# Patient Record
Sex: FEMALE | Race: WHITE | NOT HISPANIC OR LATINO | Employment: FULL TIME | URBAN - METROPOLITAN AREA
[De-identification: names, ages, dates, MRNs, and addresses within clinical notes are randomized per-mention and may not be internally consistent; named-entity substitution may affect disease eponyms.]

---

## 2017-01-05 ENCOUNTER — OFFICE VISIT (OUTPATIENT)
Dept: OPHTHALMOLOGY | Facility: CLINIC | Age: 27
End: 2017-01-05
Payer: COMMERCIAL

## 2017-01-05 DIAGNOSIS — H52.202 ASTIGMATISM, LEFT: ICD-10-CM

## 2017-01-05 DIAGNOSIS — H52.13 MYOPIA, BILATERAL: Primary | ICD-10-CM

## 2017-01-05 DIAGNOSIS — H04.123 DRY EYES, BILATERAL: ICD-10-CM

## 2017-01-05 PROCEDURE — 92004 COMPRE OPH EXAM NEW PT 1/>: CPT | Mod: S$GLB,,, | Performed by: OPTOMETRIST

## 2017-01-05 PROCEDURE — 92015 DETERMINE REFRACTIVE STATE: CPT | Mod: S$GLB,,, | Performed by: OPTOMETRIST

## 2017-01-05 PROCEDURE — 99999 PR PBB SHADOW E&M-EST. PATIENT-LVL II: CPT | Mod: PBBFAC,,, | Performed by: OPTOMETRIST

## 2017-01-05 PROCEDURE — 92310 CONTACT LENS FITTING OU: CPT | Mod: ,,, | Performed by: OPTOMETRIST

## 2017-01-05 NOTE — PROGRESS NOTES
HPI     PTs last exam was 1 year ago. PT c/o blurred distance va and wears cls   full time.   HPI    Any vision changes since last exam: slight decrease in distance va  Eye pain: no  Other ocular symptoms: itching, dryness OU. Works on computer all day    Do you wear currently wear glasses or contacts? both    Interested in contacts today? yes    Do you plan on getting new glasses today? If needed         Last edited by Gwen Fernandez MA on 1/5/2017  1:43 PM.         Assessment /Plan     For exam results, see Encounter Report.    Myopia, bilateral  Astigmatism, left  Eyeglass Final Rx     Eyeglass Final Rx      Sphere Cylinder Axis   Right -1.50     Left -2.00 +0.50 100       Expiration Date:  1/6/2018              Dry eyes, bilateral  D/c clear eyes  Start preservative free AT bid-PRN  Dispensed trial contact lenses today. RTC 1 week for contact lens check.  Dispensed trials of Dailies Total 1 and Air Optix

## 2017-01-18 ENCOUNTER — OFFICE VISIT (OUTPATIENT)
Dept: OPHTHALMOLOGY | Facility: CLINIC | Age: 27
End: 2017-01-18
Payer: COMMERCIAL

## 2017-01-18 DIAGNOSIS — H52.202 ASTIGMATISM, LEFT: ICD-10-CM

## 2017-01-18 DIAGNOSIS — H52.13 MYOPIA, BILATERAL: Primary | ICD-10-CM

## 2017-01-18 PROCEDURE — 99499 UNLISTED E&M SERVICE: CPT | Mod: S$GLB,,, | Performed by: OPTOMETRIST

## 2017-01-18 NOTE — PROGRESS NOTES
HPI     Pt states that the contact lenses are great, feel great and she can see   very well with them, but she is concerned with the cost of the daily's.        Last edited by Tom Zhao, Patient Care Assistant on 1/18/2017  8:24   AM.         Assessment /Plan     For exam results, see Encounter Report.    Myopia, bilateral    Astigmatism, left      Contact Lens Prescription (1/18/2017)       Brand Base Curve Diameter Sphere   Right Enrique Dailies Total 1 8.50 14.1 -1.50   Left Enrique Dailies Total 1 8.50 14.1 -1.50       Expiration Date:  1/19/2018    Replacement:  Daily    Wearing Schedule:  Daily wear      Contact Lens Prescription (1/18/2017)  #2      Brand Base Curve Diameter Sphere   Right Air Optix Aqua 8.60 14.2 -1.50   Left Air Optix Aqua 8.60 14.2 -1.50       Expiration Date:  1/19/2018    Replacement:  Monthly    Solutions:  OptiFree PureMoist    Wearing Schedule:  Daily wear      RTC 1 yr for undilated eye exam or PRN if any problems.   Discussed above and answered questions.

## 2017-02-01 ENCOUNTER — OFFICE VISIT (OUTPATIENT)
Dept: INTERNAL MEDICINE | Facility: CLINIC | Age: 27
End: 2017-02-01
Payer: COMMERCIAL

## 2017-02-01 ENCOUNTER — LAB VISIT (OUTPATIENT)
Dept: LAB | Facility: HOSPITAL | Age: 27
End: 2017-02-01
Attending: FAMILY MEDICINE
Payer: COMMERCIAL

## 2017-02-01 ENCOUNTER — PATIENT MESSAGE (OUTPATIENT)
Dept: INTERNAL MEDICINE | Facility: CLINIC | Age: 27
End: 2017-02-01

## 2017-02-01 VITALS
SYSTOLIC BLOOD PRESSURE: 108 MMHG | WEIGHT: 176.38 LBS | HEART RATE: 92 BPM | TEMPERATURE: 99 F | HEIGHT: 67 IN | OXYGEN SATURATION: 97 % | BODY MASS INDEX: 27.68 KG/M2 | DIASTOLIC BLOOD PRESSURE: 72 MMHG

## 2017-02-01 DIAGNOSIS — N94.3 PMS (PREMENSTRUAL SYNDROME): ICD-10-CM

## 2017-02-01 DIAGNOSIS — Z01.411 ENCOUNTER FOR GYNECOLOGICAL EXAMINATION WITH ABNORMAL FINDING: Primary | ICD-10-CM

## 2017-02-01 DIAGNOSIS — Z13.9 SCREENING: ICD-10-CM

## 2017-02-01 DIAGNOSIS — J45.909 ASTHMATIC BRONCHITIS, UNSPECIFIED ASTHMA SEVERITY, UNCOMPLICATED: ICD-10-CM

## 2017-02-01 DIAGNOSIS — N94.3 PMS (PREMENSTRUAL SYNDROME): Primary | ICD-10-CM

## 2017-02-01 DIAGNOSIS — F32.A ANXIETY AND DEPRESSION: ICD-10-CM

## 2017-02-01 DIAGNOSIS — F41.9 ANXIETY AND DEPRESSION: ICD-10-CM

## 2017-02-01 LAB
CHOLEST/HDLC SERPL: 2.3 {RATIO}
HDL/CHOLESTEROL RATIO: 43.1 %
HDLC SERPL-MCNC: 160 MG/DL
HDLC SERPL-MCNC: 69 MG/DL
LDLC SERPL CALC-MCNC: 83.4 MG/DL
NONHDLC SERPL-MCNC: 91 MG/DL
TRIGL SERPL-MCNC: 38 MG/DL

## 2017-02-01 PROCEDURE — 99999 PR PBB SHADOW E&M-EST. PATIENT-LVL III: CPT | Mod: PBBFAC,,, | Performed by: FAMILY MEDICINE

## 2017-02-01 PROCEDURE — 88175 CYTOPATH C/V AUTO FLUID REDO: CPT | Performed by: PATHOLOGY

## 2017-02-01 PROCEDURE — 36415 COLL VENOUS BLD VENIPUNCTURE: CPT

## 2017-02-01 PROCEDURE — 80061 LIPID PANEL: CPT

## 2017-02-01 PROCEDURE — 88141 CYTOPATH C/V INTERPRET: CPT | Mod: ,,, | Performed by: PATHOLOGY

## 2017-02-01 PROCEDURE — 99213 OFFICE O/P EST LOW 20 MIN: CPT | Mod: 25,S$GLB,, | Performed by: FAMILY MEDICINE

## 2017-02-01 RX ORDER — ALBUTEROL SULFATE 90 UG/1
2 AEROSOL, METERED RESPIRATORY (INHALATION)
Qty: 18 G | Refills: 3 | Status: SHIPPED | OUTPATIENT
Start: 2017-02-01 | End: 2018-12-05

## 2017-02-01 NOTE — MR AVS SNAPSHOT
[facility] - Internal Medicine  [address] [address]  Phone: [phone]  Fax: [phone]                  Billie Nicolas   2017 8:00 AM   Office Visit    Description:  Female : 1990   Provider:  Yanni Ramírez MD   Department:  [facility] - Internal Medicine           Reason for Visit     Gynecologic Exam           Diagnoses this Visit        Comments    Encounter for gynecological examination with abnormal finding    -  Primary     Asthmatic bronchitis, unspecified asthma severity, uncomplicated                To Do List           Future Appointments        Provider Department Dept Phone    2017 3:00 PM Yanni Ramírez MD [facility] Internal Medicine [phone]      Goals (5 Years of Data)     None      Follow-Up and Disposition     Return if symptoms worsen or fail to improve.       These Medications        Disp Refills Start End    albuterol 90 mcg/actuation inhaler 18 g 3 2017    Inhale 2 puffs into the lungs every 4 to 6 hours as needed for Wheezing. - Inhalation    Pharmacy: [facility] [id number] [place] [facility] [place] [place] -  [facility] #: [phone]         [facility] On Call     [facility] On Call Nurse Care Line -  Assistance  Registered nurses in the [facility] provide clinical advisement, health education, appointment booking, and other advisory services.  Call for this free service at [phone].             Medications           Message regarding Medications     Verify the changes and/or additions to your medication regime listed below are the same as discussed with your clinician today.  If any of these changes or additions are incorrect, please notify your healthcare provider.        STOP taking these medications     fluticasone (FLONASE) 50 mcg/actuation nasal spray 2 sprays by Each Nare route once daily.    valacyclovir (VALTREX) 500 MG tablet Take 500 mg by mouth once daily.           Verify that the  "below list of medications is an accurate representation of the medications you are currently taking.  If none reported, the list may be blank. If incorrect, please contact your healthcare provider. Carry this list with you in case of emergency.           Current Medications     albuterol 90 mcg/actuation inhaler Inhale 2 puffs into the lungs every 4 to 6 hours as needed for Wheezing.    CALC/D3/MAG/ZN/LENCHO/LEESA/BORON (CA-D3-MAG-ZINC--LEESA-BOR) 609-493-96-7.5 mg-unit-mg-mg Tab Take 3 tablets by mouth once daily.    copper (PARAGARD T 380A) 380 square mm IUD by Intrauterine route.    dextroamphetamine-amphetamine (ADDERALL XR) 20 MG 24 hr capsule Take 40 mg by mouth every morning.    VIIBRYD 40 mg Tab tablet Take 40 mg by mouth once daily.    zolpidem (AMBIEN) 10 mg Tab Take 10 mg by mouth nightly.           Clinical Reference Information           Vital Signs - Last Recorded  Most recent update: 2/1/2017  8:15 AM by Daily Arenas    BP Pulse Temp Ht    108/72 (BP Location: Left arm, Patient Position: Sitting, BP Method: Manual) 92 99.1 °F (37.3 °C) (Tympanic) 5' 7" (1.702 m)    Wt LMP SpO2 BMI    80 kg (176 lb 5.9 oz) 01/19/2017 (Exact Date) 97% 27.62 kg/m2      Blood Pressure          Most Recent Value    BP  108/72      Allergies as of 2/1/2017     Codeine    Iodine Containing Multivitamin      Immunizations Administered on Date of Encounter - 2/1/2017     None      "
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
## 2017-02-07 RX ORDER — SERTRALINE HYDROCHLORIDE 50 MG/1
50 TABLET, FILM COATED ORAL DAILY
Qty: 30 TABLET | Refills: 2 | Status: SHIPPED | OUTPATIENT
Start: 2017-02-07 | End: 2017-03-19

## 2017-02-08 ENCOUNTER — PATIENT MESSAGE (OUTPATIENT)
Dept: INTERNAL MEDICINE | Facility: CLINIC | Age: 27
End: 2017-02-08

## 2017-02-09 ENCOUNTER — TELEPHONE (OUTPATIENT)
Dept: INTERNAL MEDICINE | Facility: CLINIC | Age: 27
End: 2017-02-09

## 2017-02-09 DIAGNOSIS — R87.611 PAP SMEAR OF CERVIX WITH ASCUS, CANNOT EXCLUDE HGSIL: Primary | ICD-10-CM

## 2017-02-09 NOTE — TELEPHONE ENCOUNTER
Patient needs to be scheduled for colposcopy. Sent referral to gyn and sent results via [facility] to patient.

## 2017-02-13 ENCOUNTER — PATIENT MESSAGE (OUTPATIENT)
Dept: INTERNAL MEDICINE | Facility: CLINIC | Age: 27
End: 2017-02-13

## 2017-02-13 ENCOUNTER — INITIAL CONSULT (OUTPATIENT)
Dept: PSYCHIATRY | Facility: CLINIC | Age: 27
End: 2017-02-13
Payer: COMMERCIAL

## 2017-02-13 DIAGNOSIS — F33.1 MAJOR DEPRESSIVE DISORDER, RECURRENT EPISODE, MODERATE: Primary | ICD-10-CM

## 2017-02-13 PROCEDURE — 90791 PSYCH DIAGNOSTIC EVALUATION: CPT | Mod: S$GLB,,, | Performed by: SOCIAL WORKER
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
## 2017-02-23 ENCOUNTER — TELEPHONE (OUTPATIENT)
Dept: OBSTETRICS AND GYNECOLOGY | Facility: CLINIC | Age: 27
End: 2017-02-23

## 2017-02-23 NOTE — TELEPHONE ENCOUNTER
----- Message from Peg Darby sent at 2/23/2017  2:32 PM CST -----  Contact: pt  Pt want call back concerning 2/28 appt, pt can be reached at [phone]///thxMW

## 2017-03-07 ENCOUNTER — OFFICE VISIT (OUTPATIENT)
Dept: PSYCHIATRY | Facility: CLINIC | Age: 27
End: 2017-03-07
Payer: COMMERCIAL

## 2017-03-07 DIAGNOSIS — F48.9 DEFERRED DIAGNOSIS ON AXIS I: Primary | ICD-10-CM

## 2017-03-07 PROCEDURE — 90834 PSYTX W PT 45 MINUTES: CPT | Mod: S$GLB,,, | Performed by: SOCIAL WORKER

## 2017-03-17 ENCOUNTER — PATIENT MESSAGE (OUTPATIENT)
Dept: INTERNAL MEDICINE | Facility: CLINIC | Age: 27
End: 2017-03-17

## 2017-03-17 DIAGNOSIS — N94.3 PMS (PREMENSTRUAL SYNDROME): Primary | ICD-10-CM

## 2017-03-17 DIAGNOSIS — F41.8 DEPRESSION WITH ANXIETY: ICD-10-CM

## 2017-03-19 RX ORDER — SERTRALINE HYDROCHLORIDE 100 MG/1
100 TABLET, FILM COATED ORAL DAILY
Qty: 30 TABLET | Refills: 11 | Status: SHIPPED | OUTPATIENT
Start: 2017-03-19 | End: 2017-04-25 | Stop reason: SDUPTHER

## 2017-03-20 ENCOUNTER — PATIENT MESSAGE (OUTPATIENT)
Dept: INTERNAL MEDICINE | Facility: CLINIC | Age: 27
End: 2017-03-20

## 2017-03-20 ENCOUNTER — TELEPHONE (OUTPATIENT)
Dept: OBSTETRICS AND GYNECOLOGY | Facility: CLINIC | Age: 27
End: 2017-03-20

## 2017-03-20 NOTE — TELEPHONE ENCOUNTER
----- Message from Sabi Singh sent at 3/20/2017  3:14 PM CDT -----  Call pt at [phone]//calling to reschedule her procedure from the march 24//thks ht

## 2017-03-21 ENCOUNTER — OFFICE VISIT (OUTPATIENT)
Dept: PSYCHIATRY | Facility: CLINIC | Age: 27
End: 2017-03-21
Payer: COMMERCIAL

## 2017-03-21 DIAGNOSIS — F48.9 DEFERRED DIAGNOSIS ON AXIS I: Primary | ICD-10-CM

## 2017-03-21 PROCEDURE — 90834 PSYTX W PT 45 MINUTES: CPT | Mod: S$GLB,,, | Performed by: SOCIAL WORKER

## 2017-03-28 ENCOUNTER — TELEPHONE (OUTPATIENT)
Dept: OBSTETRICS AND GYNECOLOGY | Facility: CLINIC | Age: 27
End: 2017-03-28

## 2017-03-29 ENCOUNTER — TELEPHONE (OUTPATIENT)
Dept: OBSTETRICS AND GYNECOLOGY | Facility: CLINIC | Age: 27
End: 2017-03-29

## 2017-03-29 NOTE — TELEPHONE ENCOUNTER
----- Message from Mayelin Cotter sent at 3/29/2017  3:40 PM CDT -----  Contact: Pt  Pt is returning the nurse call. Pls call pt back at [phone].

## 2017-03-29 NOTE — TELEPHONE ENCOUNTER
----- Message from Teresa Hernandez sent at 3/29/2017 12:21 PM CDT -----  Contact: pt   States she is rtn nurses call and can be reached at [phone]//thanks/dbw

## 2017-04-19 ENCOUNTER — OFFICE VISIT (OUTPATIENT)
Dept: PSYCHIATRY | Facility: CLINIC | Age: 27
End: 2017-04-19
Payer: COMMERCIAL

## 2017-04-19 DIAGNOSIS — F48.9 DEFERRED DIAGNOSIS ON AXIS I: Primary | ICD-10-CM

## 2017-04-19 PROCEDURE — 90834 PSYTX W PT 45 MINUTES: CPT | Mod: S$GLB,,, | Performed by: SOCIAL WORKER

## 2017-04-20 ENCOUNTER — TELEPHONE (OUTPATIENT)
Dept: OBSTETRICS AND GYNECOLOGY | Facility: CLINIC | Age: 27
End: 2017-04-20

## 2017-04-20 NOTE — TELEPHONE ENCOUNTER
----- Message from Wilton Rodriguez sent at 4/20/2017 11:47 AM CDT -----  Pt is requesting a call from nurse in regards to scheduling a  colposcopy  Procedure.            Please call pt back at [phone]

## 2017-04-20 NOTE — TELEPHONE ENCOUNTER
Message left for patient to call the office back to reschedule her colposcopy with one of our MD's.

## 2017-04-21 ENCOUNTER — TELEPHONE (OUTPATIENT)
Dept: OBSTETRICS AND GYNECOLOGY | Facility: CLINIC | Age: 27
End: 2017-04-21

## 2017-04-21 NOTE — TELEPHONE ENCOUNTER
----- Message from Denise Prajapati sent at 4/21/2017  1:01 PM CDT -----  Contact: patient  Returning your call. Please call patient @ ASAP today [phone]. Thanks, archie

## 2017-04-21 NOTE — TELEPHONE ENCOUNTER
----- Message from Carly Barbosa sent at 4/21/2017 11:44 AM CDT -----  Contact: [phone]  Pt would like to schedule procedure appt. Please call back @ [phone]. Thanks- [facility].

## 2017-04-21 NOTE — TELEPHONE ENCOUNTER
----- Message from Teresa Hernandez sent at 4/20/2017 12:14 PM CDT -----  Contact: pt   States she is rtn nurses call and can be reached at [phone]// rg scheduling her procedure//thanks/cullen

## 2017-04-25 DIAGNOSIS — N94.3 PMS (PREMENSTRUAL SYNDROME): ICD-10-CM

## 2017-04-25 DIAGNOSIS — F41.8 DEPRESSION WITH ANXIETY: ICD-10-CM

## 2017-04-25 RX ORDER — SERTRALINE HYDROCHLORIDE 100 MG/1
100 TABLET, FILM COATED ORAL DAILY
Qty: 90 TABLET | Refills: 3 | Status: SHIPPED | OUTPATIENT
Start: 2017-04-25 | End: 2018-05-08 | Stop reason: SDUPTHER

## 2017-05-02 ENCOUNTER — PROCEDURE VISIT (OUTPATIENT)
Dept: OBSTETRICS AND GYNECOLOGY | Facility: CLINIC | Age: 27
End: 2017-05-02
Payer: COMMERCIAL

## 2017-05-02 ENCOUNTER — TELEPHONE (OUTPATIENT)
Dept: OBSTETRICS AND GYNECOLOGY | Facility: CLINIC | Age: 27
End: 2017-05-02

## 2017-05-02 VITALS
DIASTOLIC BLOOD PRESSURE: 82 MMHG | SYSTOLIC BLOOD PRESSURE: 130 MMHG | BODY MASS INDEX: 27.52 KG/M2 | WEIGHT: 175.69 LBS

## 2017-05-02 DIAGNOSIS — R87.611 PAP SMEAR OF CERVIX WITH ASCUS, CANNOT EXCLUDE HGSIL: Primary | ICD-10-CM

## 2017-05-02 PROCEDURE — 81025 URINE PREGNANCY TEST: CPT | Mod: QW,S$GLB,, | Performed by: OBSTETRICS & GYNECOLOGY

## 2017-05-02 PROCEDURE — 57454 BX/CURETT OF CERVIX W/SCOPE: CPT | Mod: S$GLB,,, | Performed by: OBSTETRICS & GYNECOLOGY

## 2017-05-02 PROCEDURE — 88305 TISSUE EXAM BY PATHOLOGIST: CPT | Mod: 26,,, | Performed by: PATHOLOGY

## 2017-05-02 PROCEDURE — 88305 TISSUE EXAM BY PATHOLOGIST: CPT | Performed by: PATHOLOGY

## 2017-05-02 NOTE — PROCEDURES
Colposcopy-Today  Date/Time: 5/2/2017 3:29 PM  Performed by: MUNIRA SANDOVAL  Authorized by: MUNIRA SANDOVAL     Consent Done?:  Yes (Written)  Assistants?: No      Colposcopy Site:  Cervix  Position:  Supine  Acrowhite Lesion? Yes    Atypical Vessels: No    Transformation Zone Adequate?: Yes    Biopsy?: Yes         Location:  Cervix ((1 00))  ECC Performed?: Yes    LEEP Performed?: No    Estimated blood loss (cc):  1   Patient tolerated the procedure well with no immediate complications.   Post-operative instructions were provided for the patient.   Patient was discharged and will follow up if any complications occur     COLPOSCOPY:    Billie Nicolas is a 26 y.o. female No obstetric history on file.  presents for colposcopy.  Patient's last menstrual period was 04/16/2017..  Her most recent pap smear shows ASC cannot exclude high grade lesion (ASCH).    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    The abnormal test findings were discussed, as well as HPV infection, need for colposcopy and possible biopsies to determine the plan of care, treatments available, the minimal risk of bleeding and infection with colposcopy, and alternatives to colposcopy.  Pt voiced understanding and desires to proceed.      UPT is negative    COLPOSCOPY EXAM:   TIME OUT PERFORMED.     No gross vulvovaginal or cervix lesions noted.  On colpo: no mosaicism, no punctation, no abnormal vasculature and acetowhite lesion(s) noted at 1 o'clock    Biopsy was taken at 1 o'clock.  ECC was performed.   SCJ was entirely visualized.    IUD strings were visualized.  Hemostasis was adequate with application of Monsel's solution.  The speculum was removed.  The patient did tolerate the procedure well but experienced a mild vagal reaction (no loss of consciousness) after completion of procedure.  This resolved promptly within minutes of laying supine with elevated legs.  Pt was able to stand and  ambulate without difficulty.    All collected specimens sent to pathology for histologic analysis.    Post-colposcopy counseling:  The patient was instructed to manage post-colposcopy cramping with NSAIDs or Tylenol, or with a prescription per the medication card.  Avoid intercourse, douching, or tampons in the vagina for at least 2-3 days.  Expect a clumpy blackish discharge due to Monsel's solution application for several days.  Report heavy bleeding, worsening pain or pain that does not respond to above medications, or foul-smelling vaginal discharge. HPV vaccine recommended according to FDA age guidelines.  Importance of follow-up stressed.      Follow up based on colposcopy results.  Impression:  GARCÍA I.  If confirmed by pathology results, recommend follow-up pap in 6 months.  Pt was counseled on possibility for need of excisional procedure (LEEP vs CKC).  Given pt tendency towards vagal response with procedures, pt may benefit from OR based procedure.

## 2017-05-10 ENCOUNTER — PATIENT MESSAGE (OUTPATIENT)
Dept: INTERNAL MEDICINE | Facility: CLINIC | Age: 27
End: 2017-05-10

## 2017-05-10 DIAGNOSIS — F32.A DEPRESSION, UNSPECIFIED DEPRESSION TYPE: Primary | ICD-10-CM

## 2017-05-11 RX ORDER — VILAZODONE HYDROCHLORIDE 20 MG/1
1 TABLET ORAL DAILY
Qty: 90 TABLET | Refills: 3 | Status: SHIPPED | OUTPATIENT
Start: 2017-05-11 | End: 2018-02-02 | Stop reason: SDUPTHER

## 2017-05-12 ENCOUNTER — TELEPHONE (OUTPATIENT)
Dept: OBSTETRICS AND GYNECOLOGY | Facility: CLINIC | Age: 27
End: 2017-05-12

## 2017-05-12 NOTE — TELEPHONE ENCOUNTER
Called and spoke with pt.  Reviewed pathology results (no dysplasia and negative ECC).  Findings are not consistent with pap result but are consistent with colpo findings.  As per recommendations at last visit, pt may proceed with expectant management with paps every 6 months or proceed with excisional procedure LEEP if she desires.  Pt voiced understanding and desires to proceed with repeat pap in 4-6 months..

## 2017-05-16 ENCOUNTER — OFFICE VISIT (OUTPATIENT)
Dept: PSYCHIATRY | Facility: CLINIC | Age: 27
End: 2017-05-16
Payer: COMMERCIAL

## 2017-05-16 DIAGNOSIS — F33.1 MAJOR DEPRESSIVE DISORDER, RECURRENT EPISODE, MODERATE: Primary | ICD-10-CM

## 2017-05-16 PROCEDURE — 90832 PSYTX W PT 30 MINUTES: CPT | Mod: S$GLB,,, | Performed by: SOCIAL WORKER
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
## 2017-07-03 ENCOUNTER — PATIENT MESSAGE (OUTPATIENT)
Dept: INTERNAL MEDICINE | Facility: CLINIC | Age: 27
End: 2017-07-03

## 2017-07-03 RX ORDER — ZOLPIDEM TARTRATE 5 MG/1
5 TABLET ORAL NIGHTLY PRN
Qty: 30 TABLET | Refills: 0 | Status: SHIPPED | OUTPATIENT
Start: 2017-07-03 | End: 2017-12-14

## 2017-07-05 ENCOUNTER — PATIENT MESSAGE (OUTPATIENT)
Dept: INTERNAL MEDICINE | Facility: CLINIC | Age: 27
End: 2017-07-05

## 2017-07-05 DIAGNOSIS — F98.8 ADD (ATTENTION DEFICIT DISORDER): Primary | ICD-10-CM

## 2017-07-05 RX ORDER — DEXTROAMPHETAMINE SACCHARATE, AMPHETAMINE ASPARTATE MONOHYDRATE, DEXTROAMPHETAMINE SULFATE AND AMPHETAMINE SULFATE 5; 5; 5; 5 MG/1; MG/1; MG/1; MG/1
40 CAPSULE, EXTENDED RELEASE ORAL EVERY MORNING
Qty: 60 CAPSULE | Refills: 0 | Status: SHIPPED | OUTPATIENT
Start: 2017-07-05 | End: 2017-07-06

## 2017-07-06 ENCOUNTER — PATIENT MESSAGE (OUTPATIENT)
Dept: INTERNAL MEDICINE | Facility: CLINIC | Age: 27
End: 2017-07-06

## 2017-07-06 RX ORDER — DEXTROAMPHETAMINE SACCHARATE, AMPHETAMINE ASPARTATE, DEXTROAMPHETAMINE SULFATE AND AMPHETAMINE SULFATE 5; 5; 5; 5 MG/1; MG/1; MG/1; MG/1
1 TABLET ORAL DAILY
Qty: 30 TABLET | Refills: 0 | Status: SHIPPED | OUTPATIENT
Start: 2017-07-06 | End: 2017-08-25 | Stop reason: SDUPTHER

## 2017-07-19 ENCOUNTER — OFFICE VISIT (OUTPATIENT)
Dept: PSYCHIATRY | Facility: CLINIC | Age: 27
End: 2017-07-19
Payer: COMMERCIAL

## 2017-07-19 DIAGNOSIS — F33.1 MAJOR DEPRESSIVE DISORDER, RECURRENT EPISODE, MODERATE: Primary | ICD-10-CM

## 2017-07-19 PROCEDURE — 90834 PSYTX W PT 45 MINUTES: CPT | Mod: S$GLB,,, | Performed by: SOCIAL WORKER

## 2017-07-19 NOTE — PROGRESS NOTES
Individual Psychotherapy (PhD/LCSW)    7/19/2017    Site:  [facility]         Therapeutic Intervention: Met with patient.  Outpatient - Insight oriented psychotherapy 45 min - CPT code 50028    Chief complaint/reason for encounter: depression and anxiety     Interval history and content of current session:  Patient presents to ongoing individual therapy due to depression and anxiety.  She was last in session on 5/16/17.  Since that time, she has decided to break off her relationship of two years.  She was dating a ash who was in his residency in [place], [place].  She felt there was too much effort being used to plan time to get together to then work on his house.  She was tired of arguing with him.  Since that time, she has been spending time with friends.  She has decided to that she does not want a serious monogamous relationship.  She has communicated her wishes to people she has been dating.  She has been spending time with a ash named, Miko.  He is an occupational therapist.  She became anxious on Sunday because they were spending too much time together.  She admits she basically threw him out of the apartment.  She has focused more time on her job.  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  She has realized that she is triggered by men who use a certain tone with her.  She continues to set boundaries with her mother who is trying to get the patient involved with her sister's life.  Her mother will back down when the patient sets boundaries.    Treatment plan:  ? Target symptoms: depression, anxiety   ? Why chosen therapy is appropriate versus another modality: relevant to diagnosis  ? Outcome monitoring methods: self-report, observation  ? Therapeutic intervention type: insight oriented psychotherapy, supportive psychotherapy, interactive psychotherapy     Risk parameters:  Patient reports no suicidal ideation  Patient reports no homicidal ideation  Patient reports no  self-injurious behavior  Patient reports no violent behavior     Verbal deficits: None     Patient's response to intervention:  The patient's response to intervention is accepting, motivated.     Progress toward goals and other mental status changes:  The patient's progress toward goals is fair .     Diagnosis:   Major depression recurrent moderate     Plan:  individual psychotherapy and medication management by physician     Return to clinic: as scheduled     Length of Service (minutes): 45

## 2017-07-28 ENCOUNTER — PATIENT MESSAGE (OUTPATIENT)
Dept: INTERNAL MEDICINE | Facility: CLINIC | Age: 27
End: 2017-07-28

## 2017-07-28 RX ORDER — VALACYCLOVIR HYDROCHLORIDE 500 MG/1
500 TABLET, FILM COATED ORAL DAILY
Qty: 90 TABLET | Refills: 0 | Status: SHIPPED | OUTPATIENT
Start: 2017-07-28 | End: 2017-10-24 | Stop reason: SDUPTHER

## 2017-08-01 ENCOUNTER — TELEPHONE (OUTPATIENT)
Dept: DERMATOLOGY | Facility: CLINIC | Age: 27
End: 2017-08-01

## 2017-08-01 ENCOUNTER — LAB VISIT (OUTPATIENT)
Dept: LAB | Facility: HOSPITAL | Age: 27
End: 2017-08-01
Payer: COMMERCIAL

## 2017-08-01 ENCOUNTER — OFFICE VISIT (OUTPATIENT)
Dept: DERMATOLOGY | Facility: CLINIC | Age: 27
End: 2017-08-01
Payer: COMMERCIAL

## 2017-08-01 DIAGNOSIS — Z79.899 ENCOUNTER FOR LONG-TERM (CURRENT) USE OF OTHER MEDICATIONS: ICD-10-CM

## 2017-08-01 DIAGNOSIS — L70.0 ACNE VULGARIS: Primary | ICD-10-CM

## 2017-08-01 LAB
ANION GAP SERPL CALC-SCNC: 8 MMOL/L
BUN SERPL-MCNC: 13 MG/DL
CALCIUM SERPL-MCNC: 9 MG/DL
CHLORIDE SERPL-SCNC: 105 MMOL/L
CO2 SERPL-SCNC: 26 MMOL/L
CREAT SERPL-MCNC: 0.9 MG/DL
EST. GFR  (AFRICAN AMERICAN): >60 ML/MIN/1.73 M^2
EST. GFR  (NON AFRICAN AMERICAN): >60 ML/MIN/1.73 M^2
GLUCOSE SERPL-MCNC: 79 MG/DL
POTASSIUM SERPL-SCNC: 4 MMOL/L
SODIUM SERPL-SCNC: 139 MMOL/L

## 2017-08-01 PROCEDURE — 99201 PR OFFICE/OUTPT VISIT,NEW,LEVL I: CPT | Mod: S$GLB,,,

## 2017-08-01 PROCEDURE — 80048 BASIC METABOLIC PNL TOTAL CA: CPT

## 2017-08-01 PROCEDURE — 99999 PR PBB SHADOW E&M-EST. PATIENT-LVL II: CPT | Mod: PBBFAC,,,

## 2017-08-01 PROCEDURE — 36415 COLL VENOUS BLD VENIPUNCTURE: CPT | Mod: PO

## 2017-08-01 RX ORDER — ADAPALENE AND BENZOYL PEROXIDE 3; 25 MG/G; MG/G
GEL TOPICAL
Qty: 45 G | Refills: 3 | Status: SHIPPED | OUTPATIENT
Start: 2017-08-01 | End: 2018-07-09 | Stop reason: SDUPTHER

## 2017-08-01 NOTE — PROGRESS NOTES
Subjective:       Patient ID:  Billie Nicolas is a 26 y.o. female who presents for   Chief Complaint   Patient presents with    Acne     c/o acne on bilateral cheeks and chin x 2 years w/ scarring     27 yo with complaint of acne x 2 years that began after she discontinued OCPs. Symptoms are worse with menses. She has tried ziana gel with mild improvement. She has also tried minocycline in past but discontinued 2/2 diarrhea/heartburn.             Review of Systems   HENT: Negative for nosebleeds and headaches.    Gastrointestinal: Positive for Sensitivity to oral antibiotics (Reports GI side effects with tetracyclines). Negative for nausea, vomiting, diarrhea and indigestion.   Genitourinary: Negative for irregular periods.        Has IUD   Musculoskeletal: Negative for arthralgias.   Skin: Positive for daily sunscreen use and activity-related sunscreen use.   Neurological: Negative for headaches.        Objective:    Physical Exam   Constitutional: She appears well-developed and well-nourished. No distress.   Neurological: She is alert and oriented to person, place, and time. She is not disoriented.   Psychiatric: She has a normal mood and affect.   Skin:   Areas Examined (abnormalities noted in diagram):   Head / Face Inspection Performed  Neck Inspection Performed  Chest / Axilla Inspection Performed  Back Inspection Performed  RUE Inspected  LUE Inspection Performed                   Diagram Legend     Erythematous scaling macule/papule c/w actinic keratosis       Vascular papule c/w angioma      Pigmented verrucoid papule/plaque c/w seborrheic keratosis      Yellow umbilicated papule c/w sebaceous hyperplasia      Irregularly shaped tan macule c/w lentigo     1-2 mm smooth white papules consistent with Milia      Movable subcutaneous cyst with punctum c/w epidermal inclusion cyst      Subcutaneous movable cyst c/w pilar cyst      Firm pink to brown papule c/w dermatofibroma      Pedunculated fleshy  papule(s) c/w skin tag(s)      Evenly pigmented macule c/w junctional nevus     Mildly variegated pigmented, slightly irregular-bordered macule c/w mildly atypical nevus      Flesh colored to evenly pigmented papule c/w intradermal nevus       Pink pearly papule/plaque c/w basal cell carcinoma      Erythematous hyperkeratotic cursted plaque c/w SCC      Surgical scar with no sign of skin cancer recurrence      Open and closed comedones      Inflammatory papules and pustules      Verrucoid papule consistent consistent with wart     Erythematous eczematous patches and plaques     Dystrophic onycholytic nail with subungual debris c/w onychomycosis     Umbilicated papule    Erythematous-base heme-crusted tan verrucoid plaque consistent with inflamed seborrheic keratosis     Erythematous Silvery Scaling Plaque c/w Psoriasis     See annotation      Assessment / Plan:        Acne vulgaris- likely hormonal   -     EPIDUO FORTE 0.3-2.5 % GlwP; AAA face qhs  Dispense: 45 g; Refill: 3  If labs wnl, will also begin spironolactone 50 mg daily  Will avoid tetracyclines 2/2 GI complaints in past.  Discussed using pea-sized drop to entire face qhs.  Start applying every 2-3 nights then gradually increase to nightly application as tolerated.  May notice mild redness and irritation  Continue sun protection with at least SPF 30 daily  Discussed benefits and risks of therapy including but not limited to breakthrough bleeding, breast tenderness, and elevated potassium levels which may give symptoms of fatigue, palpitations, and nausea. Patient should limit potassium intake - avoid potassium supplements or salt substitutes, limit bananas and citrus fruits. Pregnancy must be avoided while taking spironolactone. Pt verbalized understanding.     Encounter for long-term (current) use of other medications  -     Basic metabolic panel; Future- today  -     Basic metabolic panel; Future- 4 weeks           Return in about 6 weeks (around  9/12/2017).

## 2017-08-02 ENCOUNTER — PATIENT MESSAGE (OUTPATIENT)
Dept: DERMATOLOGY | Facility: CLINIC | Age: 27
End: 2017-08-02

## 2017-08-03 ENCOUNTER — TELEPHONE (OUTPATIENT)
Dept: DERMATOLOGY | Facility: CLINIC | Age: 27
End: 2017-08-03

## 2017-08-25 ENCOUNTER — OFFICE VISIT (OUTPATIENT)
Dept: INTERNAL MEDICINE | Facility: CLINIC | Age: 27
End: 2017-08-25
Payer: COMMERCIAL

## 2017-08-25 ENCOUNTER — OFFICE VISIT (OUTPATIENT)
Dept: PSYCHIATRY | Facility: CLINIC | Age: 27
End: 2017-08-25
Payer: COMMERCIAL

## 2017-08-25 VITALS
SYSTOLIC BLOOD PRESSURE: 124 MMHG | WEIGHT: 167.56 LBS | BODY MASS INDEX: 26.3 KG/M2 | TEMPERATURE: 98 F | OXYGEN SATURATION: 98 % | HEIGHT: 67 IN | HEART RATE: 94 BPM | DIASTOLIC BLOOD PRESSURE: 90 MMHG

## 2017-08-25 DIAGNOSIS — Z11.3 ROUTINE SCREENING FOR STI (SEXUALLY TRANSMITTED INFECTION): ICD-10-CM

## 2017-08-25 DIAGNOSIS — F33.1 MAJOR DEPRESSIVE DISORDER, RECURRENT EPISODE, MODERATE: Primary | ICD-10-CM

## 2017-08-25 DIAGNOSIS — F41.9 ANXIETY AND DEPRESSION: ICD-10-CM

## 2017-08-25 DIAGNOSIS — F32.A ANXIETY AND DEPRESSION: ICD-10-CM

## 2017-08-25 DIAGNOSIS — N94.3 PMS (PREMENSTRUAL SYNDROME): ICD-10-CM

## 2017-08-25 DIAGNOSIS — F98.8 ATTENTION DEFICIT DISORDER, UNSPECIFIED HYPERACTIVITY PRESENCE: Primary | ICD-10-CM

## 2017-08-25 PROCEDURE — 99214 OFFICE O/P EST MOD 30 MIN: CPT | Mod: S$GLB,,, | Performed by: FAMILY MEDICINE

## 2017-08-25 PROCEDURE — 3008F BODY MASS INDEX DOCD: CPT | Mod: S$GLB,,, | Performed by: FAMILY MEDICINE

## 2017-08-25 PROCEDURE — 90834 PSYTX W PT 45 MINUTES: CPT | Mod: S$GLB,,, | Performed by: SOCIAL WORKER

## 2017-08-25 PROCEDURE — 99999 PR PBB SHADOW E&M-EST. PATIENT-LVL III: CPT | Mod: PBBFAC,,, | Performed by: FAMILY MEDICINE

## 2017-08-25 RX ORDER — DEXTROAMPHETAMINE SACCHARATE, AMPHETAMINE ASPARTATE, DEXTROAMPHETAMINE SULFATE AND AMPHETAMINE SULFATE 5; 5; 5; 5 MG/1; MG/1; MG/1; MG/1
TABLET ORAL
Qty: 45 TABLET | Refills: 0 | Status: SHIPPED | OUTPATIENT
Start: 2017-08-25 | End: 2017-08-25 | Stop reason: SDUPTHER

## 2017-08-25 RX ORDER — DEXTROAMPHETAMINE SACCHARATE, AMPHETAMINE ASPARTATE, DEXTROAMPHETAMINE SULFATE AND AMPHETAMINE SULFATE 5; 5; 5; 5 MG/1; MG/1; MG/1; MG/1
TABLET ORAL
Qty: 45 TABLET | Refills: 0 | Status: SHIPPED | OUTPATIENT
Start: 2017-11-10 | End: 2017-12-14 | Stop reason: SDUPTHER

## 2017-08-25 RX ORDER — DEXTROAMPHETAMINE SACCHARATE, AMPHETAMINE ASPARTATE, DEXTROAMPHETAMINE SULFATE AND AMPHETAMINE SULFATE 5; 5; 5; 5 MG/1; MG/1; MG/1; MG/1
TABLET ORAL
Qty: 45 TABLET | Refills: 0 | Status: SHIPPED | OUTPATIENT
Start: 2017-09-15 | End: 2017-08-25 | Stop reason: SDUPTHER

## 2017-08-25 RX ORDER — DEXTROAMPHETAMINE SACCHARATE, AMPHETAMINE ASPARTATE, DEXTROAMPHETAMINE SULFATE AND AMPHETAMINE SULFATE 5; 5; 5; 5 MG/1; MG/1; MG/1; MG/1
TABLET ORAL
Qty: 45 TABLET | Refills: 0 | Status: SHIPPED | OUTPATIENT
Start: 2017-10-13 | End: 2017-08-25 | Stop reason: SDUPTHER
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
## 2017-08-25 NOTE — PROGRESS NOTES
Subjective:       Patient ID: Billie Nicolas is a 26 y.o. female.    Chief Complaint: Medication Refill and Follow-up    HPI Ms. Nicolas presents for medication refill and follow up. Zoloft has helped with PMS symptoms and she decreased her viibryd as much as she felt comfortable and feels she is stable.     Second dose of the adderall needed we have discussed this in the past.      Doing sleep hygiene which is helping to use ambien sparingly. She does not want a refill at this time.     She would like routine STI check as she is no longer in the relationship she was in previously. She does practice safe sex.     Review of Systems   Constitutional: Negative for activity change and unexpected weight change.   HENT: Negative for hearing loss, rhinorrhea and trouble swallowing.    Eyes: Negative for discharge and visual disturbance.   Respiratory: Negative for chest tightness and wheezing.    Cardiovascular: Negative for chest pain and palpitations.   Gastrointestinal: Negative for blood in stool, constipation, diarrhea and vomiting.   Endocrine: Negative for polydipsia and polyuria.   Genitourinary: Negative for difficulty urinating, dysuria, hematuria and menstrual problem.   Musculoskeletal: Negative for arthralgias, joint swelling and neck pain.   Neurological: Negative for weakness and headaches.   Psychiatric/Behavioral: Negative for confusion and dysphoric mood.           Past Medical History:   Diagnosis Date    Abnormal cervical Pap smear with positive HPV DNA test     ADHD, predominantly inattentive type     Allergy     Anxiety     Asthma     Bulimia     Major depression, recurrent     Migraine headache        Objective:        Physical Exam   Constitutional: She is oriented to person, place, and time. She appears well-developed and well-nourished.   HENT:   Head: Normocephalic and atraumatic.   Eyes: EOM are normal. Pupils are equal, round, and reactive to light.   Cardiovascular: Normal  heart sounds.    No murmur heard.  Pulmonary/Chest: Effort normal and breath sounds normal.   Abdominal: Soft. Bowel sounds are normal. She exhibits no distension. There is no tenderness.   Musculoskeletal: Normal range of motion.   Neurological: She is alert and oriented to person, place, and time.   Psychiatric: She has a normal mood and affect. Her behavior is normal.   Vitals reviewed.        Assessment/Plan:     Attention deficit disorder, unspecified hyperactivity presence  -     Discontinue: dextroamphetamine-amphetamine (ADDERALL) 20 mg tablet; Take 1 tablet in the a.m and 1/2 tablet in the evening  Dispense: 45 tablet; Refill: 0  -     Discontinue: dextroamphetamine-amphetamine (ADDERALL) 20 mg tablet; Take 1 tablet in the a.m and 1/2 tablet in the evening  Dispense: 45 tablet; Refill: 0  -     Discontinue: dextroamphetamine-amphetamine (ADDERALL) 20 mg tablet; Take 1 tablet in the a.m and 1/2 tablet in the evening  Dispense: 45 tablet; Refill: 0  -     dextroamphetamine-amphetamine (ADDERALL) 20 mg tablet; Take 1 tablet in the a.m and 1/2 tablet in the evening  Dispense: 45 tablet; Refill: 0  Refilled medications for the time I am out on maternity leave. Printed scripts today.     Anxiety and depression-Stable    PMS-Stable    Routine screening for STI (sexually transmitted infection)  -     HIV-1 and HIV-2 antibodies; Future; Expected date: 08/25/2017  -     RPR; Future; Expected date: 08/25/2017  -     Hepatitis B surface antigen; Future; Expected date: 08/25/2017  -     C. trachomatis/N. gonorrhoeae by AMP DNA Urine  -     Urinalysis Microscopic        Return in about 4 months (around 12/25/2017).    Yanni Ramírez MD  [facility]   Family Medicine

## 2017-09-07 ENCOUNTER — PATIENT MESSAGE (OUTPATIENT)
Dept: INTERNAL MEDICINE | Facility: CLINIC | Age: 27
End: 2017-09-07

## 2017-09-07 ENCOUNTER — LAB VISIT (OUTPATIENT)
Dept: LAB | Facility: HOSPITAL | Age: 27
End: 2017-09-07
Attending: FAMILY MEDICINE
Payer: COMMERCIAL

## 2017-09-07 ENCOUNTER — OFFICE VISIT (OUTPATIENT)
Dept: INTERNAL MEDICINE | Facility: CLINIC | Age: 27
End: 2017-09-07
Payer: COMMERCIAL

## 2017-09-07 VITALS
TEMPERATURE: 99 F | HEIGHT: 67 IN | HEART RATE: 79 BPM | WEIGHT: 168.63 LBS | BODY MASS INDEX: 26.47 KG/M2 | OXYGEN SATURATION: 98 % | SYSTOLIC BLOOD PRESSURE: 108 MMHG | DIASTOLIC BLOOD PRESSURE: 82 MMHG

## 2017-09-07 DIAGNOSIS — Z11.3 ROUTINE SCREENING FOR STI (SEXUALLY TRANSMITTED INFECTION): ICD-10-CM

## 2017-09-07 DIAGNOSIS — Z11.3 ROUTINE SCREENING FOR STI (SEXUALLY TRANSMITTED INFECTION): Primary | ICD-10-CM

## 2017-09-07 DIAGNOSIS — B37.9 YEAST INFECTION: ICD-10-CM

## 2017-09-07 DIAGNOSIS — B96.89 BV (BACTERIAL VAGINOSIS): ICD-10-CM

## 2017-09-07 DIAGNOSIS — N76.0 BV (BACTERIAL VAGINOSIS): ICD-10-CM

## 2017-09-07 DIAGNOSIS — N89.8 VAGINAL DISCHARGE: ICD-10-CM

## 2017-09-07 DIAGNOSIS — R30.0 DYSURIA: ICD-10-CM

## 2017-09-07 LAB
BACTERIA #/AREA URNS HPF: NORMAL /HPF
BACTERIA GENITAL QL WET PREP: ABNORMAL
BILIRUB UR QL STRIP: NEGATIVE
CLARITY UR: CLEAR
CLUE CELLS VAG QL WET PREP: ABNORMAL
COLOR UR: YELLOW
FILAMENT FUNGI VAG WET PREP-#/AREA: ABNORMAL
GLUCOSE UR QL STRIP: NEGATIVE
HGB UR QL STRIP: NEGATIVE
KETONES UR QL STRIP: NEGATIVE
LEUKOCYTE ESTERASE UR QL STRIP: NEGATIVE
MICROSCOPIC COMMENT: NORMAL
NITRITE UR QL STRIP: NEGATIVE
NON-SQ EPI CELLS #/AREA URNS HPF: <1 /HPF
PH UR STRIP: 6 [PH] (ref 5–8)
PROT UR QL STRIP: NEGATIVE
SP GR UR STRIP: 1.02 (ref 1–1.03)
SPECIMEN SOURCE: ABNORMAL
SQUAMOUS #/AREA URNS HPF: 3 /HPF
T VAGINALIS GENITAL QL WET PREP: ABNORMAL
URN SPEC COLLECT METH UR: NORMAL
WBC #/AREA URNS HPF: 1 /HPF (ref 0–5)
WBC #/AREA VAG WET PREP: ABNORMAL
YEAST GENITAL QL WET PREP: ABNORMAL

## 2017-09-07 PROCEDURE — 86703 HIV-1/HIV-2 1 RESULT ANTBDY: CPT

## 2017-09-07 PROCEDURE — 36415 COLL VENOUS BLD VENIPUNCTURE: CPT

## 2017-09-07 PROCEDURE — 87340 HEPATITIS B SURFACE AG IA: CPT

## 2017-09-07 PROCEDURE — 3008F BODY MASS INDEX DOCD: CPT | Mod: S$GLB,,, | Performed by: FAMILY MEDICINE

## 2017-09-07 PROCEDURE — 99999 PR PBB SHADOW E&M-EST. PATIENT-LVL III: CPT | Mod: PBBFAC,,, | Performed by: FAMILY MEDICINE

## 2017-09-07 PROCEDURE — 81000 URINALYSIS NONAUTO W/SCOPE: CPT

## 2017-09-07 PROCEDURE — 87210 SMEAR WET MOUNT SALINE/INK: CPT

## 2017-09-07 PROCEDURE — 99214 OFFICE O/P EST MOD 30 MIN: CPT | Mod: S$GLB,,, | Performed by: FAMILY MEDICINE

## 2017-09-07 PROCEDURE — 86592 SYPHILIS TEST NON-TREP QUAL: CPT

## 2017-09-07 PROCEDURE — 87591 N.GONORRHOEAE DNA AMP PROB: CPT

## 2017-09-07 RX ORDER — METRONIDAZOLE 500 MG/1
500 TABLET ORAL EVERY 12 HOURS
Qty: 14 TABLET | Refills: 0 | Status: SHIPPED | OUTPATIENT
Start: 2017-09-07 | End: 2018-01-08

## 2017-09-07 RX ORDER — FLUCONAZOLE 150 MG/1
150 TABLET ORAL DAILY
Qty: 1 TABLET | Refills: 0 | Status: SHIPPED | OUTPATIENT
Start: 2017-09-07 | End: 2017-09-08

## 2017-09-07 NOTE — PROGRESS NOTES
Subjective:       Patient ID: Billie Nicolas is a 26 y.o. female.    Chief Complaint: Vaginal Discharge (Creamy Yellowish,Odor, x 5-6 Days Ago, 1Treatment Of Teoconazole ); Vaginal Itching (Redness,Soreness); and Medication Refill (Ambien)    HPI Ms. Nicolas presents today with complaint of vaginal itching and discharge. She reports a new sex partner and not sure if she is having symptoms from a yeast infection or Bacterial vaginosis.   She has no other complaint today.     Review of Systems   Constitutional: Negative for chills.   Gastrointestinal: Negative for constipation, nausea and vomiting.   Genitourinary: Positive for dysuria, flank pain, frequency and urgency. Negative for hematuria.   Skin: Positive for rash.         Objective:        Physical Exam   Constitutional: She is oriented to person, place, and time. She appears well-developed and well-nourished.   Cardiovascular: Normal heart sounds.    Pulmonary/Chest: Breath sounds normal.   Genitourinary: Vagina normal. No vaginal discharge found.   Neurological: She is alert and oriented to person, place, and time.   Vitals reviewed.        Assessment/Plan:     Routine screening for STI (sexually transmitted infection)  -     C. trachomatis/N. gonorrhoeae by AMP DNA Urine  -     Urinalysis Microscopic    Vaginal discharge  -     Vaginal Screen Vagina; Future; Expected date: 09/07/2017    Dysuria  -     Urinalysis    Yeast infection  -     fluconazole (DIFLUCAN) 150 MG Tab; Take 1 tablet (150 mg total) by mouth once daily.  Dispense: 1 tablet; Refill: 0    BV (bacterial vaginosis)  -     metronidazole (FLAGYL) 500 MG tablet; Take 1 tablet (500 mg total) by mouth every 12 (twelve) hours.  Dispense: 14 tablet; Refill: 0    Vaginal screen showed clue cells.       Return if symptoms worsen or fail to improve.    Yanni Ramírez MD  [facility]   Family Medicine

## 2017-09-08 LAB
C TRACH DNA SPEC QL NAA+PROBE: NOT DETECTED
HBV SURFACE AG SERPL QL IA: NEGATIVE
HIV 1+2 AB+HIV1 P24 AG SERPL QL IA: NEGATIVE
N GONORRHOEA DNA SPEC QL NAA+PROBE: NOT DETECTED
RPR SER QL: NORMAL

## 2017-10-10 ENCOUNTER — OFFICE VISIT (OUTPATIENT)
Dept: DERMATOLOGY | Facility: CLINIC | Age: 27
End: 2017-10-10
Payer: COMMERCIAL

## 2017-10-10 DIAGNOSIS — L70.0 ACNE VULGARIS: Primary | ICD-10-CM

## 2017-10-10 DIAGNOSIS — L73.0 ACNE SCARRING: ICD-10-CM

## 2017-10-10 PROCEDURE — 99213 OFFICE O/P EST LOW 20 MIN: CPT | Mod: S$GLB,,, | Performed by: DERMATOLOGY

## 2017-10-10 PROCEDURE — 99999 PR PBB SHADOW E&M-EST. PATIENT-LVL II: CPT | Mod: PBBFAC,,, | Performed by: DERMATOLOGY

## 2017-10-10 RX ORDER — CLINDAMYCIN PHOSPHATE AND BENZOYL PEROXIDE 10; 37.5 MG/G; MG/G
GEL TOPICAL
Qty: 50 G | Refills: 3 | Status: SHIPPED | OUTPATIENT
Start: 2017-10-10 | End: 2019-06-25

## 2017-10-10 NOTE — PROGRESS NOTES
Subjective:       Patient ID:  Billie Nicolas is a 27 y.o. female who presents for   Chief Complaint   Patient presents with    Acne     f/u     Hx of acne x 2 years, last seen by NP 8/1/17.  Acne began after she discontinued OCPs. She currently has copper paraguard. Symptoms are worse with menses. She currently uses epiduo forte qOHS.  She did not start spironolactone due to concern with SAMI Felix about SIADH with zoloft. 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     Prior meds: ziana gel, minocycline (diarrhea), doxycycline (diarrhea)        Review of Systems   Constitutional: Negative for fever and chills.   Gastrointestinal: Negative for nausea and vomiting.   Skin: Negative for daily sunscreen use, activity-related sunscreen use and recent sunburn.   Hematologic/Lymphatic: Does not bruise/bleed easily.        Objective:    Physical Exam   Constitutional: She appears well-developed and well-nourished. No distress.   Neurological: She is alert and oriented to person, place, and time. She is not disoriented.   Psychiatric: She has a normal mood and affect.   Skin:   Areas Examined (abnormalities noted in diagram):   Head / Face Inspection Performed  Neck Inspection Performed  Chest / Axilla Inspection Performed  Abdomen Inspection Performed  Back Inspection Performed  RUE Inspected  LUE Inspection Performed  Nails and Digits Inspection Performed              Diagram Legend     Open and closed comedones      Inflammatory papules and pustules       Assessment / Plan:        Acne vulgaris  Acne scarring  -     ONEXTON 1.2 %(1 % base) -3.75 % Gel; AAA face each morning  Dispense: 50 g; Refill: 3  -      Discussed risk vs. Benefit with use of sprinolactone while on zoloft and risk of SIADH.  Will avoid for now, will start onexton qAM, continue epiduo forte qOHS. The patient acknowledged understanding.                Return in about 3 months (around 1/10/2018).

## 2017-10-19 ENCOUNTER — OFFICE VISIT (OUTPATIENT)
Dept: OBSTETRICS AND GYNECOLOGY | Facility: CLINIC | Age: 27
End: 2017-10-19
Payer: COMMERCIAL

## 2017-10-19 VITALS
BODY MASS INDEX: 27.35 KG/M2 | DIASTOLIC BLOOD PRESSURE: 78 MMHG | WEIGHT: 170.19 LBS | HEIGHT: 66 IN | SYSTOLIC BLOOD PRESSURE: 118 MMHG

## 2017-10-19 DIAGNOSIS — B37.31 YEAST VAGINITIS: Primary | ICD-10-CM

## 2017-10-19 PROCEDURE — 87210 SMEAR WET MOUNT SALINE/INK: CPT | Mod: QW,S$GLB,, | Performed by: OBSTETRICS & GYNECOLOGY

## 2017-10-19 PROCEDURE — 99999 PR PBB SHADOW E&M-EST. PATIENT-LVL III: CPT | Mod: PBBFAC,,, | Performed by: OBSTETRICS & GYNECOLOGY

## 2017-10-19 PROCEDURE — 99213 OFFICE O/P EST LOW 20 MIN: CPT | Mod: 25,S$GLB,, | Performed by: OBSTETRICS & GYNECOLOGY

## 2017-10-19 RX ORDER — FLUCONAZOLE 150 MG/1
150 TABLET ORAL
Qty: 3 TABLET | Refills: 0 | Status: SHIPPED | OUTPATIENT
Start: 2017-10-19 | End: 2017-10-26

## 2017-10-19 NOTE — PROGRESS NOTES
Subjective:       Patient ID: Billie Nicolas is a 27 y.o. female.    Chief Complaint:  Vaginal Discharge      History of Present Illness  HPI  Pt complains of vaginal discomfort and discharge for the past several weeks.  Pt was originally diagnosed with BV and completed course of flagyl last week (pt also treated for same 1 month ago).  Discharge this week has been white and clumpy, while prior infections have been thinner.  Pt is concerned as to why she is having recurrent infections.  Pt admits to bathing in tub 5 days weekly and has one new partner for past 3 months.  Has paragard in place.  Had STD screening recently which returned with negative results.    GYN & OB History  Patient's last menstrual period was 09/21/2017 (exact date).   Date of Last Pap: 2/9/2017    OB History   No data available       Review of Systems  Review of Systems   Constitutional: Negative for activity change, appetite change, fatigue, fever and unexpected weight change.   Respiratory: Negative for shortness of breath.    Cardiovascular: Negative for chest pain, palpitations and leg swelling.   Gastrointestinal: Negative for abdominal pain, constipation, diarrhea, nausea and vomiting.   Genitourinary: Positive for vaginal discharge, vaginal pain and vaginal odor. Negative for dyspareunia, dysuria, frequency, genital sores, hematuria, menstrual problem, pelvic pain, vaginal bleeding, dysmenorrhea, postcoital bleeding and postmenopausal bleeding.   Neurological: Negative for syncope and headaches.           Objective:    Physical Exam:   Constitutional: She is oriented to person, place, and time. She appears well-developed and well-nourished. No distress.       Cardiovascular: Normal rate and regular rhythm.     Pulmonary/Chest: Effort normal.        Abdominal: Soft. She exhibits no distension. There is no tenderness.     Genitourinary: Uterus normal. Pelvic exam was performed with patient supine. There is no rash, tenderness,  lesion or injury on the right labia. There is no rash, tenderness, lesion or injury on the left labia. Uterus is not deviated, not enlarged and not tender. Cervix is normal. Right adnexum displays no mass, no tenderness and no fullness. Left adnexum displays no mass, no tenderness and no fullness. There is erythema and tenderness in the vagina. No bleeding in the vagina. No foreign body in the vagina. No signs of injury around the vagina. Vaginal discharge found. Cervix exhibits no motion tenderness, no discharge and no friability. Additional cervical findings: IUD strings visualized  Genitourinary Comments: Wet prep: many yeast; negative for clue cells or trichomonas           Musculoskeletal: Normal range of motion and moves all extremeties. She exhibits no edema or tenderness.       Neurological: She is alert and oriented to person, place, and time.    Skin: Skin is warm and dry.    Psychiatric: She has a normal mood and affect. Her behavior is normal. Thought content normal.          Assessment:        1. Yeast vaginitis             Plan:      Yeast vaginitis  -     POCT Wet Prep  -     fluconazole (DIFLUCAN) 150 MG Tab; Take 1 tablet (150 mg total) by mouth every 72 hours.  Dispense: 3 tablet; Refill: 0  -     Pt counseled on recurrent vaginitis, including common risk factors.  Pt has IUD in place, has a recent new partner, and takes regular tub baths.  Pt encouraged to take showers instead of baths.      Return if symptoms worsen or fail to improve.

## 2017-10-24 RX ORDER — VALACYCLOVIR HYDROCHLORIDE 500 MG/1
TABLET, FILM COATED ORAL
Qty: 90 TABLET | Refills: 0 | Status: SHIPPED | OUTPATIENT
Start: 2017-10-24 | End: 2017-12-14 | Stop reason: SDUPTHER

## 2017-12-14 ENCOUNTER — OFFICE VISIT (OUTPATIENT)
Dept: INTERNAL MEDICINE | Facility: CLINIC | Age: 27
End: 2017-12-14
Payer: COMMERCIAL

## 2017-12-14 VITALS
WEIGHT: 167.31 LBS | HEART RATE: 102 BPM | OXYGEN SATURATION: 98 % | SYSTOLIC BLOOD PRESSURE: 116 MMHG | DIASTOLIC BLOOD PRESSURE: 76 MMHG | TEMPERATURE: 98 F | HEIGHT: 66 IN | BODY MASS INDEX: 26.89 KG/M2

## 2017-12-14 DIAGNOSIS — B00.1 COLD SORE: ICD-10-CM

## 2017-12-14 DIAGNOSIS — Z00.00 ROUTINE PHYSICAL EXAMINATION: Primary | ICD-10-CM

## 2017-12-14 DIAGNOSIS — F98.8 ATTENTION DEFICIT DISORDER, UNSPECIFIED HYPERACTIVITY PRESENCE: ICD-10-CM

## 2017-12-14 PROCEDURE — 99999 PR PBB SHADOW E&M-EST. PATIENT-LVL III: CPT | Mod: PBBFAC,,, | Performed by: FAMILY MEDICINE

## 2017-12-14 PROCEDURE — 99395 PREV VISIT EST AGE 18-39: CPT | Mod: S$GLB,,, | Performed by: FAMILY MEDICINE

## 2017-12-14 RX ORDER — VALACYCLOVIR HYDROCHLORIDE 500 MG/1
500 TABLET, FILM COATED ORAL DAILY
Qty: 90 TABLET | Refills: 3 | Status: SHIPPED | OUTPATIENT
Start: 2017-12-14 | End: 2018-01-24 | Stop reason: SDUPTHER

## 2017-12-14 RX ORDER — DEXTROAMPHETAMINE SACCHARATE, AMPHETAMINE ASPARTATE, DEXTROAMPHETAMINE SULFATE AND AMPHETAMINE SULFATE 5; 5; 5; 5 MG/1; MG/1; MG/1; MG/1
TABLET ORAL
Qty: 45 TABLET | Refills: 0 | Status: SHIPPED | OUTPATIENT
Start: 2017-12-14 | End: 2018-01-24 | Stop reason: SDUPTHER

## 2017-12-14 RX ORDER — NITROFURANTOIN 25; 75 MG/1; MG/1
100 CAPSULE ORAL 2 TIMES DAILY
Refills: 0 | COMMUNITY
Start: 2017-12-08 | End: 2018-01-08

## 2017-12-14 RX ORDER — FLUCONAZOLE 150 MG/1
TABLET ORAL
Refills: 0 | COMMUNITY
Start: 2017-12-08 | End: 2018-01-08

## 2017-12-14 NOTE — PROGRESS NOTES
Billie Nicolas  12/14/2017  [id number]    Yanni Ramírez MD  Patient Care Team:  Yanni Ramírez MD as PCP - General (Internal Medicine)    Has the patient seen any provider outside of the network since the last visit ? (no). If yes, HIPPA forms completed and records requested.      Visit Type:a scheduled routine follow-up visit    Chief Complaint:  Chief Complaint   Patient presents with    Annual Exam       History of Present Illness:  Ms. Nicolas presents today for her annual exam. She has no complaint today.     She has been getting similar vaginal infections monthly since Sept found to be yeast infections requiring diflucan. She has changed lifestyle habits for example bathing and not staying in sweaty clothes.   She no longer needs ambien for sleep she has worked on sleep hygiene, staying away from caffeine when she can and taking melatonin.     Screening Questionnaires:    In the last two weeks how often have you felt down, depressed, or hopeless ( no )    In the last two weeks how often have you had little interest or pleasure in doing  (no )    In the last two weeks how often have you been bothered by the following problems:  1. Feeling nervous, anxious, or on edge ( no )    2. Not being able to stop or control worrying ( no)    3. Worrying too much about different things ( no)    4. Trouble relaxing ( no )    5. Being so restless that it is hard to sit still  (no )    6. Becoming easily annoyed or irritable (no)    7. Feeling afraid as if something awful might happen (no )    How often do you have a drink containing Alcohol? minimal use     Do you exercise  (yes ) moderately active    Do you take a baby Aspirin daily ( no)    Do you have an advance directive ( no ) The patient was given information regarding Living Will/Durable Power-of- if requested.     The following were reviewed: Active problem list, medication list, allergies, family history, social history, and Health Maintenance.      History:  Past Medical History:   Diagnosis Date    Abnormal cervical Pap smear with positive HPV DNA test     ADHD, predominantly inattentive type     Allergy     Anxiety     Asthma     Bulimia     Major depression, recurrent     Migraine headache      Past Surgical History:   Procedure Laterality Date    TONSILLECTOMY      WISDOM TOOTH EXTRACTION       Family History   Problem Relation Age of Onset    Cancer Mother      cervical    Heart disease Mother     Mitral valve prolapse Mother     Hypertension Mother     Hypertension Maternal Grandmother     Heart disease Maternal Grandfather      Passed at 54 yoa    Hodgkin's lymphoma Maternal Grandfather     Cancer Maternal Grandfather     Melanoma Neg Hx      Social History     Social History    Marital status: Single     Spouse name: N/A    Number of children: 0    Years of education: N/A     Occupational History    Pharmacy Intern      Social History Main Topics    Smoking status: Never Smoker    Smokeless tobacco: Never Used    Alcohol use Yes      Comment: rarely     Drug use: No    Sexual activity: Yes     Partners: Male     Other Topics Concern    Are You Pregnant Or Think You May Be? No    Breast-Feeding No     Social History Narrative    No narrative on file     Patient Active Problem List   Diagnosis    Allergy    Anxiety    Asthma    Migraine headache     Review of patient's allergies indicates:   Allergen Reactions    Codeine Nausea Only    Iodine containing multivitamin Rash       Health Maintenance  Health Maintenance Topics with due status: Not Due       Topic Last Completion Date    TETANUS VACCINE 12/10/2015    Pap Smear 02/01/2017     There are no preventive care reminders to display for this patient.    Medications:  Current Outpatient Prescriptions on File Prior to Visit   Medication Sig Dispense Refill    albuterol 90 mcg/actuation inhaler Inhale 2 puffs into the lungs every 4 to 6 hours as needed for  Wheezing. 18 g 3    CALC/D3/MAG/ZN/LENCHO/LEESA/BORON (CA-D3-MAG-ZINC--LEESA-BOR) 325-490-70-7.5 mg-unit-mg-mg Tab Take 3 tablets by mouth once daily.      copper (PARAGARD T 380A) 380 square mm IUD by Intrauterine route.      EPIDUO FORTE 0.3-2.5 % GlwP AAA face qhs 45 g 3    ONEXTON 1.2 %(1 % base) -3.75 % Gel AAA face each morning 50 g 3    sertraline (ZOLOFT) 100 MG tablet Take 1 tablet (100 mg total) by mouth once daily. 90 tablet 3    vilazodone 20 mg Tab Take 1 tablet (20 mg total) by mouth once daily at 6am. 90 tablet 3    [DISCONTINUED] dextroamphetamine-amphetamine (ADDERALL) 20 mg tablet Take 1 tablet in the a.m and 1/2 tablet in the evening 45 tablet 0    [DISCONTINUED] valACYclovir (VALTREX) 500 MG tablet TAKE 1 TABLET BY MOUTH ONCE DAILY 90 tablet 0    metronidazole (FLAGYL) 500 MG tablet Take 1 tablet (500 mg total) by mouth every 12 (twelve) hours. 14 tablet 0    [DISCONTINUED] zolpidem (AMBIEN) 5 MG Tab Take 1 tablet (5 mg total) by mouth nightly as needed. 30 tablet 0     No current facility-administered medications on file prior to visit.        Medications have been reviewed and reconciled with patient at visit today.    Barriers to medications present (no )    Adverse reactions to current medications (no)    Over the counter medications reviewed (Yes) and if needed added to active Medication list.    Exam:  Vitals:    12/14/17 1510   BP: 116/76   Pulse: 102   Temp: 98.1 °F (36.7 °C)     Weight: 75.9 kg (167 lb 5.3 oz)   Body mass index is 27.01 kg/m².    Review of Systems   Constitutional: Negative for chills and fever.   HENT: Negative for congestion and tinnitus.    Eyes: Negative for blurred vision, pain and discharge.   Respiratory: Negative for cough and wheezing.    Cardiovascular: Negative for chest pain, palpitations, orthopnea and leg swelling.   Gastrointestinal: Negative for abdominal pain, blood in stool, constipation, diarrhea, heartburn, nausea and vomiting.    Genitourinary: Negative for dysuria, flank pain, frequency, hematuria and urgency.   Skin: Negative for itching and rash.   Neurological: Negative for dizziness, tingling and headaches.   Psychiatric/Behavioral: Negative for depression.       Physical Exam   Constitutional: She is oriented to person, place, and time. She appears well-nourished. No distress.   HENT:   Head: Normocephalic and atraumatic.   Right Ear: External ear normal.   Left Ear: External ear normal.   Nose: Nose normal.   Mouth/Throat: Oropharynx is clear and moist.   Eyes: EOM are normal. Pupils are equal, round, and reactive to light. Right eye exhibits no discharge. Left eye exhibits no discharge.   Neck: Normal range of motion. Neck supple. No thyromegaly present.   Cardiovascular: Normal rate, regular rhythm and normal heart sounds.    No murmur heard.  Pulmonary/Chest: Effort normal and breath sounds normal. No respiratory distress. She has no wheezes.   Abdominal: Soft. Bowel sounds are normal. She exhibits no distension. There is no tenderness.   Musculoskeletal: Normal range of motion. She exhibits no edema.   Neurological: She is alert and oriented to person, place, and time. Coordination normal.   Skin: Skin is warm and dry. No rash noted.   Psychiatric: She has a normal mood and affect. Her behavior is normal.   Nursing note and vitals reviewed.      Laboratory Reviewed: (Yes)  Lab Results   Component Value Date    WBC 7.05 12/13/2016    HGB 11.9 (L) 12/13/2016    HCT 37.7 12/13/2016     12/13/2016    CHOL 160 02/01/2017    TRIG 38 02/01/2017    HDL 69 02/01/2017     08/01/2017    K 4.0 08/01/2017     08/01/2017    CREATININE 0.9 08/01/2017    BUN 13 08/01/2017    CO2 26 08/01/2017    TSH 1.347 12/13/2016       Assessment:  The primary encounter diagnosis was Routine physical examination. Diagnoses of Attention deficit disorder, unspecified hyperactivity presence and Cold sore were also pertinent to this  visit.    Plan:  Routine physical examination  Patient doing well today. No complaint. Health maintenance up to date    Attention deficit disorder, unspecified hyperactivity presence  -     dextroamphetamine-amphetamine (ADDERALL) 20 mg tablet; Take 1 tablet in the a.m and 1/2 tablet in the evening  Dispense: 45 tablet; Refill: 0  Refilled today. She misplaced previous 2 printed prescriptions had some left from previous filled prescriptions because she doesn't take the 1/2 tablet every evening    Cold sore-prophylaxis  -     valACYclovir (VALTREX) 500 MG tablet; Take 1 tablet (500 mg total) by mouth once daily.  Dispense: 90 tablet; Refill: 3    Will refill other medications on request  She has enough zoloft and viibryd currently    Follow up: Return in about 1 year (around 12/14/2018).      Care Plan/Goals: Reviewed N/A  Goals     None              After visit summary printed and given to patient upon discharge.  Patient goals and care plan are included in After visit summary.

## 2017-12-26 ENCOUNTER — OFFICE VISIT (OUTPATIENT)
Dept: PSYCHIATRY | Facility: CLINIC | Age: 27
End: 2017-12-26
Payer: COMMERCIAL

## 2017-12-26 DIAGNOSIS — F48.9 DEFERRED DIAGNOSIS ON AXIS I: Primary | ICD-10-CM

## 2017-12-26 PROCEDURE — 90834 PSYTX W PT 45 MINUTES: CPT | Mod: S$GLB,,, | Performed by: SOCIAL WORKER

## 2017-12-26 PROCEDURE — 99499 UNLISTED E&M SERVICE: CPT | Mod: S$GLB,,, | Performed by: SOCIAL WORKER

## 2018-01-08 ENCOUNTER — OFFICE VISIT (OUTPATIENT)
Dept: INTERNAL MEDICINE | Facility: CLINIC | Age: 28
End: 2018-01-08
Payer: COMMERCIAL

## 2018-01-08 ENCOUNTER — LAB VISIT (OUTPATIENT)
Dept: LAB | Facility: HOSPITAL | Age: 28
End: 2018-01-08
Attending: FAMILY MEDICINE
Payer: COMMERCIAL

## 2018-01-08 VITALS
HEART RATE: 88 BPM | DIASTOLIC BLOOD PRESSURE: 78 MMHG | WEIGHT: 167.56 LBS | HEIGHT: 66 IN | RESPIRATION RATE: 18 BRPM | BODY MASS INDEX: 26.93 KG/M2 | SYSTOLIC BLOOD PRESSURE: 122 MMHG | TEMPERATURE: 98 F | OXYGEN SATURATION: 98 %

## 2018-01-08 DIAGNOSIS — T78.40XD ALLERGIC REACTION, SUBSEQUENT ENCOUNTER: ICD-10-CM

## 2018-01-08 DIAGNOSIS — T78.40XD ALLERGIC REACTION, SUBSEQUENT ENCOUNTER: Primary | ICD-10-CM

## 2018-01-08 LAB
ALBUMIN SERPL BCP-MCNC: 3.6 G/DL
ALP SERPL-CCNC: 86 U/L
ALT SERPL W/O P-5'-P-CCNC: 39 U/L
ANION GAP SERPL CALC-SCNC: 5 MMOL/L
AST SERPL-CCNC: 40 U/L
BASOPHILS # BLD AUTO: 0.02 K/UL
BASOPHILS NFR BLD: 0.7 %
BILIRUB SERPL-MCNC: 0.2 MG/DL
BUN SERPL-MCNC: 14 MG/DL
CALCIUM SERPL-MCNC: 8.4 MG/DL
CHLORIDE SERPL-SCNC: 106 MMOL/L
CO2 SERPL-SCNC: 26 MMOL/L
CREAT SERPL-MCNC: 0.7 MG/DL
DIFFERENTIAL METHOD: ABNORMAL
EOSINOPHIL # BLD AUTO: 0 K/UL
EOSINOPHIL NFR BLD: 0.3 %
ERYTHROCYTE [DISTWIDTH] IN BLOOD BY AUTOMATED COUNT: 15.2 %
EST. GFR  (AFRICAN AMERICAN): >60 ML/MIN/1.73 M^2
EST. GFR  (NON AFRICAN AMERICAN): >60 ML/MIN/1.73 M^2
GLUCOSE SERPL-MCNC: 99 MG/DL
HCT VFR BLD AUTO: 35.6 %
HGB BLD-MCNC: 11.3 G/DL
IMM GRANULOCYTES # BLD AUTO: 0.01 K/UL
IMM GRANULOCYTES NFR BLD AUTO: 0.3 %
LYMPHOCYTES # BLD AUTO: 0.8 K/UL
LYMPHOCYTES NFR BLD: 26.8 %
MCH RBC QN AUTO: 27.1 PG
MCHC RBC AUTO-ENTMCNC: 31.7 G/DL
MCV RBC AUTO: 85 FL
MONOCYTES # BLD AUTO: 0.1 K/UL
MONOCYTES NFR BLD: 4.3 %
NEUTROPHILS # BLD AUTO: 2 K/UL
NEUTROPHILS NFR BLD: 67.6 %
NRBC BLD-RTO: 0 /100 WBC
PLATELET # BLD AUTO: 237 K/UL
PMV BLD AUTO: 9.5 FL
POTASSIUM SERPL-SCNC: 4.6 MMOL/L
PROT SERPL-MCNC: 7.1 G/DL
RBC # BLD AUTO: 4.17 M/UL
SODIUM SERPL-SCNC: 137 MMOL/L
WBC # BLD AUTO: 2.99 K/UL

## 2018-01-08 PROCEDURE — 99213 OFFICE O/P EST LOW 20 MIN: CPT | Mod: S$GLB,,, | Performed by: FAMILY MEDICINE

## 2018-01-08 PROCEDURE — 85025 COMPLETE CBC W/AUTO DIFF WBC: CPT

## 2018-01-08 PROCEDURE — 99999 PR PBB SHADOW E&M-EST. PATIENT-LVL III: CPT | Mod: PBBFAC,,, | Performed by: FAMILY MEDICINE

## 2018-01-08 PROCEDURE — 80053 COMPREHEN METABOLIC PANEL: CPT

## 2018-01-08 PROCEDURE — 36415 COLL VENOUS BLD VENIPUNCTURE: CPT

## 2018-01-08 NOTE — LETTER
January 8, 2018      [facility]  [id number] [place] [id number]  Phone: [phone]  Fax: [phone]       Patient: Billie Nicolas   YOB: 1990  Date of Visit: 01/08/2018    To Whom It May Concern:    Beth Nicolas  was at [facility] on 01/08/2018. She may return to work on 01/09/2018 with no restrictions. If you have any questions or concerns, or if I can be of further assistance, please do not hesitate to contact me.          Sincerely,        Yanni Ramírez MD

## 2018-01-08 NOTE — PROGRESS NOTES
Subjective:       Patient ID: Billie Nicolas is a 27 y.o. female.    Chief Complaint: Follow-up (Had an allergic reaction to bactrim. Rx'd for abcess.)    HPI Ms. Nicolas presents today for follow up from  visit. She was prescribed Bactrim for an abcess and had a reaction to it.   Abscess drained and positive for MRSA.   Not sensitive to clindamycin but sensitive to Bactrim  8 days in to taking Bactrim noted petechia from trunk to extremities. This turned to blotchy   Day 10 fever, headache, stiffness of the neck, URI, sore throat and fatigue.     Day 2 of medrol dose pack. Feels better but not great.     Review of Systems   Constitutional: Positive for fatigue and fever.   HENT: Positive for congestion, rhinorrhea and sore throat.    Eyes: Positive for pain.   Respiratory: Positive for shortness of breath. Negative for cough.    Gastrointestinal: Negative for diarrhea and vomiting.   Skin: Positive for rash.         Objective:        Physical Exam   Constitutional: She appears well-developed and well-nourished.   HENT:   Head: Normocephalic and atraumatic.   Musculoskeletal: Normal range of motion.   Skin: Skin is warm. Rash (splotchy rash on extremities improved from prior (viewed pictures)) noted.   Vitals reviewed.        Assessment/Plan:     Allergic reaction, subsequent encounter  -     CBC auto differential; Future; Expected date: 01/08/2018  -     Comprehensive metabolic panel; Future; Expected date: 01/08/2018    Considered DRESS with history of medication and symptoms. Will obtain CBC and CMP did not get hepatitis panel will follow up if liver function is abnormal.       Return if symptoms worsen or fail to improve.    Yanni Ramírez MD  [facility]   Family Medicine

## 2018-01-15 ENCOUNTER — PATIENT MESSAGE (OUTPATIENT)
Dept: PSYCHIATRY | Facility: CLINIC | Age: 28
End: 2018-01-15

## 2018-01-17 ENCOUNTER — PATIENT MESSAGE (OUTPATIENT)
Dept: OPHTHALMOLOGY | Facility: CLINIC | Age: 28
End: 2018-01-17

## 2018-01-24 ENCOUNTER — PATIENT MESSAGE (OUTPATIENT)
Dept: INTERNAL MEDICINE | Facility: CLINIC | Age: 28
End: 2018-01-24

## 2018-01-24 DIAGNOSIS — B00.1 COLD SORE: ICD-10-CM

## 2018-01-24 DIAGNOSIS — F98.8 ATTENTION DEFICIT DISORDER, UNSPECIFIED HYPERACTIVITY PRESENCE: ICD-10-CM

## 2018-01-24 RX ORDER — VALACYCLOVIR HYDROCHLORIDE 500 MG/1
500 TABLET, FILM COATED ORAL DAILY
Qty: 90 TABLET | Refills: 3 | Status: SHIPPED | OUTPATIENT
Start: 2018-01-24 | End: 2018-07-06

## 2018-01-24 RX ORDER — DEXTROAMPHETAMINE SACCHARATE, AMPHETAMINE ASPARTATE, DEXTROAMPHETAMINE SULFATE AND AMPHETAMINE SULFATE 5; 5; 5; 5 MG/1; MG/1; MG/1; MG/1
TABLET ORAL
Qty: 45 TABLET | Refills: 0 | Status: SHIPPED | OUTPATIENT
Start: 2018-01-24 | End: 2018-02-28 | Stop reason: SDUPTHER

## 2018-01-24 RX ORDER — VALACYCLOVIR HYDROCHLORIDE 500 MG/1
TABLET, FILM COATED ORAL
Qty: 90 TABLET | Refills: 0 | Status: SHIPPED | OUTPATIENT
Start: 2018-01-24 | End: 2018-04-15 | Stop reason: SDUPTHER

## 2018-02-02 DIAGNOSIS — F32.A DEPRESSION, UNSPECIFIED DEPRESSION TYPE: ICD-10-CM

## 2018-02-02 RX ORDER — VILAZODONE HYDROCHLORIDE 20 MG/1
TABLET ORAL
Qty: 90 TABLET | Refills: 1 | Status: SHIPPED | OUTPATIENT
Start: 2018-02-02 | End: 2018-07-02 | Stop reason: SDUPTHER

## 2018-02-28 ENCOUNTER — PATIENT MESSAGE (OUTPATIENT)
Dept: INTERNAL MEDICINE | Facility: CLINIC | Age: 28
End: 2018-02-28

## 2018-02-28 DIAGNOSIS — F98.8 ATTENTION DEFICIT DISORDER, UNSPECIFIED HYPERACTIVITY PRESENCE: ICD-10-CM

## 2018-03-01 RX ORDER — DEXTROAMPHETAMINE SACCHARATE, AMPHETAMINE ASPARTATE, DEXTROAMPHETAMINE SULFATE AND AMPHETAMINE SULFATE 5; 5; 5; 5 MG/1; MG/1; MG/1; MG/1
TABLET ORAL
Qty: 45 TABLET | Refills: 0 | Status: SHIPPED | OUTPATIENT
Start: 2018-03-01 | End: 2018-04-16 | Stop reason: SDUPTHER

## 2018-03-21 ENCOUNTER — OFFICE VISIT (OUTPATIENT)
Dept: DERMATOLOGY | Facility: CLINIC | Age: 28
End: 2018-03-21
Payer: COMMERCIAL

## 2018-03-21 DIAGNOSIS — L30.0 NUMMULAR ECZEMA: Primary | ICD-10-CM

## 2018-03-21 PROCEDURE — 99213 OFFICE O/P EST LOW 20 MIN: CPT | Mod: S$GLB,,, | Performed by: DERMATOLOGY

## 2018-03-21 PROCEDURE — 99999 PR PBB SHADOW E&M-EST. PATIENT-LVL II: CPT | Mod: PBBFAC,,, | Performed by: DERMATOLOGY

## 2018-03-21 RX ORDER — TRIAMCINOLONE ACETONIDE 0.25 MG/G
CREAM TOPICAL
Qty: 80 G | Refills: 1 | Status: SHIPPED | OUTPATIENT
Start: 2018-03-21 | End: 2019-06-25

## 2018-03-21 NOTE — PROGRESS NOTES
Subjective:       Patient ID:  Billie Nicolas is a 27 y.o. female who presents for   Chief Complaint   Patient presents with    Follow-up    Eczema     eyelid and underarm     Hx of acne and recently dx sulfa allergy (hives), last seen on 10/10/17.  She has used terbinafine and clotrimazole topical x 2 weeks without improvement.  + improvement with medrol dosepak (for sulfa drug rash).     She states acne flares one week prior to menses. She has paraguard IUD. She is not compliant with epiduo.          Review of Systems   Constitutional: Negative for fever and chills.   Gastrointestinal: Negative for nausea and vomiting.   Skin: Negative for daily sunscreen use, activity-related sunscreen use and recent sunburn.   Hematologic/Lymphatic: Does not bruise/bleed easily.        Objective:    Physical Exam   Constitutional: She appears well-developed and well-nourished. No distress.   Neurological: She is alert and oriented to person, place, and time. She is not disoriented.   Psychiatric: She has a normal mood and affect.   Skin:   Areas Examined (abnormalities noted in diagram):   Head / Face Inspection Performed  Neck Inspection Performed  Chest / Axilla Inspection Performed  Back Inspection Performed  RUE Inspected  LUE Inspection Performed                 Assessment / Plan:        Nummular eczema        -     crisaborole (EUCRISA) 2 % Oint; AAA bid.  Dispense: 60 g; Refill: 3  -     triamcinolone acetonide 0.025% (KENALOG) 0.025 % cream; AAA bid as needed for flares.  Mild steroid.  Dispense: 80 g; Refill: 1  -     Discussed unlikely tinea given response to systemic steroids and lack of scaling.  Will start above med with eucrisa.                    Follow-up if symptoms worsen or fail to improve.

## 2018-03-29 ENCOUNTER — PATIENT MESSAGE (OUTPATIENT)
Dept: INTERNAL MEDICINE | Facility: CLINIC | Age: 28
End: 2018-03-29

## 2018-03-29 DIAGNOSIS — Z20.828 EXPOSURE TO HERPES: Primary | ICD-10-CM

## 2018-04-02 ENCOUNTER — LAB VISIT (OUTPATIENT)
Dept: LAB | Facility: HOSPITAL | Age: 28
End: 2018-04-02
Attending: FAMILY MEDICINE
Payer: COMMERCIAL

## 2018-04-02 DIAGNOSIS — Z20.828 EXPOSURE TO HERPES: ICD-10-CM

## 2018-04-02 PROCEDURE — 86696 HERPES SIMPLEX TYPE 2 TEST: CPT

## 2018-04-02 PROCEDURE — 36415 COLL VENOUS BLD VENIPUNCTURE: CPT

## 2018-04-02 PROCEDURE — 86694 HERPES SIMPLEX NES ANTBDY: CPT

## 2018-04-03 LAB
HSV1 IGG SERPL QL IA: POSITIVE
HSV2 IGG SERPL QL IA: NEGATIVE

## 2018-04-04 LAB — HSV AB, IGM BY EIA: NEGATIVE

## 2018-04-15 ENCOUNTER — HOSPITAL ENCOUNTER (EMERGENCY)
Facility: HOSPITAL | Age: 28
Discharge: HOME | End: 2018-04-15
Payer: COMMERCIAL

## 2018-04-15 VITALS
SYSTOLIC BLOOD PRESSURE: 129 MMHG | TEMPERATURE: 98 F | OXYGEN SATURATION: 100 % | RESPIRATION RATE: 20 BRPM | HEART RATE: 89 BPM | DIASTOLIC BLOOD PRESSURE: 82 MMHG | BODY MASS INDEX: 27.47 KG/M2 | WEIGHT: 175 LBS | HEIGHT: 67 IN

## 2018-04-15 DIAGNOSIS — S61.259A ANIMAL BITE OF FINGER, INITIAL ENCOUNTER: ICD-10-CM

## 2018-04-15 DIAGNOSIS — S51.852A: Primary | ICD-10-CM

## 2018-04-15 PROCEDURE — 63600175 PHARM REV CODE 636 W HCPCS: Performed by: PHYSICIAN ASSISTANT

## 2018-04-15 PROCEDURE — 25000003 PHARM REV CODE 250: Performed by: PHYSICIAN ASSISTANT

## 2018-04-15 PROCEDURE — 96372 THER/PROPH/DIAG INJ SC/IM: CPT

## 2018-04-15 PROCEDURE — 12031 INTMD RPR S/A/T/EXT 2.5 CM/<: CPT

## 2018-04-15 PROCEDURE — 99283 EMERGENCY DEPT VISIT LOW MDM: CPT | Mod: 25

## 2018-04-15 RX ORDER — AMOXICILLIN AND CLAVULANATE POTASSIUM 875; 125 MG/1; MG/1
1 TABLET, FILM COATED ORAL 2 TIMES DAILY
Qty: 14 TABLET | Refills: 0 | Status: SHIPPED | OUTPATIENT
Start: 2018-04-15 | End: 2018-07-06

## 2018-04-15 RX ORDER — LIDOCAINE HYDROCHLORIDE 10 MG/ML
10 INJECTION, SOLUTION EPIDURAL; INFILTRATION; INTRACAUDAL; PERINEURAL
Status: COMPLETED | OUTPATIENT
Start: 2018-04-15 | End: 2018-04-15

## 2018-04-15 RX ORDER — CEFAZOLIN SODIUM 1 G/3ML
1 INJECTION, POWDER, FOR SOLUTION INTRAMUSCULAR; INTRAVENOUS
Status: COMPLETED | OUTPATIENT
Start: 2018-04-15 | End: 2018-04-15

## 2018-04-15 RX ADMIN — CEFAZOLIN 1 G: 330 INJECTION, POWDER, FOR SOLUTION INTRAMUSCULAR; INTRAVENOUS at 12:04

## 2018-04-15 RX ADMIN — LIDOCAINE HYDROCHLORIDE 100 MG: 10 INJECTION, SOLUTION EPIDURAL; INFILTRATION; INTRACAUDAL at 12:04

## 2018-04-15 NOTE — ED PROVIDER NOTES
Encounter Date: 4/15/2018       History     Chief Complaint   Patient presents with    Animal Bite     patient states she was at the dog park when her dog and another got into a fight and she pulled them apart, c/o dog bite to left forearm and middle finger     Pt was attempting to break up a fight between her dog and another dog when she was bitten.  She had the shot records of the other animal and is assured the animal is UTD on all vaccines.      The history is provided by the patient.   Animal Bite    The incident occurred just prior to arrival. The incident occurred at a playground. She came to the ER via by private vehicle. There is an injury to the left forearm. There is an injury to the left long finger. The pain is at a severity of 0/10. Pertinent negatives include no chest pain, no numbness, no abdominal pain, no nausea, no vomiting, no headaches, no focal weakness, no decreased responsiveness, no light-headedness, no loss of consciousness, no seizures, no tingling, no weakness, no cough and no memory loss. There have been no prior injuries to these areas. Her tetanus status is UTD.     Review of patient's allergies indicates:   Allergen Reactions    Sulfa (sulfonamide antibiotics) Hives    Codeine Nausea Only    Iodine containing multivitamin Rash     Past Medical History:   Diagnosis Date    Abnormal cervical Pap smear with positive HPV DNA test     ADHD, predominantly inattentive type     Allergy     Anxiety     Asthma     Bulimia     Major depression, recurrent     Migraine headache      Past Surgical History:   Procedure Laterality Date    ABSCESS DRAINAGE  12/25/2017    abscess lanced     TONSILLECTOMY      WISDOM TOOTH EXTRACTION       Family History   Problem Relation Age of Onset    Cancer Mother      cervical    Heart disease Mother     Mitral valve prolapse Mother     Hypertension Mother     Hypertension Maternal Grandmother     Heart disease Maternal Grandfather      Passed  at 54 yoa    Hodgkin's lymphoma Maternal Grandfather     Cancer Maternal Grandfather     Melanoma Neg Hx      Social History   Substance Use Topics    Smoking status: Never Smoker    Smokeless tobacco: Never Used    Alcohol use Yes      Comment: rarely      Review of Systems   Constitutional: Negative for chills, decreased responsiveness and fever.   HENT: Negative for sore throat.    Eyes: Negative for photophobia and redness.   Respiratory: Negative for cough and shortness of breath.    Cardiovascular: Negative for chest pain.   Gastrointestinal: Negative for abdominal pain, diarrhea, nausea and vomiting.   Endocrine: Negative for polydipsia and polyphagia.   Genitourinary: Negative for dysuria.   Musculoskeletal: Negative for arthralgias, back pain and myalgias.   Skin: Positive for wound.   Neurological: Negative for tingling, focal weakness, seizures, loss of consciousness, weakness, light-headedness, numbness and headaches.   Hematological: Does not bruise/bleed easily.   Psychiatric/Behavioral: Negative for memory loss. The patient is not nervous/anxious.    All other systems reviewed and are negative.      Physical Exam     Initial Vitals [04/15/18 1147]   BP Pulse Resp Temp SpO2   (!) 146/86 102 18 97.9 °F (36.6 °C) 100 %      MAP       106         Physical Exam    Nursing note and vitals reviewed.  Constitutional: Vital signs are normal. She appears well-developed and well-nourished. No distress.   HENT:   Head: Normocephalic and atraumatic.   Right Ear: External ear normal.   Left Ear: External ear normal.   Nose: Nose normal.   Mouth/Throat: Oropharynx is clear and moist.   Eyes: Conjunctivae, EOM and lids are normal. Pupils are equal, round, and reactive to light.   Neck: Normal range of motion and full passive range of motion without pain. Neck supple.   Cardiovascular: Normal rate, regular rhythm, S1 normal, S2 normal, normal heart sounds, intact distal pulses and normal pulses.    Pulmonary/Chest: Breath sounds normal. No respiratory distress. She has no wheezes. She has no rales.   Abdominal: Soft. Normal appearance and bowel sounds are normal. She exhibits no distension. There is no tenderness.   Musculoskeletal: Normal range of motion.        Left forearm: She exhibits tenderness and laceration. She exhibits no bony tenderness, no swelling, no edema and no deformity.   Sub centimeter linear lac to volar aspect of left forearm;  No active bleeding noted;  No erythema, swelling or discharge noted.  Superficial abrasion to lateral aspect of left forearm measuring less than 1 cm in length  Superficial abrasion to dorsal aspect of left middle finger;   Lymphadenopathy:     She has no cervical adenopathy.   Neurological: She is alert and oriented to person, place, and time. She has normal strength. No cranial nerve deficit or sensory deficit. Coordination and gait normal.   Skin: Skin is warm, dry and intact.   Psychiatric: She has a normal mood and affect. Her speech is normal and behavior is normal. Judgment and thought content normal. Cognition and memory are normal.         ED Course   Lac Repair  Date/Time: 4/15/2018 12:06 PM  Performed by: OBED DEAN.  Authorized by: OBED DEAN.   Body area: upper extremity  Location details: left lower arm  Laceration length: 0.5 cm  Foreign bodies: no foreign bodies  Tendon involvement: none  Nerve involvement: none  Vascular damage: no  Irrigation solution: saline  Irrigation method: jet lavage (pulsavac)  Amount of cleaning: extensive  Debridement: none  Degree of undermining: none  Dressing: 4x4 sterile gauze  Patient tolerance: Patient tolerated the procedure well with no immediate complications        Labs Reviewed - No data to display          Medical Decision Making:   ED Management:  WOUND WAS NOT SUTURED TO MITIGATE THE DEVELOPMENT OF BACTERIAL INFECTION SECONDARY TO THE ANIMAL BITE.                      Clinical  Impression:   The primary encounter diagnosis was Animal bite of left forearm, initial encounter. A diagnosis of Animal bite of finger, initial encounter was also pertinent to this visit.    Disposition:   Disposition: Discharged  Condition: Stable                        JAYCE Schaefer  04/15/18 1216       JAYCE Schaefer  04/15/18 1216

## 2018-04-16 ENCOUNTER — OFFICE VISIT (OUTPATIENT)
Dept: INTERNAL MEDICINE | Facility: CLINIC | Age: 28
End: 2018-04-16
Payer: COMMERCIAL

## 2018-04-16 VITALS
HEART RATE: 82 BPM | BODY MASS INDEX: 27.37 KG/M2 | HEIGHT: 67 IN | OXYGEN SATURATION: 98 % | DIASTOLIC BLOOD PRESSURE: 78 MMHG | SYSTOLIC BLOOD PRESSURE: 114 MMHG | WEIGHT: 174.38 LBS | TEMPERATURE: 97 F

## 2018-04-16 DIAGNOSIS — Z09 HOSPITAL DISCHARGE FOLLOW-UP: Primary | ICD-10-CM

## 2018-04-16 DIAGNOSIS — F98.8 ATTENTION DEFICIT DISORDER, UNSPECIFIED HYPERACTIVITY PRESENCE: ICD-10-CM

## 2018-04-16 PROCEDURE — 99213 OFFICE O/P EST LOW 20 MIN: CPT | Mod: S$GLB,,, | Performed by: FAMILY MEDICINE

## 2018-04-16 PROCEDURE — 99999 PR PBB SHADOW E&M-EST. PATIENT-LVL III: CPT | Mod: PBBFAC,,, | Performed by: FAMILY MEDICINE

## 2018-04-16 RX ORDER — DEXTROAMPHETAMINE SACCHARATE, AMPHETAMINE ASPARTATE, DEXTROAMPHETAMINE SULFATE AND AMPHETAMINE SULFATE 5; 5; 5; 5 MG/1; MG/1; MG/1; MG/1
TABLET ORAL
Qty: 45 TABLET | Refills: 0 | Status: SHIPPED | OUTPATIENT
Start: 2018-04-16 | End: 2018-05-28 | Stop reason: SDUPTHER

## 2018-04-16 NOTE — PROGRESS NOTES
Subjective:       Patient ID: Billie Nicolas is a 27 y.o. female.    Chief Complaint: Hospital Follow Up (dog bite/currently on antibiotics)    HPI Ms. Nicolas presents today for hospital follow up. She was seen on 4/15/18 after a dog bite. She thinks her dog bit her in the midst of the commotion between her dog and another dog.   She is on antibiotics currently.   She reports both animals were UTD on vaccinations.     She would like a refill on her adderall. She doesn't take the second dose when se isn't working and therefore has been able to stretch the other prescription. She has no complaint in regard to the medication and is tolerating it. She denies weight loss and problems sleeping.      Review of Systems   Constitutional: Negative for activity change and unexpected weight change.   HENT: Negative for hearing loss, rhinorrhea and trouble swallowing.    Eyes: Negative for discharge and visual disturbance.   Respiratory: Negative for chest tightness and wheezing.    Cardiovascular: Negative for chest pain and palpitations.   Gastrointestinal: Negative for blood in stool, constipation, diarrhea and vomiting.   Endocrine: Negative for polydipsia and polyuria.   Genitourinary: Negative for difficulty urinating, dysuria, hematuria and menstrual problem.   Musculoskeletal: Negative for arthralgias, joint swelling and neck pain.   Neurological: Negative for weakness and headaches.   Psychiatric/Behavioral: Negative for confusion and dysphoric mood.           Past Medical History:   Diagnosis Date    Abnormal cervical Pap smear with positive HPV DNA test     ADHD, predominantly inattentive type     Allergy     Anxiety     Asthma     Bulimia     Major depression, recurrent     Migraine headache          Objective:        Physical Exam    Vitals reviewed.  Constitutional: Vital signs are normal. She appears well-developed and well-nourished. No distress.   Head: Normocephalic and atraumatic.    Eyes:  Conjunctivae, EOM and lids are normal. Pupils are equal, round, and reactive to light.   Cardiovascular: Normal rate, regular rhythm,normal heart sounds  Musculoskeletal: Normal range of motion.        Left forearm: She exhibits no bony tenderness, no swelling, no edema and no deformity.   Sub centimeter linear lac to volar aspect of left forearm;  No active bleeding noted;  No erythema, swelling or discharge noted.  Superficial abrasion to lateral aspect of left forearm measuring less than 1 cm in length  Neurological: She is alert and oriented to person, place, and time  Skin: Skin is warm, dry and intact.      Assessment/Plan:     Hospital discharge follow-up    Attention deficit disorder, unspecified hyperactivity presence  -     dextroamphetamine-amphetamine (ADDERALL) 20 mg tablet; Take 1 tablet in the a.m and 1/2 tablet in the evening  Dispense: 45 tablet; Refill: 0          Follow-up in about 3 months (around 7/16/2018).    Yanni Ramírez MD  [facility]   Family Medicine

## 2018-04-20 ENCOUNTER — PATIENT MESSAGE (OUTPATIENT)
Dept: INTERNAL MEDICINE | Facility: CLINIC | Age: 28
End: 2018-04-20

## 2018-04-20 DIAGNOSIS — T36.95XA ANTIBIOTIC-INDUCED YEAST INFECTION: Primary | ICD-10-CM

## 2018-04-20 DIAGNOSIS — B37.9 ANTIBIOTIC-INDUCED YEAST INFECTION: Primary | ICD-10-CM

## 2018-04-23 RX ORDER — FLUCONAZOLE 200 MG/1
200 TABLET ORAL DAILY
Qty: 1 TABLET | Refills: 1 | Status: SHIPPED | OUTPATIENT
Start: 2018-04-23 | End: 2018-05-23

## 2018-05-08 DIAGNOSIS — F41.8 DEPRESSION WITH ANXIETY: ICD-10-CM

## 2018-05-08 DIAGNOSIS — N94.3 PMS (PREMENSTRUAL SYNDROME): ICD-10-CM

## 2018-05-08 RX ORDER — SERTRALINE HYDROCHLORIDE 100 MG/1
100 TABLET, FILM COATED ORAL DAILY
Qty: 90 TABLET | Refills: 3 | Status: SHIPPED | OUTPATIENT
Start: 2018-05-08 | End: 2019-04-20 | Stop reason: SDUPTHER

## 2018-05-28 DIAGNOSIS — F98.8 ATTENTION DEFICIT DISORDER, UNSPECIFIED HYPERACTIVITY PRESENCE: ICD-10-CM

## 2018-05-28 RX ORDER — DEXTROAMPHETAMINE SACCHARATE, AMPHETAMINE ASPARTATE, DEXTROAMPHETAMINE SULFATE AND AMPHETAMINE SULFATE 5; 5; 5; 5 MG/1; MG/1; MG/1; MG/1
TABLET ORAL
Qty: 45 TABLET | Refills: 0 | Status: SHIPPED | OUTPATIENT
Start: 2018-05-28 | End: 2018-07-02 | Stop reason: SDUPTHER

## 2018-07-02 ENCOUNTER — PATIENT MESSAGE (OUTPATIENT)
Dept: DERMATOLOGY | Facility: CLINIC | Age: 28
End: 2018-07-02

## 2018-07-02 DIAGNOSIS — F98.8 ATTENTION DEFICIT DISORDER, UNSPECIFIED HYPERACTIVITY PRESENCE: ICD-10-CM

## 2018-07-02 DIAGNOSIS — F32.A DEPRESSION, UNSPECIFIED DEPRESSION TYPE: ICD-10-CM

## 2018-07-02 RX ORDER — VILAZODONE HYDROCHLORIDE 20 MG/1
TABLET ORAL
Qty: 90 TABLET | Refills: 1 | Status: SHIPPED | OUTPATIENT
Start: 2018-07-02 | End: 2018-12-28

## 2018-07-02 RX ORDER — DEXTROAMPHETAMINE SACCHARATE, AMPHETAMINE ASPARTATE, DEXTROAMPHETAMINE SULFATE AND AMPHETAMINE SULFATE 5; 5; 5; 5 MG/1; MG/1; MG/1; MG/1
TABLET ORAL
Qty: 45 TABLET | Refills: 0 | Status: SHIPPED | OUTPATIENT
Start: 2018-07-02 | End: 2018-08-08 | Stop reason: SDUPTHER

## 2018-07-06 ENCOUNTER — OFFICE VISIT (OUTPATIENT)
Dept: INTERNAL MEDICINE | Facility: CLINIC | Age: 28
End: 2018-07-06
Payer: COMMERCIAL

## 2018-07-06 VITALS
WEIGHT: 177.25 LBS | TEMPERATURE: 98 F | OXYGEN SATURATION: 100 % | HEART RATE: 101 BPM | HEIGHT: 67 IN | DIASTOLIC BLOOD PRESSURE: 92 MMHG | RESPIRATION RATE: 20 BRPM | SYSTOLIC BLOOD PRESSURE: 134 MMHG | BODY MASS INDEX: 27.82 KG/M2

## 2018-07-06 DIAGNOSIS — R39.198 SUBJECTIVE CHANGE IN URINATION: Primary | ICD-10-CM

## 2018-07-06 LAB
BACTERIA #/AREA URNS HPF: ABNORMAL /HPF
BILIRUB UR QL STRIP: NEGATIVE
CLARITY UR: ABNORMAL
COLOR UR: YELLOW
GLUCOSE UR QL STRIP: NEGATIVE
HGB UR QL STRIP: ABNORMAL
KETONES UR QL STRIP: NEGATIVE
LEUKOCYTE ESTERASE UR QL STRIP: ABNORMAL
MICROSCOPIC COMMENT: ABNORMAL
NITRITE UR QL STRIP: NEGATIVE
PH UR STRIP: 6 [PH] (ref 5–8)
PROT UR QL STRIP: ABNORMAL
RBC #/AREA URNS HPF: >100 /HPF (ref 0–4)
SP GR UR STRIP: 1.01 (ref 1–1.03)
URN SPEC COLLECT METH UR: ABNORMAL
WBC #/AREA URNS HPF: >100 /HPF (ref 0–5)

## 2018-07-06 PROCEDURE — 87086 URINE CULTURE/COLONY COUNT: CPT

## 2018-07-06 PROCEDURE — 87077 CULTURE AEROBIC IDENTIFY: CPT

## 2018-07-06 PROCEDURE — 99213 OFFICE O/P EST LOW 20 MIN: CPT | Mod: S$GLB,,, | Performed by: FAMILY MEDICINE

## 2018-07-06 PROCEDURE — 3008F BODY MASS INDEX DOCD: CPT | Mod: CPTII,S$GLB,, | Performed by: FAMILY MEDICINE

## 2018-07-06 PROCEDURE — 99999 PR PBB SHADOW E&M-EST. PATIENT-LVL III: CPT | Mod: PBBFAC,,, | Performed by: FAMILY MEDICINE

## 2018-07-06 PROCEDURE — 81000 URINALYSIS NONAUTO W/SCOPE: CPT

## 2018-07-06 PROCEDURE — 87186 SC STD MICRODIL/AGAR DIL: CPT

## 2018-07-06 PROCEDURE — 87088 URINE BACTERIA CULTURE: CPT

## 2018-07-06 RX ORDER — NITROFURANTOIN 25; 75 MG/1; MG/1
100 CAPSULE ORAL EVERY 12 HOURS
Qty: 10 CAPSULE | Refills: 0 | Status: SHIPPED | OUTPATIENT
Start: 2018-07-06 | End: 2018-07-10 | Stop reason: ALTCHOICE

## 2018-07-06 NOTE — PROGRESS NOTES
Subjective:       Patient ID: Billie Nicolas is a 27 y.o. female.    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  Feel subjectively warm - no fevers.    Has had dysuria.      Normal PO intake    No n/v      Review of Systems   Constitutional: Negative for chills and fever.   HENT: Negative for trouble swallowing.    Eyes: Negative for visual disturbance.   Respiratory: Negative for shortness of breath.    Cardiovascular: Negative for chest pain.   Gastrointestinal: Negative for nausea and vomiting.   Genitourinary: Positive for dysuria. Negative for difficulty urinating.   Neurological: Negative for dizziness.   Psychiatric/Behavioral: Negative for confusion and dysphoric mood.       Objective:       Vitals:    07/06/18 1616   BP: (!) 134/92   Pulse: 101   Resp: 20   Temp: 97.7 °F (36.5 °C)       Physical Exam   Constitutional: She is oriented to person, place, and time. She appears well-developed and well-nourished. She does not have a sickly appearance. No distress.   HENT:   Head: Normocephalic and atraumatic.   Right Ear: External ear normal.   Left Ear: External ear normal.   Eyes: Conjunctivae, EOM and lids are normal.   Neck: Trachea normal, normal range of motion and full passive range of motion without pain.   Cardiovascular: Normal rate, regular rhythm and normal heart sounds.    Pulmonary/Chest: Effort normal and breath sounds normal. No stridor. No respiratory distress.   Abdominal: Soft. Normal appearance and bowel sounds are normal. She exhibits no distension. There is tenderness. There is no guarding. No hernia.   Musculoskeletal: Normal range of motion.   Lymphadenopathy:     She has no cervical adenopathy.   Neurological: She is alert and oriented to person, place, and time. She is not disoriented.   Skin: Skin is warm, dry and intact. No rash noted. She is not diaphoretic.   Psychiatric: She has a normal mood  and affect. Her speech is normal and behavior is normal. Thought content normal.       Assessment:       1. Subjective change in urination        Plan:   Subjective change in urination  -     Urinalysis Microscopic  -     Urinalysis; Future; Expected date: 07/06/2018  -     Urine culture; Future; Expected date: 07/06/2018    Nitrofurantoin prescribed.  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    Noted elevated pressure. Reports recent adderall - not typically elevated. Encouraged to monitor.       No Follow-up on file.

## 2018-07-09 DIAGNOSIS — L70.0 ACNE VULGARIS: ICD-10-CM

## 2018-07-09 LAB — BACTERIA UR CULT: NORMAL

## 2018-07-09 RX ORDER — ADAPALENE AND BENZOYL PEROXIDE 3; 25 MG/G; MG/G
GEL TOPICAL
Qty: 45 G | Refills: 5 | Status: SHIPPED | OUTPATIENT
Start: 2018-07-09 | End: 2020-06-10

## 2018-07-10 RX ORDER — CIPROFLOXACIN 500 MG/1
500 TABLET ORAL EVERY 12 HOURS
Qty: 6 TABLET | Refills: 0 | Status: SHIPPED | OUTPATIENT
Start: 2018-07-10 | End: 2018-07-13

## 2018-07-17 ENCOUNTER — OFFICE VISIT (OUTPATIENT)
Dept: OPHTHALMOLOGY | Facility: CLINIC | Age: 28
End: 2018-07-17
Payer: COMMERCIAL

## 2018-07-17 DIAGNOSIS — H52.222 REGULAR ASTIGMATISM OF LEFT EYE: ICD-10-CM

## 2018-07-17 DIAGNOSIS — H52.13 MYOPIA, BILATERAL: Primary | ICD-10-CM

## 2018-07-17 PROCEDURE — 92014 COMPRE OPH EXAM EST PT 1/>: CPT | Mod: S$GLB,,, | Performed by: OPTOMETRIST

## 2018-07-17 PROCEDURE — 99999 PR PBB SHADOW E&M-EST. PATIENT-LVL I: CPT | Mod: PBBFAC,,, | Performed by: OPTOMETRIST

## 2018-07-17 PROCEDURE — 92015 DETERMINE REFRACTIVE STATE: CPT | Mod: S$GLB,,, | Performed by: OPTOMETRIST

## 2018-07-31 ENCOUNTER — PATIENT MESSAGE (OUTPATIENT)
Dept: INTERNAL MEDICINE | Facility: CLINIC | Age: 28
End: 2018-07-31

## 2018-07-31 DIAGNOSIS — N64.4 BREAST TENDERNESS IN FEMALE: Primary | ICD-10-CM

## 2018-07-31 DIAGNOSIS — N92.3 SPOTTING BETWEEN MENSES: ICD-10-CM

## 2018-07-31 DIAGNOSIS — Z32.02 URINE PREGNANCY TEST NEGATIVE: ICD-10-CM

## 2018-08-01 ENCOUNTER — LAB VISIT (OUTPATIENT)
Dept: LAB | Facility: HOSPITAL | Age: 28
End: 2018-08-01
Payer: COMMERCIAL

## 2018-08-01 DIAGNOSIS — Z32.02 URINE PREGNANCY TEST NEGATIVE: ICD-10-CM

## 2018-08-01 DIAGNOSIS — N64.4 BREAST TENDERNESS IN FEMALE: ICD-10-CM

## 2018-08-01 DIAGNOSIS — N92.3 SPOTTING BETWEEN MENSES: ICD-10-CM

## 2018-08-01 LAB — HCG INTACT+B SERPL-ACNC: <1.2 MIU/ML

## 2018-08-01 PROCEDURE — 36415 COLL VENOUS BLD VENIPUNCTURE: CPT

## 2018-08-01 PROCEDURE — 84702 CHORIONIC GONADOTROPIN TEST: CPT

## 2018-08-02 ENCOUNTER — OFFICE VISIT (OUTPATIENT)
Dept: INTERNAL MEDICINE | Facility: CLINIC | Age: 28
End: 2018-08-02
Payer: COMMERCIAL

## 2018-08-02 VITALS
WEIGHT: 178.38 LBS | TEMPERATURE: 97 F | BODY MASS INDEX: 28 KG/M2 | OXYGEN SATURATION: 98 % | SYSTOLIC BLOOD PRESSURE: 144 MMHG | DIASTOLIC BLOOD PRESSURE: 90 MMHG | HEIGHT: 67 IN | HEART RATE: 82 BPM

## 2018-08-02 DIAGNOSIS — N93.9 ABNORMAL UTERINE AND VAGINAL BLEEDING, UNSPECIFIED: Primary | ICD-10-CM

## 2018-08-02 PROCEDURE — 99999 PR PBB SHADOW E&M-EST. PATIENT-LVL III: CPT | Mod: PBBFAC,,, | Performed by: FAMILY MEDICINE

## 2018-08-02 PROCEDURE — 99213 OFFICE O/P EST LOW 20 MIN: CPT | Mod: S$GLB,,, | Performed by: FAMILY MEDICINE

## 2018-08-02 PROCEDURE — 3008F BODY MASS INDEX DOCD: CPT | Mod: CPTII,S$GLB,, | Performed by: FAMILY MEDICINE

## 2018-08-04 ENCOUNTER — PATIENT MESSAGE (OUTPATIENT)
Dept: INTERNAL MEDICINE | Facility: CLINIC | Age: 28
End: 2018-08-04

## 2018-08-04 NOTE — PROGRESS NOTES
Subjective:       Patient ID: Billie Nicolas is a 27 y.o. female.    Chief Complaint: Follow-up    HPI Elizabeth Nicolas presents today to have her IUD checked.   She reports Abnormal vaginal bleeding started Juy 31st. It wasn't a lot, like a light day of her normal cycle in volume. She had concerns because the first day of her period was July 16, and the timeline would place her in her fertile window sand in theory putting her ovulation date of 7/30 (day 14 of cycle). She has noticed some lower back pain (new) and breast swelling/tenderness that has sustained even though my period is over.   No abdominal pain or cramping nor tenderness.     UPT was negative, however, she is concerned the bleeding could be implantation bleeding.     Review of Systems   Constitutional: Positive for activity change. Negative for unexpected weight change.   HENT: Negative for hearing loss, rhinorrhea and trouble swallowing.    Eyes: Negative for discharge and visual disturbance.   Respiratory: Negative for chest tightness and wheezing.    Cardiovascular: Negative for chest pain and palpitations.   Gastrointestinal: Negative for blood in stool, constipation, diarrhea and vomiting.   Endocrine: Negative for polydipsia and polyuria.   Genitourinary: Positive for menstrual problem. Negative for difficulty urinating, dysuria and hematuria.   Musculoskeletal: Negative for arthralgias, joint swelling and neck pain.   Neurological: Negative for weakness and headaches.   Psychiatric/Behavioral: Positive for dysphoric mood. Negative for confusion.         Objective:        Physical Exam   Constitutional: She appears well-developed and well-nourished.   Genitourinary:   Genitourinary Comments: Blood noted on speculum exam not excessive  Strings of IUD identified.    Vitals reviewed.        Results for orders placed or performed in visit on 08/01/18   hCG, quantitative   Result Value Ref Range    hCG Quant <1.2 See Text mIU/mL        Assessment/Plan:     Abnormal uterine and vaginal bleeding, unspecified  -     hCG, quantitative; Future; Expected date: 08/02/2018    Discussed potential etiologies of her symptoms. Concerned for implantation bleeding. Keeping in mind that the copper IUD is to create an unfavorable environment for implantation and therefore this could be a result of the IUD doing what it was and is suppose to do. Rechecking hCG quantitative at this point UPT negative and blood test negative.   Will follow up after recheck on Monday.    This may or may not have been an early miscarriage    Follow-up if symptoms worsen or fail to improve.    Yanni Ramírez MD  [facility]   Family Medicine

## 2018-08-06 ENCOUNTER — LAB VISIT (OUTPATIENT)
Dept: LAB | Facility: HOSPITAL | Age: 28
End: 2018-08-06
Payer: COMMERCIAL

## 2018-08-06 DIAGNOSIS — N93.9 ABNORMAL UTERINE AND VAGINAL BLEEDING, UNSPECIFIED: ICD-10-CM

## 2018-08-06 LAB — HCG INTACT+B SERPL-ACNC: <1.2 MIU/ML

## 2018-08-06 PROCEDURE — 36415 COLL VENOUS BLD VENIPUNCTURE: CPT

## 2018-08-06 PROCEDURE — 84702 CHORIONIC GONADOTROPIN TEST: CPT

## 2018-08-07 ENCOUNTER — PATIENT MESSAGE (OUTPATIENT)
Dept: PSYCHIATRY | Facility: CLINIC | Age: 28
End: 2018-08-07

## 2018-08-07 ENCOUNTER — PATIENT MESSAGE (OUTPATIENT)
Dept: INTERNAL MEDICINE | Facility: CLINIC | Age: 28
End: 2018-08-07

## 2018-08-08 ENCOUNTER — OFFICE VISIT (OUTPATIENT)
Dept: PSYCHIATRY | Facility: CLINIC | Age: 28
End: 2018-08-08
Payer: COMMERCIAL

## 2018-08-08 DIAGNOSIS — F98.8 ATTENTION DEFICIT DISORDER, UNSPECIFIED HYPERACTIVITY PRESENCE: ICD-10-CM

## 2018-08-08 DIAGNOSIS — F48.9 DEFERRED DIAGNOSIS ON AXIS I: Primary | ICD-10-CM

## 2018-08-08 PROCEDURE — 90834 PSYTX W PT 45 MINUTES: CPT | Mod: S$GLB,,, | Performed by: SOCIAL WORKER

## 2018-08-09 RX ORDER — DEXTROAMPHETAMINE SACCHARATE, AMPHETAMINE ASPARTATE, DEXTROAMPHETAMINE SULFATE AND AMPHETAMINE SULFATE 5; 5; 5; 5 MG/1; MG/1; MG/1; MG/1
TABLET ORAL
Qty: 45 TABLET | Refills: 0 | Status: SHIPPED | OUTPATIENT
Start: 2018-08-09 | End: 2018-09-27 | Stop reason: SDUPTHER

## 2018-08-20 ENCOUNTER — TELEPHONE (OUTPATIENT)
Dept: INTERNAL MEDICINE | Facility: CLINIC | Age: 28
End: 2018-08-20

## 2018-08-20 DIAGNOSIS — N93.9 ABNORMAL UTERINE BLEEDING UNRELATED TO MENSTRUAL CYCLE: Primary | ICD-10-CM

## 2018-08-20 RX ORDER — MEDROXYPROGESTERONE ACETATE 10 MG/1
10 TABLET ORAL DAILY
Qty: 10 TABLET | Refills: 0 | Status: SHIPPED | OUTPATIENT
Start: 2018-08-20 | End: 2018-12-05

## 2018-08-20 NOTE — TELEPHONE ENCOUNTER
Spoke to pt and she reports she has stopped bleeding as of yesterday. She is ok right now. And she does not need the medication right now.   Pt will notify the office is something changes.   Dr Ramírez already sent RX to [facility] pharmacy.

## 2018-09-27 DIAGNOSIS — F98.8 ATTENTION DEFICIT DISORDER, UNSPECIFIED HYPERACTIVITY PRESENCE: ICD-10-CM

## 2018-09-27 RX ORDER — DEXTROAMPHETAMINE SACCHARATE, AMPHETAMINE ASPARTATE, DEXTROAMPHETAMINE SULFATE AND AMPHETAMINE SULFATE 5; 5; 5; 5 MG/1; MG/1; MG/1; MG/1
TABLET ORAL
Qty: 45 TABLET | Refills: 0 | Status: SHIPPED | OUTPATIENT
Start: 2018-09-27 | End: 2018-10-24 | Stop reason: SDUPTHER

## 2018-10-04 ENCOUNTER — OFFICE VISIT (OUTPATIENT)
Dept: PSYCHIATRY | Facility: CLINIC | Age: 28
End: 2018-10-04
Payer: COMMERCIAL

## 2018-10-04 DIAGNOSIS — F48.9 DEFERRED DIAGNOSIS ON AXIS I: Primary | ICD-10-CM

## 2018-10-04 PROCEDURE — 90834 PSYTX W PT 45 MINUTES: CPT | Mod: S$GLB,,, | Performed by: SOCIAL WORKER

## 2018-10-13 ENCOUNTER — PATIENT MESSAGE (OUTPATIENT)
Dept: INTERNAL MEDICINE | Facility: CLINIC | Age: 28
End: 2018-10-13

## 2018-10-24 DIAGNOSIS — F98.8 ATTENTION DEFICIT DISORDER, UNSPECIFIED HYPERACTIVITY PRESENCE: ICD-10-CM

## 2018-10-24 RX ORDER — DEXTROAMPHETAMINE SACCHARATE, AMPHETAMINE ASPARTATE, DEXTROAMPHETAMINE SULFATE AND AMPHETAMINE SULFATE 5; 5; 5; 5 MG/1; MG/1; MG/1; MG/1
TABLET ORAL
Qty: 45 TABLET | Refills: 0 | Status: SHIPPED | OUTPATIENT
Start: 2018-10-24 | End: 2018-12-21 | Stop reason: SDUPTHER

## 2018-11-11 ENCOUNTER — PATIENT MESSAGE (OUTPATIENT)
Dept: INTERNAL MEDICINE | Facility: CLINIC | Age: 28
End: 2018-11-11

## 2018-12-05 ENCOUNTER — OFFICE VISIT (OUTPATIENT)
Dept: INTERNAL MEDICINE | Facility: CLINIC | Age: 28
End: 2018-12-05
Payer: COMMERCIAL

## 2018-12-05 VITALS
HEIGHT: 67 IN | SYSTOLIC BLOOD PRESSURE: 136 MMHG | HEART RATE: 91 BPM | BODY MASS INDEX: 27.41 KG/M2 | DIASTOLIC BLOOD PRESSURE: 80 MMHG | WEIGHT: 174.63 LBS | OXYGEN SATURATION: 99 % | TEMPERATURE: 99 F

## 2018-12-05 DIAGNOSIS — L02.411 ABSCESS OF RIGHT AXILLA: Primary | ICD-10-CM

## 2018-12-05 PROCEDURE — 99999 PR PBB SHADOW E&M-EST. PATIENT-LVL IV: CPT | Mod: PBBFAC,,, | Performed by: NURSE PRACTITIONER

## 2018-12-05 PROCEDURE — 3008F BODY MASS INDEX DOCD: CPT | Mod: CPTII,S$GLB,, | Performed by: NURSE PRACTITIONER

## 2018-12-05 PROCEDURE — 99214 OFFICE O/P EST MOD 30 MIN: CPT | Mod: S$GLB,,, | Performed by: NURSE PRACTITIONER

## 2018-12-05 RX ORDER — CLINDAMYCIN HYDROCHLORIDE 300 MG/1
300 CAPSULE ORAL EVERY 6 HOURS
Qty: 28 CAPSULE | Refills: 0 | Status: SHIPPED | OUTPATIENT
Start: 2018-12-05 | End: 2018-12-11 | Stop reason: ALTCHOICE

## 2018-12-05 NOTE — PROGRESS NOTES
Subjective:       Patient ID: Billie Nicolas is a 28 y.o. female.    Chief Complaint: abscess to right armpit and Sinus Problem    Patient presents for right axilla abscess and sinus problem.      Abscess   Chronicity:  NewProgression Since Onset: worsening  Abscess location: right axilla.  Associated Symptoms: a fever (subjective), no chills, no sweats  Characteristics: draining (one has drained, other has not), painful, redness and swelling    Pain Scale:  3/10  Treatments Tried:  Warm compresses, draining/squeezing and warm water soaks (arnicare)  Worsened by:  Nothing  Sinus Problem   This is a new problem. The current episode started in the past 7 days (6 days). Maximum temperature: subjective. Associated symptoms include congestion, coughing, headaches, sinus pressure and a sore throat. Pertinent negatives include no chills. Treatments tried: xyzal, DM.     Review of Systems   Constitutional: Positive for fatigue and fever (subjective). Negative for chills.   HENT: Positive for congestion, sinus pressure and sore throat.    Respiratory: Positive for cough.    Neurological: Positive for headaches.       Objective:      Physical Exam   Constitutional: She appears well-developed and well-nourished. No distress.   HENT:   Head: Normocephalic and atraumatic.   Right Ear: Tympanic membrane and ear canal normal.   Left Ear: Tympanic membrane and ear canal normal.   Nose: Rhinorrhea present. No mucosal edema. Right sinus exhibits maxillary sinus tenderness. Right sinus exhibits no frontal sinus tenderness. Left sinus exhibits maxillary sinus tenderness. Left sinus exhibits no frontal sinus tenderness.   Eyes: Conjunctivae are normal. Pupils are equal, round, and reactive to light. Right eye exhibits no discharge. Left eye exhibits no discharge.   Cardiovascular: Normal rate and regular rhythm.   Pulmonary/Chest: Effort normal and breath sounds normal.   Skin: She is not diaphoretic.        Vitals reviewed.       Assessment:       1. Abscess of right axilla        Plan:   Abscess of right axilla  -     clindamycin (CLEOCIN) 300 MG capsule; Take 1 capsule (300 mg total) by mouth every 6 (six) hours. for 7 days  Dispense: 28 capsule; Refill: 0      Avoid shaving  Continue compresses  If no improvement in 24 hours, will refer for possible I&D  Follow-up in about 1 day (around 12/6/2018), or if symptoms worsen or fail to improve, for abscess follow up.

## 2018-12-06 ENCOUNTER — PATIENT MESSAGE (OUTPATIENT)
Dept: INTERNAL MEDICINE | Facility: CLINIC | Age: 28
End: 2018-12-06

## 2018-12-06 DIAGNOSIS — B37.9 ANTIBIOTIC-INDUCED YEAST INFECTION: Primary | ICD-10-CM

## 2018-12-06 DIAGNOSIS — T36.95XA ANTIBIOTIC-INDUCED YEAST INFECTION: Primary | ICD-10-CM

## 2018-12-09 ENCOUNTER — OFFICE VISIT (OUTPATIENT)
Dept: URGENT CARE | Facility: CLINIC | Age: 28
End: 2018-12-09
Payer: COMMERCIAL

## 2018-12-09 VITALS
TEMPERATURE: 99 F | OXYGEN SATURATION: 97 % | WEIGHT: 176.38 LBS | BODY MASS INDEX: 27.68 KG/M2 | HEART RATE: 94 BPM | HEIGHT: 67 IN | DIASTOLIC BLOOD PRESSURE: 90 MMHG | SYSTOLIC BLOOD PRESSURE: 130 MMHG

## 2018-12-09 DIAGNOSIS — L02.411 ABSCESS OF RIGHT AXILLA: Primary | ICD-10-CM

## 2018-12-09 PROCEDURE — 99213 OFFICE O/P EST LOW 20 MIN: CPT | Mod: S$GLB,,, | Performed by: FAMILY MEDICINE

## 2018-12-09 PROCEDURE — 87186 SC STD MICRODIL/AGAR DIL: CPT

## 2018-12-09 PROCEDURE — 3008F BODY MASS INDEX DOCD: CPT | Mod: CPTII,S$GLB,, | Performed by: FAMILY MEDICINE

## 2018-12-09 PROCEDURE — 87077 CULTURE AEROBIC IDENTIFY: CPT

## 2018-12-09 PROCEDURE — 87070 CULTURE OTHR SPECIMN AEROBIC: CPT

## 2018-12-09 PROCEDURE — 99999 PR PBB SHADOW E&M-EST. PATIENT-LVL IV: CPT | Mod: PBBFAC,,,

## 2018-12-09 RX ORDER — TRAMADOL HYDROCHLORIDE 50 MG/1
50 TABLET ORAL EVERY 8 HOURS PRN
Qty: 20 TABLET | Refills: 0 | Status: SHIPPED | OUTPATIENT
Start: 2018-12-09 | End: 2018-12-28

## 2018-12-09 RX ORDER — IBUPROFEN 800 MG/1
800 TABLET ORAL 3 TIMES DAILY
Qty: 30 TABLET | Refills: 0 | Status: SHIPPED | OUTPATIENT
Start: 2018-12-09 | End: 2019-06-25

## 2018-12-09 NOTE — PROGRESS NOTES
Subjective:       Patient ID: Billie Nicolas is a 28 y.o. female.    Chief Complaint: Abscess  Pt reports to clinic with chief complaint of worsening abscess to right axilla.  Was evaluated 4 days ago and placed on clindamycin.  Reports compliance with mediation.  Denies drainage.  Notes worsening edema and erythema extending to bicpe  HPI  Review of Systems    Objective:      Physical Exam   Constitutional: She is oriented to person, place, and time. She appears well-developed and well-nourished.   Cardiovascular: Normal rate and normal heart sounds.   Pulmonary/Chest: Effort normal and breath sounds normal.   Abdominal: Soft. Bowel sounds are normal.   Neurological: She is alert and oriented to person, place, and time.   Skin: There is erythema.        Erythema extending from axilla to bicep   Vitals reviewed.      Assessment:       1. Abscess of right axilla        Plan:   Abscess of right axilla  -     CULTURE, AEROBIC  (SPECIFY SOURCE)  -     traMADol (ULTRAM) 50 mg tablet; Take 1 tablet (50 mg total) by mouth every 8 (eight) hours as needed for Pain.  Dispense: 20 tablet; Refill: 0  -     ibuprofen (ADVIL,MOTRIN) 800 MG tablet; Take 1 tablet (800 mg total) by mouth 3 (three) times daily.  Dispense: 30 tablet; Refill: 0  Continue clindamycin pending culture reports.  Home instruction with packing removal given. Verbalized understanding    Procedure: Billie was seen in the clinic room and placed in the supine position on the treatment table. Attention was directed to the abscess which was located on the right axillar, where an wound measuring 3 cm is noted. The area was prepped with betadine. Approximately 1.5cc of 2% lidocaine without epi was injected into the audie-abscess area. Once the area was anesthetized allowing 2-3 minutes for the anesthetic to take effect, I utilized a #11 scalpel to cut through the skin into the abscess area. I allowed the pus to drain utilizing gauze to absorb drainage and  "blood. A culture swab was utilized to take a culture of the abscess contents swabbing inside the abscess cavity. The abscess cavity was explored breaking up any loculations within the abscess cavity. The wound was then packed with 1/2" plain nugauze, placing a gauze dressing over the wound and securing with paper tape. The patient tolerated the procedure well.    Follow up in 3 days    "

## 2018-12-09 NOTE — PATIENT INSTRUCTIONS
Abscess (Incision & Drainage)  An abscess is sometimes called a boil. It happens when bacteria get trapped under the skin and start to grow. Pus forms inside the abscess as the body responds to the bacteria. An abscess can happen with an insect bite, ingrown hair, blocked oil gland, pimple, cyst, or puncture wound.  Your healthcare provider has drained the pus from your abscess. If the abscess pocket was large, your healthcare provider may have put in gauze packing. Your provider will need to remove it on your next visit. He or she may also replace it at that time. You may not need antibiotics to treat a simple abscess, unless the infection is spreading into the skin around the wound (cellulitis).  The wound will take about 1 to 2 weeks to heal, depending on the size of the abscess. Healthy tissue will grow from the bottom and sides of the opening until it seals over.  Home care  These tips can help your wound heal:  · The wound may drain for the first 2 days. Cover the wound with a clean dry dressing. Change the dressing if it becomes soaked with blood or pus.  · If a gauze packing was placed inside the abscess pocket, you may be told to remove it yourself. You may do this in the shower. Once the packing is removed, you should wash the area in the shower, or clean the area as directed by your provider. Continue to do this until the skin opening has closed. Make sure you wash your hands after changing the packing or cleaning the wound.  · If you were prescribed antibiotics, take them as directed until they are all gone.  · You may use acetaminophen or ibuprofen to control pain, unless another pain medicine was prescribed. If you have liver disease or ever had a stomach ulcer, talk with your doctor before using these medicines.  Follow-up care  Follow up with your healthcare provider, or as advised. If a gauze packing was put in your wound, it should be removed in 1 to 2 days. Check your wound every day for any  signs that the infection is getting worse. The signs are listed below.  When to seek medical advice  Call your healthcare provider right away if any of these occur:  · Increasing redness or swelling  · Red streaks in the skin leading away from the wound  · Increasing local pain or swelling  · Continued pus draining from the wound 2 days after treatment  · Fever of 100.4ºF (38ºC) or higher, or as directed by your healthcare provider  · Boil returns when you are at home  Date Last Reviewed: 9/1/2016  © 2889-9407 [employer]. [address] [place] [address]. All rights reserved. This information is not intended as a substitute for professional medical care. Always follow your healthcare professional's instructions.

## 2018-12-10 ENCOUNTER — PATIENT MESSAGE (OUTPATIENT)
Dept: FAMILY MEDICINE | Facility: CLINIC | Age: 28
End: 2018-12-10

## 2018-12-11 ENCOUNTER — PATIENT MESSAGE (OUTPATIENT)
Dept: FAMILY MEDICINE | Facility: CLINIC | Age: 28
End: 2018-12-11

## 2018-12-11 LAB — BACTERIA SPEC AEROBE CULT: NORMAL

## 2018-12-11 RX ORDER — FLUCONAZOLE 150 MG/1
150 TABLET ORAL DAILY
Qty: 1 TABLET | Refills: 0 | Status: SHIPPED | OUTPATIENT
Start: 2018-12-11 | End: 2018-12-20 | Stop reason: SDUPTHER

## 2018-12-11 RX ORDER — FLUCONAZOLE 150 MG/1
150 TABLET ORAL DAILY
Qty: 1 TABLET | Refills: 0 | Status: SHIPPED | OUTPATIENT
Start: 2018-12-11 | End: 2018-12-11 | Stop reason: SDUPTHER

## 2018-12-11 RX ORDER — DOXYCYCLINE 150 MG/1
150 TABLET ORAL 2 TIMES DAILY
Qty: 20 TABLET | Refills: 0 | Status: SHIPPED | OUTPATIENT
Start: 2018-12-11 | End: 2018-12-12

## 2018-12-12 ENCOUNTER — OFFICE VISIT (OUTPATIENT)
Dept: FAMILY MEDICINE | Facility: CLINIC | Age: 28
End: 2018-12-12
Payer: COMMERCIAL

## 2018-12-12 VITALS
WEIGHT: 171.31 LBS | BODY MASS INDEX: 26.83 KG/M2 | SYSTOLIC BLOOD PRESSURE: 140 MMHG | OXYGEN SATURATION: 99 % | DIASTOLIC BLOOD PRESSURE: 89 MMHG | HEART RATE: 86 BPM | TEMPERATURE: 100 F

## 2018-12-12 DIAGNOSIS — Z09 ENCOUNTER FOR RECHECK OF ABSCESS FOLLOWING INCISION AND DRAINAGE: Primary | ICD-10-CM

## 2018-12-12 DIAGNOSIS — A49.02 MRSA INFECTION: ICD-10-CM

## 2018-12-12 PROCEDURE — 99024 POSTOP FOLLOW-UP VISIT: CPT | Mod: S$GLB,,, | Performed by: NURSE PRACTITIONER

## 2018-12-12 PROCEDURE — 99999 PR PBB SHADOW E&M-EST. PATIENT-LVL III: CPT | Mod: PBBFAC,,, | Performed by: NURSE PRACTITIONER

## 2018-12-16 ENCOUNTER — PATIENT MESSAGE (OUTPATIENT)
Dept: INTERNAL MEDICINE | Facility: CLINIC | Age: 28
End: 2018-12-16

## 2018-12-17 NOTE — PROGRESS NOTES
Subjective:       Patient ID: Billie Nicolas is a 28 y.o. female.    Chief Complaint: Follow-up  pt reports to clinic for I&D follow up of right axilla abscess.  I&D performed 3 days ago.  Pt denies increased drainage.  Notes minimal pain.  Wound culture is positive for MRSA resistant to clindamycin, pt switched to doxcycline.    HPI  Review of Systems    Objective:      Physical Exam   Constitutional: She appears well-developed and well-nourished.   Cardiovascular: Normal rate and normal heart sounds.   Pulmonary/Chest: Breath sounds normal.   Skin: There is erythema.   Right axilla: 1cm incision, minimally erythema; serosag drainage, minimally induration decreased from previous   Vitals reviewed.      Assessment:       1. Encounter for recheck of abscess following incision and drainage    2. MRSA infection        Plan:   Encounter for recheck of abscess following incision and drainage  Improving., continue previous home care instructions   MRSA infection  Complete antibiotic therapy    No Follow-up on file.

## 2018-12-20 ENCOUNTER — OFFICE VISIT (OUTPATIENT)
Dept: INTERNAL MEDICINE | Facility: CLINIC | Age: 28
End: 2018-12-20
Payer: COMMERCIAL

## 2018-12-20 ENCOUNTER — HOSPITAL ENCOUNTER (OUTPATIENT)
Dept: RADIOLOGY | Facility: HOSPITAL | Age: 28
Discharge: HOME | End: 2018-12-20
Attending: PHYSICIAN ASSISTANT
Payer: COMMERCIAL

## 2018-12-20 VITALS
BODY MASS INDEX: 27.64 KG/M2 | OXYGEN SATURATION: 99 % | SYSTOLIC BLOOD PRESSURE: 108 MMHG | HEART RATE: 93 BPM | WEIGHT: 176.13 LBS | HEIGHT: 67 IN | DIASTOLIC BLOOD PRESSURE: 76 MMHG | TEMPERATURE: 100 F

## 2018-12-20 DIAGNOSIS — S61.002A OPEN WOUND OF LEFT THUMB, INITIAL ENCOUNTER: ICD-10-CM

## 2018-12-20 DIAGNOSIS — S61.002A OPEN WOUND OF LEFT THUMB, INITIAL ENCOUNTER: Primary | ICD-10-CM

## 2018-12-20 PROCEDURE — 73130 X-RAY EXAM OF HAND: CPT | Mod: TC,LT

## 2018-12-20 PROCEDURE — 99214 OFFICE O/P EST MOD 30 MIN: CPT | Mod: S$GLB,,, | Performed by: PHYSICIAN ASSISTANT

## 2018-12-20 PROCEDURE — 99999 PR PBB SHADOW E&M-EST. PATIENT-LVL IV: CPT | Mod: PBBFAC,,, | Performed by: PHYSICIAN ASSISTANT

## 2018-12-20 PROCEDURE — 3008F BODY MASS INDEX DOCD: CPT | Mod: CPTII,S$GLB,, | Performed by: PHYSICIAN ASSISTANT

## 2018-12-20 PROCEDURE — 73130 X-RAY EXAM OF HAND: CPT | Mod: 26,LT,, | Performed by: RADIOLOGY

## 2018-12-20 RX ORDER — FLUCONAZOLE 150 MG/1
150 TABLET ORAL ONCE
Qty: 1 TABLET | Refills: 2 | Status: SHIPPED | OUTPATIENT
Start: 2018-12-20 | End: 2018-12-21

## 2018-12-20 RX ORDER — LEVOFLOXACIN 500 MG/1
500 TABLET, FILM COATED ORAL DAILY
Qty: 5 TABLET | Refills: 0 | Status: SHIPPED | OUTPATIENT
Start: 2018-12-20 | End: 2018-12-25

## 2018-12-20 NOTE — PROGRESS NOTES
Subjective:       Patient ID: Billie Nicolas is a 28 y.o. female.    Chief Complaint: left thumb injury (PCP - Ramírez)    Patient is a 27 yo female who injured her left thumb by a drill. She has concerns it could be infected.       Hand Injury    Her dominant hand is their left hand. The incident occurred 3 to 5 days ago. The incident occurred at home. The injury mechanism was a direct blow. The quality of the pain is described as aching. The pain is mild. The pain has been constant since the incident. Associated symptoms include tingling. Pertinent negatives include no chest pain. The symptoms are aggravated by movement and palpation. She has tried nothing for the symptoms.     Past Medical History:   Diagnosis Date    Abnormal cervical Pap smear with positive HPV DNA test     ADHD, predominantly inattentive type     Allergy     Anxiety     Asthma     Bulimia     Major depression, recurrent     Migraine headache        Review of Systems   Constitutional: Negative for chills, fatigue and fever.   Respiratory: Negative for chest tightness and shortness of breath.    Cardiovascular: Negative for chest pain.   Gastrointestinal: Negative for abdominal pain.   Neurological: Positive for tingling.       Objective:      Physical Exam   Constitutional: She appears well-developed and well-nourished. No distress.   Musculoskeletal:        Hands:  Skin: She is not diaphoretic.   Nursing note and vitals reviewed.      Assessment:       1. Open wound of left thumb, initial encounter        Plan:       Open wound of left thumb, initial encounter  -     X-Ray Hand 3 view Left; Future; Expected date: 12/20/2018    Other orders  -     levoFLOXacin (LEVAQUIN) 500 MG tablet; Take 1 tablet (500 mg total) by mouth once daily. for 5 days  Dispense: 5 tablet; Refill: 0  -     fluconazole (DIFLUCAN) 150 MG Tab; Take 1 tablet (150 mg total) by mouth once. for 1 dose  Dispense: 1 tablet; Refill: 2

## 2018-12-21 DIAGNOSIS — F98.8 ATTENTION DEFICIT DISORDER, UNSPECIFIED HYPERACTIVITY PRESENCE: ICD-10-CM

## 2018-12-21 RX ORDER — DEXTROAMPHETAMINE SACCHARATE, AMPHETAMINE ASPARTATE, DEXTROAMPHETAMINE SULFATE AND AMPHETAMINE SULFATE 5; 5; 5; 5 MG/1; MG/1; MG/1; MG/1
TABLET ORAL
Qty: 45 TABLET | Refills: 0 | Status: SHIPPED | OUTPATIENT
Start: 2018-12-21 | End: 2019-01-18 | Stop reason: SDUPTHER

## 2018-12-28 ENCOUNTER — OFFICE VISIT (OUTPATIENT)
Dept: INTERNAL MEDICINE | Facility: CLINIC | Age: 28
End: 2018-12-28
Payer: COMMERCIAL

## 2018-12-28 ENCOUNTER — LAB VISIT (OUTPATIENT)
Dept: LAB | Facility: HOSPITAL | Age: 28
End: 2018-12-28
Attending: FAMILY MEDICINE
Payer: COMMERCIAL

## 2018-12-28 VITALS
RESPIRATION RATE: 20 BRPM | HEART RATE: 106 BPM | OXYGEN SATURATION: 98 % | DIASTOLIC BLOOD PRESSURE: 80 MMHG | TEMPERATURE: 98 F | HEIGHT: 67 IN | WEIGHT: 174.38 LBS | BODY MASS INDEX: 27.37 KG/M2 | SYSTOLIC BLOOD PRESSURE: 126 MMHG

## 2018-12-28 DIAGNOSIS — R89.9 ABNORMAL LABORATORY TEST: ICD-10-CM

## 2018-12-28 DIAGNOSIS — F32.A ANXIETY AND DEPRESSION: ICD-10-CM

## 2018-12-28 DIAGNOSIS — Z11.3 ROUTINE SCREENING FOR STI (SEXUALLY TRANSMITTED INFECTION): ICD-10-CM

## 2018-12-28 DIAGNOSIS — Z11.3 ROUTINE SCREENING FOR STI (SEXUALLY TRANSMITTED INFECTION): Primary | ICD-10-CM

## 2018-12-28 DIAGNOSIS — F41.9 ANXIETY AND DEPRESSION: ICD-10-CM

## 2018-12-28 LAB
AMORPH CRY UR QL COMP ASSIST: NORMAL
ANION GAP SERPL CALC-SCNC: 8 MMOL/L
BACTERIA #/AREA URNS AUTO: NORMAL /HPF
BASOPHILS # BLD AUTO: 0.02 K/UL
BASOPHILS NFR BLD: 0.5 %
BUN SERPL-MCNC: 17 MG/DL
CALCIUM SERPL-MCNC: 9.2 MG/DL
CHLORIDE SERPL-SCNC: 103 MMOL/L
CO2 SERPL-SCNC: 27 MMOL/L
CREAT SERPL-MCNC: 0.9 MG/DL
DIFFERENTIAL METHOD: ABNORMAL
EOSINOPHIL # BLD AUTO: 0.1 K/UL
EOSINOPHIL NFR BLD: 2.3 %
ERYTHROCYTE [DISTWIDTH] IN BLOOD BY AUTOMATED COUNT: 13.9 %
EST. GFR  (AFRICAN AMERICAN): >60 ML/MIN/1.73 M^2
EST. GFR  (NON AFRICAN AMERICAN): >60 ML/MIN/1.73 M^2
GLUCOSE SERPL-MCNC: 67 MG/DL
HCT VFR BLD AUTO: 40.6 %
HGB BLD-MCNC: 12.8 G/DL
IMM GRANULOCYTES # BLD AUTO: 0 K/UL
IMM GRANULOCYTES NFR BLD AUTO: 0 %
LYMPHOCYTES # BLD AUTO: 1.4 K/UL
LYMPHOCYTES NFR BLD: 34.3 %
MCH RBC QN AUTO: 29.4 PG
MCHC RBC AUTO-ENTMCNC: 31.5 G/DL
MCV RBC AUTO: 93 FL
MICROSCOPIC COMMENT: NORMAL
MONOCYTES # BLD AUTO: 0.3 K/UL
MONOCYTES NFR BLD: 8 %
NEUTROPHILS # BLD AUTO: 2.2 K/UL
NEUTROPHILS NFR BLD: 54.9 %
NRBC BLD-RTO: 0 /100 WBC
PLATELET # BLD AUTO: 299 K/UL
PMV BLD AUTO: 9.2 FL
POTASSIUM SERPL-SCNC: 3.8 MMOL/L
RBC # BLD AUTO: 4.36 M/UL
RBC #/AREA URNS AUTO: 2 /HPF (ref 0–4)
SODIUM SERPL-SCNC: 138 MMOL/L
SQUAMOUS #/AREA URNS AUTO: 7 /HPF
WBC # BLD AUTO: 3.99 K/UL
WBC #/AREA URNS AUTO: 3 /HPF (ref 0–5)

## 2018-12-28 PROCEDURE — 81001 URINALYSIS AUTO W/SCOPE: CPT

## 2018-12-28 PROCEDURE — 3008F BODY MASS INDEX DOCD: CPT | Mod: CPTII,S$GLB,, | Performed by: FAMILY MEDICINE

## 2018-12-28 PROCEDURE — 86703 HIV-1/HIV-2 1 RESULT ANTBDY: CPT

## 2018-12-28 PROCEDURE — 99213 OFFICE O/P EST LOW 20 MIN: CPT | Mod: S$GLB,,, | Performed by: FAMILY MEDICINE

## 2018-12-28 PROCEDURE — 36415 COLL VENOUS BLD VENIPUNCTURE: CPT

## 2018-12-28 PROCEDURE — 86592 SYPHILIS TEST NON-TREP QUAL: CPT

## 2018-12-28 PROCEDURE — 99999 PR PBB SHADOW E&M-EST. PATIENT-LVL III: CPT | Mod: PBBFAC,,, | Performed by: FAMILY MEDICINE

## 2018-12-28 PROCEDURE — 87350 HEPATITIS BE AG IA: CPT

## 2018-12-28 PROCEDURE — 80048 BASIC METABOLIC PNL TOTAL CA: CPT

## 2018-12-28 PROCEDURE — 85025 COMPLETE CBC W/AUTO DIFF WBC: CPT

## 2018-12-28 PROCEDURE — 87491 CHLMYD TRACH DNA AMP PROBE: CPT

## 2018-12-28 PROCEDURE — 87340 HEPATITIS B SURFACE AG IA: CPT

## 2018-12-28 RX ORDER — VALACYCLOVIR HYDROCHLORIDE 500 MG/1
500 TABLET, FILM COATED ORAL DAILY
Qty: 90 TABLET | Refills: 3 | Status: SHIPPED | OUTPATIENT
Start: 2018-12-28 | End: 2019-11-19 | Stop reason: SDUPTHER

## 2018-12-28 RX ORDER — VILAZODONE HYDROCHLORIDE 40 MG/1
40 TABLET ORAL DAILY
Qty: 90 TABLET | Refills: 0 | Status: SHIPPED | OUTPATIENT
Start: 2018-12-28 | End: 2019-03-13 | Stop reason: SDUPTHER

## 2018-12-28 NOTE — PROGRESS NOTES
Subjective:       Patient ID: Billie Nicolas is a 28 y.o. female.    Chief Complaint: Std Screening and Medication Refill (valtrex, viibryd)    HPI Ms. Nicolas presents for medication refill and STI screen.     Rotating schedule with her job the lamp she got for seasonal depression. She has noted that once fall and winter came her depression increased this year. Tried getting the lamp but did not use it as she should have.    She has not been able to be consistent.   She is tired all the time and not taking care of herself.     Review of Systems   Constitutional: Negative.    HENT: Negative.    Respiratory: Negative.    Cardiovascular: Negative.    Gastrointestinal: Negative.    Genitourinary: Negative.    Musculoskeletal: Negative.    Neurological: Negative.    Psychiatric/Behavioral: Positive for decreased concentration and dysphoric mood.         Past Medical History:   Diagnosis Date    Abnormal cervical Pap smear with positive HPV DNA test     ADHD, predominantly inattentive type     Allergy     Anxiety     Asthma     Bulimia     Major depression, recurrent     Migraine headache      Objective:        Physical Exam   Constitutional: She is oriented to person, place, and time. She appears well-developed and well-nourished.   HENT:   Head: Normocephalic and atraumatic.   Musculoskeletal: Normal range of motion.   Neurological: She is alert and oriented to person, place, and time.   Vitals reviewed.        Assessment/Plan:     Routine screening for STI (sexually transmitted infection)  -     Urinalysis Microscopic  -     C. trachomatis/N. gonorrhoeae by AMP DNA [facility]; Urine  -     Hepatitis B e antigen; Future; Expected date: 12/28/2018  -     Hepatitis B surface antigen; Future; Expected date: 12/28/2018  -     RPR; Future; Expected date: 12/28/2018  -     HIV 1/2 Ag/Ab (4th Gen); Future; Expected date: 12/28/2018    Anxiety and depression  -     vilazodone (VIIBRYD) 40 mg Tab tablet; Take 1  tablet (40 mg total) by mouth once daily.  Dispense: 90 tablet; Refill: 0    Abnormal laboratory test  -     CBC auto differential; Future; Expected date: 12/28/2018  -     Basic metabolic panel; Future; Expected date: 12/28/2018    Other orders  -     valACYclovir (VALTREX) 500 MG tablet; TAKE ONE TABLET BY MOUTH EVERY DAY  Dispense: 90 tablet; Refill: 3          Follow-up if symptoms worsen or fail to improve.    Yanni Ramírez MD  [facility]   Family Medicine

## 2018-12-29 LAB
C TRACH DNA SPEC QL NAA+PROBE: NOT DETECTED
N GONORRHOEA DNA SPEC QL NAA+PROBE: NOT DETECTED
RPR SER QL: NORMAL

## 2018-12-31 LAB
HBV SURFACE AG SERPL QL IA: NEGATIVE
HIV 1+2 AB+HIV1 P24 AG SERPL QL IA: NEGATIVE

## 2019-01-02 LAB — HBV E AG SERPL QL IA: NORMAL

## 2019-01-07 ENCOUNTER — OFFICE VISIT (OUTPATIENT)
Dept: INTERNAL MEDICINE | Facility: CLINIC | Age: 29
End: 2019-01-07
Payer: COMMERCIAL

## 2019-01-07 VITALS
BODY MASS INDEX: 26.71 KG/M2 | OXYGEN SATURATION: 99 % | DIASTOLIC BLOOD PRESSURE: 78 MMHG | SYSTOLIC BLOOD PRESSURE: 110 MMHG | HEIGHT: 67 IN | TEMPERATURE: 98 F | HEART RATE: 93 BPM | WEIGHT: 170.19 LBS

## 2019-01-07 DIAGNOSIS — Z86.14 HISTORY OF MRSA INFECTION: ICD-10-CM

## 2019-01-07 DIAGNOSIS — L02.415 ABSCESS OF RIGHT LEG: Primary | ICD-10-CM

## 2019-01-07 PROCEDURE — 99213 PR OFFICE/OUTPT VISIT, EST, LEVL III, 20-29 MIN: ICD-10-PCS | Mod: S$GLB,,, | Performed by: FAMILY MEDICINE

## 2019-01-07 PROCEDURE — 3008F BODY MASS INDEX DOCD: CPT | Mod: CPTII,S$GLB,, | Performed by: FAMILY MEDICINE

## 2019-01-07 PROCEDURE — 99999 PR PBB SHADOW E&M-EST. PATIENT-LVL III: ICD-10-PCS | Mod: PBBFAC,,, | Performed by: FAMILY MEDICINE

## 2019-01-07 PROCEDURE — 99213 OFFICE O/P EST LOW 20 MIN: CPT | Mod: S$GLB,,, | Performed by: FAMILY MEDICINE

## 2019-01-07 PROCEDURE — 3008F PR BODY MASS INDEX (BMI) DOCUMENTED: ICD-10-PCS | Mod: CPTII,S$GLB,, | Performed by: FAMILY MEDICINE

## 2019-01-07 PROCEDURE — 99999 PR PBB SHADOW E&M-EST. PATIENT-LVL III: CPT | Mod: PBBFAC,,, | Performed by: FAMILY MEDICINE

## 2019-01-07 RX ORDER — DOXYCYCLINE 100 MG/1
100 CAPSULE ORAL 2 TIMES DAILY
Qty: 20 CAPSULE | Refills: 0 | Status: SHIPPED | OUTPATIENT
Start: 2019-01-07 | End: 2019-01-17

## 2019-01-09 ENCOUNTER — PATIENT MESSAGE (OUTPATIENT)
Dept: INTERNAL MEDICINE | Facility: CLINIC | Age: 29
End: 2019-01-09

## 2019-01-18 DIAGNOSIS — F98.8 ATTENTION DEFICIT DISORDER, UNSPECIFIED HYPERACTIVITY PRESENCE: ICD-10-CM

## 2019-01-18 RX ORDER — DEXTROAMPHETAMINE SACCHARATE, AMPHETAMINE ASPARTATE, DEXTROAMPHETAMINE SULFATE AND AMPHETAMINE SULFATE 5; 5; 5; 5 MG/1; MG/1; MG/1; MG/1
TABLET ORAL
Qty: 45 TABLET | Refills: 0 | Status: SHIPPED | OUTPATIENT
Start: 2019-01-18 | End: 2019-03-13 | Stop reason: SDUPTHER

## 2019-02-15 ENCOUNTER — PATIENT MESSAGE (OUTPATIENT)
Dept: INTERNAL MEDICINE | Facility: CLINIC | Age: 29
End: 2019-02-15

## 2019-03-13 DIAGNOSIS — F32.A ANXIETY AND DEPRESSION: ICD-10-CM

## 2019-03-13 DIAGNOSIS — F98.8 ATTENTION DEFICIT DISORDER, UNSPECIFIED HYPERACTIVITY PRESENCE: ICD-10-CM

## 2019-03-13 DIAGNOSIS — F41.9 ANXIETY AND DEPRESSION: ICD-10-CM

## 2019-03-13 RX ORDER — DEXTROAMPHETAMINE SACCHARATE, AMPHETAMINE ASPARTATE, DEXTROAMPHETAMINE SULFATE AND AMPHETAMINE SULFATE 5; 5; 5; 5 MG/1; MG/1; MG/1; MG/1
TABLET ORAL
Qty: 45 TABLET | Refills: 0 | Status: SHIPPED | OUTPATIENT
Start: 2019-03-13 | End: 2019-04-15 | Stop reason: SDUPTHER

## 2019-03-13 RX ORDER — VILAZODONE HYDROCHLORIDE 40 MG/1
40 TABLET ORAL DAILY
Qty: 90 TABLET | Refills: 0 | Status: SHIPPED | OUTPATIENT
Start: 2019-03-13 | End: 2019-06-10 | Stop reason: SDUPTHER

## 2019-03-18 ENCOUNTER — PATIENT MESSAGE (OUTPATIENT)
Dept: INTERNAL MEDICINE | Facility: CLINIC | Age: 29
End: 2019-03-18

## 2019-03-18 DIAGNOSIS — R35.0 FREQUENCY OF MICTURITION: Primary | ICD-10-CM

## 2019-03-18 DIAGNOSIS — R30.0 DYSURIA: ICD-10-CM

## 2019-03-19 ENCOUNTER — LAB VISIT (OUTPATIENT)
Dept: LAB | Facility: HOSPITAL | Age: 29
End: 2019-03-19
Attending: FAMILY MEDICINE
Payer: COMMERCIAL

## 2019-03-19 DIAGNOSIS — R30.0 DYSURIA: ICD-10-CM

## 2019-03-19 DIAGNOSIS — R35.0 FREQUENCY OF MICTURITION: ICD-10-CM

## 2019-03-19 LAB
BILIRUB UR QL STRIP: NEGATIVE
CLARITY UR: CLEAR
COLOR UR: YELLOW
GLUCOSE UR QL STRIP: NEGATIVE
HGB UR QL STRIP: NEGATIVE
KETONES UR QL STRIP: ABNORMAL
LEUKOCYTE ESTERASE UR QL STRIP: NEGATIVE
NITRITE UR QL STRIP: NEGATIVE
PH UR STRIP: 5.5 [PH] (ref 5–8)
PROT UR QL STRIP: NEGATIVE
SP GR UR STRIP: 1.02 (ref 1–1.03)
URN SPEC COLLECT METH UR: ABNORMAL

## 2019-03-19 PROCEDURE — 87086 URINE CULTURE/COLONY COUNT: CPT

## 2019-03-19 PROCEDURE — 81002 URINALYSIS NONAUTO W/O SCOPE: CPT

## 2019-03-19 PROCEDURE — 87491 CHLMYD TRACH DNA AMP PROBE: CPT

## 2019-03-20 LAB
C TRACH DNA SPEC QL NAA+PROBE: NOT DETECTED
N GONORRHOEA DNA SPEC QL NAA+PROBE: NOT DETECTED

## 2019-03-21 LAB — BACTERIA UR CULT: NO GROWTH

## 2019-03-27 ENCOUNTER — PATIENT MESSAGE (OUTPATIENT)
Dept: PSYCHIATRY | Facility: CLINIC | Age: 29
End: 2019-03-27

## 2019-04-15 ENCOUNTER — LAB VISIT (OUTPATIENT)
Dept: LAB | Facility: HOSPITAL | Age: 29
End: 2019-04-15
Attending: FAMILY MEDICINE
Payer: COMMERCIAL

## 2019-04-15 ENCOUNTER — PATIENT MESSAGE (OUTPATIENT)
Dept: INTERNAL MEDICINE | Facility: CLINIC | Age: 29
End: 2019-04-15

## 2019-04-15 DIAGNOSIS — F98.8 ATTENTION DEFICIT DISORDER, UNSPECIFIED HYPERACTIVITY PRESENCE: ICD-10-CM

## 2019-04-15 DIAGNOSIS — Z11.3 ROUTINE SCREENING FOR STI (SEXUALLY TRANSMITTED INFECTION): Primary | ICD-10-CM

## 2019-04-15 DIAGNOSIS — Z11.3 ROUTINE SCREENING FOR STI (SEXUALLY TRANSMITTED INFECTION): ICD-10-CM

## 2019-04-15 PROCEDURE — 87350 HEPATITIS BE AG IA: CPT

## 2019-04-15 PROCEDURE — 86703 HIV-1/HIV-2 1 RESULT ANTBDY: CPT

## 2019-04-15 PROCEDURE — 86592 SYPHILIS TEST NON-TREP QUAL: CPT

## 2019-04-15 PROCEDURE — 36415 COLL VENOUS BLD VENIPUNCTURE: CPT

## 2019-04-15 PROCEDURE — 87340 HEPATITIS B SURFACE AG IA: CPT

## 2019-04-16 LAB
HBV SURFACE AG SERPL QL IA: NEGATIVE
HIV 1+2 AB+HIV1 P24 AG SERPL QL IA: NEGATIVE
RPR SER QL: NORMAL

## 2019-04-16 RX ORDER — DEXTROAMPHETAMINE SACCHARATE, AMPHETAMINE ASPARTATE, DEXTROAMPHETAMINE SULFATE AND AMPHETAMINE SULFATE 5; 5; 5; 5 MG/1; MG/1; MG/1; MG/1
TABLET ORAL
Qty: 45 TABLET | Refills: 0 | Status: SHIPPED | OUTPATIENT
Start: 2019-04-16 | End: 2019-06-03 | Stop reason: SDUPTHER

## 2019-04-19 LAB — HBV E AG SERPL QL IA: NORMAL

## 2019-04-20 DIAGNOSIS — N94.3 PMS (PREMENSTRUAL SYNDROME): ICD-10-CM

## 2019-04-20 DIAGNOSIS — F41.8 DEPRESSION WITH ANXIETY: ICD-10-CM

## 2019-04-22 RX ORDER — SERTRALINE HYDROCHLORIDE 100 MG/1
100 TABLET, FILM COATED ORAL DAILY
Qty: 90 TABLET | Refills: 3 | Status: SHIPPED | OUTPATIENT
Start: 2019-04-22 | End: 2020-04-20 | Stop reason: SDUPTHER

## 2019-05-06 ENCOUNTER — OFFICE VISIT (OUTPATIENT)
Dept: PSYCHIATRY | Facility: CLINIC | Age: 29
End: 2019-05-06
Payer: COMMERCIAL

## 2019-05-06 DIAGNOSIS — F48.9 DEFERRED DIAGNOSIS ON AXIS I: Primary | ICD-10-CM

## 2019-05-06 PROCEDURE — 90834 PSYTX W PT 45 MINUTES: CPT | Mod: S$GLB,,, | Performed by: SOCIAL WORKER

## 2019-05-06 PROCEDURE — 90834 PR PSYCHOTHERAPY W/PATIENT, 45 MIN: ICD-10-PCS | Mod: S$GLB,,, | Performed by: SOCIAL WORKER

## 2019-05-13 ENCOUNTER — PATIENT MESSAGE (OUTPATIENT)
Dept: INTERNAL MEDICINE | Facility: CLINIC | Age: 29
End: 2019-05-13

## 2019-06-03 DIAGNOSIS — F98.8 ATTENTION DEFICIT DISORDER, UNSPECIFIED HYPERACTIVITY PRESENCE: ICD-10-CM

## 2019-06-03 RX ORDER — DEXTROAMPHETAMINE SACCHARATE, AMPHETAMINE ASPARTATE, DEXTROAMPHETAMINE SULFATE AND AMPHETAMINE SULFATE 5; 5; 5; 5 MG/1; MG/1; MG/1; MG/1
TABLET ORAL
Qty: 45 TABLET | Refills: 0 | Status: SHIPPED | OUTPATIENT
Start: 2019-06-03 | End: 2020-01-17 | Stop reason: SDUPTHER

## 2019-06-05 ENCOUNTER — OFFICE VISIT (OUTPATIENT)
Dept: PSYCHIATRY | Facility: CLINIC | Age: 29
End: 2019-06-05
Payer: COMMERCIAL

## 2019-06-05 DIAGNOSIS — F48.9 DEFERRED DIAGNOSIS ON AXIS I: Primary | ICD-10-CM

## 2019-06-05 PROCEDURE — 90834 PSYTX W PT 45 MINUTES: CPT | Mod: S$GLB,,, | Performed by: SOCIAL WORKER

## 2019-06-05 PROCEDURE — 90834 PR PSYCHOTHERAPY W/PATIENT, 45 MIN: ICD-10-PCS | Mod: S$GLB,,, | Performed by: SOCIAL WORKER

## 2019-06-10 DIAGNOSIS — F32.A ANXIETY AND DEPRESSION: ICD-10-CM

## 2019-06-10 DIAGNOSIS — F41.9 ANXIETY AND DEPRESSION: ICD-10-CM

## 2019-06-10 RX ORDER — VILAZODONE HYDROCHLORIDE 40 MG/1
40 TABLET ORAL DAILY
Qty: 90 TABLET | Refills: 0 | Status: SHIPPED | OUTPATIENT
Start: 2019-06-10 | End: 2019-10-09 | Stop reason: SDUPTHER

## 2019-06-25 ENCOUNTER — OFFICE VISIT (OUTPATIENT)
Dept: INTERNAL MEDICINE | Facility: CLINIC | Age: 29
End: 2019-06-25
Payer: COMMERCIAL

## 2019-06-25 VITALS
HEIGHT: 67 IN | TEMPERATURE: 97 F | HEART RATE: 84 BPM | RESPIRATION RATE: 18 BRPM | OXYGEN SATURATION: 98 % | DIASTOLIC BLOOD PRESSURE: 84 MMHG | SYSTOLIC BLOOD PRESSURE: 126 MMHG | BODY MASS INDEX: 27.86 KG/M2 | WEIGHT: 177.5 LBS

## 2019-06-25 DIAGNOSIS — F32.A ANXIETY AND DEPRESSION: ICD-10-CM

## 2019-06-25 DIAGNOSIS — F90.9 ATTENTION DEFICIT HYPERACTIVITY DISORDER (ADHD), UNSPECIFIED ADHD TYPE: Primary | ICD-10-CM

## 2019-06-25 DIAGNOSIS — F41.9 ANXIETY AND DEPRESSION: ICD-10-CM

## 2019-06-25 PROCEDURE — 99999 PR PBB SHADOW E&M-EST. PATIENT-LVL III: CPT | Mod: PBBFAC,,, | Performed by: FAMILY MEDICINE

## 2019-06-25 PROCEDURE — 99213 PR OFFICE/OUTPT VISIT, EST, LEVL III, 20-29 MIN: ICD-10-PCS | Mod: S$GLB,,, | Performed by: FAMILY MEDICINE

## 2019-06-25 PROCEDURE — 99999 PR PBB SHADOW E&M-EST. PATIENT-LVL III: ICD-10-PCS | Mod: PBBFAC,,, | Performed by: FAMILY MEDICINE

## 2019-06-25 PROCEDURE — 3008F PR BODY MASS INDEX (BMI) DOCUMENTED: ICD-10-PCS | Mod: CPTII,S$GLB,, | Performed by: FAMILY MEDICINE

## 2019-06-25 PROCEDURE — 99213 OFFICE O/P EST LOW 20 MIN: CPT | Mod: S$GLB,,, | Performed by: FAMILY MEDICINE

## 2019-06-25 PROCEDURE — 3008F BODY MASS INDEX DOCD: CPT | Mod: CPTII,S$GLB,, | Performed by: FAMILY MEDICINE

## 2019-06-25 RX ORDER — LISDEXAMFETAMINE DIMESYLATE 30 MG/1
30 CAPSULE ORAL EVERY MORNING
Qty: 30 CAPSULE | Refills: 0 | Status: SHIPPED | OUTPATIENT
Start: 2019-06-25 | End: 2019-07-18

## 2019-06-25 NOTE — PROGRESS NOTES
Subjective:       Patient ID: Billie Nicolas is a 28 y.o. female.    Chief Complaint: ADHD    HPI Ms. Nicolas presents for ADHD  She would like to see if Vyvanse instead of the Adderall.   Adderall for about 2 years   Was on Concerta prior to this     She does not like the crash of the adderall.       Review of Systems   Constitutional: Negative for activity change and unexpected weight change.   HENT: Negative for hearing loss, rhinorrhea and trouble swallowing.    Eyes: Negative for discharge and visual disturbance.   Respiratory: Negative for chest tightness and wheezing.    Cardiovascular: Negative for chest pain and palpitations.   Gastrointestinal: Negative for blood in stool, constipation, diarrhea and vomiting.   Endocrine: Negative for polydipsia and polyuria.   Genitourinary: Negative for difficulty urinating, dysuria, hematuria and menstrual problem.   Musculoskeletal: Negative for arthralgias, joint swelling and neck pain.   Neurological: Negative for weakness and headaches.   Psychiatric/Behavioral: Negative for confusion and dysphoric mood.           Past Medical History:   Diagnosis Date    Abnormal cervical Pap smear with positive HPV DNA test     ADHD, predominantly inattentive type     Allergy     Anxiety     Asthma     Bulimia     Major depression, recurrent     Migraine headache      Objective:        Physical Exam   Constitutional: She appears well-developed and well-nourished.   HENT:   Head: Normocephalic and atraumatic.   Eyes: EOM are normal.   Cardiovascular: Normal heart sounds.   Pulmonary/Chest: Breath sounds normal.   Skin: Skin is warm.   Psychiatric: She has a normal mood and affect. Her behavior is normal.   Vitals reviewed.          Assessment/Plan:     Attention deficit hyperactivity disorder (ADHD), unspecified ADHD type  -     lisdexamfetamine (VYVANSE) 30 MG capsule; Take 1 capsule (30 mg total) by mouth every morning.  Dispense: 30 capsule; Refill:  0    Anxiety and depression    doesn't need refill on medication at this time.       Follow up in 3 months     Yanni Ramírez MD  [facility]

## 2019-06-26 NOTE — PROGRESS NOTES
HPI     Pts last exam was 1/5/17 with DNL. PT c/o blurred distance va and wears   cls full time.   HPI    Any vision changes since last exam: decreased near va  Eye pain: no  Other ocular symptoms: no    Do you wear currently wear glasses or contacts? both    Interested in contacts today? yes    Do you plan on getting new glasses today? yes      Last edited by Gwen Fernandez MA on 7/17/2018  4:50 PM. (History)            Assessment /Plan     For exam results, see Encounter Report.    Myopia, bilateral    Regular astigmatism of left eye      Eyeglass Final Rx     Eyeglass Final Rx       Sphere Cylinder [place] Dist VA    Right -1.50   20/20    Left -2.00 +0.50 100 20/20    Expiration Date:  7/18/2019              Contact Lens Prescription (7/17/2018)        Brand Base Curve Diameter Sphere    Right Air Optix Aqua 8.60 14.2 -1.50    Left Air Optix Aqua 8.60 14.2 -1.50    Expiration Date:  7/18/2019    Replacement:  Monthly    Wearing Schedule:  Daily wear        RTC 1 yr for undilated eye exam or PRN if any problems.   Discussed above and answered questions.                    Patient expressed no known problems or needs

## 2019-07-10 ENCOUNTER — PATIENT MESSAGE (OUTPATIENT)
Dept: INTERNAL MEDICINE | Facility: CLINIC | Age: 29
End: 2019-07-10

## 2019-07-15 ENCOUNTER — OFFICE VISIT (OUTPATIENT)
Dept: OPHTHALMOLOGY | Facility: CLINIC | Age: 29
End: 2019-07-15
Payer: COMMERCIAL

## 2019-07-15 DIAGNOSIS — H52.13 MYOPIA, BILATERAL: Primary | ICD-10-CM

## 2019-07-15 DIAGNOSIS — H52.222 REGULAR ASTIGMATISM OF LEFT EYE: ICD-10-CM

## 2019-07-15 PROCEDURE — 99999 PR PBB SHADOW E&M-EST. PATIENT-LVL II: ICD-10-PCS | Mod: PBBFAC,,, | Performed by: OPTOMETRIST

## 2019-07-15 PROCEDURE — 92015 DETERMINE REFRACTIVE STATE: CPT | Mod: S$GLB,,, | Performed by: OPTOMETRIST

## 2019-07-15 PROCEDURE — 92014 PR EYE EXAM, EST PATIENT,COMPREHESV: ICD-10-PCS | Mod: S$GLB,,, | Performed by: OPTOMETRIST

## 2019-07-15 PROCEDURE — 99999 PR PBB SHADOW E&M-EST. PATIENT-LVL II: CPT | Mod: PBBFAC,,, | Performed by: OPTOMETRIST

## 2019-07-15 PROCEDURE — 92014 COMPRE OPH EXAM EST PT 1/>: CPT | Mod: S$GLB,,, | Performed by: OPTOMETRIST

## 2019-07-15 PROCEDURE — 92015 PR REFRACTION: ICD-10-PCS | Mod: S$GLB,,, | Performed by: OPTOMETRIST

## 2019-07-15 NOTE — PROGRESS NOTES
HPI     Pts last exam was 7/17/18 with DNL. PT c/o blurred distance va and wears   cls full time.   HPI    Any vision changes since last exam: Eye pain: no  Other ocular symptoms: no     Do you wear currently wear glasses or contacts? both     Interested in contacts today? yes     Do you plan on getting new glasses today? yes    Last edited by Gwen Fernandez MA on 7/15/2019 10:24 AM. (History)            Assessment /Plan     For exam results, see Encounter Report.    Myopia, bilateral    Regular astigmatism of left eye      Eyeglass Final Rx     Eyeglass Final Rx       Sphere Cylinder Axis    Right -1.75 +0.50 088    Left -2.00 +0.50 100    Expiration Date:  7/15/2020              Contact Lens Prescription (7/15/2019)        Brand Base Curve Diameter Sphere    Right Air Optix Aqua 8.60 14.2 -1.50    Left Air Optix Aqua 8.60 14.2 -1.50    Expiration Date:  7/15/2020    Replacement:  Monthly    Wearing Schedule:  Daily wear        RTC 1 yr for undilated eye exam or PRN if any problems.   Discussed above and answered questions.

## 2019-07-17 ENCOUNTER — PATIENT MESSAGE (OUTPATIENT)
Dept: INTERNAL MEDICINE | Facility: CLINIC | Age: 29
End: 2019-07-17

## 2019-07-17 DIAGNOSIS — F90.0 ATTENTION DEFICIT HYPERACTIVITY DISORDER (ADHD), PREDOMINANTLY INATTENTIVE TYPE: Primary | ICD-10-CM

## 2019-07-18 RX ORDER — LISDEXAMFETAMINE DIMESYLATE 40 MG/1
40 CAPSULE ORAL DAILY
Qty: 30 CAPSULE | Refills: 0 | Status: SHIPPED | OUTPATIENT
Start: 2019-07-18 | End: 2019-08-16 | Stop reason: SDUPTHER

## 2019-08-16 DIAGNOSIS — F90.0 ATTENTION DEFICIT HYPERACTIVITY DISORDER (ADHD), PREDOMINANTLY INATTENTIVE TYPE: ICD-10-CM

## 2019-08-16 RX ORDER — LISDEXAMFETAMINE DIMESYLATE 40 MG/1
40 CAPSULE ORAL DAILY
Qty: 30 CAPSULE | Refills: 0 | Status: SHIPPED | OUTPATIENT
Start: 2019-08-16 | End: 2019-09-23 | Stop reason: SDUPTHER

## 2019-09-03 ENCOUNTER — PATIENT MESSAGE (OUTPATIENT)
Dept: INTERNAL MEDICINE | Facility: CLINIC | Age: 29
End: 2019-09-03

## 2019-09-03 DIAGNOSIS — Z11.3 ROUTINE SCREENING FOR STI (SEXUALLY TRANSMITTED INFECTION): Primary | ICD-10-CM

## 2019-09-04 ENCOUNTER — OFFICE VISIT (OUTPATIENT)
Dept: PSYCHIATRY | Facility: CLINIC | Age: 29
End: 2019-09-04
Payer: COMMERCIAL

## 2019-09-04 DIAGNOSIS — F48.9 DEFERRED DIAGNOSIS ON AXIS I: Primary | ICD-10-CM

## 2019-09-04 PROCEDURE — 90834 PR PSYCHOTHERAPY W/PATIENT, 45 MIN: ICD-10-PCS | Mod: S$GLB,,, | Performed by: SOCIAL WORKER

## 2019-09-04 PROCEDURE — 90834 PSYTX W PT 45 MINUTES: CPT | Mod: S$GLB,,, | Performed by: SOCIAL WORKER

## 2019-09-06 ENCOUNTER — LAB VISIT (OUTPATIENT)
Dept: LAB | Facility: HOSPITAL | Age: 29
End: 2019-09-06
Attending: FAMILY MEDICINE
Payer: COMMERCIAL

## 2019-09-06 DIAGNOSIS — Z11.3 ROUTINE SCREENING FOR STI (SEXUALLY TRANSMITTED INFECTION): ICD-10-CM

## 2019-09-06 PROCEDURE — 86592 SYPHILIS TEST NON-TREP QUAL: CPT

## 2019-09-06 PROCEDURE — 87350 HEPATITIS BE AG IA: CPT

## 2019-09-06 PROCEDURE — 36415 COLL VENOUS BLD VENIPUNCTURE: CPT

## 2019-09-06 PROCEDURE — 86703 HIV-1/HIV-2 1 RESULT ANTBDY: CPT

## 2019-09-06 PROCEDURE — 87340 HEPATITIS B SURFACE AG IA: CPT

## 2019-09-07 LAB — RPR SER QL: NORMAL

## 2019-09-09 LAB
HBV SURFACE AG SERPL QL IA: NEGATIVE
HIV 1+2 AB+HIV1 P24 AG SERPL QL IA: NEGATIVE

## 2019-09-10 LAB — HBV E AG SERPL QL IA: NORMAL

## 2019-09-23 DIAGNOSIS — F90.0 ATTENTION DEFICIT HYPERACTIVITY DISORDER (ADHD), PREDOMINANTLY INATTENTIVE TYPE: ICD-10-CM

## 2019-09-24 RX ORDER — LISDEXAMFETAMINE DIMESYLATE 40 MG/1
40 CAPSULE ORAL DAILY
Qty: 30 CAPSULE | Refills: 0 | Status: SHIPPED | OUTPATIENT
Start: 2019-09-24 | End: 2019-10-22 | Stop reason: SDUPTHER

## 2019-10-02 ENCOUNTER — OFFICE VISIT (OUTPATIENT)
Dept: ORTHOPEDICS | Facility: CLINIC | Age: 29
End: 2019-10-02
Payer: COMMERCIAL

## 2019-10-02 ENCOUNTER — HOSPITAL ENCOUNTER (OUTPATIENT)
Dept: RADIOLOGY | Facility: HOSPITAL | Age: 29
Discharge: HOME | End: 2019-10-02
Attending: NURSE PRACTITIONER
Payer: COMMERCIAL

## 2019-10-02 ENCOUNTER — OFFICE VISIT (OUTPATIENT)
Dept: FAMILY MEDICINE | Facility: CLINIC | Age: 29
End: 2019-10-02
Payer: COMMERCIAL

## 2019-10-02 VITALS
HEART RATE: 112 BPM | HEIGHT: 67 IN | SYSTOLIC BLOOD PRESSURE: 145 MMHG | BODY MASS INDEX: 28.41 KG/M2 | DIASTOLIC BLOOD PRESSURE: 90 MMHG | WEIGHT: 181 LBS

## 2019-10-02 VITALS
BODY MASS INDEX: 28.45 KG/M2 | DIASTOLIC BLOOD PRESSURE: 98 MMHG | HEART RATE: 120 BPM | OXYGEN SATURATION: 99 % | SYSTOLIC BLOOD PRESSURE: 137 MMHG | WEIGHT: 181.69 LBS | TEMPERATURE: 99 F

## 2019-10-02 DIAGNOSIS — S69.91XA INJURY OF FINGER OF RIGHT HAND, INITIAL ENCOUNTER: ICD-10-CM
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
## 2019-10-02 DIAGNOSIS — S61.051A: Primary | ICD-10-CM

## 2019-10-02 PROCEDURE — 3008F BODY MASS INDEX DOCD: CPT | Mod: CPTII,S$GLB,, | Performed by: NURSE PRACTITIONER

## 2019-10-02 PROCEDURE — 99213 OFFICE O/P EST LOW 20 MIN: CPT | Mod: S$GLB,,, | Performed by: NURSE PRACTITIONER

## 2019-10-02 PROCEDURE — 73130 XR HAND COMPLETE 3 VIEW RIGHT: ICD-10-PCS | Mod: 26,RT,, | Performed by: RADIOLOGY

## 2019-10-02 PROCEDURE — 3008F PR BODY MASS INDEX (BMI) DOCUMENTED: ICD-10-PCS | Mod: CPTII,S$GLB,, | Performed by: ORTHOPAEDIC SURGERY

## 2019-10-02 PROCEDURE — 73130 X-RAY EXAM OF HAND: CPT | Mod: TC,PO,RT

## 2019-10-02 PROCEDURE — 99213 PR OFFICE/OUTPT VISIT, EST, LEVL III, 20-29 MIN: ICD-10-PCS | Mod: S$GLB,,, | Performed by: NURSE PRACTITIONER

## 2019-10-02 PROCEDURE — 99999 PR PBB SHADOW E&M-EST. PATIENT-LVL IV: CPT | Mod: PBBFAC,,, | Performed by: NURSE PRACTITIONER

## 2019-10-02 PROCEDURE — 99999 PR PBB SHADOW E&M-EST. PATIENT-LVL III: ICD-10-PCS | Mod: PBBFAC,,, | Performed by: ORTHOPAEDIC SURGERY

## 2019-10-02 PROCEDURE — 3008F PR BODY MASS INDEX (BMI) DOCUMENTED: ICD-10-PCS | Mod: CPTII,S$GLB,, | Performed by: NURSE PRACTITIONER

## 2019-10-02 PROCEDURE — 99999 PR PBB SHADOW E&M-EST. PATIENT-LVL III: CPT | Mod: PBBFAC,,, | Performed by: ORTHOPAEDIC SURGERY

## 2019-10-02 PROCEDURE — 99203 OFFICE O/P NEW LOW 30 MIN: CPT | Mod: S$GLB,,, | Performed by: ORTHOPAEDIC SURGERY

## 2019-10-02 PROCEDURE — 3008F BODY MASS INDEX DOCD: CPT | Mod: CPTII,S$GLB,, | Performed by: ORTHOPAEDIC SURGERY

## 2019-10-02 PROCEDURE — 99999 PR PBB SHADOW E&M-EST. PATIENT-LVL IV: ICD-10-PCS | Mod: PBBFAC,,, | Performed by: NURSE PRACTITIONER

## 2019-10-02 PROCEDURE — 99203 PR OFFICE/OUTPT VISIT, NEW, LEVL III, 30-44 MIN: ICD-10-PCS | Mod: S$GLB,,, | Performed by: ORTHOPAEDIC SURGERY

## 2019-10-02 PROCEDURE — 73130 X-RAY EXAM OF HAND: CPT | Mod: 26,RT,, | Performed by: RADIOLOGY

## 2019-10-02 RX ORDER — CLINDAMYCIN HYDROCHLORIDE 300 MG/1
300 CAPSULE ORAL EVERY 8 HOURS
Qty: 21 CAPSULE | Refills: 0 | Status: SHIPPED | OUTPATIENT
Start: 2019-10-02 | End: 2019-10-11

## 2019-10-02 NOTE — PROGRESS NOTES
Subjective:       Patient ID: Billie Nicolas is a 29 y.o. female.    Chief Complaint: Animal Bite  pt reports to clinic with chief complaint of lizard bite to right hand.  Domestic pet-[place].  Pt reports injury occurred prior to arrival.  Tetanus is up to date.  Reports pain, bleeding and paresthesias to first and second digit.  Denies loss motion of digits.    Animal Bite    The incident occurred just prior to arrival. There is an injury to the right thumb and right index finger. The pain is mild. It is unknown if a foreign body is present. Her tetanus status is UTD.     Review of Systems   Constitutional: Negative for chills and fever.   Respiratory: Negative.    Musculoskeletal: Positive for arthralgias.   Skin: Positive for wound.       Objective:      Physical Exam   Constitutional: She is oriented to person, place, and time. She appears well-developed and well-nourished.   Musculoskeletal:        Right hand: She exhibits tenderness and laceration. She exhibits normal range of motion.        Hands:  2 lacerations on dorsal aspect of digit red wound base, no drainage, mild bleeding, full ROM   Neurological: She is alert and oriented to person, place, and time.   Vitals reviewed.      Assessment:       1. Reptile bite, initial encounter    2. Injury of finger of right hand, initial encounter        Plan:   Reptile bite, initial encounter  -     Ambulatory referral/consult to Orthopedics; Future; Expected date: 10/02/2019    Injury of finger of right hand, initial encounter  -     X-Ray Hand Complete Right; Future; Expected date: 10/02/2019  -     Ambulatory referral/consult to Orthopedics; Future; Expected date: 10/02/2019  -concerned about paresthesias to digits.  Discussed case with Dr. Jameson's staff, Dr. Jameson will see patient now in office.    X-ray negative for foreign body  No follow-ups on file.

## 2019-10-02 NOTE — PROGRESS NOTES
Subjective:     Patient ID: Billie Nicolas is a 29 y.o. female.    Chief Complaint: Injury and Pain of the Right Hand    The patient is a 29-year-old female with a loose heard bite to the right thumb today about an hour prior to her visit.  She works as a pharmacist.      Past Medical History:   Diagnosis Date    Abnormal cervical Pap smear with positive HPV DNA test     ADHD, predominantly inattentive type     Allergy     Anxiety     Asthma     Bulimia     Major depression, recurrent     Migraine headache      Past Surgical History:   Procedure Laterality Date    ABSCESS DRAINAGE  12/25/2017    abscess lanced     TONSILLECTOMY      WISDOM TOOTH EXTRACTION       Family History   Problem Relation Age of Onset    Cancer Mother         cervical    Heart disease Mother     Mitral valve prolapse Mother     Hypertension Mother     Hypertension Maternal Grandmother     Heart disease Maternal Grandfather         Passed at 54 yoa    Hodgkin's lymphoma Maternal Grandfather     Cancer Maternal Grandfather     Melanoma Neg Hx      Social History     Socioeconomic History    Marital status: Single     Spouse name: Not on file    Number of children: 0    Years of education: Not on file    Highest education level: Not on file   Occupational History    Occupation: Pharmacy Intern   Social Needs    Financial resource strain: Not very hard    Food insecurity:     Worry: Never true     Inability: Never true    Transportation needs:     Medical: No     Non-medical: No   Tobacco Use    Smoking status: Never Smoker    Smokeless tobacco: Never Used   Substance and Sexual Activity    Alcohol use: Yes     Frequency: 2-4 times a month     Drinks per session: 1 or 2     Binge frequency: Never     Comment: rarely     Drug use: No    Sexual activity: Yes     Partners: Male   Lifestyle    Physical activity:     Days per week: 2 days     Minutes per session: 20 min    Stress: Rather much    Relationships    Social connections:     Talks on phone: More than three times a week     Gets together: More than three times a week     Attends Temple service: Not on file     Active member of club or organization: Yes     Attends meetings of clubs or organizations: More than 4 times per year     Relationship status: Never    Other Topics Concern    Are you pregnant or think you may be? No    Breast-feeding No   Social History Narrative    Not on file     Medication List with Changes/Refills   Current Medications    COPPER (PARAGARD T 380A) 380 SQUARE MM IUD    by Intrauterine route.    DEXTROAMPHETAMINE-AMPHETAMINE (ADDERALL) 20 MG TABLET    Take 1 tablet in the morning  and 1/2 tablet in the evening    EPIDUO FORTE 0.3-2.5 % GLWP    apply topically to the affected area every night at bedtime    LISDEXAMFETAMINE (VYVANSE) 40 MG CAP    Take 1 capsule (40 mg total) by mouth once daily.    SERTRALINE (ZOLOFT) 100 MG TABLET    Take 1 tablet (100 mg total) by mouth once daily.    VALACYCLOVIR (VALTREX) 500 MG TABLET    TAKE ONE TABLET BY MOUTH EVERY DAY    VILAZODONE (VIIBRYD) 40 MG TAB TABLET    Take 1 tablet (40 mg total) by mouth once daily.     Review of patient's allergies indicates:   Allergen Reactions    Sulfa (sulfonamide antibiotics) Hives     Review of Systems   Constitution: Negative for malaise/fatigue.   HENT: Negative for hearing loss.    Eyes: Negative for double vision and visual disturbance.   Cardiovascular: Negative for chest pain.   Respiratory: Positive for wheezing. Negative for shortness of breath.    Endocrine: Negative for cold intolerance.   Hematologic/Lymphatic: Does not bruise/bleed easily.   Skin: Negative for poor wound healing and suspicious lesions.   Musculoskeletal: Negative for gout, joint pain and joint swelling.   Gastrointestinal: Negative for nausea and vomiting.   Genitourinary: Negative for dysuria.   Neurological: Positive for headaches. Negative for  numbness, paresthesias and sensory change.   Psychiatric/Behavioral: Negative for depression, memory loss and substance abuse. The patient is nervous/anxious.    Allergic/Immunologic: Negative for persistent infections.       Objective:   Body mass index is 28.35 kg/m².  Vitals:    10/02/19 1118   BP: (!) 145/90   Pulse: (!) 112                General    Constitutional: She is oriented to person, place, and time. She appears well-developed and well-nourished. No distress.   HENT:   Head: Normocephalic.   Eyes: EOM are normal.   Neck: Normal range of motion.   Pulmonary/Chest: Effort normal.   Neurological: She is oriented to person, place, and time.   Psychiatric: She has a normal mood and affect.             Right Hand/Wrist Exam     Inspection   Scars: Hand -  present    Pain   Hand - The patient exhibits pain of the thumb IP.    Other     Neuorologic Exam    Median Distribution: normal  Ulnar Distribution: normal  Radial Distribution: normal    Comments:  The patient has a laceration dorsal and volar aspect right thumb.  There is no sign of infection.  There are no motor or sensory deficits.          Vascular Exam       Capillary Refill  Right Hand: normal capillary refill      Relevant imaging results reviewed and interpreted by me, discussed with the patient and / or family today radiographs right thumb were normal.  Assessment:     Encounter Diagnosis   Name Primary?    Bite wound of right thumb, initial encounter Yes        Plan:       Betadine Epson salt soaks was recommended.  Local wound care was discussed.  She is allergic to sulfa.  She will be empirically given clindamycin 300 mg p.o. t.i.d. 20.  1 if needed. She will return on a as needed basis.              Disclaimer: This note was prepared using a voice recognition system and is likely to have sound alike errors within the text.

## 2019-10-04 ENCOUNTER — PATIENT MESSAGE (OUTPATIENT)
Dept: FAMILY MEDICINE | Facility: CLINIC | Age: 29
End: 2019-10-04

## 2019-10-04 ENCOUNTER — PATIENT MESSAGE (OUTPATIENT)
Dept: ORTHOPEDICS | Facility: CLINIC | Age: 29
End: 2019-10-04

## 2019-10-04 RX ORDER — CIPROFLOXACIN 750 MG/1
750 TABLET, FILM COATED ORAL EVERY 12 HOURS
Qty: 14 TABLET | Refills: 0 | Status: SHIPPED | OUTPATIENT
Start: 2019-10-04 | End: 2020-06-10

## 2019-10-07 ENCOUNTER — PATIENT MESSAGE (OUTPATIENT)
Dept: PSYCHIATRY | Facility: CLINIC | Age: 29
End: 2019-10-07

## 2019-10-07 ENCOUNTER — OFFICE VISIT (OUTPATIENT)
Dept: PSYCHIATRY | Facility: CLINIC | Age: 29
End: 2019-10-07
Payer: COMMERCIAL

## 2019-10-07 DIAGNOSIS — F48.9 DEFERRED DIAGNOSIS ON AXIS I: Primary | ICD-10-CM

## 2019-10-07 PROCEDURE — 90834 PR PSYCHOTHERAPY W/PATIENT, 45 MIN: ICD-10-PCS | Mod: S$GLB,,, | Performed by: SOCIAL WORKER

## 2019-10-07 PROCEDURE — 90834 PSYTX W PT 45 MINUTES: CPT | Mod: S$GLB,,, | Performed by: SOCIAL WORKER

## 2019-10-09 DIAGNOSIS — F41.9 ANXIETY AND DEPRESSION: ICD-10-CM

## 2019-10-09 DIAGNOSIS — F32.A ANXIETY AND DEPRESSION: ICD-10-CM

## 2019-10-09 RX ORDER — VILAZODONE HYDROCHLORIDE 40 MG/1
40 TABLET ORAL DAILY
Qty: 90 TABLET | Refills: 0 | Status: SHIPPED | OUTPATIENT
Start: 2019-10-09 | End: 2020-01-17 | Stop reason: SDUPTHER

## 2019-10-22 DIAGNOSIS — F90.0 ATTENTION DEFICIT HYPERACTIVITY DISORDER (ADHD), PREDOMINANTLY INATTENTIVE TYPE: ICD-10-CM

## 2019-10-22 RX ORDER — LISDEXAMFETAMINE DIMESYLATE 40 MG/1
40 CAPSULE ORAL DAILY
Qty: 30 CAPSULE | Refills: 0 | Status: SHIPPED | OUTPATIENT
Start: 2019-10-22 | End: 2019-11-19 | Stop reason: SDUPTHER

## 2019-10-28 ENCOUNTER — OFFICE VISIT (OUTPATIENT)
Dept: PSYCHIATRY | Facility: CLINIC | Age: 29
End: 2019-10-28
Payer: COMMERCIAL

## 2019-10-28 DIAGNOSIS — F48.9 DEFERRED DIAGNOSIS ON AXIS I: Primary | ICD-10-CM

## 2019-10-28 PROCEDURE — 90834 PR PSYCHOTHERAPY W/PATIENT, 45 MIN: ICD-10-PCS | Mod: S$GLB,,, | Performed by: SOCIAL WORKER

## 2019-10-28 PROCEDURE — 90834 PSYTX W PT 45 MINUTES: CPT | Mod: S$GLB,,, | Performed by: SOCIAL WORKER

## 2019-11-11 ENCOUNTER — OFFICE VISIT (OUTPATIENT)
Dept: PSYCHIATRY | Facility: CLINIC | Age: 29
End: 2019-11-11
Payer: COMMERCIAL

## 2019-11-11 DIAGNOSIS — F48.9 DEFERRED DIAGNOSIS ON AXIS I: Primary | ICD-10-CM

## 2019-11-11 PROCEDURE — 90834 PR PSYCHOTHERAPY W/PATIENT, 45 MIN: ICD-10-PCS | Mod: S$GLB,,, | Performed by: SOCIAL WORKER

## 2019-11-11 PROCEDURE — 90834 PSYTX W PT 45 MINUTES: CPT | Mod: S$GLB,,, | Performed by: SOCIAL WORKER

## 2019-11-19 DIAGNOSIS — F90.0 ATTENTION DEFICIT HYPERACTIVITY DISORDER (ADHD), PREDOMINANTLY INATTENTIVE TYPE: ICD-10-CM

## 2019-11-19 RX ORDER — LISDEXAMFETAMINE DIMESYLATE 40 MG/1
40 CAPSULE ORAL DAILY
Qty: 30 CAPSULE | Refills: 0 | Status: SHIPPED | OUTPATIENT
Start: 2019-11-19 | End: 2020-06-10

## 2019-11-19 RX ORDER — VALACYCLOVIR HYDROCHLORIDE 500 MG/1
500 TABLET, FILM COATED ORAL DAILY
Qty: 90 TABLET | Refills: 3 | Status: SHIPPED | OUTPATIENT
Start: 2019-11-19 | End: 2020-01-24

## 2019-11-20 ENCOUNTER — OFFICE VISIT (OUTPATIENT)
Dept: PSYCHIATRY | Facility: CLINIC | Age: 29
End: 2019-11-20
Payer: COMMERCIAL

## 2019-11-20 DIAGNOSIS — F48.9 DEFERRED DIAGNOSIS ON AXIS I: Primary | ICD-10-CM

## 2019-11-20 PROCEDURE — 90834 PR PSYCHOTHERAPY W/PATIENT, 45 MIN: ICD-10-PCS | Mod: S$GLB,,, | Performed by: SOCIAL WORKER

## 2019-11-20 PROCEDURE — 90834 PSYTX W PT 45 MINUTES: CPT | Mod: S$GLB,,, | Performed by: SOCIAL WORKER

## 2019-12-03 ENCOUNTER — OFFICE VISIT (OUTPATIENT)
Dept: PSYCHIATRY | Facility: CLINIC | Age: 29
End: 2019-12-03
Payer: COMMERCIAL

## 2019-12-03 DIAGNOSIS — F48.9 DEFERRED DIAGNOSIS ON AXIS I: Primary | ICD-10-CM

## 2019-12-03 PROCEDURE — 90834 PR PSYCHOTHERAPY W/PATIENT, 45 MIN: ICD-10-PCS | Mod: S$GLB,,, | Performed by: SOCIAL WORKER

## 2019-12-03 PROCEDURE — 90834 PSYTX W PT 45 MINUTES: CPT | Mod: S$GLB,,, | Performed by: SOCIAL WORKER

## 2019-12-18 ENCOUNTER — OFFICE VISIT (OUTPATIENT)
Dept: PSYCHIATRY | Facility: CLINIC | Age: 29
End: 2019-12-18
Payer: COMMERCIAL

## 2019-12-18 DIAGNOSIS — F41.1 GENERALIZED ANXIETY DISORDER: Primary | ICD-10-CM

## 2019-12-18 PROCEDURE — 90834 PSYTX W PT 45 MINUTES: CPT | Mod: S$GLB,,, | Performed by: SOCIAL WORKER

## 2019-12-18 PROCEDURE — 90834 PR PSYCHOTHERAPY W/PATIENT, 45 MIN: ICD-10-PCS | Mod: S$GLB,,, | Performed by: SOCIAL WORKER

## 2019-12-18 NOTE — PROGRESS NOTES
Individual Psychotherapy (PhD/LCSW)    12/18/2019    Site:  [place]         Therapeutic Intervention: Met with patient.  Outpatient - Insight oriented psychotherapy 45 min - CPT code 68469    Chief complaint/reason for encounter: anxiety     Interval history and content of current session:  Patient presents to ongoing individual therapy due to anxiety.  She is know to me from EAP sessions.  She will work on getting another authorization for ongoing EAP visits.  She has been surprised by the recent behavior from her brother.  He broke off an engagement with his fimarissae.  They have dated for four years.  He told his fimarissae that she had not grown or changed in the time they dated.  The patient is worried that he is exhibiting behavior that is similar to their sister and father.  She notes that her sister is about to be kicked out of their uncle's home because of her behavior.  She will be moving to [place] where her  is stationed.  The patient recently snapped on her boyfriend because he was asking about multiple details regarding their night.  She also asked for some time alone to collect herself before the night and he interrupted.  Emphasized that her brother is going through several transitions and he may not have the same problems as her father and sister.  Educate the patient that resilience can be an essential part of a relationship.  She is looking forward to going to [facility] to be with her boyfriend's family and her mother.  She plans to research the Power of Forgiveness and watch the film.  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  She has invited her brother's ex darlene to come for a visit in [place] since the patient has known her for a long time and she sees her making positive gains in her mental health.    Treatment plan:  · Target symptoms: anxiety   · Why chosen therapy is appropriate versus another modality: relevant to diagnosis  · Outcome  monitoring methods: self-report, observation  · Therapeutic intervention type: insight oriented psychotherapy, supportive psychotherapy, interactive psychotherapy    Risk parameters:  Patient reports no suicidal ideation  Patient reports no homicidal ideation  Patient reports no self-injurious behavior  Patient reports no violent behavior    Verbal deficits: None    Patient's response to intervention:  The patient's response to intervention is accepting, motivated.    Progress toward goals and other mental status changes:  The patient's progress toward goals is fair .    Diagnosis:   Generalized Anxiety Disorder    Plan:  individual psychotherapy    Return to clinic: as scheduled    Length of Service (minutes): 45

## 2019-12-27 ENCOUNTER — PATIENT MESSAGE (OUTPATIENT)
Dept: INTERNAL MEDICINE | Facility: CLINIC | Age: 29
End: 2019-12-27

## 2019-12-30 ENCOUNTER — PATIENT MESSAGE (OUTPATIENT)
Dept: PSYCHIATRY | Facility: CLINIC | Age: 29
End: 2019-12-30

## 2019-12-31 ENCOUNTER — OFFICE VISIT (OUTPATIENT)
Dept: PSYCHIATRY | Facility: CLINIC | Age: 29
End: 2019-12-31
Payer: COMMERCIAL

## 2019-12-31 DIAGNOSIS — F48.9 DEFERRED DIAGNOSIS ON AXIS I: Primary | ICD-10-CM

## 2019-12-31 PROCEDURE — 90834 PSYTX W PT 45 MINUTES: CPT | Mod: S$GLB,,, | Performed by: SOCIAL WORKER

## 2019-12-31 PROCEDURE — 90834 PR PSYCHOTHERAPY W/PATIENT, 45 MIN: ICD-10-PCS | Mod: S$GLB,,, | Performed by: SOCIAL WORKER

## 2020-01-17 ENCOUNTER — OFFICE VISIT (OUTPATIENT)
Dept: INTERNAL MEDICINE | Facility: CLINIC | Age: 30
End: 2020-01-17
Payer: COMMERCIAL

## 2020-01-17 ENCOUNTER — LAB VISIT (OUTPATIENT)
Dept: LAB | Facility: HOSPITAL | Age: 30
End: 2020-01-17
Attending: FAMILY MEDICINE
Payer: COMMERCIAL

## 2020-01-17 VITALS
BODY MASS INDEX: 28.42 KG/M2 | HEART RATE: 100 BPM | OXYGEN SATURATION: 98 % | SYSTOLIC BLOOD PRESSURE: 126 MMHG | WEIGHT: 181.44 LBS | DIASTOLIC BLOOD PRESSURE: 82 MMHG | TEMPERATURE: 98 F

## 2020-01-17 DIAGNOSIS — F32.A ANXIETY AND DEPRESSION: ICD-10-CM

## 2020-01-17 DIAGNOSIS — Z11.3 ROUTINE SCREENING FOR STI (SEXUALLY TRANSMITTED INFECTION): ICD-10-CM

## 2020-01-17 DIAGNOSIS — Z12.4 SCREENING FOR CERVICAL CANCER: Primary | ICD-10-CM

## 2020-01-17 DIAGNOSIS — F41.9 ANXIETY AND DEPRESSION: ICD-10-CM

## 2020-01-17 DIAGNOSIS — F98.8 ATTENTION DEFICIT DISORDER, UNSPECIFIED HYPERACTIVITY PRESENCE: ICD-10-CM

## 2020-01-17 PROCEDURE — 36415 COLL VENOUS BLD VENIPUNCTURE: CPT

## 2020-01-17 PROCEDURE — 88175 CYTOPATH C/V AUTO FLUID REDO: CPT | Performed by: PATHOLOGY

## 2020-01-17 PROCEDURE — 87350 HEPATITIS BE AG IA: CPT

## 2020-01-17 PROCEDURE — 99999 PR PBB SHADOW E&M-EST. PATIENT-LVL III: ICD-10-PCS | Mod: PBBFAC,,, | Performed by: FAMILY MEDICINE

## 2020-01-17 PROCEDURE — 99395 PREV VISIT EST AGE 18-39: CPT | Mod: S$GLB,,, | Performed by: FAMILY MEDICINE

## 2020-01-17 PROCEDURE — 87624 HPV HI-RISK TYP POOLED RSLT: CPT

## 2020-01-17 PROCEDURE — 99395 PR PREVENTIVE VISIT,EST,18-39: ICD-10-PCS | Mod: S$GLB,,, | Performed by: FAMILY MEDICINE

## 2020-01-17 PROCEDURE — 86703 HIV-1/HIV-2 1 RESULT ANTBDY: CPT

## 2020-01-17 PROCEDURE — 87340 HEPATITIS B SURFACE AG IA: CPT

## 2020-01-17 PROCEDURE — 99999 PR PBB SHADOW E&M-EST. PATIENT-LVL III: CPT | Mod: PBBFAC,,, | Performed by: FAMILY MEDICINE

## 2020-01-17 PROCEDURE — 86592 SYPHILIS TEST NON-TREP QUAL: CPT

## 2020-01-17 PROCEDURE — 88141 CYTOPATH C/V INTERPRET: CPT | Mod: ,,, | Performed by: PATHOLOGY

## 2020-01-17 PROCEDURE — 88141 PR  CYTOPATH CERV/VAG INTERPRET: ICD-10-PCS | Mod: ,,, | Performed by: PATHOLOGY

## 2020-01-17 RX ORDER — VILAZODONE HYDROCHLORIDE 40 MG/1
40 TABLET ORAL DAILY
Qty: 90 TABLET | Refills: 0 | Status: SHIPPED | OUTPATIENT
Start: 2020-01-17 | End: 2020-04-20 | Stop reason: SDUPTHER

## 2020-01-17 RX ORDER — DEXTROAMPHETAMINE SACCHARATE, AMPHETAMINE ASPARTATE, DEXTROAMPHETAMINE SULFATE AND AMPHETAMINE SULFATE 5; 5; 5; 5 MG/1; MG/1; MG/1; MG/1
TABLET ORAL
Qty: 45 TABLET | Refills: 0 | Status: SHIPPED | OUTPATIENT
Start: 2020-01-17 | End: 2020-03-05 | Stop reason: SDUPTHER

## 2020-01-17 NOTE — PROGRESS NOTES
Subjective:       Billie Nicolas is a 29 y.o. woman who comes in today for a  pap smear only. Her most recent annual exam was a couple years ago. Her most recent Pap smear was on  and showed ASCUS can not rule out HGSIL. Previous abnormal Pap smears: yes - see above. Contraception: copper IUD      Would like to change back to Adderall because Vyvanse dried mouth a lot and gingivitis     The following portions of the patient's history were reviewed and updated as appropriate: allergies, current medications and problem list.    Review of Systems  Pertinent items are noted in HPI.     Objective:      /82   Pulse 100   Temp 97.7 °F (36.5 °C)   Wt 82.3 kg (181 lb 7 oz)   SpO2 98%   BMI 28.42 kg/m²   Pelvic Exam: cervix normal in appearance, external genitalia normal and vagina normal without discharge. Pap smear obtained.     Assessment:      Screening pap smear.     Plan:      Follow up as indicated by Pap results.

## 2020-01-18 LAB — RPR SER QL: NORMAL

## 2020-01-20 LAB
HBV SURFACE AG SERPL QL IA: NEGATIVE
HIV 1+2 AB+HIV1 P24 AG SERPL QL IA: NEGATIVE

## 2020-01-21 LAB — HBV E AG SERPL QL IA: NORMAL

## 2020-01-22 LAB
HPV HR 12 DNA SPEC QL NAA+PROBE: NEGATIVE
HPV16 AG SPEC QL: NEGATIVE
HPV18 DNA SPEC QL NAA+PROBE: NEGATIVE

## 2020-01-24 ENCOUNTER — PATIENT MESSAGE (OUTPATIENT)
Dept: INTERNAL MEDICINE | Facility: CLINIC | Age: 30
End: 2020-01-24

## 2020-01-24 RX ORDER — VALACYCLOVIR HYDROCHLORIDE 1 G/1
1000 TABLET, FILM COATED ORAL 2 TIMES DAILY
Qty: 60 TABLET | Refills: 0 | Status: SHIPPED | OUTPATIENT
Start: 2020-01-24 | End: 2021-01-23

## 2020-01-27 ENCOUNTER — OFFICE VISIT (OUTPATIENT)
Dept: PSYCHIATRY | Facility: CLINIC | Age: 30
End: 2020-01-27
Payer: COMMERCIAL

## 2020-01-27 DIAGNOSIS — F48.9 DEFERRED DIAGNOSIS ON AXIS I: Primary | ICD-10-CM

## 2020-01-27 PROCEDURE — 90834 PSYTX W PT 45 MINUTES: CPT | Mod: S$GLB,,, | Performed by: SOCIAL WORKER

## 2020-01-27 PROCEDURE — 90834 PR PSYCHOTHERAPY W/PATIENT, 45 MIN: ICD-10-PCS | Mod: S$GLB,,, | Performed by: SOCIAL WORKER

## 2020-01-28 RX ORDER — VALACYCLOVIR HYDROCHLORIDE 500 MG/1
500 TABLET, FILM COATED ORAL DAILY
Qty: 90 TABLET | Refills: 3 | Status: SHIPPED | OUTPATIENT
Start: 2020-01-28 | End: 2020-02-05 | Stop reason: SDUPTHER

## 2020-02-04 ENCOUNTER — OFFICE VISIT (OUTPATIENT)
Dept: OPHTHALMOLOGY | Facility: CLINIC | Age: 30
End: 2020-02-04
Payer: COMMERCIAL

## 2020-02-04 DIAGNOSIS — H10.33 ACUTE CONJUNCTIVITIS OF BOTH EYES, UNSPECIFIED ACUTE CONJUNCTIVITIS TYPE: Primary | ICD-10-CM

## 2020-02-04 PROCEDURE — 99999 PR PBB SHADOW E&M-EST. PATIENT-LVL II: ICD-10-PCS | Mod: PBBFAC,,, | Performed by: OPTOMETRIST

## 2020-02-04 PROCEDURE — 99999 PR PBB SHADOW E&M-EST. PATIENT-LVL II: CPT | Mod: PBBFAC,,, | Performed by: OPTOMETRIST

## 2020-02-04 PROCEDURE — 92012 PR EYE EXAM, EST PATIENT,INTERMED: ICD-10-PCS | Mod: S$GLB,,, | Performed by: OPTOMETRIST

## 2020-02-04 PROCEDURE — 92012 INTRM OPH EXAM EST PATIENT: CPT | Mod: S$GLB,,, | Performed by: OPTOMETRIST

## 2020-02-04 NOTE — PROGRESS NOTES
HPI     Pt c/o irritation, redness, burning, blurred vision, am crusting in both   eyes for about a week. Has worn cls.   Last exam 7/15/19 with DNL  Pain Scale:  3  Onset:   1 week  OD, OS, OU:   OS>OD*  Discharge:   no  A.M. Matting:  yes  Itch:   slight  Redness:   yes  Photophobia:   no  Foreign body sensation:   At times  Deep pain:   no  Previous occurrence:   no  Drops:   no      Last edited by Gwen Fernandez MA on 2/4/2020  3:16 PM. (History)            Assessment /Plan     For exam results, see Encounter Report.    Acute conjunctivitis of both eyes, unspecified acute conjunctivitis type      Clear tear lake  +cls wearer  Symptoms x 1 week  Start antibiotic/steroid combo: corticosprorin ophthalmic sol called in to pharmacy: qid OU x 1 week, then d/c    RTC PRN with any worsening or if not resolved x 1 week  Discussed above and all questions were answered.

## 2020-02-05 ENCOUNTER — PATIENT MESSAGE (OUTPATIENT)
Dept: INTERNAL MEDICINE | Facility: CLINIC | Age: 30
End: 2020-02-05

## 2020-02-05 RX ORDER — VALACYCLOVIR HYDROCHLORIDE 500 MG/1
500 TABLET, FILM COATED ORAL DAILY
Qty: 90 TABLET | Refills: 3 | Status: SHIPPED | OUTPATIENT
Start: 2020-02-05 | End: 2020-10-08 | Stop reason: SDUPTHER

## 2020-02-07 LAB
FINAL PATHOLOGIC DIAGNOSIS: NORMAL
Lab: NORMAL

## 2020-02-26 ENCOUNTER — OFFICE VISIT (OUTPATIENT)
Dept: PSYCHIATRY | Facility: CLINIC | Age: 30
End: 2020-02-26
Payer: COMMERCIAL

## 2020-02-26 DIAGNOSIS — F48.9 DEFERRED DIAGNOSIS ON AXIS I: Primary | ICD-10-CM

## 2020-02-26 PROCEDURE — 90834 PR PSYCHOTHERAPY W/PATIENT, 45 MIN: ICD-10-PCS | Mod: S$GLB,,, | Performed by: SOCIAL WORKER

## 2020-02-26 PROCEDURE — 90834 PSYTX W PT 45 MINUTES: CPT | Mod: S$GLB,,, | Performed by: SOCIAL WORKER

## 2020-02-28 ENCOUNTER — PATIENT MESSAGE (OUTPATIENT)
Dept: INTERNAL MEDICINE | Facility: CLINIC | Age: 30
End: 2020-02-28

## 2020-02-28 RX ORDER — OSELTAMIVIR PHOSPHATE 75 MG/1
75 CAPSULE ORAL DAILY
Qty: 10 CAPSULE | Refills: 0 | Status: SHIPPED | OUTPATIENT
Start: 2020-02-28 | End: 2020-03-09

## 2020-03-05 DIAGNOSIS — F98.8 ATTENTION DEFICIT DISORDER, UNSPECIFIED HYPERACTIVITY PRESENCE: ICD-10-CM

## 2020-03-05 RX ORDER — DEXTROAMPHETAMINE SACCHARATE, AMPHETAMINE ASPARTATE, DEXTROAMPHETAMINE SULFATE AND AMPHETAMINE SULFATE 5; 5; 5; 5 MG/1; MG/1; MG/1; MG/1
TABLET ORAL
Qty: 45 TABLET | Refills: 0 | Status: SHIPPED | OUTPATIENT
Start: 2020-03-05 | End: 2020-04-20 | Stop reason: SDUPTHER

## 2020-03-06 ENCOUNTER — OFFICE VISIT (OUTPATIENT)
Dept: PSYCHIATRY | Facility: CLINIC | Age: 30
End: 2020-03-06
Payer: COMMERCIAL

## 2020-03-06 DIAGNOSIS — F48.9 DEFERRED DIAGNOSIS ON AXIS I: Primary | ICD-10-CM

## 2020-03-06 PROCEDURE — 90834 PSYTX W PT 45 MINUTES: CPT | Mod: S$GLB,,, | Performed by: SOCIAL WORKER

## 2020-03-06 PROCEDURE — 90834 PR PSYCHOTHERAPY W/PATIENT, 45 MIN: ICD-10-PCS | Mod: S$GLB,,, | Performed by: SOCIAL WORKER

## 2020-03-20 ENCOUNTER — OFFICE VISIT (OUTPATIENT)
Dept: PSYCHIATRY | Facility: CLINIC | Age: 30
End: 2020-03-20
Payer: COMMERCIAL

## 2020-03-20 DIAGNOSIS — F41.1 GENERALIZED ANXIETY DISORDER: Primary | ICD-10-CM

## 2020-03-20 PROCEDURE — 90834 PR PSYCHOTHERAPY W/PATIENT, 45 MIN: ICD-10-PCS | Mod: 95,,, | Performed by: SOCIAL WORKER

## 2020-03-20 PROCEDURE — 90834 PSYTX W PT 45 MINUTES: CPT | Mod: 95,,, | Performed by: SOCIAL WORKER

## 2020-03-20 NOTE — PROGRESS NOTES
Individual Psychotherapy (PhD/LCSW)    3/20/2020    Site:  [facility]         Therapeutic Intervention: Met with patient.  Outpatient - Insight oriented psychotherapy 45 min - CPT code 42797 Virtual visit with synchronous audio and video    Chief complaint/reason for encounter: depression and anxiety     Interval history and content of current session:  Patient presents to ongoing individual therapy due to anxiety.  She is seen using telehealth.  She is anxious due to the recent coronavirus.  She notes that there is a young person in the ICU with no prior medical problems, who may not survive.  She admits for the first time in her life she is worried about dying.  She took for granted the things that she did on a normal basis.  She notes that she will not be able to have lunch or see her friends for some time.  They are practicing social distancing at work.  She will have the weekend off.  She was able to get nine hours of sleep last night.  She and her boyfriend, Carl, have realized that they don't want to be apart.  He will be coming from [place] to stay with her.  Encouraged the patient to participate in results oriented activities.  Emphasize the need to socially connect in some way.  Validated her fear.  She plans on making toys over the weekend.  She had been drinking for a couple of nights, but she was able to stop and focus on more social coping.  She has been calling her mother more frequently.  She is happy to report that her sister has decided to get into therapy.  Her sister will have to give birth alone due to the crisis.  The patient is also focused on keeping her animals alive.  She notes how she and her boyfriend make funny voice sounds when impersonating her dog.    Treatment plan:  · Target symptoms: anxiety   · Why chosen therapy is appropriate versus another modality: relevant to diagnosis  · Outcome monitoring methods: self-report, observation  · Therapeutic intervention type: insight  oriented psychotherapy, supportive psychotherapy, interactive psychotherapy    Risk parameters:  Patient reports no suicidal ideation  Patient reports no homicidal ideation  Patient reports no self-injurious behavior  Patient reports no violent behavior    Verbal deficits: None    Patient's response to intervention:  The patient's response to intervention is accepting, motivated.    Progress toward goals and other mental status changes:  The patient's progress toward goals is fair .    Diagnosis:   Generalized Anxiety Disorder    Plan:  individual psychotherapy and medication management by physician    Return to clinic: as scheduled    Length of Service (minutes): 45

## 2020-03-24 ENCOUNTER — PATIENT MESSAGE (OUTPATIENT)
Dept: OPHTHALMOLOGY | Facility: CLINIC | Age: 30
End: 2020-03-24

## 2020-03-30 ENCOUNTER — OFFICE VISIT (OUTPATIENT)
Dept: PSYCHIATRY | Facility: CLINIC | Age: 30
End: 2020-03-30
Payer: COMMERCIAL

## 2020-03-30 DIAGNOSIS — F41.1 GENERALIZED ANXIETY DISORDER: Primary | ICD-10-CM

## 2020-03-30 PROCEDURE — 90834 PSYTX W PT 45 MINUTES: CPT | Mod: 95,,, | Performed by: SOCIAL WORKER

## 2020-03-30 PROCEDURE — 90834 PR PSYCHOTHERAPY W/PATIENT, 45 MIN: ICD-10-PCS | Mod: 95,,, | Performed by: SOCIAL WORKER

## 2020-03-31 NOTE — PROGRESS NOTES
"Individual Psychotherapy (PhD/LCSW)    3/30/2020    Site:  [facility]         Therapeutic Intervention: Met with patient.  Outpatient - Insight oriented psychotherapy 45 min - CPT code 17267 Virtual visit with synchronous audio and video    Chief complaint/reason for encounter: depression and anxiety     Interval history and content of current session:  Patient presents via video to ongoing individual therapy due to depression and anxiety.  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  She was stressed having to deal with some pharmacy staff who were wanting to know what patients were diagnosed with COVID.  She was not able to reveal that information to them.  She continues to have her boyfriend, Carl, living with her.  He is commuting back and forth from [place].  They are writing a contract about living together.  She plans to include the subject matter of chores and respecting each other's space.  She has enjoyed having him with her in a time of crisis.  However, she worries that she is being "codependent" by desiring him around.  Educate the patient that a desire to connect is healthy.  Praise steps to communicate directly with her partner.  Validate her anxieties related to the recent COVID crisis.  She has been working on projects in her home on her days off.  She displays a wallet the she recently made with remnants of leather.  She has been going through a decontamination protocol as she enters her room.  She was touched when her boyfriend was there with a towel and a change of clothes to help her.    Treatment plan:  ? Target symptoms: anxiety   ? Why chosen therapy is appropriate versus another modality: relevant to diagnosis  ? Outcome monitoring methods: self-report, observation  ? Therapeutic intervention type: insight oriented psychotherapy, supportive psychotherapy, interactive psychotherapy     Risk parameters:  Patient reports no suicidal ideation  Patient reports no homicidal " ideation  Patient reports no self-injurious behavior  Patient reports no violent behavior     Verbal deficits: None     Patient's response to intervention:  The patient's response to intervention is accepting, motivated.     Progress toward goals and other mental status changes:  The patient's progress toward goals is fair .     Diagnosis:   Generalized Anxiety Disorder     Plan:  individual psychotherapy and medication management by physician     Return to clinic: as scheduled     Length of Service (minutes): 45

## 2020-04-20 DIAGNOSIS — F32.A ANXIETY AND DEPRESSION: ICD-10-CM

## 2020-04-20 DIAGNOSIS — F98.8 ATTENTION DEFICIT DISORDER, UNSPECIFIED HYPERACTIVITY PRESENCE: ICD-10-CM

## 2020-04-20 DIAGNOSIS — N94.3 PMS (PREMENSTRUAL SYNDROME): ICD-10-CM

## 2020-04-20 DIAGNOSIS — F41.8 DEPRESSION WITH ANXIETY: ICD-10-CM

## 2020-04-20 DIAGNOSIS — F41.9 ANXIETY AND DEPRESSION: ICD-10-CM

## 2020-04-20 RX ORDER — DEXTROAMPHETAMINE SACCHARATE, AMPHETAMINE ASPARTATE, DEXTROAMPHETAMINE SULFATE AND AMPHETAMINE SULFATE 5; 5; 5; 5 MG/1; MG/1; MG/1; MG/1
TABLET ORAL
Qty: 45 TABLET | Refills: 0 | Status: SHIPPED | OUTPATIENT
Start: 2020-04-20 | End: 2020-06-10 | Stop reason: SDUPTHER

## 2020-04-20 RX ORDER — VILAZODONE HYDROCHLORIDE 40 MG/1
40 TABLET ORAL DAILY
Qty: 90 TABLET | Refills: 0 | Status: SHIPPED | OUTPATIENT
Start: 2020-04-20 | End: 2020-06-12

## 2020-04-20 RX ORDER — SERTRALINE HYDROCHLORIDE 100 MG/1
100 TABLET, FILM COATED ORAL DAILY
Qty: 90 TABLET | Refills: 3 | Status: SHIPPED | OUTPATIENT
Start: 2020-04-20 | End: 2020-06-12 | Stop reason: SDUPTHER

## 2020-04-21 DIAGNOSIS — Z01.84 ANTIBODY RESPONSE EXAMINATION: ICD-10-CM

## 2020-04-22 ENCOUNTER — LAB VISIT (OUTPATIENT)
Dept: LAB | Facility: HOSPITAL | Age: 30
End: 2020-04-22
Attending: INTERNAL MEDICINE
Payer: COMMERCIAL

## 2020-04-22 DIAGNOSIS — Z01.84 ANTIBODY RESPONSE EXAMINATION: ICD-10-CM

## 2020-04-22 PROCEDURE — 86769 SARS-COV-2 COVID-19 ANTIBODY: CPT

## 2020-04-22 PROCEDURE — 36415 COLL VENOUS BLD VENIPUNCTURE: CPT

## 2020-04-23 LAB — SARS-COV-2 IGG SERPL QL IA: NEGATIVE

## 2020-05-04 ENCOUNTER — OFFICE VISIT (OUTPATIENT)
Dept: PSYCHIATRY | Facility: CLINIC | Age: 30
End: 2020-05-04
Payer: COMMERCIAL

## 2020-05-04 DIAGNOSIS — F41.1 GENERALIZED ANXIETY DISORDER: Primary | ICD-10-CM

## 2020-05-04 PROCEDURE — 90834 PSYTX W PT 45 MINUTES: CPT | Mod: 95,,, | Performed by: SOCIAL WORKER

## 2020-05-04 PROCEDURE — 90834 PR PSYCHOTHERAPY W/PATIENT, 45 MIN: ICD-10-PCS | Mod: 95,,, | Performed by: SOCIAL WORKER

## 2020-05-04 NOTE — PROGRESS NOTES
"Individual Psychotherapy (PhD/LCSW)    5/4/2020    Site:  [facility]         Therapeutic Intervention: Met with patient.  Outpatient - Insight oriented psychotherapy 45 min - CPT code 87346 Virtual visit with synchronous audio and video    Each patient to whom he provides medical services by telemedicine is:  (1) informed of the relationship between the physician and patient and the respective role of any other health care provider with respect to management of the patient; and (2) notified that he or she may decline to receive medical services by telemedicine and may withdraw from such care at any time.     Location: Her living room    Chief complaint/reason for encounter: depression and anxiety     Interval history and content of current session:  Patient presents via video to ongoing individual therapy due to depression and anxiety.  She will have to take some time off in the next three months.  She is willing to take the time to avoid lay offs and keep her job.  She realizes that she can't return to [employer].  She realizes that she would become a depressed alcoholic.  She had a "break down" at the beginning of lst week.  Shd had not slept well and it was the start of her cycle.  She is having some problems with social distancing.  She has been really isolated.  She is frustrated that the highest point of contact is a kiss.  She was able to travel home to see her mom at a distance.  For the first time, she was not stressed to return home.  She has leaned on her boyfriend, Carl, more recently.  He is struggling with the fact that he might not be poly.  Emphasized that relationships are an opportunity to grow and get to know another person and yourself better.  Encourage the patient to focus on the present instead of worrying about the future.  Validate the need to be clear about non negotiables.  Her current boyfriend is more like a cat.  He is more reserved.  She has a new ash that she is interested " in.  She has a history with him from [facility].  She was always very attracted to him.  He is  and an .  Her boyfriend is losing his mind.  He is worried that the patient will leave it.  He is terrified that he will lose the relationship.  She is encouraging him to love himself.  He is two or three steps ahead of their relationship.  He has a fear of inadequacy.  She will end the relationship if he is not comfortable with a poly relationship.  Her boyfriend is struggling with a fear of abandonment.  She is worried that he will not be able to do it because she could see herself marrying him.  She is scared of being laid off.    Treatment plan:  ? Target symptoms: anxiety   ? Why chosen therapy is appropriate versus another modality: relevant to diagnosis  ? Outcome monitoring methods: self-report, observation  ? Therapeutic intervention type: insight oriented psychotherapy, supportive psychotherapy, interactive psychotherapy     Risk parameters:  Patient reports no suicidal ideation  Patient reports no homicidal ideation  Patient reports no self-injurious behavior  Patient reports no violent behavior     Verbal deficits: None     Patient's response to intervention:  The patient's response to intervention is accepting, motivated.     Progress toward goals and other mental status changes:  The patient's progress toward goals is fair .     Diagnosis:   Generalized Anxiety Disorder     Plan:  individual psychotherapy and medication management by physician     Return to clinic: as scheduled     Length of Service (minutes): 45

## 2020-05-11 ENCOUNTER — OFFICE VISIT (OUTPATIENT)
Dept: PSYCHIATRY | Facility: CLINIC | Age: 30
End: 2020-05-11
Payer: COMMERCIAL

## 2020-05-11 DIAGNOSIS — F41.1 GENERALIZED ANXIETY DISORDER: Primary | ICD-10-CM

## 2020-05-11 PROCEDURE — 90834 PSYTX W PT 45 MINUTES: CPT | Mod: 95,,, | Performed by: SOCIAL WORKER

## 2020-05-11 PROCEDURE — 90834 PR PSYCHOTHERAPY W/PATIENT, 45 MIN: ICD-10-PCS | Mod: 95,,, | Performed by: SOCIAL WORKER

## 2020-05-11 NOTE — PROGRESS NOTES
"Individual Psychotherapy (PhD/LCSW)    5/11/2020    Site:  [facility]         Therapeutic Intervention: Met with patient.  Outpatient - Insight oriented psychotherapy 45 min - CPT code 84071  Virtual visit with synchronous audio and video     Each patient to whom he provides medical services by telemedicine is:  (1) informed of the relationship between the physician and patient and the respective role of any other health care provider with respect to management of the patient; and (2) notified that he or she may decline to receive medical services by telemedicine and may withdraw from such care at any time.     Location: Her living room    Chief complaint/reason for encounter: depression and anxiety     Interval history and content of current session:  Patient presents via video to ongoing individual therapy due to depression and anxiety.  Her boyfriend continues to struggle with her poly lifestyle.  She is constantly answering questions that his anxiety is asking.  It is exhausting to her.  She has been taking a lot of responsibility for his emotions.  He is still seeing his therapist.  She has asked him to reach out to other people.  He has reached out to her other people.  He is worried that she is "out of league."  She admits that the quarantine is getting to him.  She acknowledges that physical touch is an important love language to her.  She was honest with her boyfriend that she has kissed someone.  Her boyfriend was initially devastated.  He felt better with more details.  She does care for her boyfriend and she does experience some jealousy.  Explore the different relationships she and her boyfriend have with their parents.  Emphasize the role jealousy has in a healthy relationship. Encourage continued boundary setting.  She picked a fight with him about "decision inertia."  He is very intelligent.  She told him he needed to "step the fuck up."  He did make up some excuses.  He is terrified stepping out of " "something he does not know.  Her sister had her son three months ago.  Her sister is starting therapy in [place].  The numbers have gone down at the hospital.  Patients are now coming in for "bullshit" reasons.  She feels more at home with her staff due to the recent crisis.  She has seen other co workers step up and go beyond.  She has had two co workers postpone their marriage.  She is satisfied with how the virus has been handled.  A hobby that she is involved in may open at the end of the month.  She feels that they can make it works.  "I have to begin to live my life again."    Treatment plan:  ? Target symptoms: anxiety   ? Why chosen therapy is appropriate versus another modality: relevant to diagnosis  ? Outcome monitoring methods: self-report, observation  ? Therapeutic intervention type: insight oriented psychotherapy, supportive psychotherapy, interactive psychotherapy     Risk parameters:  Patient reports no suicidal ideation  Patient reports no homicidal ideation  Patient reports no self-injurious behavior  Patient reports no violent behavior     Verbal deficits: None     Patient's response to intervention:  The patient's response to intervention is accepting, motivated.     Progress toward goals and other mental status changes:  The patient's progress toward goals is fair .     Diagnosis:   Generalized Anxiety Disorder     Plan:  individual psychotherapy and medication management by physician     Return to clinic: as scheduled     Length of Service (minutes): 45    "

## 2020-05-18 ENCOUNTER — OFFICE VISIT (OUTPATIENT)
Dept: PSYCHIATRY | Facility: CLINIC | Age: 30
End: 2020-05-18
Payer: COMMERCIAL

## 2020-05-18 DIAGNOSIS — F41.1 GENERALIZED ANXIETY DISORDER: Primary | ICD-10-CM

## 2020-05-18 PROCEDURE — 90834 PSYTX W PT 45 MINUTES: CPT | Mod: 95,,, | Performed by: SOCIAL WORKER

## 2020-05-18 PROCEDURE — 90834 PR PSYCHOTHERAPY W/PATIENT, 45 MIN: ICD-10-PCS | Mod: 95,,, | Performed by: SOCIAL WORKER

## 2020-05-18 NOTE — PROGRESS NOTES
Individual Psychotherapy (PhD/LCSW)    5/18/2020    Site:  [facility]         Therapeutic Intervention: Met with patient.  Outpatient - Insight oriented psychotherapy 45 min - CPT code 35477  Virtual visit with synchronous audio and video     Each patient to whom he provides medical services by telemedicine is:  (1) informed of the relationship between the physician and patient and the respective role of any other health care provider with respect to management of the patient; and (2) notified that he or she may decline to receive medical services by telemedicine and may withdraw from such care at any time.     Location:  Her living room    Chief complaint/reason for encounter: depression and anxiety     Interval history and content of current session:  Patient presents via video to ongoing individual therapy due to depression and anxiety.  She wonders why her insecurities become triggered when she is getting into a new relationship.  She was becoming super disappointed when she did not get a phone call from a new boyfriend.  She asked him for consistent communication.  His initial response was defensiveness.  He was caught off guard by the seriousness of the conversation so early in the relationship.  He acknowledged that he has baggage.  He was with the same girl since he was a senior in high school.  She has realized that this ash is not ready to be her boyfriend. Because he is not emotionally equipped.  She has realized that this ash has some major anxiety and abandonment issues.  He has a history of partying too hard when he was in law school.  Praise the patient for becoming aware of boundaries she is working to set with others.  Encourage the patient to utilize time off to relax and care for herself.  Emphasize that she has learned some good tools for relationships through others such as direct communication of her needs.  She shared her insights with her current boyfriend.  He is willing to put in the  emotional work in the relationship.  She is certain that she wants some type of future with her boyfriend, Carl.  He has reaffirmed that he can be in a poly amorous relationship.  He feels that he has a smaller capacity to five to many relationships.  He would still like to be with the patient.  She and her boyfriend use R.A.D.A.R. As a method to communicate differences in their relationship.  She feels more secure in her job.  She is working toward setting up a [place].  Her place of play may open up at the end of the month where she could socialize more.  She has a five day weekend planned.  She plans to spend time with friends.          Treatment plan:  ? Target symptoms: anxiety   ? Why chosen therapy is appropriate versus another modality: relevant to diagnosis  ? Outcome monitoring methods: self-report, observation  ? Therapeutic intervention type: insight oriented psychotherapy, supportive psychotherapy, interactive psychotherapy     Risk parameters:  Patient reports no suicidal ideation  Patient reports no homicidal ideation  Patient reports no self-injurious behavior  Patient reports no violent behavior     Verbal deficits: None     Patient's response to intervention:  The patient's response to intervention is accepting, motivated.     Progress toward goals and other mental status changes:  The patient's progress toward goals is fair .     Diagnosis:   Generalized Anxiety Disorder     Plan:  individual psychotherapy and medication management by physician     Return to clinic: as scheduled     Length of Service (minutes): 45

## 2020-05-25 ENCOUNTER — PATIENT MESSAGE (OUTPATIENT)
Dept: INTERNAL MEDICINE | Facility: CLINIC | Age: 30
End: 2020-05-25

## 2020-05-25 DIAGNOSIS — K14.8 TONGUE LESION: Primary | ICD-10-CM

## 2020-06-04 DIAGNOSIS — F98.8 ATTENTION DEFICIT DISORDER, UNSPECIFIED HYPERACTIVITY PRESENCE: ICD-10-CM

## 2020-06-04 RX ORDER — DEXTROAMPHETAMINE SACCHARATE, AMPHETAMINE ASPARTATE, DEXTROAMPHETAMINE SULFATE AND AMPHETAMINE SULFATE 5; 5; 5; 5 MG/1; MG/1; MG/1; MG/1
TABLET ORAL
Qty: 45 TABLET | Refills: 0 | OUTPATIENT
Start: 2020-06-04

## 2020-06-08 DIAGNOSIS — F98.8 ATTENTION DEFICIT DISORDER, UNSPECIFIED HYPERACTIVITY PRESENCE: ICD-10-CM

## 2020-06-08 RX ORDER — DEXTROAMPHETAMINE SACCHARATE, AMPHETAMINE ASPARTATE, DEXTROAMPHETAMINE SULFATE AND AMPHETAMINE SULFATE 5; 5; 5; 5 MG/1; MG/1; MG/1; MG/1
TABLET ORAL
Qty: 45 TABLET | Refills: 0 | OUTPATIENT
Start: 2020-06-08

## 2020-06-10 ENCOUNTER — LAB VISIT (OUTPATIENT)
Dept: LAB | Facility: HOSPITAL | Age: 30
End: 2020-06-10
Attending: FAMILY MEDICINE
Payer: COMMERCIAL

## 2020-06-10 ENCOUNTER — PATIENT MESSAGE (OUTPATIENT)
Dept: INTERNAL MEDICINE | Facility: CLINIC | Age: 30
End: 2020-06-10

## 2020-06-10 ENCOUNTER — OFFICE VISIT (OUTPATIENT)
Dept: INTERNAL MEDICINE | Facility: CLINIC | Age: 30
End: 2020-06-10
Payer: COMMERCIAL

## 2020-06-10 ENCOUNTER — LAB VISIT (OUTPATIENT)
Dept: INTERNAL MEDICINE | Facility: CLINIC | Age: 30
End: 2020-06-10
Payer: COMMERCIAL

## 2020-06-10 VITALS
HEART RATE: 95 BPM | BODY MASS INDEX: 28.62 KG/M2 | WEIGHT: 182.75 LBS | TEMPERATURE: 100 F | SYSTOLIC BLOOD PRESSURE: 122 MMHG | OXYGEN SATURATION: 98 % | DIASTOLIC BLOOD PRESSURE: 88 MMHG

## 2020-06-10 DIAGNOSIS — Z20.822 CLOSE EXPOSURE TO COVID-19 VIRUS: ICD-10-CM

## 2020-06-10 DIAGNOSIS — F41.8 DEPRESSION WITH ANXIETY: ICD-10-CM

## 2020-06-10 DIAGNOSIS — N94.3 PMS (PREMENSTRUAL SYNDROME): ICD-10-CM

## 2020-06-10 DIAGNOSIS — B37.9 ANTIBIOTIC-INDUCED YEAST INFECTION: Primary | ICD-10-CM

## 2020-06-10 DIAGNOSIS — F98.8 ATTENTION DEFICIT DISORDER, UNSPECIFIED HYPERACTIVITY PRESENCE: ICD-10-CM

## 2020-06-10 DIAGNOSIS — Z11.3 SCREEN FOR STD (SEXUALLY TRANSMITTED DISEASE): ICD-10-CM

## 2020-06-10 DIAGNOSIS — F90.9 ATTENTION DEFICIT HYPERACTIVITY DISORDER (ADHD), UNSPECIFIED ADHD TYPE: Primary | ICD-10-CM

## 2020-06-10 DIAGNOSIS — T36.95XA ANTIBIOTIC-INDUCED YEAST INFECTION: Primary | ICD-10-CM

## 2020-06-10 DIAGNOSIS — Z20.822 CLOSE EXPOSURE TO COVID-19 VIRUS: Primary | ICD-10-CM

## 2020-06-10 PROCEDURE — U0003 INFECTIOUS AGENT DETECTION BY NUCLEIC ACID (DNA OR RNA); SEVERE ACUTE RESPIRATORY SYNDROME CORONAVIRUS 2 (SARS-COV-2) (CORONAVIRUS DISEASE [COVID-19]), AMPLIFIED PROBE TECHNIQUE, MAKING USE OF HIGH THROUGHPUT TECHNOLOGIES AS DESCRIBED BY CMS-2020-01-R: HCPCS

## 2020-06-10 PROCEDURE — 99999 PR PBB SHADOW E&M-EST. PATIENT-LVL III: ICD-10-PCS | Mod: PBBFAC,,, | Performed by: PHYSICIAN ASSISTANT

## 2020-06-10 PROCEDURE — 3008F BODY MASS INDEX DOCD: CPT | Mod: CPTII,S$GLB,, | Performed by: PHYSICIAN ASSISTANT

## 2020-06-10 PROCEDURE — 99213 PR OFFICE/OUTPT VISIT, EST, LEVL III, 20-29 MIN: ICD-10-PCS | Mod: S$GLB,,, | Performed by: PHYSICIAN ASSISTANT

## 2020-06-10 PROCEDURE — 86592 SYPHILIS TEST NON-TREP QUAL: CPT

## 2020-06-10 PROCEDURE — 80074 ACUTE HEPATITIS PANEL: CPT

## 2020-06-10 PROCEDURE — 99999 PR PBB SHADOW E&M-EST. PATIENT-LVL III: CPT | Mod: PBBFAC,,, | Performed by: PHYSICIAN ASSISTANT

## 2020-06-10 PROCEDURE — 3008F PR BODY MASS INDEX (BMI) DOCUMENTED: ICD-10-PCS | Mod: CPTII,S$GLB,, | Performed by: PHYSICIAN ASSISTANT

## 2020-06-10 PROCEDURE — 36415 COLL VENOUS BLD VENIPUNCTURE: CPT

## 2020-06-10 PROCEDURE — 99213 OFFICE O/P EST LOW 20 MIN: CPT | Mod: S$GLB,,, | Performed by: PHYSICIAN ASSISTANT

## 2020-06-10 PROCEDURE — 86696 HERPES SIMPLEX TYPE 2 TEST: CPT

## 2020-06-10 PROCEDURE — 86703 HIV-1/HIV-2 1 RESULT ANTBDY: CPT

## 2020-06-10 RX ORDER — SERTRALINE HYDROCHLORIDE 100 MG/1
100 TABLET, FILM COATED ORAL DAILY
Qty: 90 TABLET | Refills: 3 | OUTPATIENT
Start: 2020-06-10 | End: 2021-06-10

## 2020-06-10 RX ORDER — DEXTROAMPHETAMINE SACCHARATE, AMPHETAMINE ASPARTATE, DEXTROAMPHETAMINE SULFATE AND AMPHETAMINE SULFATE 5; 5; 5; 5 MG/1; MG/1; MG/1; MG/1
TABLET ORAL
Qty: 45 TABLET | Refills: 0 | Status: SHIPPED | OUTPATIENT
Start: 2020-06-10 | End: 2020-07-17 | Stop reason: SDUPTHER

## 2020-06-10 NOTE — PROGRESS NOTES
Subjective:      Patient ID: Billie Nicolas is a 29 y.o. female  Chief Complaint: No chief complaint on file.    Patient is new to me, being seen today for medication refill request.  Patient also requests STD screen, denies symptoms but sexual history includes multiple partners and she likes to be screened every 4-6mths to be safe (admits does use safe sex practices/barriers).  Requests COVID screen d/t exposure to COVID + patient, denies symptoms.    Has been on adderall since ~2014. Still working well for patient.  Medication taken daily, current dose 20mg.  Denies adverse effect or sleep disturbance.     Currently on viibryd and zoloft for anxiety.  Managed with medication.    Review of Systems   Constitutional: Negative for activity change and unexpected weight change.   HENT: Positive for trouble swallowing. Negative for hearing loss and rhinorrhea.    Eyes: Negative for discharge and visual disturbance.   Respiratory: Positive for chest tightness. Negative for wheezing.    Cardiovascular: Positive for palpitations. Negative for chest pain.   Gastrointestinal: Negative for blood in stool, constipation, diarrhea and vomiting.   Endocrine: Negative for polydipsia and polyuria.   Genitourinary: Negative for difficulty urinating, dysuria, hematuria and menstrual problem.   Musculoskeletal: Negative for arthralgias, joint swelling and neck pain.   Neurological: Positive for headaches. Negative for weakness.   Psychiatric/Behavioral: Positive for dysphoric mood. Negative for confusion and sleep disturbance. The patient is not nervous/anxious.        Objective:   /88   Pulse 95   Temp 99.7 °F (37.6 °C)   Wt 82.9 kg (182 lb 12.2 oz)   SpO2 98%   BMI 28.62 kg/m²   Physical Exam   Constitutional: She appears well-developed and well-nourished. She does not appear ill. No distress.   HENT:   Head: Normocephalic and atraumatic.   Cardiovascular: Normal rate, regular rhythm and normal heart sounds.   No  murmur heard.  Pulmonary/Chest: Effort normal and breath sounds normal. No respiratory distress. She has no decreased breath sounds.   Skin: Skin is warm, dry and intact. No rash noted.   Psychiatric: She has a normal mood and affect. Her speech is normal and behavior is normal. Thought content normal.     Assessment:      1. Attention deficit hyperactivity disorder (ADHD), unspecified ADHD type       Plan:   Attention deficit hyperactivity disorder (ADHD), unspecified ADHD type    Will pend refill request to physician  Follow up prn     Discussed worsening signs/symptoms and when to return to clinic or go to ED.   Patient expresses understanding and agrees with treatment plan.

## 2020-06-11 LAB
HAV IGM SERPL QL IA: NEGATIVE
HBV CORE IGM SERPL QL IA: NEGATIVE
HBV SURFACE AG SERPL QL IA: NEGATIVE
HCV AB SERPL QL IA: NEGATIVE
HIV 1+2 AB+HIV1 P24 AG SERPL QL IA: NEGATIVE
RPR SER QL: NORMAL

## 2020-06-11 NOTE — TELEPHONE ENCOUNTER
IS she on Zoloft and Viibryd together?  I filled the adderall    
Pt was seen by Caimlle Alonzo, but she can't refill that medication.  
Yes, patient reports that she is taking both medication and has been for some time.  She reports one of medications best manages anxiety, while other better managers depression.  Reported she has spoken about risks associated with duplicate therapy in the past w Dr. 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    
full weight-bearing

## 2020-06-12 DIAGNOSIS — F41.9 ANXIETY AND DEPRESSION: ICD-10-CM

## 2020-06-12 DIAGNOSIS — F32.A ANXIETY AND DEPRESSION: ICD-10-CM

## 2020-06-12 LAB
HSV1 IGG SERPL QL IA: POSITIVE
HSV2 IGG SERPL QL IA: NEGATIVE
SARS-COV-2 RNA RESP QL NAA+PROBE: NOT DETECTED

## 2020-06-12 RX ORDER — SERTRALINE HYDROCHLORIDE 100 MG/1
100 TABLET, FILM COATED ORAL DAILY
Qty: 90 TABLET | Refills: 1 | Status: SHIPPED | OUTPATIENT
Start: 2020-06-12 | End: 2020-10-08 | Stop reason: SDUPTHER

## 2020-06-12 RX ORDER — VILAZODONE HYDROCHLORIDE 40 MG/1
40 TABLET ORAL DAILY
Qty: 90 TABLET | Refills: 0 | Status: SHIPPED | OUTPATIENT
Start: 2020-06-12 | End: 2020-09-08 | Stop reason: SDUPTHER

## 2020-06-17 ENCOUNTER — OFFICE VISIT (OUTPATIENT)
Dept: PSYCHIATRY | Facility: CLINIC | Age: 30
End: 2020-06-17
Payer: COMMERCIAL

## 2020-06-17 DIAGNOSIS — F41.1 GENERALIZED ANXIETY DISORDER: Primary | ICD-10-CM

## 2020-06-17 PROCEDURE — 90834 PSYTX W PT 45 MINUTES: CPT | Mod: 95,,, | Performed by: SOCIAL WORKER

## 2020-06-17 PROCEDURE — 90834 PR PSYCHOTHERAPY W/PATIENT, 45 MIN: ICD-10-PCS | Mod: 95,,, | Performed by: SOCIAL WORKER
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
## 2020-06-26 ENCOUNTER — OFFICE VISIT (OUTPATIENT)
Dept: URGENT CARE | Facility: CLINIC | Age: 30
End: 2020-06-26
Payer: COMMERCIAL

## 2020-06-26 VITALS
WEIGHT: 181.69 LBS | SYSTOLIC BLOOD PRESSURE: 124 MMHG | OXYGEN SATURATION: 98 % | HEIGHT: 67 IN | BODY MASS INDEX: 28.52 KG/M2 | HEART RATE: 77 BPM | TEMPERATURE: 98 F | DIASTOLIC BLOOD PRESSURE: 86 MMHG

## 2020-06-26 DIAGNOSIS — R10.9 ABDOMINAL CRAMPING: ICD-10-CM

## 2020-06-26 DIAGNOSIS — Z20.822 CLOSE EXPOSURE TO COVID-19 VIRUS: Primary | ICD-10-CM

## 2020-06-26 PROCEDURE — 99999 PR PBB SHADOW E&M-EST. PATIENT-LVL IV: CPT | Mod: PBBFAC,,, | Performed by: NURSE PRACTITIONER

## 2020-06-26 PROCEDURE — 3008F PR BODY MASS INDEX (BMI) DOCUMENTED: ICD-10-PCS | Mod: CPTII,S$GLB,, | Performed by: NURSE PRACTITIONER

## 2020-06-26 PROCEDURE — 3008F BODY MASS INDEX DOCD: CPT | Mod: CPTII,S$GLB,, | Performed by: NURSE PRACTITIONER

## 2020-06-26 PROCEDURE — 99213 OFFICE O/P EST LOW 20 MIN: CPT | Mod: S$GLB,,, | Performed by: NURSE PRACTITIONER

## 2020-06-26 PROCEDURE — U0003 INFECTIOUS AGENT DETECTION BY NUCLEIC ACID (DNA OR RNA); SEVERE ACUTE RESPIRATORY SYNDROME CORONAVIRUS 2 (SARS-COV-2) (CORONAVIRUS DISEASE [COVID-19]), AMPLIFIED PROBE TECHNIQUE, MAKING USE OF HIGH THROUGHPUT TECHNOLOGIES AS DESCRIBED BY CMS-2020-01-R: HCPCS

## 2020-06-26 PROCEDURE — 99213 PR OFFICE/OUTPT VISIT, EST, LEVL III, 20-29 MIN: ICD-10-PCS | Mod: S$GLB,,, | Performed by: NURSE PRACTITIONER

## 2020-06-26 PROCEDURE — 99999 PR PBB SHADOW E&M-EST. PATIENT-LVL IV: ICD-10-PCS | Mod: PBBFAC,,, | Performed by: NURSE PRACTITIONER

## 2020-06-27 NOTE — PROGRESS NOTES
"CHIEF COMPLAINT/REASON FOR VISIT:  COVID-19 exposure      HISTORY OF PRESENT ILLNESS:  29-year-old female complains of COVID-19 exposure.  Complains of significant other headache dental party with a positive COVID-19 friend.  Patient complains of slight abdominal cramping, possible menstrual.  Admits last menstrual cycle couple of days ago.  Discussed with patient may need for POCT UA/ deferred. Patient denies chest pain, shortness of breath, congestion, fever, cough, dizziness, blurred vision, nausea, vomiting, diarrhea, " aching all over", fatigue, loss of appetite.      Past Medical History:   Diagnosis Date    Abnormal cervical Pap smear with positive HPV DNA test     ADHD, predominantly inattentive type     Allergy     Anxiety     Asthma     Bulimia     Major depression, recurrent     Migraine headache           Social History     Socioeconomic History    Marital status: Single     Spouse name: Not on file    Number of children: 0    Years of education: Not on file    Highest education level: Not on file   Occupational History    Occupation: Pharmacy Intern   Social Needs    Financial resource strain: Not hard at all    Food insecurity     Worry: Never true     Inability: Never true    Transportation needs     Medical: No     Non-medical: No   Tobacco Use    Smoking status: Never Smoker    Smokeless tobacco: Never Used   Substance and Sexual Activity    Alcohol use: Yes     Frequency: 2-4 times a month     Drinks per session: 1 or 2     Binge frequency: Less than monthly     Comment: rarely     Drug use: No    Sexual activity: Yes     Partners: Male   Lifestyle    Physical activity     Days per week: 3 days     Minutes per session: 20 min    Stress: To some extent   Relationships    Social connections     Talks on phone: More than three times a week     Gets together: Three times a week     Attends Confucianism service: Not on file     Active member of club or organization: Yes     Attends " meetings of clubs or organizations: More than 4 times per year     Relationship status: Never    Other Topics Concern    Are you pregnant or think you may be? No    Breast-feeding No   Social History Narrative    Not on file          Family History   Problem Relation Age of Onset    Cancer Mother         cervical    Heart disease Mother     Mitral valve prolapse Mother     Hypertension Mother     Hypertension Maternal Grandmother     Heart disease Maternal Grandfather         Passed at 54 yoa    Hodgkin's lymphoma Maternal Grandfather     Cancer Maternal Grandfather     Melanoma Neg Hx        ROS:  GENERAL: No fever, chills, fatigability or weight loss.  SKIN: No rashes, itching or changes in color or texture of skin.  HEENT: No headaches or recent head trauma. Visual acuity fine. No photophobia, ocular pain or diplopia. Denies ear pain, discharge or vertigo. No loss of smell, no epistaxis or postnasal drip. No hoarseness or change in voice.   NODES: No masses or lesions. Denies swollen glands.  CHEST: Denies cyanosis, wheezing, cough and sputum production.  CARDIOVASCULAR: Denies chest pain, PND, orthopnea or reduced exercise tolerance.  ABDOMEN: Appetite fine. No weight loss.  Abdominal cramping, possible menstrual, Denies diarrhea.  MUSCULOSKELETAL: No joint stiffness or swelling. Denies back pain.  NEUROLOGIC: No history of seizures, paralysis, alteration of gait or coordination.  PSYCHIATRIC: Denies mood swings, depression or suicidal thoughts.    PE:   APPEARANCE:  Brief exam, Well nourished, well developed, in no acute distress.  Temp 98.4°, pulse ox 98%  V/S: Reviewed.  SKIN: Normal skin turgor, no lesions.  HEENT:  turbinates pink, pink pharynx, TM's clear bilateral.  CHEST:  No respiratory symptoms  CARDIOVASCULAR: Regular rate and rhythm   MUSCULOSKELETAL: Motor: 5/5 strength major flexors/extensors.  NEUROLOGIC: No sensory deficits. Gait & Posture: Normal gait and fine motion. No  cerebellar signs.  MENTAL STATUS: Patient alert, oriented x 3 & conversant.    PLAN:  Lab work COVID-19 screen  Advise increase p.o. fluids- water/juice & rest  Cool mist humidifier/vaporizer.  Practice good handwashing.  Tylenol or Ibuprofen for fever, headache and body aches.  Advise follow up with PCP in 2 3 days for recheck  Advise go to ER if nausea, vomiting, fever, increased pain, or fail to improve with treatment.  AVS provided and reviewed with patient including supportive care, follow up, and red flag symptoms.   Patient verbalizes understanding and agrees with treatment plan. Discharged from Urgent Care in stable condition.  · Stay home except to get medical care.  · Separate yourself from other people and animals in your home  · Call ahead before visiting your doctor.  · Wear a facemask.  · Cover your coughs and sneezes.  · Clean your hands often.  · Avoid sharing personal household items.  · Clean all high-touch surfaces every day.  · Monitor your symptoms. Seek prompt medical attention if your illness is worsening (e.g., difficulty breathing). Before seeking care, call your healthcare provider.  · If you have a medical emergency and need to call 911, notify the dispatch personnel that you have, or are being evaluated for COVID-19. If possible, put on a facemask before emergency medical services arrive.  · Discontinuing home isolation. Call your provider about guidance to discontinue home isolation.     Recommended precautions for household members, intimate partners, and caregivers in a non healthcare setting of a patient with symptomatic laboratory-confirmed COVID-19 or a patient under investigation.  Household members, intimate partners, and caregivers in a non healthcare setting may have close contact with a person with symptomatic, laboratory-confirmed COVID-19 or a person under investigation. Close contacts should monitor their health; they should call their healthcare provider right away if  they develop symptoms suggestive of COVID-19 (e.g., fever, cough, shortness of breath).       DIAGNOSIS:  Exposure to COVID-19 virus  Abdominal cramping

## 2020-06-27 NOTE — PATIENT INSTRUCTIONS
PLAN:  Lab work COVID-19 screen  Advise increase p.o. fluids- water/juice & rest  Cool mist humidifier/vaporizer.  Practice good handwashing.  Tylenol or Ibuprofen for fever, headache and body aches.  Advise follow up with PCP in 2 3 days for recheck  Advise go to ER if nausea, vomiting, fever, increased pain, or fail to improve with treatment.  AVS provided and reviewed with patient including supportive care, follow up, and red flag symptoms.   Patient verbalizes understanding and agrees with treatment plan. Discharged from Urgent Care in stable condition.  · Stay home except to get medical care.  · Separate yourself from other people and animals in your home  · Call ahead before visiting your doctor.  · Wear a facemask.  · Cover your coughs and sneezes.  · Clean your hands often.  · Avoid sharing personal household items.  · Clean all high-touch surfaces every day.  · Monitor your symptoms. Seek prompt medical attention if your illness is worsening (e.g., difficulty breathing). Before seeking care, call your healthcare provider.  · If you have a medical emergency and need to call 911, notify the dispatch personnel that you have, or are being evaluated for COVID-19. If possible, put on a facemask before emergency medical services arrive.  · Discontinuing home isolation. Call your provider about guidance to discontinue home isolation.     Recommended precautions for household members, intimate partners, and caregivers in a nonhealthcare setting of a patient with symptomatic laboratory-confirmed COVID-19 or a patient under investigation.  Household members, intimate partners, and caregivers in a nonhealthcare setting may have close contact with a person with symptomatic, laboratory-confirmed COVID-19 or a person under investigation. Close contacts should monitor their health; they should call their healthcare provider right away if they develop symptoms suggestive of COVID-19 (e.g., fever, cough, shortness of  breath).

## 2020-06-28 ENCOUNTER — OFFICE VISIT (OUTPATIENT)
Dept: FAMILY MEDICINE | Facility: CLINIC | Age: 30
End: 2020-06-28
Payer: COMMERCIAL

## 2020-06-28 DIAGNOSIS — R30.0 DYSURIA: ICD-10-CM

## 2020-06-28 DIAGNOSIS — N39.0 ACUTE UTI: Primary | ICD-10-CM

## 2020-06-28 PROCEDURE — 99212 OFFICE O/P EST SF 10 MIN: CPT | Mod: 95,,, | Performed by: NURSE PRACTITIONER

## 2020-06-28 PROCEDURE — 99212 PR OFFICE/OUTPT VISIT, EST, LEVL II, 10-19 MIN: ICD-10-PCS | Mod: 95,,, | Performed by: NURSE PRACTITIONER

## 2020-06-28 RX ORDER — AMOXICILLIN AND CLAVULANATE POTASSIUM 875; 125 MG/1; MG/1
1 TABLET, FILM COATED ORAL 2 TIMES DAILY
Qty: 20 TABLET | Refills: 0 | Status: SHIPPED | OUTPATIENT
Start: 2020-06-28 | End: 2020-07-08

## 2020-06-28 RX ORDER — PHENAZOPYRIDINE HYDROCHLORIDE 200 MG/1
200 TABLET, FILM COATED ORAL 3 TIMES DAILY PRN
Qty: 12 TABLET | Refills: 0 | Status: SHIPPED | OUTPATIENT
Start: 2020-06-28 | End: 2020-07-08

## 2020-06-28 NOTE — PROGRESS NOTES
Billie Nicolas is a 29 y.o. female patient of Yanni Ramírez MD who presents to the clinic today for a Virtual Visit.    The patient location is: [place]  The chief complaint leading to consultation is: UTI sxs  Visit type: audiovisual  Total time spent with patient: 10 minutes  Each patient to whom he or she provides medical services by telemedicine is:  (1) informed of the relationship between the physician and patient and the respective role of any other health care provider with respect to management of the patient; and (2) notified that he or she may decline to receive medical services by telemedicine and may withdraw from such care at any time.    HPI    Pt, who is not known to me, reports a new problem to me:  + dysuria, + urgency, + frequency of urination.  No blood in the urine.    Answers for HPI/ROS submitted by the patient on 6/28/2020   Dysuria  Chronicity: new  Onset: yesterday  Frequency: every urination  Progression since onset: waxing and waning  Pain quality: burning  Pain - numeric: 4/10  Fever: no fever  Sexually active?: Yes  History of pyelonephritis?: No  chills: No  discharge: Yes  flank pain: Yes  frequency: Yes  hematuria: No  hesitancy: No  nausea: No  possible pregnancy: No  sweats: No  urgency: Yes  vomiting: No  constipation: No  rash: No  weight loss: No  withholding: No  behavior changes: Yes  Treatments tried: increased fluids  Improvement on treatment: mild  Pain severity: mild  catheterization: No  diabetes insipidus: No  diabetes mellitus: No  genitourinary reflux: No  hypertension: No  recurrent UTIs: No  single kidney: No  STD: No  urinary stasis: No  urological procedure: No  kidney stones: No      These symptoms began 1 day  ago and status is continuing.  New to this episode is flank papin and low abd pain.  No h/o pyelo.  Just finished her menses.  No STIs, no PID hx per recent exam (6/10/2020).  Pt denies the following symptoms:  No fever, no nausea, no  vomiting.    Aggravating factors include + urinating.    Relieving factors include + increasing fluids, no Azo/pyridium, no OTC analgesic.    OTC Medications tried are above.    Prescription medications taken for symptoms are none    Pertinent history:  + h/o UTIs/no pyelonephritis.    ROS    Constitutional: no fever, no fatigue, no change in appetite.    GI:  low stomach ache, no nausea, no vomiting, no diarrhea, no constipation.  BM qd but consistency different a couple days ago.    Urinary:  + dysuria, + frequency, + urgency, bilat flank pain.     HEENT/Heart/Lung/MS/Skin:  No symptoms or no changes.    Past Medical History:   Diagnosis Date    Abnormal cervical Pap smear with positive HPV DNA test     ADHD, predominantly inattentive type     Allergy     Anxiety     Asthma     Bulimia     Major depression, recurrent     Migraine headache        Current Outpatient Medications:     cetirizine HCl/pseudoephedrine (ALLERGY-D, CETIRIZINE, ORAL), cetirizine, Disp: , Rfl:     copper (PARAGARD T 380A) 380 square mm IUD, by Intrauterine route., Disp: , Rfl:     dextroamphetamine-amphetamine (ADDERALL) 20 mg tablet, Take 1 tablet in the morning  and 1/2 tablet in the evening, Disp: 45 tablet, Rfl: 0    sertraline (ZOLOFT) 100 MG tablet, Take 1 tablet (100 mg total) by mouth once daily., Disp: 90 tablet, Rfl: 1    valACYclovir (VALTREX) 1000 MG tablet, Take 1 tablet (1,000 mg total) by mouth 2 (two) times daily., Disp: 60 tablet, Rfl: 0    valACYclovir (VALTREX) 500 MG tablet, TAKE ONE TABLET BY MOUTH EVERY DAY, Disp: 90 tablet, Rfl: 3    vilazodone (VIIBRYD) 40 mg Tab tablet, Take 1 tablet (40 mg total) by mouth once daily., Disp: 90 tablet, Rfl: 0    Pt is not a smoker.    ALLERGIES AND MEDICATIONS: updated and reviewed.  Review of patient's allergies indicates:   Allergen Reactions    Sulfa (sulfonamide antibiotics) Hives    Sulfamethoxazole-trimethoprim      Current Outpatient Medications   Medication  Sig Dispense Refill    cetirizine HCl/pseudoephedrine (ALLERGY-D, CETIRIZINE, ORAL) cetirizine      copper (PARAGARD T 380A) 380 square mm IUD by Intrauterine route.      dextroamphetamine-amphetamine (ADDERALL) 20 mg tablet Take 1 tablet in the morning  and 1/2 tablet in the evening 45 tablet 0    sertraline (ZOLOFT) 100 MG tablet Take 1 tablet (100 mg total) by mouth once daily. 90 tablet 1    valACYclovir (VALTREX) 1000 MG tablet Take 1 tablet (1,000 mg total) by mouth 2 (two) times daily. 60 tablet 0    valACYclovir (VALTREX) 500 MG tablet TAKE ONE TABLET BY MOUTH EVERY DAY 90 tablet 3    vilazodone (VIIBRYD) 40 mg Tab tablet Take 1 tablet (40 mg total) by mouth once daily. 90 tablet 0     No current facility-administered medications for this visit.        PHYSICAL EXAM    There were no vitals filed for this visit.  Vital signs not taken per patient.  Patient's questionnaire (if available), medications & PMH all reviewed today.  Gen:  Alert, coop 29 y.o. female patient in no distress, is not ill-appearing.           Well developed, well-nourished  Psych:   Behavior and affect appropriate for situation and setting.  HENT:   Normocephalic, atraumatic,  Symmetrical facial movements.    Eyes without visible discharge or swelling.  No sneezing during the visit.  Voice steady, no hoarseness noted.  Resp:   Normal respiratory effort  No retractions  No increased work of breathing  No audible wheezes  Talks in full sentences.  CV:   No audie oral cyanosis  Neuro:  Responds promptly to questions showing good insight.  MSK:  Moves head and upper extremities evenly.  Skin:   No observed rashes/bruises    Acute UTI  -     amoxicillin-clavulanate 875-125mg (AUGMENTIN) 875-125 mg per tablet; Take 1 tablet by mouth 2 (two) times daily. for 10 days  Dispense: 20 tablet; Refill: 0    Dysuria  -     phenazopyridine (PYRIDIUM) 200 MG tablet; Take 1 tablet (200 mg total) by mouth 3 (three) times daily as needed for Pain.   Dispense: 12 tablet; Refill: 0        Pt today presents with report of dysuria, frequency, urgency of urination with additional complaint of bilateral flank pain and lower abdominal pain.    She denies fever, nausea, vomiting.    .  Additionally, she recently and had STI testing and all was negative.  She had a history of UTI I in 2018 sensitive to all antibiotics except nitrofurantoin  Her urinary symptoms in 2019 did not grow out an infection    Physical exam shows key findings of thank alert, cooperative, 29-year-old female in no acute distress .    Findings consistent with acute UTI .    This is a new problem to me.  No work up is planned.        Pt advised to perform comfort measures recommended on patient instruction sheet, which were reviewed at the time of the visit..    If not better in 3 days, the patient is advised to call here, PCP office or go for an in-person/follow up evaluation.  If worse or concerns, the patient is advised to call for advise to this office or the PCP office or call [facility] ON CALL or go to the nearest URGENT CARE or ER.  Explained exam findings, diagnosis and treatment plan to patient .  Questions answered and patient states understanding.

## 2020-06-28 NOTE — PATIENT INSTRUCTIONS

## 2020-06-30 ENCOUNTER — OFFICE VISIT (OUTPATIENT)
Dept: PSYCHIATRY | Facility: CLINIC | Age: 30
End: 2020-06-30
Payer: COMMERCIAL

## 2020-06-30 DIAGNOSIS — F41.1 GENERALIZED ANXIETY DISORDER: Primary | ICD-10-CM

## 2020-06-30 PROCEDURE — 90834 PR PSYCHOTHERAPY W/PATIENT, 45 MIN: ICD-10-PCS | Mod: 95,,, | Performed by: SOCIAL WORKER

## 2020-06-30 PROCEDURE — 90834 PSYTX W PT 45 MINUTES: CPT | Mod: 95,,, | Performed by: SOCIAL WORKER

## 2020-06-30 NOTE — PROGRESS NOTES
Individual Psychotherapy (PhD/LCSW)    6/30/2020    Site:  [facility]         Therapeutic Intervention: Met with patient.  Outpatient - Insight oriented psychotherapy 45 min - CPT code 04078  Virtual visit with synchronous audio and video     Each patient to whom he provides medical services by telemedicine is:  (1) informed of the relationship between the physician and patient and the respective role of any other health care provider with respect to management of the patient; and (2) notified that he or she may decline to receive medical services by telemedicine and may withdraw from such care at any time.      Location:  Her living room    Chief complaint/reason for encounter: depression and anxiety     Interval history and content of current session:  Patient presents via video to ongoing individual therapy due to depression and anxiety.  She is still dating Carl.  She had a meeting when the COVID cases seemed to be rising again.  Her boyfriend was having some major anxiety about the increase in cases again.  They put together a document about how they are going to handle the increased COVID cases.  They are only going to allow a specific amount of people in their [place].  They also expressed their end of life issues.  The document helped to calm both of their anxieties.  She is going on a picnic date with her girlfriend today.  Her boyfriend is okay with the date.  Her girlfriend is an  at [employer].  Confront the patient that she and her peers are struggling with the fear of the unknown related to the virus.  Explore how she uses positive coping to deal with unknown situations.  Validate her need to make social contact with others.  Praise steps to have a positive, healthy interaction with her mother by asking for what she needs.  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  She is dealing with a lot of panic and stress.  She is drawn to people who are chaotic for her.   "She plans to start studying attachment theory.  She realizes that she is an empath.  She is not sure how she sets boundaries with "air."  She is actively engaging in her extracurricular activities.  She has been grocery shopping consistently.  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  She knows that it is not good for her emotionally to isolate herself.  She is worried about she and her boyfriend's exposure to people who are COVID positive.  She is asking her co workers individually how they are dealing with the virus.  She called her mother after they had to intubate a 26 year old.  Her mother told her that rafal was feeling all the emotions that she is feeling.  She told the patient that she was proud of her and the work she does.  The patient admits she has a tendency to downplay her needs.  Her mother is visiting her sister for two weeks in [place].              Treatment plan:  ? Target symptoms: anxiety   ? Why chosen therapy is appropriate versus another modality: relevant to diagnosis  ? Outcome monitoring methods: self-report, observation  ? Therapeutic intervention type: insight oriented psychotherapy, supportive psychotherapy, interactive psychotherapy     Risk parameters:  Patient reports no suicidal ideation  Patient reports no homicidal ideation  Patient reports no self-injurious behavior  Patient reports no violent behavior     Verbal deficits: None     Patient's response to intervention:  The patient's response to intervention is accepting, motivated.     Progress toward goals and other mental status changes:  The patient's progress toward goals is fair .     Diagnosis:   Generalized Anxiety Disorder     Plan:  individual psychotherapy and medication management by physician     Return to clinic: as scheduled     Length of Service (minutes): 45       "

## 2020-07-02 ENCOUNTER — TELEPHONE (OUTPATIENT)
Dept: URGENT CARE | Facility: CLINIC | Age: 30
End: 2020-07-02

## 2020-07-02 LAB — SARS-COV-2 RNA RESP QL NAA+PROBE: NOT DETECTED

## 2020-07-06 RX ORDER — FLUCONAZOLE 150 MG/1
150 TABLET ORAL ONCE
Qty: 2 TABLET | Refills: 0 | Status: SHIPPED | OUTPATIENT
Start: 2020-07-06 | End: 2020-07-08

## 2020-07-17 DIAGNOSIS — F98.8 ATTENTION DEFICIT DISORDER, UNSPECIFIED HYPERACTIVITY PRESENCE: ICD-10-CM

## 2020-07-17 RX ORDER — DEXTROAMPHETAMINE SACCHARATE, AMPHETAMINE ASPARTATE, DEXTROAMPHETAMINE SULFATE AND AMPHETAMINE SULFATE 5; 5; 5; 5 MG/1; MG/1; MG/1; MG/1
TABLET ORAL
Qty: 45 TABLET | Refills: 0 | Status: SHIPPED | OUTPATIENT
Start: 2020-07-17 | End: 2020-09-08 | Stop reason: SDUPTHER

## 2020-07-17 RX ORDER — DEXTROAMPHETAMINE SACCHARATE, AMPHETAMINE ASPARTATE, DEXTROAMPHETAMINE SULFATE AND AMPHETAMINE SULFATE 5; 5; 5; 5 MG/1; MG/1; MG/1; MG/1
TABLET ORAL
Qty: 45 TABLET | Refills: 0 | Status: CANCELLED | OUTPATIENT
Start: 2020-07-17

## 2020-07-23 ENCOUNTER — OFFICE VISIT (OUTPATIENT)
Dept: OPHTHALMOLOGY | Facility: CLINIC | Age: 30
End: 2020-07-23
Payer: COMMERCIAL

## 2020-07-23 DIAGNOSIS — H52.13 MYOPIA OF BOTH EYES WITH ASTIGMATISM: Primary | ICD-10-CM

## 2020-07-23 DIAGNOSIS — H52.203 MYOPIA OF BOTH EYES WITH ASTIGMATISM: Primary | ICD-10-CM

## 2020-07-23 PROCEDURE — 92014 COMPRE OPH EXAM EST PT 1/>: CPT | Mod: S$GLB,,, | Performed by: OPTOMETRIST

## 2020-07-23 PROCEDURE — 92015 PR REFRACTION: ICD-10-PCS | Mod: S$GLB,,, | Performed by: OPTOMETRIST

## 2020-07-23 PROCEDURE — 92015 DETERMINE REFRACTIVE STATE: CPT | Mod: S$GLB,,, | Performed by: OPTOMETRIST

## 2020-07-23 PROCEDURE — 99999 PR PBB SHADOW E&M-EST. PATIENT-LVL II: ICD-10-PCS | Mod: PBBFAC,,, | Performed by: OPTOMETRIST

## 2020-07-23 PROCEDURE — 92014 PR EYE EXAM, EST PATIENT,COMPREHESV: ICD-10-PCS | Mod: S$GLB,,, | Performed by: OPTOMETRIST

## 2020-07-23 PROCEDURE — 99999 PR PBB SHADOW E&M-EST. PATIENT-LVL II: CPT | Mod: PBBFAC,,, | Performed by: OPTOMETRIST

## 2020-07-23 NOTE — PROGRESS NOTES
HPI     Eye Exam     Comments: Yearly              Comments     Patient last visit with [facility] on 02/04/2020.  HPI     Any vision changes since last exam: No  Eye pain: No  Other ocular symptoms: No     Do you wear currently wear glasses or contacts? Both    Interested in contacts today? Yes    Do you plan on getting new glasses today? Yes              Last edited by Tash Noyola on 7/23/2020  9:20 AM. (History)            Assessment /Plan     For exam results, see Encounter Report.    Myopia of both eyes with astigmatism    Eyeglass Final Rx     Eyeglass Final Rx       Sphere Cylinder Axis    Right -1.75 +0.50 090    Left -2.00 +0.25 105    Expiration Date: 7/24/2021              Contact Lens Prescription (7/23/2020)        Brand Base Curve Diameter Sphere    Right Acuvue Oasys 1 Day 8.50 14.3 -1.75    Left Acuvue Oasys 1 Day 8.50 14.3 -1.75    Expiration Date: 7/24/2021    Replacement: Daily    Wearing Schedule: Daily wear          Dispensed trial contact lenses today. Patient is to wear lenses for 1 week.   Ok to order supply if no problems. RTC PRN if any problems arise.    Otherwise, RTC 1 yr for dilated eye exam.  Discussed above and answered questions.

## 2020-07-29 ENCOUNTER — OFFICE VISIT (OUTPATIENT)
Dept: SURGERY | Facility: CLINIC | Age: 30
End: 2020-07-29
Payer: COMMERCIAL

## 2020-07-29 ENCOUNTER — OFFICE VISIT (OUTPATIENT)
Dept: FAMILY MEDICINE | Facility: CLINIC | Age: 30
End: 2020-07-29
Payer: COMMERCIAL

## 2020-07-29 VITALS
DIASTOLIC BLOOD PRESSURE: 88 MMHG | WEIGHT: 183 LBS | SYSTOLIC BLOOD PRESSURE: 126 MMHG | HEIGHT: 67 IN | TEMPERATURE: 98 F | BODY MASS INDEX: 28.72 KG/M2 | HEART RATE: 106 BPM

## 2020-07-29 VITALS
HEART RATE: 122 BPM | WEIGHT: 183.88 LBS | OXYGEN SATURATION: 97 % | BODY MASS INDEX: 28.8 KG/M2 | DIASTOLIC BLOOD PRESSURE: 89 MMHG | TEMPERATURE: 98 F | SYSTOLIC BLOOD PRESSURE: 136 MMHG

## 2020-07-29 DIAGNOSIS — B37.31 VAGINAL CANDIDIASIS: ICD-10-CM

## 2020-07-29 DIAGNOSIS — L05.91 PILONIDAL CYST: ICD-10-CM

## 2020-07-29 DIAGNOSIS — L05.91 PILONIDAL CYST: Primary | ICD-10-CM

## 2020-07-29 PROCEDURE — 99999 PR PBB SHADOW E&M-EST. PATIENT-LVL IV: CPT | Mod: PBBFAC,,, | Performed by: NURSE PRACTITIONER

## 2020-07-29 PROCEDURE — 99213 OFFICE O/P EST LOW 20 MIN: CPT | Mod: S$GLB,,, | Performed by: NURSE PRACTITIONER

## 2020-07-29 PROCEDURE — 3008F BODY MASS INDEX DOCD: CPT | Mod: CPTII,S$GLB,, | Performed by: NURSE PRACTITIONER

## 2020-07-29 PROCEDURE — 99499 UNLISTED E&M SERVICE: CPT | Mod: S$GLB,,, | Performed by: SURGERY

## 2020-07-29 PROCEDURE — 99499 NO LOS: ICD-10-PCS | Mod: S$GLB,,, | Performed by: SURGERY

## 2020-07-29 PROCEDURE — 99999 PR PBB SHADOW E&M-EST. PATIENT-LVL IV: ICD-10-PCS | Mod: PBBFAC,,, | Performed by: SURGERY

## 2020-07-29 PROCEDURE — 99999 PR PBB SHADOW E&M-EST. PATIENT-LVL IV: CPT | Mod: PBBFAC,,, | Performed by: SURGERY

## 2020-07-29 PROCEDURE — 99999 PR PBB SHADOW E&M-EST. PATIENT-LVL IV: ICD-10-PCS | Mod: PBBFAC,,, | Performed by: NURSE PRACTITIONER

## 2020-07-29 PROCEDURE — 3008F PR BODY MASS INDEX (BMI) DOCUMENTED: ICD-10-PCS | Mod: CPTII,S$GLB,, | Performed by: NURSE PRACTITIONER

## 2020-07-29 PROCEDURE — 99213 PR OFFICE/OUTPT VISIT, EST, LEVL III, 20-29 MIN: ICD-10-PCS | Mod: S$GLB,,, | Performed by: NURSE PRACTITIONER

## 2020-07-29 RX ORDER — IBUPROFEN 800 MG/1
800 TABLET ORAL 3 TIMES DAILY
Qty: 15 TABLET | Refills: 0 | Status: SHIPPED | OUTPATIENT
Start: 2020-07-29 | End: 2021-07-09

## 2020-07-29 RX ORDER — TRAMADOL HYDROCHLORIDE 50 MG/1
50 TABLET ORAL EVERY 6 HOURS PRN
Qty: 15 TABLET | Refills: 1 | Status: SHIPPED | OUTPATIENT
Start: 2020-07-29 | End: 2021-07-09

## 2020-07-29 RX ORDER — CLINDAMYCIN HYDROCHLORIDE 150 MG/1
150 CAPSULE ORAL EVERY 6 HOURS
Qty: 28 CAPSULE | Refills: 0 | Status: SHIPPED | OUTPATIENT
Start: 2020-07-29 | End: 2020-08-05

## 2020-07-29 RX ORDER — FLUCONAZOLE 150 MG/1
TABLET ORAL
Qty: 3 TABLET | Refills: 1 | Status: SHIPPED | OUTPATIENT
Start: 2020-07-29 | End: 2020-12-15 | Stop reason: SDUPTHER

## 2020-07-29 NOTE — LETTER
July 31, 2020      Yvonne Smallwood NP  [address]  [address]st [address]  [place] [facility] [address]           [facility]  [address] [facility] [address]  Phone: [phone]  Fax: [phone]          Patient: Billie Nicolas   MR Number: [id number]   YOB: 1990   Date of Visit: 7/29/2020       Dear Yvonne Smallwood:    Thank you for referring Billie Nicolas to me for evaluation. Attached you will find relevant portions of my assessment and plan of care.    If you have questions, please do not hesitate to call me. I look forward to following Billie Nicolas along with you.    Sincerely,    Wellington Acosta MD    Enclosure  CC:  No Recipients    If you would like to receive this communication electronically, please contact [email or web address] or [phone] to request more information on [facility] [employer] access.    For providers and/or their staff who would like to refer a patient to [facility], please contact us through our one-stop-shop provider referral line, [facility], at [phone].    If you feel you have received this communication in error or would no longer like to receive these types of communications, please e-mail [email or web address]

## 2020-07-29 NOTE — PROGRESS NOTES
Subjective:       Patient ID: Billie Nicolas is a 29 y.o. female.    Chief Complaint: Cyst  Pt reports to clinic with chief complaint of pilonidal cyst.  Reports area has been red and painful for several days.  Has been applying warm compresses to area without relief.  Denies drainage.  Pain worsens with sitting.    HPI  Review of Systems   Constitutional: Negative for fever.   Respiratory: Negative.    Cardiovascular: Negative.    Skin: Positive for wound.       Objective:      Physical Exam  Vitals signs reviewed.   Constitutional:       Appearance: Normal appearance.   HENT:      Nose: Nose normal.   Pulmonary:      Effort: Pulmonary effort is normal.   Abdominal:      General: Abdomen is flat.   Musculoskeletal: Normal range of motion.   Skin:     General: Skin is warm and dry.             Comments: Erythematous pustule with induration, tender to touch, no drainage.    Neurological:      General: No focal deficit present.      Mental Status: She is alert and oriented to person, place, and time.   Psychiatric:         Mood and Affect: Mood normal.         Behavior: Behavior normal.         Assessment:       1. Pilonidal cyst    2. Vaginal candidiasis        Plan:   Pilonidal cyst  -     clindamycin (CLEOCIN) 150 MG capsule; Take 1 capsule (150 mg total) by mouth every 6 (six) hours. for 7 days (Patient not taking: Reported on 7/29/2020)  Dispense: 28 capsule; Refill: 0  -     Ambulatory referral/consult to General Surgery; Future; Expected date: 08/05/2020    Vaginal candidiasis  -     fluconazole (DIFLUCAN) 150 MG Tab; Take 1 tablet by mouth every 3 days x3 doses (Patient not taking: Reported on 7/29/2020)  Dispense: 3 tablet; Refill: 1      No follow-ups on file.

## 2020-08-03 NOTE — PROGRESS NOTES
History & Physical    SUBJECTIVE:     History of Present Illness:  Patient is a 29 y.o. female referred for pilonidal cyst abscess. Reports fullness and pain developing over past few days. Painful to sit on area, no drainage or fevers.    Chief Complaint   Patient presents with    Consult       Review of patient's allergies indicates:   Allergen Reactions    Sulfa (sulfonamide antibiotics) Hives    Sulfamethoxazole-trimethoprim        Current Outpatient Medications   Medication Sig Dispense Refill    copper (PARAGARD T 380A) 380 square mm IUD by Intrauterine route.      dextroamphetamine-amphetamine (ADDERALL) 20 mg tablet Take 1 tablet in the morning  and 1/2 tablet in the evening 45 tablet 0    sertraline (ZOLOFT) 100 MG tablet Take 1 tablet (100 mg total) by mouth once daily. 90 tablet 1    valACYclovir (VALTREX) 1000 MG tablet Take 1 tablet (1,000 mg total) by mouth 2 (two) times daily. 60 tablet 0    valACYclovir (VALTREX) 500 MG tablet TAKE ONE TABLET BY MOUTH EVERY DAY 90 tablet 3    vilazodone (VIIBRYD) 40 mg Tab tablet Take 1 tablet (40 mg total) by mouth once daily. 90 tablet 0    cetirizine HCl/pseudoephedrine (ALLERGY-D, CETIRIZINE, ORAL) cetirizine      clindamycin (CLEOCIN) 150 MG capsule Take 1 capsule (150 mg total) by mouth every 6 (six) hours. for 7 days (Patient not taking: Reported on 7/29/2020) 28 capsule 0    fluconazole (DIFLUCAN) 150 MG Tab Take 1 tablet by mouth every 3 days x3 doses (Patient not taking: Reported on 7/29/2020) 3 tablet 1    ibuprofen (ADVIL,MOTRIN) 800 MG tablet Take 1 tablet (800 mg total) by mouth 3 (three) times daily. 15 tablet 0    traMADoL (ULTRAM) 50 mg tablet Take 1 tablet (50 mg total) by mouth every 6 (six) hours as needed for Pain. 15 tablet 1     No current facility-administered medications for this visit.        Past Medical History:   Diagnosis Date    Abnormal cervical Pap smear with positive HPV DNA test     ADHD, predominantly inattentive  "type     Allergy     Anxiety     Asthma     Bulimia     Major depression, recurrent     Migraine headache      Past Surgical History:   Procedure Laterality Date    ABSCESS DRAINAGE  12/25/2017    abscess lanced     TONSILLECTOMY      WISDOM TOOTH EXTRACTION       Family History   Problem Relation Age of Onset    Cancer Mother         cervical    Heart disease Mother     Mitral valve prolapse Mother     Hypertension Mother     Hypertension Maternal Grandmother     Heart disease Maternal Grandfather         Passed at 54 yoa    Hodgkin's lymphoma Maternal Grandfather     Cancer Maternal Grandfather     Melanoma Neg Hx      Social History     Tobacco Use    Smoking status: Never Smoker    Smokeless tobacco: Never Used   Substance Use Topics    Alcohol use: Yes     Frequency: 2-4 times a month     Drinks per session: 1 or 2     Binge frequency: Less than monthly     Comment: rarely     Drug use: No        Review of Systems:  Review of Systems   Constitutional: Negative for activity change and unexpected weight change.   HENT: Negative for hearing loss, rhinorrhea and trouble swallowing.    Eyes: Negative for discharge and visual disturbance.   Respiratory: Negative for chest tightness and wheezing.    Cardiovascular: Negative for chest pain and palpitations.   Gastrointestinal: Negative for blood in stool, constipation, diarrhea and vomiting.   Endocrine: Negative for polydipsia and polyuria.   Genitourinary: Negative for difficulty urinating, dysuria, hematuria and menstrual problem.   Musculoskeletal: Negative for arthralgias, joint swelling and neck pain.   Neurological: Negative for weakness and headaches.   Psychiatric/Behavioral: Positive for dysphoric mood. Negative for confusion.       OBJECTIVE:     Vital Signs (Most Recent)  Temp: 98.1 °F (36.7 °C) (07/29/20 1112)  Pulse: 106 (07/29/20 1112)  BP: 126/88 (07/29/20 1112)  5' 7" (1.702 m)  83 kg (182 lb 15.7 oz)     Physical Exam:  Physical " Exam  Vitals signs reviewed.   Constitutional:       Appearance: She is well-developed.   HENT:      Head: Normocephalic and atraumatic.   Neck:      Musculoskeletal: Neck supple.   Cardiovascular:      Rate and Rhythm: Normal rate and regular rhythm.   Pulmonary:      Effort: Pulmonary effort is normal.      Breath sounds: Normal breath sounds.   Abdominal:      General: Bowel sounds are normal. There is no distension.      Palpations: Abdomen is soft.      Tenderness: There is no abdominal tenderness.   Genitourinary:     Comments: pilonidal cyst abscess  Skin:     General: Skin is warm and dry.   Neurological:      Mental Status: She is alert and oriented to person, place, and time.           ASSESSMENT/PLAN:     28 y/o female with pilonidal abscess    PLAN:Plan     I&D today  rx bactrim, pain med  Daily wound care    Procedure:  Prepped sterile fashion, local anesthetic injected, incision with drainage pus, wound thoroughly washed out until clear, packed with gauze, tolerated procedure well.

## 2020-08-04 ENCOUNTER — OFFICE VISIT (OUTPATIENT)
Dept: PSYCHIATRY | Facility: CLINIC | Age: 30
End: 2020-08-04
Payer: COMMERCIAL

## 2020-08-04 DIAGNOSIS — F41.1 GENERALIZED ANXIETY DISORDER: Primary | ICD-10-CM

## 2020-08-04 PROCEDURE — 90834 PR PSYCHOTHERAPY W/PATIENT, 45 MIN: ICD-10-PCS | Mod: 95,,, | Performed by: SOCIAL WORKER

## 2020-08-04 PROCEDURE — 90834 PSYTX W PT 45 MINUTES: CPT | Mod: 95,,, | Performed by: SOCIAL WORKER

## 2020-08-04 NOTE — PROGRESS NOTES
"Individual Psychotherapy (PhD/LCSW)    8/4/2020    Site:  [place]         Therapeutic Intervention: Met with patient.  Outpatient - Insight oriented psychotherapy 45 min - CPT code 01852  Virtual visit with synchronous audio and video     Each patient to whom he provides medical services by telemedicine is:  (1) informed of the relationship between the physician and patient and the respective role of any other health care provider with respect to management of the patient; and (2) notified that he or she may decline to receive medical services by telemedicine and may withdraw from such care at any time.      Location:  Her living room    Chief complaint/reason for encounter: depression and anxiety     Interval history and content of current session:   Patient presents via video to ongoing individual therapy due to depression and anxiety.  They had a 34yo die in the hospital.  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  She is drained, sad, and angry when she returns home from a shift.  She only had one day off last week.  She is on a rotating schedule.  She has realized she does better with evening shifts.  The patient and her mother had an argument.  The patient participated in a protest in [facility] for black lives matter.  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  She is prepared for if tear gas is going.  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  Her mother said, "Just because I don't agree with you, you are doing to do what's right."  Her mother said that patient is trying to make her feel "shame" because she supports Manolo Nowak.  Her mother had heat stroke because she "overtrained."  Her mother is now spinning and she has lost 25 pounds.  Her mother tried to call the patient's sister to complain about the difficulty with the patient.  Her sister wanted to affirm that she supports the patient.  Explore what aspects of her work are testing her internal boundaries. "  Encourage the patient to find methods to nurture herself in the time of difficulty.  Validate the need to find healthy relationships that are comforting.  Emphasize that she could take the opportunity to learn why her mother believes differently that she does.  She has been communicating with her sister about handling conflict in a healthy manner.  Her mother did not listen to the advice.  Her mother can't see outside of herself.  Her mother need two liters of fluid.  She was trying to set it up for her mother to apologize.  She expressed how she was hurt from her mother.  She is worried that she will get an attack from her mother.  She is worried that her mother may not get to a better place of understanding.  She has been dealing with a lot of stress and anxiety.  She has heard some of the rhetoric from her political side.  She does not have much left to give due to dealing with the virus at work.  The patient has been focusing on peaceful relationships.  She feels she has bigger things to handle.  She has good relationships.  She is planning a beach trip with sorority sisters that she has not seen in many years.  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  They have new nurses who do not understand how to do basic functions.  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      Treatment plan:  ? Target symptoms: anxiety   ? Why chosen therapy is appropriate versus another modality: relevant to diagnosis  ? Outcome monitoring methods: self-report, observation  ? Therapeutic intervention type: insight oriented psychotherapy, supportive psychotherapy, interactive psychotherapy     Risk parameters:  Patient reports no suicidal ideation  Patient reports no homicidal ideation  Patient reports no self-injurious behavior  Patient reports no violent behavior     Verbal deficits: None     Patient's response to intervention:  The patient's response to  intervention is accepting, motivated.     Progress toward goals and other mental status changes:  The patient's progress toward goals is fair .     Diagnosis:   Generalized Anxiety Disorder     Plan:  individual psychotherapy and medication management by physician     Return to clinic: as scheduled     Length of Service (minutes): 45

## 2020-08-05 RX ORDER — AMOXICILLIN AND CLAVULANATE POTASSIUM 875; 125 MG/1; MG/1
1 TABLET, FILM COATED ORAL 2 TIMES DAILY
Qty: 6 TABLET | Refills: 0 | Status: SHIPPED | OUTPATIENT
Start: 2020-08-05 | End: 2020-08-08

## 2020-09-08 DIAGNOSIS — F41.9 ANXIETY AND DEPRESSION: ICD-10-CM

## 2020-09-08 DIAGNOSIS — F98.8 ATTENTION DEFICIT DISORDER, UNSPECIFIED HYPERACTIVITY PRESENCE: ICD-10-CM

## 2020-09-08 DIAGNOSIS — F32.A ANXIETY AND DEPRESSION: ICD-10-CM

## 2020-09-09 RX ORDER — VILAZODONE HYDROCHLORIDE 40 MG/1
40 TABLET ORAL DAILY
Qty: 90 TABLET | Refills: 0 | Status: SHIPPED | OUTPATIENT
Start: 2020-09-09 | End: 2020-10-08 | Stop reason: SDUPTHER

## 2020-09-09 RX ORDER — DEXTROAMPHETAMINE SACCHARATE, AMPHETAMINE ASPARTATE, DEXTROAMPHETAMINE SULFATE AND AMPHETAMINE SULFATE 5; 5; 5; 5 MG/1; MG/1; MG/1; MG/1
TABLET ORAL
Qty: 45 TABLET | Refills: 0 | Status: SHIPPED | OUTPATIENT
Start: 2020-09-09 | End: 2020-10-07 | Stop reason: SDUPTHER

## 2020-09-22 ENCOUNTER — PATIENT MESSAGE (OUTPATIENT)
Dept: INTERNAL MEDICINE | Facility: CLINIC | Age: 30
End: 2020-09-22

## 2020-09-22 DIAGNOSIS — B96.89 BV (BACTERIAL VAGINOSIS): Primary | ICD-10-CM

## 2020-09-22 DIAGNOSIS — N76.0 BV (BACTERIAL VAGINOSIS): Primary | ICD-10-CM

## 2020-09-23 ENCOUNTER — PATIENT MESSAGE (OUTPATIENT)
Dept: INTERNAL MEDICINE | Facility: CLINIC | Age: 30
End: 2020-09-23

## 2020-09-23 RX ORDER — METRONIDAZOLE 7.5 MG/G
1 GEL VAGINAL 2 TIMES DAILY
Qty: 70 G | Refills: 2 | Status: SHIPPED | OUTPATIENT
Start: 2020-09-23 | End: 2021-05-16 | Stop reason: SDUPTHER

## 2020-10-07 DIAGNOSIS — F98.8 ATTENTION DEFICIT DISORDER, UNSPECIFIED HYPERACTIVITY PRESENCE: ICD-10-CM

## 2020-10-07 RX ORDER — DEXTROAMPHETAMINE SACCHARATE, AMPHETAMINE ASPARTATE, DEXTROAMPHETAMINE SULFATE AND AMPHETAMINE SULFATE 5; 5; 5; 5 MG/1; MG/1; MG/1; MG/1
TABLET ORAL
Qty: 45 TABLET | Refills: 0 | Status: SHIPPED | OUTPATIENT
Start: 2020-10-07 | End: 2020-11-17 | Stop reason: SDUPTHER

## 2020-10-08 ENCOUNTER — HOSPITAL ENCOUNTER (OUTPATIENT)
Dept: RADIOLOGY | Facility: HOSPITAL | Age: 30
Discharge: HOME | End: 2020-10-08
Attending: FAMILY MEDICINE
Payer: COMMERCIAL

## 2020-10-08 ENCOUNTER — OFFICE VISIT (OUTPATIENT)
Dept: INTERNAL MEDICINE | Facility: CLINIC | Age: 30
End: 2020-10-08
Payer: COMMERCIAL

## 2020-10-08 VITALS
RESPIRATION RATE: 18 BRPM | SYSTOLIC BLOOD PRESSURE: 122 MMHG | TEMPERATURE: 98 F | HEIGHT: 67 IN | WEIGHT: 185.88 LBS | DIASTOLIC BLOOD PRESSURE: 78 MMHG | BODY MASS INDEX: 29.17 KG/M2 | OXYGEN SATURATION: 98 % | HEART RATE: 93 BPM

## 2020-10-08 DIAGNOSIS — F41.8 DEPRESSION WITH ANXIETY: ICD-10-CM

## 2020-10-08 DIAGNOSIS — F41.9 ANXIETY AND DEPRESSION: ICD-10-CM

## 2020-10-08 DIAGNOSIS — S69.91XA INJURY OF RIGHT WRIST, INITIAL ENCOUNTER: Primary | ICD-10-CM

## 2020-10-08 DIAGNOSIS — F32.A ANXIETY AND DEPRESSION: ICD-10-CM

## 2020-10-08 DIAGNOSIS — N94.3 PMS (PREMENSTRUAL SYNDROME): ICD-10-CM

## 2020-10-08 DIAGNOSIS — S69.91XA INJURY OF RIGHT WRIST, INITIAL ENCOUNTER: ICD-10-CM

## 2020-10-08 PROCEDURE — 3008F PR BODY MASS INDEX (BMI) DOCUMENTED: ICD-10-PCS | Mod: CPTII,S$GLB,, | Performed by: FAMILY MEDICINE

## 2020-10-08 PROCEDURE — 99214 OFFICE O/P EST MOD 30 MIN: CPT | Mod: S$GLB,,, | Performed by: FAMILY MEDICINE

## 2020-10-08 PROCEDURE — 3008F BODY MASS INDEX DOCD: CPT | Mod: CPTII,S$GLB,, | Performed by: FAMILY MEDICINE

## 2020-10-08 PROCEDURE — 73110 XR WRIST COMPLETE 3 VIEWS RIGHT: ICD-10-PCS | Mod: 26,RT,, | Performed by: RADIOLOGY

## 2020-10-08 PROCEDURE — 99214 PR OFFICE/OUTPT VISIT, EST, LEVL IV, 30-39 MIN: ICD-10-PCS | Mod: S$GLB,,, | Performed by: FAMILY MEDICINE

## 2020-10-08 PROCEDURE — 73110 X-RAY EXAM OF WRIST: CPT | Mod: 26,RT,, | Performed by: RADIOLOGY

## 2020-10-08 PROCEDURE — 73110 X-RAY EXAM OF WRIST: CPT | Mod: TC,RT

## 2020-10-08 PROCEDURE — 99999 PR PBB SHADOW E&M-EST. PATIENT-LVL IV: ICD-10-PCS | Mod: PBBFAC,,, | Performed by: FAMILY MEDICINE

## 2020-10-08 PROCEDURE — 99999 PR PBB SHADOW E&M-EST. PATIENT-LVL IV: CPT | Mod: PBBFAC,,, | Performed by: FAMILY MEDICINE

## 2020-10-08 RX ORDER — VALACYCLOVIR HYDROCHLORIDE 500 MG/1
500 TABLET, FILM COATED ORAL DAILY
Qty: 90 TABLET | Refills: 3 | Status: SHIPPED | OUTPATIENT
Start: 2020-10-08 | End: 2021-10-01 | Stop reason: SDUPTHER

## 2020-10-08 RX ORDER — SERTRALINE HYDROCHLORIDE 100 MG/1
100 TABLET, FILM COATED ORAL DAILY
Qty: 90 TABLET | Refills: 1 | Status: SHIPPED | OUTPATIENT
Start: 2020-10-08 | End: 2021-04-12 | Stop reason: SDUPTHER

## 2020-10-08 RX ORDER — VILAZODONE HYDROCHLORIDE 40 MG/1
40 TABLET ORAL DAILY
Qty: 90 TABLET | Refills: 3 | Status: SHIPPED | OUTPATIENT
Start: 2020-10-08 | End: 2021-10-01 | Stop reason: SDUPTHER

## 2020-10-08 NOTE — PROGRESS NOTES
Subjective:       Patient ID: Billie Nicolas is a 30 y.o. female.    Chief Complaint: Wrist Injury    HPIMsRonal Nicolas presents today for wrist injury    A month ago she was throwing away a box of litter.     Not getting worse   New numbness sensation pinky finger and 4th finger   Decreased sensation    On adderall doing well and stable     Review of Systems   Constitutional: Negative for activity change, appetite change, fatigue and fever.   HENT: Negative for congestion, ear pain and sinus pressure.    Eyes: Negative for pain and visual disturbance.   Respiratory: Negative for cough, chest tightness and wheezing.    Cardiovascular: Negative for chest pain, palpitations and leg swelling.   Gastrointestinal: Negative for abdominal distention, abdominal pain, constipation, diarrhea, nausea and vomiting.   Endocrine: Negative for polydipsia and polyuria.   Genitourinary: Negative for difficulty urinating, dyspareunia, dysuria, flank pain and hematuria.   Musculoskeletal: Positive for joint swelling. Negative for arthralgias and back pain.   Skin: Negative for rash.   Neurological: Negative for dizziness, tremors, syncope, weakness, numbness and headaches.   Psychiatric/Behavioral: Negative for agitation and confusion.           Past Medical History:   Diagnosis Date    Abnormal cervical Pap smear with positive HPV DNA test     ADHD, predominantly inattentive type     Allergy     Anxiety     Asthma     Bulimia     Major depression, recurrent     Migraine headache      Past Surgical History:   Procedure Laterality Date    ABSCESS DRAINAGE  12/25/2017    abscess lanced     TONSILLECTOMY      WISDOM TOOTH EXTRACTION         Objective:        Physical Exam  Vitals signs reviewed.   Constitutional:       Appearance: Normal appearance.   HENT:      Head: Normocephalic and atraumatic.   Musculoskeletal:        Hands:       Comments: Slight swelling of the right hand and wrist    Neurological:      Mental  Status: She is alert and oriented to person, place, and time.   Psychiatric:         Mood and Affect: Mood normal.         Behavior: Behavior normal.           Results for orders placed or performed in visit on 06/26/20   COVID-19 Routine Screening   Result Value Ref Range    SARS-CoV2 (COVID-19) Qualitative PCR Not Detected Not Detected       Assessment/Plan:     Injury of right wrist, initial encounter  -     Cancel: X-Ray Wrist 2 View Right; Future; Expected date: 10/08/2020  -     Ambulatory referral/consult to Orthopedics; Future; Expected date: 10/15/2020    PMS (premenstrual syndrome)  -     sertraline (ZOLOFT) 100 MG tablet; Take 1 tablet (100 mg total) by mouth once daily.  Dispense: 90 tablet; Refill: 1    Depression with anxiety  -     sertraline (ZOLOFT) 100 MG tablet; Take 1 tablet (100 mg total) by mouth once daily.  Dispense: 90 tablet; Refill: 1    Anxiety and depression  -     vilazodone (VIIBRYD) 40 mg Tab tablet; Take 1 tablet (40 mg total) by mouth once daily.  Dispense: 90 tablet; Refill: 3  -     Comprehensive Metabolic Panel; Future; Expected date: 10/08/2020    Other orders  -     valACYclovir (VALTREX) 500 MG tablet; TAKE ONE TABLET BY MOUTH EVERY DAY  Dispense: 90 tablet; Refill: 3          Follow up as needed     Yanni Ramírez MD  [facility]   Family Medicine

## 2020-10-11 ENCOUNTER — PATIENT MESSAGE (OUTPATIENT)
Dept: INTERNAL MEDICINE | Facility: CLINIC | Age: 30
End: 2020-10-11

## 2020-10-12 ENCOUNTER — OFFICE VISIT (OUTPATIENT)
Dept: INTERNAL MEDICINE | Facility: CLINIC | Age: 30
End: 2020-10-12
Payer: COMMERCIAL

## 2020-10-12 VITALS
OXYGEN SATURATION: 98 % | TEMPERATURE: 99 F | WEIGHT: 185.19 LBS | RESPIRATION RATE: 18 BRPM | BODY MASS INDEX: 29.07 KG/M2 | SYSTOLIC BLOOD PRESSURE: 122 MMHG | DIASTOLIC BLOOD PRESSURE: 78 MMHG | HEIGHT: 67 IN | HEART RATE: 102 BPM

## 2020-10-12 DIAGNOSIS — Z30.432 ENCOUNTER FOR REMOVAL OF INTRAUTERINE CONTRACEPTIVE DEVICE (IUD): Primary | ICD-10-CM

## 2020-10-12 PROCEDURE — 3008F PR BODY MASS INDEX (BMI) DOCUMENTED: ICD-10-PCS | Mod: CPTII,S$GLB,, | Performed by: FAMILY MEDICINE

## 2020-10-12 PROCEDURE — 99999 PR PBB SHADOW E&M-EST. PATIENT-LVL IV: CPT | Mod: PBBFAC,,, | Performed by: FAMILY MEDICINE

## 2020-10-12 PROCEDURE — 58301 REMOVE INTRAUTERINE DEVICE: CPT | Mod: S$GLB,,, | Performed by: FAMILY MEDICINE

## 2020-10-12 PROCEDURE — 58301 PR REMOVE, INTRAUTERINE DEVICE: ICD-10-PCS | Mod: S$GLB,,, | Performed by: FAMILY MEDICINE

## 2020-10-12 PROCEDURE — 99499 UNLISTED E&M SERVICE: CPT | Mod: S$GLB,,, | Performed by: FAMILY MEDICINE

## 2020-10-12 PROCEDURE — 99499 NO LOS: ICD-10-PCS | Mod: S$GLB,,, | Performed by: FAMILY MEDICINE

## 2020-10-12 PROCEDURE — 3008F BODY MASS INDEX DOCD: CPT | Mod: CPTII,S$GLB,, | Performed by: FAMILY MEDICINE

## 2020-10-12 PROCEDURE — 99999 PR PBB SHADOW E&M-EST. PATIENT-LVL IV: ICD-10-PCS | Mod: PBBFAC,,, | Performed by: FAMILY MEDICINE

## 2020-10-12 NOTE — PROGRESS NOTES
Subjective:       Patient ID: Billie Nicolas is a 30 y.o. female.    Chief Complaint: Contraception    HPI Ms. Nicolas presents today for IUD removal.   She has had the paraguard for 6 years     Notes could be contributing to recurrent BV.     Cycles also are 9 days instead of 5 days .    Mild cramping that has been daily this month usually would be around cycle.       Review of Systems   Constitutional: Negative for activity change, appetite change, fatigue and fever.   HENT: Negative for congestion, ear pain and sinus pressure.    Eyes: Negative for pain and visual disturbance.   Respiratory: Negative for cough, chest tightness and wheezing.    Cardiovascular: Negative for chest pain, palpitations and leg swelling.   Gastrointestinal: Negative for abdominal distention, abdominal pain, constipation, diarrhea, nausea and vomiting.   Endocrine: Negative for polydipsia and polyuria.   Genitourinary: Negative for difficulty urinating, dyspareunia, dysuria, flank pain and hematuria.   Musculoskeletal: Negative for arthralgias and back pain.   Skin: Negative for rash.   Neurological: Negative for dizziness, tremors, syncope, weakness, numbness and headaches.   Psychiatric/Behavioral: Negative for agitation and confusion.           Past Medical History:   Diagnosis Date    Abnormal cervical Pap smear with positive HPV DNA test     ADHD, predominantly inattentive type     Allergy     Anxiety     Asthma     Bulimia     Major depression, recurrent     Migraine headache      Past Surgical History:   Procedure Laterality Date    ABSCESS DRAINAGE  12/25/2017    abscess lanced     TONSILLECTOMY      WISDOM TOOTH EXTRACTION           Objective:        Physical Exam  Vitals signs reviewed.   Constitutional:       Appearance: Normal appearance.   Genitourinary:     Comments: Copious discharge over cervix   Cleared to visualize strings from IUD   Neurological:      Mental Status: She is alert and oriented to  person, place, and time.           Results for orders placed or performed in visit on 06/26/20   COVID-19 Routine Screening   Result Value Ref Range    SARS-CoV2 (COVID-19) Qualitative PCR Not Detected Not Detected       Assessment/Plan:     Encounter for removal of intrauterine contraceptive device (IUD)    speculum inserted  Copious discharge covered strings of IUD   Removed with large q-tip swab  Strings visualized and IUD removed with no complication     Follow up as needed       Yanni Ramírez MD  [facility]   Family Medicine

## 2020-10-13 ENCOUNTER — PATIENT OUTREACH (OUTPATIENT)
Dept: ADMINISTRATIVE | Facility: OTHER | Age: 30
End: 2020-10-13

## 2020-10-13 ENCOUNTER — OFFICE VISIT (OUTPATIENT)
Dept: ORTHOPEDICS | Facility: CLINIC | Age: 30
End: 2020-10-13
Payer: COMMERCIAL

## 2020-10-13 VITALS
BODY MASS INDEX: 29.03 KG/M2 | DIASTOLIC BLOOD PRESSURE: 88 MMHG | WEIGHT: 185 LBS | HEIGHT: 67 IN | SYSTOLIC BLOOD PRESSURE: 124 MMHG | HEART RATE: 88 BPM

## 2020-10-13 DIAGNOSIS — S69.91XA INJURY OF RIGHT WRIST, INITIAL ENCOUNTER: ICD-10-CM

## 2020-10-13 PROCEDURE — 3008F BODY MASS INDEX DOCD: CPT | Mod: CPTII,S$GLB,, | Performed by: ORTHOPAEDIC SURGERY

## 2020-10-13 PROCEDURE — 3008F PR BODY MASS INDEX (BMI) DOCUMENTED: ICD-10-PCS | Mod: CPTII,S$GLB,, | Performed by: ORTHOPAEDIC SURGERY

## 2020-10-13 PROCEDURE — 99999 PR PBB SHADOW E&M-EST. PATIENT-LVL IV: CPT | Mod: PBBFAC,,, | Performed by: ORTHOPAEDIC SURGERY

## 2020-10-13 PROCEDURE — 99999 PR PBB SHADOW E&M-EST. PATIENT-LVL IV: ICD-10-PCS | Mod: PBBFAC,,, | Performed by: ORTHOPAEDIC SURGERY

## 2020-10-13 PROCEDURE — 99213 OFFICE O/P EST LOW 20 MIN: CPT | Mod: S$GLB,,, | Performed by: ORTHOPAEDIC SURGERY

## 2020-10-13 PROCEDURE — 99213 PR OFFICE/OUTPT VISIT, EST, LEVL III, 20-29 MIN: ICD-10-PCS | Mod: S$GLB,,, | Performed by: ORTHOPAEDIC SURGERY

## 2020-10-13 NOTE — LETTER
October 13, 2020      Yanni aRmírez MD  [address] [place] [address] [address] [place] [id number]           [facility]  [address] [place] [address] [address] [place] [address]  Phone: [phone]  Fax: [phone]          Patient: Billie Nicolas   MR Number: [id number]   YOB: 1990   Date of Visit: 10/13/2020       Dear Dr. Yanni Ramírez:    Thank you for referring Billie Nicolas to me for evaluation. Attached you will find relevant portions of my assessment and plan of care.    If you have questions, please do not hesitate to call me. I look forward to following Billie Nicolas along with you.    Sincerely,    Elbert Jameson MD    Enclosure  CC:  No Recipients    If you would like to receive this communication electronically, please contact [email or web address] or [phone] to request more information on [facility] access.    For providers and/or their staff who would like to refer a patient to [facility], please contact us through our one-stop-shop provider referral line, [facility], at [phone].    If you feel you have received this communication in error or would no longer like to receive these types of communications, please e-mail [email or web address]

## 2020-10-13 NOTE — PROGRESS NOTES
Subjective:     Patient ID: Billie Nicolas is a 30 y.o. female.    Chief Complaint: Pain, Injury, and Swelling of the Right Wrist    The patient is a 30-year-old female with injury to her right wrist while throwing a bag of cat food.  Since that time she complains of ulnar-sided wrist pain as well as numbness in the ring and small finger.      Past Medical History:   Diagnosis Date    Abnormal cervical Pap smear with positive HPV DNA test     ADHD, predominantly inattentive type     Allergy     Anxiety     Asthma     Bulimia     Major depression, recurrent     Migraine headache      Past Surgical History:   Procedure Laterality Date    ABSCESS DRAINAGE  12/25/2017    abscess lanced     TONSILLECTOMY      WISDOM TOOTH EXTRACTION       Family History   Problem Relation Age of Onset    Cancer Mother         cervical    Heart disease Mother     Mitral valve prolapse Mother     Hypertension Mother     Hypertension Maternal Grandmother     Heart disease Maternal Grandfather         Passed at 54 yoa    Hodgkin's lymphoma Maternal Grandfather     Cancer Maternal Grandfather     Melanoma Neg Hx      Social History     Socioeconomic History    Marital status: Single     Spouse name: Not on file    Number of children: 0    Years of education: Not on file    Highest education level: Not on file   Occupational History    Occupation: Pharmacy Intern   Social Needs    Financial resource strain: Not hard at all    Food insecurity     Worry: Never true     Inability: Never true    Transportation needs     Medical: No     Non-medical: No   Tobacco Use    Smoking status: Never Smoker    Smokeless tobacco: Never Used   Substance and Sexual Activity    Alcohol use: Yes     Frequency: 2-4 times a month     Drinks per session: 1 or 2     Binge frequency: Less than monthly     Comment: rarely     Drug use: No    Sexual activity: Yes     Partners: Male   Lifestyle    Physical activity     Days per  week: 3 days     Minutes per session: 20 min    Stress: To some extent   Relationships    Social connections     Talks on phone: More than three times a week     Gets together: Three times a week     Attends Spiritism service: Not on file     Active member of club or organization: Yes     Attends meetings of clubs or organizations: More than 4 times per year     Relationship status: Never    Other Topics Concern    Are you pregnant or think you may be? No    Breast-feeding No   Social History Narrative    Not on file     Medication List with Changes/Refills   Current Medications    CETIRIZINE HCL/PSEUDOEPHEDRINE (ALLERGY-D, CETIRIZINE, ORAL)    cetirizine    COPPER (PARAGARD T 380A) 380 SQUARE MM IUD    by Intrauterine route.    DEXTROAMPHETAMINE-AMPHETAMINE (ADDERALL) 20 MG TABLET    Take 1 tablet in the morning  and 1/2 tablet in the evening    FLUCONAZOLE (DIFLUCAN) 150 MG TAB    Take 1 tablet by mouth every 3 days x3 doses    IBUPROFEN (ADVIL,MOTRIN) 800 MG TABLET    Take 1 tablet (800 mg total) by mouth 3 (three) times daily.    METRONIDAZOLE (METROGEL) 0.75 % VAGINAL GEL    Place 1 applicator vaginally 2 (two) times daily.    SERTRALINE (ZOLOFT) 100 MG TABLET    Take 1 tablet (100 mg total) by mouth once daily.    TRAMADOL (ULTRAM) 50 MG TABLET    Take 1 tablet (50 mg total) by mouth every 6 (six) hours as needed for Pain.    VALACYCLOVIR (VALTREX) 1000 MG TABLET    Take 1 tablet (1,000 mg total) by mouth 2 (two) times daily.    VALACYCLOVIR (VALTREX) 500 MG TABLET    TAKE ONE TABLET BY MOUTH EVERY DAY    VILAZODONE (VIIBRYD) 40 MG TAB TABLET    Take 1 tablet (40 mg total) by mouth once daily.     Review of patient's allergies indicates:   Allergen Reactions    Sulfa (sulfonamide antibiotics) Hives    Sulfamethoxazole-trimethoprim      Review of Systems   Constitution: Negative for malaise/fatigue.   HENT: Negative for hearing loss.    Eyes: Negative for double vision and visual disturbance.    Cardiovascular: Negative for chest pain.   Respiratory: Positive for wheezing. Negative for shortness of breath.    Endocrine: Negative for cold intolerance.   Hematologic/Lymphatic: Does not bruise/bleed easily.   Skin: Negative for poor wound healing and suspicious lesions.   Musculoskeletal: Negative for gout, joint pain and joint swelling.   Gastrointestinal: Negative for nausea and vomiting.   Genitourinary: Negative for dysuria.   Neurological: Positive for headaches, numbness, paresthesias and sensory change.   Psychiatric/Behavioral: Negative for depression, memory loss and substance abuse. The patient is nervous/anxious.    Allergic/Immunologic: Negative for persistent infections.       Objective:   Body mass index is 28.98 kg/m².  Vitals:    10/13/20 0746   BP: 124/88   Pulse: 88                General    Constitutional: She is oriented to person, place, and time. She appears well-developed and well-nourished. No distress.   HENT:   Head: Normocephalic.   Eyes: EOM are normal.   Neck: Normal range of motion.   Pulmonary/Chest: Effort normal.   Neurological: She is oriented to person, place, and time.   Psychiatric: She has a normal mood and affect.             Right Hand/Wrist Exam     Inspection   Scars: Wrist - absent Hand -  absent  Effusion: Wrist - absent Hand -  absent    Pain   Wrist - The patient exhibits pain of the ulnar nerve and ulnar collateral ligament.    Tests   Phalens Sign: negative  Cubital Tunnel Compression Test: negative    Atrophy   Thenar:  negative  Intrinsic:  negative    Other     Neuorologic Exam    Median Distribution: normal  Ulnar Distribution: normal  Radial Distribution: abnormal    Comments:  The patient has a positive Tinel sign over the ulnar nerve in the ulnar tunnel ulnar aspect of the wrist.  She has tenderness well localized to the extensor carpi ulnaris tendon particularly on pronation.  The tendon does not appear to sublux unusually.  There is no instability of  the distal radioulnar joint.      Right Elbow Exam     Tests   Tinel's sign (cubital tunnel): negative          Vascular Exam       Capillary Refill  Right Hand: normal capillary refill      Relevant imaging results reviewed and interpreted by me, discussed with the patient and / or family today radiographs left wrist were normal  Assessment:     Encounter Diagnosis   Name Primary?    Injury of right wrist, initial encounter     left wrist extensor carpi ulnaris tendonitis  Left wrist ulnar tunnel syndrome    Plan:     The patient is only tender on rotation of the wrist.  She will return in 2 months if not improved for consideration of MRI scan and nerve conduction studies                Disclaimer: This note was prepared using a voice recognition system and is likely to have sound alike errors within the text.

## 2020-10-13 NOTE — PROGRESS NOTES
Health Maintenance Due   Topic Date Due    Influenza Vaccine (1) 08/01/2020     Updates were requested from care everywhere.  Chart was reviewed for overdue Proactive [facility] Encounters (RONY) topics (CRS, Breast Cancer Screening, Eye exam)  Health Maintenance has been updated.  LINKS immunization registry triggered.  Immunizations were reconciled.

## 2020-10-22 ENCOUNTER — OFFICE VISIT (OUTPATIENT)
Dept: PSYCHIATRY | Facility: CLINIC | Age: 30
End: 2020-10-22
Payer: COMMERCIAL

## 2020-10-22 ENCOUNTER — CLINICAL SUPPORT (OUTPATIENT)
Dept: INTERNAL MEDICINE | Facility: CLINIC | Age: 30
End: 2020-10-22
Payer: COMMERCIAL

## 2020-10-22 DIAGNOSIS — Z00.00 ROUTINE GENERAL MEDICAL EXAMINATION AT A HEALTH CARE FACILITY: ICD-10-CM

## 2020-10-22 DIAGNOSIS — F41.1 GENERALIZED ANXIETY DISORDER: Primary | ICD-10-CM

## 2020-10-22 PROCEDURE — 90834 PSYTX W PT 45 MINUTES: CPT | Mod: 95,,, | Performed by: SOCIAL WORKER

## 2020-10-22 PROCEDURE — 97802 MEDICAL NUTRITION INDIV IN: CPT | Mod: 95,,, | Performed by: DIETITIAN, REGISTERED

## 2020-10-22 PROCEDURE — 90834 PR PSYCHOTHERAPY W/PATIENT, 45 MIN: ICD-10-PCS | Mod: 95,,, | Performed by: SOCIAL WORKER

## 2020-10-22 PROCEDURE — 97802 PR MED NUTR THER, 1ST, INDIV, EA 15 MIN: ICD-10-PCS | Mod: 95,,, | Performed by: DIETITIAN, REGISTERED

## 2020-10-22 NOTE — PROGRESS NOTES
"The patient location is: at her home  The chief complaint leading to consultation is: Nutrition Counseling    Visit type: audiovisual    Face to Face time with patient: 56  190 minutes of total time spent on the encounter, which includes face to face time and non-face to face time preparing to see the patient (eg, review of tests), Obtaining and/or reviewing separately obtained history, Documenting clinical information in the electronic or other health record, Independently interpreting results (not separately reported) and communicating results to the patient/family/caregiver, or Care coordination (not separately reported).         Each patient to whom he or she provides medical services by telemedicine is:  (1) informed of the relationship between the physician and patient and the respective role of any other health care provider with respect to management of the patient; and (2) notified that he or she may decline to receive medical services by telemedicine and may withdraw from such care at any time.    Notes:   Nutrition Assessment  Client name:  Billie Nicolas  :  1990  Age:  30 y.o.  Gender:  female    Client states:  Very pleasant [employer] employee present for a virtual nutrition consultation. Reports gaining the "COVID-15" and is interesting in trying to lose that weight. Reports having an unpredictable work schedule which in returns has patient dining out often or trying to find meals which require minimal cooking. Food and exercise history below. Patients reports having a low intake of vegetables and would like to improve that. Patient asked several nutrition questions throughout consult.    Anthropometrics (10/13/2020)  Height:  5' 7"     Weight:  185 lbs  BMI:  28.98  % Body Fat:  n/a    Clinical Signs/Symptoms  N/V/D:  none  Appetite:  good       Past Medical History:   Diagnosis Date    Abnormal cervical Pap smear with positive HPV DNA test     ADHD, predominantly inattentive type  "    Allergy     Anxiety     Asthma     Bulimia     Major depression, recurrent     Migraine headache        Past Surgical History:   Procedure Laterality Date    ABSCESS DRAINAGE  12/25/2017    abscess lanced     TONSILLECTOMY      WISDOM TOOTH EXTRACTION         Medications    has a current medication list which includes the following prescription(s): cetirizine hcl/pseudoephedrine, copper intrauterine device, dextroamphetamine-amphetamine, fluconazole, ibuprofen, metronidazole, sertraline, tramadol, valacyclovir, valacyclovir, and vilazodone.    Vitamins, Minerals, and/or Supplements:  probiotic     Food/Medication Interactions:  Reviewed     Food Allergies or Intolerances:  NKFA     Social History    Marital status:  Single  Employment:  [employer]    Social History     Tobacco Use    Smoking status: Never Smoker    Smokeless tobacco: Never Used   Substance Use Topics    Alcohol use: Yes     Frequency: 2-4 times a month     Drinks per session: 1 or 2     Binge frequency: Less than monthly     Comment: rarely         Lab Reports   Total Cholesterol:  n/a    Triglycerides:  n/a  HDL:  n/a  LDL:  n/a   Glucose:  n/a  HbA1c:  n/a  BP Readings from Last 1 Encounters:   10/13/20 124/88       Food History  Typically consumes 2 meals and 3 snacks daily. Breakfast, lunch, and afternoon/evening snacks which counts as patient's dinner. Reports liking fruits and eating them daily. Likes vegetables but does not consume them daily due to forgetting about them or having them spoil before having a change to eat them. Consumes convenient foods and dines out often due to busy schedule which varies week to week. Recently began ordering Home  for days when she has time to cook.   *Fluid intake:  Coffee, diet coke, water (very little due to not being able to keep a water bottle on work station at work or having limited time for bathroom breaks).    Exercise History:  Recently hand/wrist injury which prevents patient from  "doing previous exercises out yoga/ aerobic exercises. Takes dog on daily walks for some exercise.    Cultural/Spiritual/Personal Preferences:  None identified    Support System:  none noted    State of Change:  Preparation    Barriers to Change:  time    Diagnosis    Overweight related to excess energy intake as evidenced by BMI 28.    Intervention    RMR (Method:  Black-. Stevenson):  1595 kcal  Activity Factor:  1.3    BHUPINDER:  2073 - 250 = 1823 kcal    Goals:  1.  Aim to consume 1 vegetable serving at each lunch and dinner.  2.  When dining out, choose healthier options available (options provided) majority of the time.  3. On work days, aim to drink one bottle water during both morning and evening route.       Nutrition Education  Reviewed all handouts provided. Discussed importance of each food group and discussed "quick" options for each food group and each meal. Encouraged patient to use snack handout presented when trying to decide on healthy snacks. Answered patient's nutrition-related questions. Discussed importance of proper hydration. Set a goal for patient to improve hydration on work days by aiming to drink one bottled water on the way to work and again on the way home from work and more once home. E-mailed patient handouts. Provided contact information and encouraged patient to contact me if she has any further questions.    Patient verbalized understanding of nutrition education and recommendations received.    Handouts Provided (e-mailed)  Meal Planning Guide  Restaurant Guide  Eat Fit Shopping List  Fast Food Guide  My Plate Method  Healthy Snack List    Monitoring/Evaluation    Monitor the following:  Weight  BMI  Caloric intake      Follow Up Plan:  Communication with referring healthcare provider is unnecessary at this time as patient presented as part of annual wellness exam.  However, will follow up with patient in 1-2 years.  "
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
## 2020-11-11 ENCOUNTER — OFFICE VISIT (OUTPATIENT)
Dept: PSYCHIATRY | Facility: CLINIC | Age: 30
End: 2020-11-11
Payer: COMMERCIAL

## 2020-11-11 DIAGNOSIS — F41.1 GENERALIZED ANXIETY DISORDER: Primary | ICD-10-CM

## 2020-11-11 PROCEDURE — 90834 PR PSYCHOTHERAPY W/PATIENT, 45 MIN: ICD-10-PCS | Mod: 95,,, | Performed by: SOCIAL WORKER

## 2020-11-11 PROCEDURE — 90834 PSYTX W PT 45 MINUTES: CPT | Mod: 95,,, | Performed by: SOCIAL WORKER
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
## 2020-11-17 DIAGNOSIS — F98.8 ATTENTION DEFICIT DISORDER, UNSPECIFIED HYPERACTIVITY PRESENCE: ICD-10-CM

## 2020-11-18 RX ORDER — DEXTROAMPHETAMINE SACCHARATE, AMPHETAMINE ASPARTATE, DEXTROAMPHETAMINE SULFATE AND AMPHETAMINE SULFATE 5; 5; 5; 5 MG/1; MG/1; MG/1; MG/1
TABLET ORAL
Qty: 45 TABLET | Refills: 0 | Status: SHIPPED | OUTPATIENT
Start: 2020-11-18 | End: 2020-12-18 | Stop reason: SDUPTHER

## 2020-12-02 ENCOUNTER — OFFICE VISIT (OUTPATIENT)
Dept: PSYCHIATRY | Facility: CLINIC | Age: 30
End: 2020-12-02
Payer: COMMERCIAL

## 2020-12-02 DIAGNOSIS — F41.1 GENERALIZED ANXIETY DISORDER: Primary | ICD-10-CM

## 2020-12-02 PROCEDURE — 90834 PR PSYCHOTHERAPY W/PATIENT, 45 MIN: ICD-10-PCS | Mod: 95,,, | Performed by: SOCIAL WORKER

## 2020-12-02 PROCEDURE — 90834 PSYTX W PT 45 MINUTES: CPT | Mod: 95,,, | Performed by: SOCIAL WORKER
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
## 2020-12-15 ENCOUNTER — PATIENT MESSAGE (OUTPATIENT)
Dept: INTERNAL MEDICINE | Facility: CLINIC | Age: 30
End: 2020-12-15

## 2020-12-15 ENCOUNTER — IMMUNIZATION (OUTPATIENT)
Dept: INTERNAL MEDICINE | Facility: CLINIC | Age: 30
End: 2020-12-15

## 2020-12-15 DIAGNOSIS — B37.31 VAGINAL CANDIDIASIS: ICD-10-CM

## 2020-12-15 DIAGNOSIS — Z23 NEED FOR VACCINATION: ICD-10-CM

## 2020-12-15 PROCEDURE — 91300 COVID-19, MRNA, LNP-S, PF, 30 MCG/0.3 ML DOSE VACCINE: ICD-10-PCS | Mod: S$GLB,,, | Performed by: FAMILY MEDICINE

## 2020-12-15 PROCEDURE — 0001A COVID-19, MRNA, LNP-S, PF, 30 MCG/0.3 ML DOSE VACCINE: CPT | Mod: CV19,S$GLB,, | Performed by: FAMILY MEDICINE

## 2020-12-15 PROCEDURE — 0001A COVID-19, MRNA, LNP-S, PF, 30 MCG/0.3 ML DOSE VACCINE: ICD-10-PCS | Mod: CV19,S$GLB,, | Performed by: FAMILY MEDICINE

## 2020-12-15 PROCEDURE — 91300 COVID-19, MRNA, LNP-S, PF, 30 MCG/0.3 ML DOSE VACCINE: CPT | Mod: S$GLB,,, | Performed by: FAMILY MEDICINE

## 2020-12-15 RX ORDER — FLUCONAZOLE 150 MG/1
TABLET ORAL
Qty: 3 TABLET | Refills: 1 | Status: SHIPPED | OUTPATIENT
Start: 2020-12-15 | End: 2021-11-24

## 2020-12-18 DIAGNOSIS — F98.8 ATTENTION DEFICIT DISORDER, UNSPECIFIED HYPERACTIVITY PRESENCE: ICD-10-CM

## 2020-12-18 RX ORDER — DEXTROAMPHETAMINE SACCHARATE, AMPHETAMINE ASPARTATE, DEXTROAMPHETAMINE SULFATE AND AMPHETAMINE SULFATE 5; 5; 5; 5 MG/1; MG/1; MG/1; MG/1
TABLET ORAL
Qty: 45 TABLET | Refills: 0 | Status: SHIPPED | OUTPATIENT
Start: 2020-12-18 | End: 2021-03-05

## 2021-01-05 ENCOUNTER — IMMUNIZATION (OUTPATIENT)
Dept: INTERNAL MEDICINE | Facility: CLINIC | Age: 31
End: 2021-01-05
Payer: COMMERCIAL

## 2021-01-05 DIAGNOSIS — Z23 NEED FOR VACCINATION: ICD-10-CM

## 2021-01-05 PROCEDURE — 91300 COVID-19, MRNA, LNP-S, PF, 30 MCG/0.3 ML DOSE VACCINE: CPT | Mod: PBBFAC | Performed by: FAMILY MEDICINE

## 2021-01-05 PROCEDURE — 0002A COVID-19, MRNA, LNP-S, PF, 30 MCG/0.3 ML DOSE VACCINE: CPT | Mod: PBBFAC | Performed by: FAMILY MEDICINE

## 2021-01-06 ENCOUNTER — OFFICE VISIT (OUTPATIENT)
Dept: PSYCHIATRY | Facility: CLINIC | Age: 31
End: 2021-01-06
Payer: COMMERCIAL

## 2021-01-06 DIAGNOSIS — F41.1 GENERALIZED ANXIETY DISORDER: Primary | ICD-10-CM

## 2021-01-06 PROCEDURE — 90834 PR PSYCHOTHERAPY W/PATIENT, 45 MIN: ICD-10-PCS | Mod: 95,,, | Performed by: SOCIAL WORKER

## 2021-01-06 PROCEDURE — 90834 PSYTX W PT 45 MINUTES: CPT | Mod: 95,,, | Performed by: SOCIAL WORKER

## 2021-02-03 ENCOUNTER — OFFICE VISIT (OUTPATIENT)
Dept: PSYCHIATRY | Facility: CLINIC | Age: 31
End: 2021-02-03
Payer: COMMERCIAL

## 2021-02-03 ENCOUNTER — OFFICE VISIT (OUTPATIENT)
Dept: ORTHOPEDICS | Facility: CLINIC | Age: 31
End: 2021-02-03
Payer: COMMERCIAL

## 2021-02-03 VITALS
SYSTOLIC BLOOD PRESSURE: 131 MMHG | BODY MASS INDEX: 29.03 KG/M2 | DIASTOLIC BLOOD PRESSURE: 89 MMHG | WEIGHT: 185 LBS | HEIGHT: 67 IN | HEART RATE: 96 BPM

## 2021-02-03 DIAGNOSIS — F41.1 GENERALIZED ANXIETY DISORDER: Primary | ICD-10-CM

## 2021-02-03 DIAGNOSIS — M77.8 RIGHT WRIST TENDINITIS: Primary | ICD-10-CM

## 2021-02-03 PROCEDURE — 90834 PSYTX W PT 45 MINUTES: CPT | Mod: 95,,, | Performed by: SOCIAL WORKER

## 2021-02-03 PROCEDURE — 99213 OFFICE O/P EST LOW 20 MIN: CPT | Mod: S$GLB,,, | Performed by: ORTHOPAEDIC SURGERY

## 2021-02-03 PROCEDURE — 99213 PR OFFICE/OUTPT VISIT, EST, LEVL III, 20-29 MIN: ICD-10-PCS | Mod: S$GLB,,, | Performed by: ORTHOPAEDIC SURGERY

## 2021-02-03 PROCEDURE — 3008F BODY MASS INDEX DOCD: CPT | Mod: CPTII,S$GLB,, | Performed by: ORTHOPAEDIC SURGERY

## 2021-02-03 PROCEDURE — 3008F PR BODY MASS INDEX (BMI) DOCUMENTED: ICD-10-PCS | Mod: CPTII,S$GLB,, | Performed by: ORTHOPAEDIC SURGERY

## 2021-02-03 PROCEDURE — 90834 PR PSYCHOTHERAPY W/PATIENT, 45 MIN: ICD-10-PCS | Mod: 95,,, | Performed by: SOCIAL WORKER

## 2021-02-03 PROCEDURE — 1125F PR PAIN SEVERITY QUANTIFIED, PAIN PRESENT: ICD-10-PCS | Mod: S$GLB,,, | Performed by: ORTHOPAEDIC SURGERY

## 2021-02-03 PROCEDURE — 99999 PR PBB SHADOW E&M-EST. PATIENT-LVL III: CPT | Mod: PBBFAC,,, | Performed by: ORTHOPAEDIC SURGERY

## 2021-02-03 PROCEDURE — 1125F AMNT PAIN NOTED PAIN PRSNT: CPT | Mod: S$GLB,,, | Performed by: ORTHOPAEDIC SURGERY

## 2021-02-03 PROCEDURE — 99999 PR PBB SHADOW E&M-EST. PATIENT-LVL III: ICD-10-PCS | Mod: PBBFAC,,, | Performed by: ORTHOPAEDIC SURGERY

## 2021-02-10 ENCOUNTER — PATIENT MESSAGE (OUTPATIENT)
Dept: INTERNAL MEDICINE | Facility: CLINIC | Age: 31
End: 2021-02-10

## 2021-02-10 DIAGNOSIS — Z11.3 ROUTINE SCREENING FOR STI (SEXUALLY TRANSMITTED INFECTION): Primary | ICD-10-CM

## 2021-03-05 ENCOUNTER — PATIENT MESSAGE (OUTPATIENT)
Dept: INTERNAL MEDICINE | Facility: CLINIC | Age: 31
End: 2021-03-05

## 2021-03-05 ENCOUNTER — LAB VISIT (OUTPATIENT)
Dept: LAB | Facility: HOSPITAL | Age: 31
End: 2021-03-05
Attending: FAMILY MEDICINE
Payer: COMMERCIAL

## 2021-03-05 DIAGNOSIS — Z11.3 ROUTINE SCREENING FOR STI (SEXUALLY TRANSMITTED INFECTION): ICD-10-CM

## 2021-03-05 DIAGNOSIS — Z30.011 INITIATION OF OCP (BCP): ICD-10-CM

## 2021-03-05 DIAGNOSIS — F90.9 ATTENTION DEFICIT HYPERACTIVITY DISORDER (ADHD), UNSPECIFIED ADHD TYPE: Primary | ICD-10-CM

## 2021-03-05 DIAGNOSIS — F32.A ANXIETY AND DEPRESSION: ICD-10-CM

## 2021-03-05 DIAGNOSIS — Z30.011 INITIATION OF OCP (BCP): Primary | ICD-10-CM

## 2021-03-05 DIAGNOSIS — F41.9 ANXIETY AND DEPRESSION: ICD-10-CM

## 2021-03-05 LAB
ALBUMIN SERPL BCP-MCNC: 4.1 G/DL (ref 3.5–5.2)
ALP SERPL-CCNC: 67 U/L (ref 55–135)
ALT SERPL W/O P-5'-P-CCNC: 10 U/L (ref 10–44)
ANION GAP SERPL CALC-SCNC: 9 MMOL/L (ref 8–16)
AST SERPL-CCNC: 16 U/L (ref 10–40)
B-HCG UR QL: NEGATIVE
BILIRUB SERPL-MCNC: 0.3 MG/DL (ref 0.1–1)
BUN SERPL-MCNC: 15 MG/DL (ref 6–20)
CALCIUM SERPL-MCNC: 9.2 MG/DL (ref 8.7–10.5)
CHLORIDE SERPL-SCNC: 107 MMOL/L (ref 95–110)
CO2 SERPL-SCNC: 26 MMOL/L (ref 23–29)
CREAT SERPL-MCNC: 1.1 MG/DL (ref 0.5–1.4)
EST. GFR  (AFRICAN AMERICAN): >60 ML/MIN/1.73 M^2
EST. GFR  (NON AFRICAN AMERICAN): >60 ML/MIN/1.73 M^2
GLUCOSE SERPL-MCNC: 82 MG/DL (ref 70–110)
POTASSIUM SERPL-SCNC: 3.6 MMOL/L (ref 3.5–5.1)
PROT SERPL-MCNC: 7.2 G/DL (ref 6–8.4)
SODIUM SERPL-SCNC: 142 MMOL/L (ref 136–145)

## 2021-03-05 PROCEDURE — 81025 URINE PREGNANCY TEST: CPT | Performed by: FAMILY MEDICINE

## 2021-03-05 PROCEDURE — 80053 COMPREHEN METABOLIC PANEL: CPT | Performed by: FAMILY MEDICINE

## 2021-03-05 PROCEDURE — 86592 SYPHILIS TEST NON-TREP QUAL: CPT | Performed by: FAMILY MEDICINE

## 2021-03-05 PROCEDURE — 36415 COLL VENOUS BLD VENIPUNCTURE: CPT | Performed by: FAMILY MEDICINE

## 2021-03-05 PROCEDURE — 86703 HIV-1/HIV-2 1 RESULT ANTBDY: CPT | Performed by: FAMILY MEDICINE

## 2021-03-05 PROCEDURE — 87350 HEPATITIS BE AG IA: CPT | Performed by: FAMILY MEDICINE

## 2021-03-05 PROCEDURE — 87340 HEPATITIS B SURFACE AG IA: CPT | Performed by: FAMILY MEDICINE

## 2021-03-05 RX ORDER — DEXTROAMPHETAMINE SACCHARATE, AMPHETAMINE ASPARTATE MONOHYDRATE, DEXTROAMPHETAMINE SULFATE AND AMPHETAMINE SULFATE 5; 5; 5; 5 MG/1; MG/1; MG/1; MG/1
20 CAPSULE, EXTENDED RELEASE ORAL EVERY MORNING
Qty: 30 CAPSULE | Refills: 0 | Status: SHIPPED | OUTPATIENT
Start: 2021-03-05 | End: 2021-04-12 | Stop reason: SDUPTHER

## 2021-03-05 RX ORDER — DROSPIRENONE AND ETHINYL ESTRADIOL 0.02-3(28)
1 KIT ORAL DAILY
Qty: 84 TABLET | Refills: 3 | Status: SHIPPED | OUTPATIENT
Start: 2021-03-05 | End: 2021-07-09

## 2021-03-06 LAB — RPR SER QL: NORMAL

## 2021-03-08 LAB
HBV E AG SERPL QL IA: NONREACTIVE
HBV SURFACE AG SERPL QL IA: NEGATIVE
HIV 1+2 AB+HIV1 P24 AG SERPL QL IA: NEGATIVE

## 2021-03-09 ENCOUNTER — PATIENT MESSAGE (OUTPATIENT)
Dept: PSYCHIATRY | Facility: CLINIC | Age: 31
End: 2021-03-09

## 2021-03-24 ENCOUNTER — CLINICAL SUPPORT (OUTPATIENT)
Dept: OTHER | Facility: CLINIC | Age: 31
End: 2021-03-24
Payer: COMMERCIAL

## 2021-03-24 DIAGNOSIS — Z00.8 ENCOUNTER FOR OTHER GENERAL EXAMINATION: ICD-10-CM

## 2021-03-25 VITALS — BODY MASS INDEX: 28.98 KG/M2 | HEIGHT: 67 IN

## 2021-03-25 LAB
GLUCOSE SERPL-MCNC: 91 MG/DL (ref 60–140)
HDLC SERPL-MCNC: 70 MG/DL
POC CHOLESTEROL, LDL (DOCK): 61 MG/DL
POC CHOLESTEROL, TOTAL: 148 MG/DL
TRIGL SERPL-MCNC: 83 MG/DL

## 2021-03-29 ENCOUNTER — OFFICE VISIT (OUTPATIENT)
Dept: PSYCHIATRY | Facility: CLINIC | Age: 31
End: 2021-03-29
Payer: COMMERCIAL

## 2021-03-29 DIAGNOSIS — F41.1 GENERALIZED ANXIETY DISORDER: Primary | ICD-10-CM

## 2021-03-29 PROCEDURE — 90834 PSYTX W PT 45 MINUTES: CPT | Mod: 95,,, | Performed by: SOCIAL WORKER

## 2021-03-29 PROCEDURE — 90834 PR PSYCHOTHERAPY W/PATIENT, 45 MIN: ICD-10-PCS | Mod: 95,,, | Performed by: SOCIAL WORKER

## 2021-04-05 DIAGNOSIS — F90.9 ATTENTION DEFICIT HYPERACTIVITY DISORDER (ADHD), UNSPECIFIED ADHD TYPE: ICD-10-CM

## 2021-04-05 RX ORDER — DEXTROAMPHETAMINE SACCHARATE, AMPHETAMINE ASPARTATE MONOHYDRATE, DEXTROAMPHETAMINE SULFATE AND AMPHETAMINE SULFATE 5; 5; 5; 5 MG/1; MG/1; MG/1; MG/1
20 CAPSULE, EXTENDED RELEASE ORAL EVERY MORNING
Qty: 30 CAPSULE | Refills: 0 | OUTPATIENT
Start: 2021-04-05

## 2021-04-06 ENCOUNTER — PATIENT MESSAGE (OUTPATIENT)
Dept: INTERNAL MEDICINE | Facility: CLINIC | Age: 31
End: 2021-04-06

## 2021-04-06 DIAGNOSIS — F90.9 ATTENTION DEFICIT HYPERACTIVITY DISORDER (ADHD), UNSPECIFIED ADHD TYPE: ICD-10-CM

## 2021-04-06 RX ORDER — DEXTROAMPHETAMINE SACCHARATE, AMPHETAMINE ASPARTATE MONOHYDRATE, DEXTROAMPHETAMINE SULFATE AND AMPHETAMINE SULFATE 5; 5; 5; 5 MG/1; MG/1; MG/1; MG/1
20 CAPSULE, EXTENDED RELEASE ORAL EVERY MORNING
Qty: 30 CAPSULE | Refills: 0 | OUTPATIENT
Start: 2021-04-06

## 2021-04-12 ENCOUNTER — PATIENT MESSAGE (OUTPATIENT)
Dept: INTERNAL MEDICINE | Facility: CLINIC | Age: 31
End: 2021-04-12

## 2021-04-12 ENCOUNTER — PATIENT MESSAGE (OUTPATIENT)
Dept: PHARMACY | Facility: CLINIC | Age: 31
End: 2021-04-12

## 2021-04-12 ENCOUNTER — OFFICE VISIT (OUTPATIENT)
Dept: INTERNAL MEDICINE | Facility: CLINIC | Age: 31
End: 2021-04-12
Payer: COMMERCIAL

## 2021-04-12 DIAGNOSIS — F41.8 DEPRESSION WITH ANXIETY: ICD-10-CM

## 2021-04-12 DIAGNOSIS — N94.3 PMS (PREMENSTRUAL SYNDROME): ICD-10-CM

## 2021-04-12 DIAGNOSIS — F90.9 ATTENTION DEFICIT HYPERACTIVITY DISORDER (ADHD), UNSPECIFIED ADHD TYPE: ICD-10-CM

## 2021-04-12 PROCEDURE — 99213 PR OFFICE/OUTPT VISIT, EST, LEVL III, 20-29 MIN: ICD-10-PCS | Mod: 95,,, | Performed by: FAMILY MEDICINE

## 2021-04-12 PROCEDURE — 99213 OFFICE O/P EST LOW 20 MIN: CPT | Mod: 95,,, | Performed by: FAMILY MEDICINE

## 2021-04-12 RX ORDER — SERTRALINE HYDROCHLORIDE 100 MG/1
100 TABLET, FILM COATED ORAL DAILY
Qty: 90 TABLET | Refills: 2 | Status: SHIPPED | OUTPATIENT
Start: 2021-04-12 | End: 2021-12-07 | Stop reason: SDUPTHER

## 2021-04-12 RX ORDER — DEXTROAMPHETAMINE SACCHARATE, AMPHETAMINE ASPARTATE MONOHYDRATE, DEXTROAMPHETAMINE SULFATE AND AMPHETAMINE SULFATE 5; 5; 5; 5 MG/1; MG/1; MG/1; MG/1
20 CAPSULE, EXTENDED RELEASE ORAL EVERY MORNING
Qty: 30 CAPSULE | Refills: 0 | Status: SHIPPED | OUTPATIENT
Start: 2021-04-12 | End: 2021-05-13 | Stop reason: SDUPTHER

## 2021-04-13 ENCOUNTER — OFFICE VISIT (OUTPATIENT)
Dept: PSYCHIATRY | Facility: CLINIC | Age: 31
End: 2021-04-13
Payer: COMMERCIAL

## 2021-04-13 DIAGNOSIS — F41.1 GENERALIZED ANXIETY DISORDER: Primary | ICD-10-CM

## 2021-04-13 PROCEDURE — 90832 PSYTX W PT 30 MINUTES: CPT | Mod: 95,,, | Performed by: SOCIAL WORKER

## 2021-04-13 PROCEDURE — 90832 PR PSYCHOTHERAPY W/PATIENT, 30 MIN: ICD-10-PCS | Mod: 95,,, | Performed by: SOCIAL WORKER

## 2021-04-23 ENCOUNTER — OFFICE VISIT (OUTPATIENT)
Dept: PSYCHIATRY | Facility: CLINIC | Age: 31
End: 2021-04-23
Payer: COMMERCIAL

## 2021-04-23 DIAGNOSIS — F41.1 GENERALIZED ANXIETY DISORDER: Primary | ICD-10-CM

## 2021-04-23 PROCEDURE — 90834 PSYTX W PT 45 MINUTES: CPT | Mod: 95,,, | Performed by: SOCIAL WORKER

## 2021-04-23 PROCEDURE — 90834 PR PSYCHOTHERAPY W/PATIENT, 45 MIN: ICD-10-PCS | Mod: 95,,, | Performed by: SOCIAL WORKER

## 2021-05-13 DIAGNOSIS — F90.9 ATTENTION DEFICIT HYPERACTIVITY DISORDER (ADHD), UNSPECIFIED ADHD TYPE: ICD-10-CM

## 2021-05-14 RX ORDER — DEXTROAMPHETAMINE SACCHARATE, AMPHETAMINE ASPARTATE MONOHYDRATE, DEXTROAMPHETAMINE SULFATE AND AMPHETAMINE SULFATE 5; 5; 5; 5 MG/1; MG/1; MG/1; MG/1
20 CAPSULE, EXTENDED RELEASE ORAL EVERY MORNING
Qty: 30 CAPSULE | Refills: 0 | Status: SHIPPED | OUTPATIENT
Start: 2021-05-14 | End: 2021-06-13 | Stop reason: SDUPTHER

## 2021-05-16 DIAGNOSIS — N76.0 BV (BACTERIAL VAGINOSIS): ICD-10-CM

## 2021-05-16 DIAGNOSIS — B96.89 BV (BACTERIAL VAGINOSIS): ICD-10-CM

## 2021-05-17 RX ORDER — METRONIDAZOLE 7.5 MG/G
1 GEL VAGINAL 2 TIMES DAILY
Qty: 70 G | Refills: 2 | Status: SHIPPED | OUTPATIENT
Start: 2021-05-17 | End: 2021-12-15 | Stop reason: SDUPTHER

## 2021-06-13 DIAGNOSIS — F90.9 ATTENTION DEFICIT HYPERACTIVITY DISORDER (ADHD), UNSPECIFIED ADHD TYPE: ICD-10-CM

## 2021-06-13 RX ORDER — DEXTROAMPHETAMINE SACCHARATE, AMPHETAMINE ASPARTATE MONOHYDRATE, DEXTROAMPHETAMINE SULFATE AND AMPHETAMINE SULFATE 5; 5; 5; 5 MG/1; MG/1; MG/1; MG/1
20 CAPSULE, EXTENDED RELEASE ORAL EVERY MORNING
Qty: 30 CAPSULE | Refills: 0 | Status: SHIPPED | OUTPATIENT
Start: 2021-06-13 | End: 2021-07-09 | Stop reason: SDUPTHER

## 2021-06-16 ENCOUNTER — OFFICE VISIT (OUTPATIENT)
Dept: PSYCHIATRY | Facility: CLINIC | Age: 31
End: 2021-06-16
Payer: COMMERCIAL

## 2021-06-16 DIAGNOSIS — F41.1 GENERALIZED ANXIETY DISORDER: Primary | ICD-10-CM

## 2021-06-16 PROCEDURE — 90834 PR PSYCHOTHERAPY W/PATIENT, 45 MIN: ICD-10-PCS | Mod: 95,,, | Performed by: SOCIAL WORKER

## 2021-06-16 PROCEDURE — 90834 PSYTX W PT 45 MINUTES: CPT | Mod: 95,,, | Performed by: SOCIAL WORKER

## 2021-06-25 ENCOUNTER — OFFICE VISIT (OUTPATIENT)
Dept: PSYCHIATRY | Facility: CLINIC | Age: 31
End: 2021-06-25
Payer: COMMERCIAL

## 2021-06-25 DIAGNOSIS — F41.1 GENERALIZED ANXIETY DISORDER: Primary | ICD-10-CM

## 2021-06-25 PROCEDURE — 90834 PR PSYCHOTHERAPY W/PATIENT, 45 MIN: ICD-10-PCS | Mod: 95,,, | Performed by: SOCIAL WORKER

## 2021-06-25 PROCEDURE — 90834 PSYTX W PT 45 MINUTES: CPT | Mod: 95,,, | Performed by: SOCIAL WORKER

## 2021-07-09 ENCOUNTER — OFFICE VISIT (OUTPATIENT)
Dept: PSYCHIATRY | Facility: CLINIC | Age: 31
End: 2021-07-09
Payer: COMMERCIAL

## 2021-07-09 ENCOUNTER — OFFICE VISIT (OUTPATIENT)
Dept: INTERNAL MEDICINE | Facility: CLINIC | Age: 31
End: 2021-07-09
Payer: COMMERCIAL

## 2021-07-09 ENCOUNTER — CLINICAL SUPPORT (OUTPATIENT)
Dept: INTERNAL MEDICINE | Facility: CLINIC | Age: 31
End: 2021-07-09
Payer: COMMERCIAL

## 2021-07-09 DIAGNOSIS — F41.1 GENERALIZED ANXIETY DISORDER: Primary | ICD-10-CM

## 2021-07-09 DIAGNOSIS — Z23 NEED FOR HPV VACCINE: ICD-10-CM

## 2021-07-09 DIAGNOSIS — F90.9 ATTENTION DEFICIT HYPERACTIVITY DISORDER (ADHD), UNSPECIFIED ADHD TYPE: Primary | ICD-10-CM

## 2021-07-09 PROCEDURE — 99213 PR OFFICE/OUTPT VISIT, EST, LEVL III, 20-29 MIN: ICD-10-PCS | Mod: 25,95,, | Performed by: FAMILY MEDICINE

## 2021-07-09 PROCEDURE — 99213 OFFICE O/P EST LOW 20 MIN: CPT | Mod: 25,95,, | Performed by: FAMILY MEDICINE

## 2021-07-09 PROCEDURE — 90834 PSYTX W PT 45 MINUTES: CPT | Mod: 95,,, | Performed by: SOCIAL WORKER

## 2021-07-09 PROCEDURE — 99999 PR PBB SHADOW E&M-EST. PATIENT-LVL II: CPT | Mod: PBBFAC,,,

## 2021-07-09 PROCEDURE — 90834 PR PSYCHOTHERAPY W/PATIENT, 45 MIN: ICD-10-PCS | Mod: 95,,, | Performed by: SOCIAL WORKER

## 2021-07-09 PROCEDURE — 90651 9VHPV VACCINE 2/3 DOSE IM: CPT | Mod: S$GLB,,, | Performed by: FAMILY MEDICINE

## 2021-07-09 PROCEDURE — 90471 HPV VACCINE 9-VALENT 3 DOSE IM: ICD-10-PCS | Mod: S$GLB,,, | Performed by: FAMILY MEDICINE

## 2021-07-09 PROCEDURE — 99999 PR PBB SHADOW E&M-EST. PATIENT-LVL II: ICD-10-PCS | Mod: PBBFAC,,,

## 2021-07-09 PROCEDURE — 90471 IMMUNIZATION ADMIN: CPT | Mod: S$GLB,,, | Performed by: FAMILY MEDICINE

## 2021-07-09 PROCEDURE — 90651 HPV VACCINE 9-VALENT 3 DOSE IM: ICD-10-PCS | Mod: S$GLB,,, | Performed by: FAMILY MEDICINE

## 2021-07-09 RX ORDER — DEXTROAMPHETAMINE SACCHARATE, AMPHETAMINE ASPARTATE MONOHYDRATE, DEXTROAMPHETAMINE SULFATE AND AMPHETAMINE SULFATE 5; 5; 5; 5 MG/1; MG/1; MG/1; MG/1
20 CAPSULE, EXTENDED RELEASE ORAL EVERY MORNING
Qty: 30 CAPSULE | Refills: 0 | Status: SHIPPED | OUTPATIENT
Start: 2021-07-09 | End: 2021-08-12 | Stop reason: SDUPTHER

## 2021-07-11 ENCOUNTER — PATIENT MESSAGE (OUTPATIENT)
Dept: INTERNAL MEDICINE | Facility: CLINIC | Age: 31
End: 2021-07-11

## 2021-07-13 RX ORDER — SERTRALINE HYDROCHLORIDE 50 MG/1
50 TABLET, FILM COATED ORAL DAILY
Qty: 90 TABLET | Refills: 3 | Status: SHIPPED | OUTPATIENT
Start: 2021-07-13 | End: 2022-03-28 | Stop reason: SDUPTHER

## 2021-08-02 ENCOUNTER — OFFICE VISIT (OUTPATIENT)
Dept: FAMILY MEDICINE | Facility: CLINIC | Age: 31
End: 2021-08-02
Payer: COMMERCIAL

## 2021-08-02 DIAGNOSIS — G44.209 ACUTE NON INTRACTABLE TENSION-TYPE HEADACHE: ICD-10-CM

## 2021-08-02 DIAGNOSIS — M79.10 MYALGIA: Primary | ICD-10-CM

## 2021-08-02 PROCEDURE — 1159F PR MEDICATION LIST DOCUMENTED IN MEDICAL RECORD: ICD-10-PCS | Mod: CPTII,,, | Performed by: FAMILY MEDICINE

## 2021-08-02 PROCEDURE — 1159F MED LIST DOCD IN RCRD: CPT | Mod: CPTII,,, | Performed by: FAMILY MEDICINE

## 2021-08-02 PROCEDURE — 1160F PR REVIEW ALL MEDS BY PRESCRIBER/CLIN PHARMACIST DOCUMENTED: ICD-10-PCS | Mod: CPTII,,, | Performed by: FAMILY MEDICINE

## 2021-08-02 PROCEDURE — 99214 OFFICE O/P EST MOD 30 MIN: CPT | Mod: 95,,, | Performed by: FAMILY MEDICINE

## 2021-08-02 PROCEDURE — 1160F RVW MEDS BY RX/DR IN RCRD: CPT | Mod: CPTII,,, | Performed by: FAMILY MEDICINE

## 2021-08-02 PROCEDURE — 99214 PR OFFICE/OUTPT VISIT, EST, LEVL IV, 30-39 MIN: ICD-10-PCS | Mod: 95,,, | Performed by: FAMILY MEDICINE

## 2021-08-02 RX ORDER — BUTALBITAL, ACETAMINOPHEN AND CAFFEINE 50; 325; 40 MG/1; MG/1; MG/1
1 TABLET ORAL EVERY 4 HOURS PRN
Qty: 40 TABLET | Refills: 1 | Status: SHIPPED | OUTPATIENT
Start: 2021-08-02 | End: 2021-09-01

## 2021-08-12 ENCOUNTER — PATIENT MESSAGE (OUTPATIENT)
Dept: INTERNAL MEDICINE | Facility: CLINIC | Age: 31
End: 2021-08-12

## 2021-08-12 DIAGNOSIS — F90.9 ATTENTION DEFICIT HYPERACTIVITY DISORDER (ADHD), UNSPECIFIED ADHD TYPE: ICD-10-CM

## 2021-08-12 RX ORDER — DEXTROAMPHETAMINE SACCHARATE, AMPHETAMINE ASPARTATE MONOHYDRATE, DEXTROAMPHETAMINE SULFATE AND AMPHETAMINE SULFATE 7.5; 7.5; 7.5; 7.5 MG/1; MG/1; MG/1; MG/1
30 CAPSULE, EXTENDED RELEASE ORAL EVERY MORNING
Qty: 30 CAPSULE | Refills: 0 | Status: SHIPPED | OUTPATIENT
Start: 2021-08-12 | End: 2021-09-10 | Stop reason: SDUPTHER

## 2021-08-18 ENCOUNTER — OFFICE VISIT (OUTPATIENT)
Dept: PSYCHIATRY | Facility: CLINIC | Age: 31
End: 2021-08-18
Payer: COMMERCIAL

## 2021-08-18 DIAGNOSIS — F41.1 GENERALIZED ANXIETY DISORDER: Primary | ICD-10-CM

## 2021-08-18 PROCEDURE — 90834 PSYTX W PT 45 MINUTES: CPT | Mod: 95,,, | Performed by: SOCIAL WORKER

## 2021-08-18 PROCEDURE — 90834 PR PSYCHOTHERAPY W/PATIENT, 45 MIN: ICD-10-PCS | Mod: 95,,, | Performed by: SOCIAL WORKER

## 2021-09-08 ENCOUNTER — PATIENT MESSAGE (OUTPATIENT)
Dept: INTERNAL MEDICINE | Facility: CLINIC | Age: 31
End: 2021-09-08

## 2021-09-08 ENCOUNTER — TELEPHONE (OUTPATIENT)
Dept: INTERNAL MEDICINE | Facility: CLINIC | Age: 31
End: 2021-09-08

## 2021-09-08 DIAGNOSIS — Z23 NEED FOR HPV VACCINE: Primary | ICD-10-CM

## 2021-09-09 ENCOUNTER — CLINICAL SUPPORT (OUTPATIENT)
Dept: INTERNAL MEDICINE | Facility: CLINIC | Age: 31
End: 2021-09-09
Payer: COMMERCIAL

## 2021-09-09 DIAGNOSIS — Z23 NEED FOR HPV VACCINE: ICD-10-CM

## 2021-09-09 PROCEDURE — 99999 PR PBB SHADOW E&M-EST. PATIENT-LVL II: CPT | Mod: PBBFAC,,,

## 2021-09-09 PROCEDURE — 99999 PR PBB SHADOW E&M-EST. PATIENT-LVL II: ICD-10-PCS | Mod: PBBFAC,,,

## 2021-09-09 PROCEDURE — 90651 9VHPV VACCINE 2/3 DOSE IM: CPT | Mod: S$GLB,,, | Performed by: FAMILY MEDICINE

## 2021-09-09 PROCEDURE — 90651 HPV VACCINE 9-VALENT 3 DOSE IM: ICD-10-PCS | Mod: S$GLB,,, | Performed by: FAMILY MEDICINE

## 2021-09-09 PROCEDURE — 90471 HPV VACCINE 9-VALENT 3 DOSE IM: ICD-10-PCS | Mod: S$GLB,,, | Performed by: FAMILY MEDICINE

## 2021-09-09 PROCEDURE — 90471 IMMUNIZATION ADMIN: CPT | Mod: S$GLB,,, | Performed by: FAMILY MEDICINE

## 2021-09-10 DIAGNOSIS — F90.9 ATTENTION DEFICIT HYPERACTIVITY DISORDER (ADHD), UNSPECIFIED ADHD TYPE: ICD-10-CM

## 2021-09-10 RX ORDER — DEXTROAMPHETAMINE SACCHARATE, AMPHETAMINE ASPARTATE MONOHYDRATE, DEXTROAMPHETAMINE SULFATE AND AMPHETAMINE SULFATE 7.5; 7.5; 7.5; 7.5 MG/1; MG/1; MG/1; MG/1
30 CAPSULE, EXTENDED RELEASE ORAL EVERY MORNING
Qty: 30 CAPSULE | Refills: 0 | Status: SHIPPED | OUTPATIENT
Start: 2021-09-10 | End: 2021-10-01

## 2021-09-14 ENCOUNTER — PATIENT MESSAGE (OUTPATIENT)
Dept: PSYCHIATRY | Facility: CLINIC | Age: 31
End: 2021-09-14

## 2021-09-29 ENCOUNTER — IMMUNIZATION (OUTPATIENT)
Dept: PRIMARY CARE CLINIC | Facility: CLINIC | Age: 31
End: 2021-09-29
Payer: COMMERCIAL

## 2021-09-29 DIAGNOSIS — Z23 NEED FOR VACCINATION: Primary | ICD-10-CM

## 2021-09-29 PROCEDURE — 0003A COVID-19, MRNA, LNP-S, PF, 30 MCG/0.3 ML DOSE VACCINE: CPT | Mod: CV19,PBBFAC | Performed by: FAMILY MEDICINE

## 2021-09-29 PROCEDURE — 91300 COVID-19, MRNA, LNP-S, PF, 30 MCG/0.3 ML DOSE VACCINE: CPT | Mod: PBBFAC | Performed by: FAMILY MEDICINE

## 2021-10-01 ENCOUNTER — OFFICE VISIT (OUTPATIENT)
Dept: INTERNAL MEDICINE | Facility: CLINIC | Age: 31
End: 2021-10-01
Payer: COMMERCIAL

## 2021-10-01 DIAGNOSIS — Z11.3 ROUTINE SCREENING FOR STI (SEXUALLY TRANSMITTED INFECTION): ICD-10-CM

## 2021-10-01 DIAGNOSIS — F32.A ANXIETY AND DEPRESSION: ICD-10-CM

## 2021-10-01 DIAGNOSIS — F41.9 ANXIETY AND DEPRESSION: ICD-10-CM

## 2021-10-01 DIAGNOSIS — F98.8 ATTENTION DEFICIT DISORDER, UNSPECIFIED HYPERACTIVITY PRESENCE: Primary | ICD-10-CM

## 2021-10-01 PROCEDURE — 99213 OFFICE O/P EST LOW 20 MIN: CPT | Mod: 95,,, | Performed by: FAMILY MEDICINE

## 2021-10-01 PROCEDURE — 99213 PR OFFICE/OUTPT VISIT, EST, LEVL III, 20-29 MIN: ICD-10-PCS | Mod: 95,,, | Performed by: FAMILY MEDICINE

## 2021-10-01 RX ORDER — VILAZODONE HYDROCHLORIDE 40 MG/1
40 TABLET ORAL DAILY
Qty: 90 TABLET | Refills: 3 | Status: SHIPPED | OUTPATIENT
Start: 2021-10-01 | End: 2022-03-28 | Stop reason: SDUPTHER

## 2021-10-01 RX ORDER — DEXTROAMPHETAMINE SACCHARATE, AMPHETAMINE ASPARTATE MONOHYDRATE, DEXTROAMPHETAMINE SULFATE AND AMPHETAMINE SULFATE 5; 5; 5; 5 MG/1; MG/1; MG/1; MG/1
20 CAPSULE, EXTENDED RELEASE ORAL EVERY MORNING
Qty: 30 CAPSULE | Refills: 0 | Status: SHIPPED | OUTPATIENT
Start: 2021-10-01 | End: 2021-11-08 | Stop reason: SDUPTHER

## 2021-10-01 RX ORDER — DEXTROAMPHETAMINE SACCHARATE, AMPHETAMINE ASPARTATE, DEXTROAMPHETAMINE SULFATE AND AMPHETAMINE SULFATE 2.5; 2.5; 2.5; 2.5 MG/1; MG/1; MG/1; MG/1
10 TABLET ORAL DAILY
Qty: 30 TABLET | Refills: 0 | Status: SHIPPED | OUTPATIENT
Start: 2021-10-01 | End: 2021-12-07 | Stop reason: SDUPTHER

## 2021-10-01 RX ORDER — VALACYCLOVIR HYDROCHLORIDE 500 MG/1
500 TABLET, FILM COATED ORAL DAILY
Qty: 90 TABLET | Refills: 3 | Status: SHIPPED | OUTPATIENT
Start: 2021-10-01 | End: 2022-03-28 | Stop reason: SDUPTHER

## 2021-10-05 ENCOUNTER — LAB VISIT (OUTPATIENT)
Dept: LAB | Facility: HOSPITAL | Age: 31
End: 2021-10-05
Attending: FAMILY MEDICINE
Payer: COMMERCIAL

## 2021-10-05 DIAGNOSIS — Z11.3 ROUTINE SCREENING FOR STI (SEXUALLY TRANSMITTED INFECTION): ICD-10-CM

## 2021-10-05 PROCEDURE — 87350 HEPATITIS BE AG IA: CPT | Performed by: FAMILY MEDICINE

## 2021-10-05 PROCEDURE — 87340 HEPATITIS B SURFACE AG IA: CPT | Performed by: FAMILY MEDICINE

## 2021-10-05 PROCEDURE — 86592 SYPHILIS TEST NON-TREP QUAL: CPT | Performed by: FAMILY MEDICINE

## 2021-10-05 PROCEDURE — 87389 HIV-1 AG W/HIV-1&-2 AB AG IA: CPT | Performed by: FAMILY MEDICINE

## 2021-10-06 DIAGNOSIS — R51.9 INTRACTABLE HEADACHE, UNSPECIFIED CHRONICITY PATTERN, UNSPECIFIED HEADACHE TYPE: Primary | ICD-10-CM

## 2021-10-08 LAB — HBV E AG SERPL QL IA: NONREACTIVE

## 2021-10-20 ENCOUNTER — TELEPHONE (OUTPATIENT)
Dept: PSYCHIATRY | Facility: CLINIC | Age: 31
End: 2021-10-20

## 2021-10-20 ENCOUNTER — PATIENT MESSAGE (OUTPATIENT)
Dept: PSYCHIATRY | Facility: CLINIC | Age: 31
End: 2021-10-20
Payer: COMMERCIAL

## 2021-10-21 ENCOUNTER — PATIENT MESSAGE (OUTPATIENT)
Dept: PSYCHIATRY | Facility: CLINIC | Age: 31
End: 2021-10-21
Payer: COMMERCIAL

## 2021-10-21 ENCOUNTER — TELEPHONE (OUTPATIENT)
Dept: PSYCHIATRY | Facility: CLINIC | Age: 31
End: 2021-10-21

## 2021-10-25 ENCOUNTER — OFFICE VISIT (OUTPATIENT)
Dept: PSYCHIATRY | Facility: CLINIC | Age: 31
End: 2021-10-25
Payer: COMMERCIAL

## 2021-10-25 DIAGNOSIS — F41.1 GENERALIZED ANXIETY DISORDER: Primary | ICD-10-CM

## 2021-10-25 PROCEDURE — 90834 PR PSYCHOTHERAPY W/PATIENT, 45 MIN: ICD-10-PCS | Mod: 95,,, | Performed by: SOCIAL WORKER

## 2021-10-25 PROCEDURE — 90834 PSYTX W PT 45 MINUTES: CPT | Mod: 95,,, | Performed by: SOCIAL WORKER

## 2021-10-27 ENCOUNTER — OFFICE VISIT (OUTPATIENT)
Dept: PSYCHIATRY | Facility: CLINIC | Age: 31
End: 2021-10-27
Payer: COMMERCIAL

## 2021-10-27 ENCOUNTER — PATIENT MESSAGE (OUTPATIENT)
Dept: PSYCHIATRY | Facility: CLINIC | Age: 31
End: 2021-10-27
Payer: COMMERCIAL

## 2021-10-27 DIAGNOSIS — F43.10 PTSD (POST-TRAUMATIC STRESS DISORDER): Primary | ICD-10-CM

## 2021-10-27 DIAGNOSIS — F41.9 ANXIETY: ICD-10-CM

## 2021-10-27 PROCEDURE — 90791 PSYCH DIAGNOSTIC EVALUATION: CPT | Mod: 95,,, | Performed by: PSYCHOLOGIST

## 2021-10-27 PROCEDURE — 90791 PR PSYCHIATRIC DIAGNOSTIC EVALUATION: ICD-10-PCS | Mod: 95,,, | Performed by: PSYCHOLOGIST

## 2021-11-08 DIAGNOSIS — F98.8 ATTENTION DEFICIT DISORDER, UNSPECIFIED HYPERACTIVITY PRESENCE: ICD-10-CM

## 2021-11-09 RX ORDER — DEXTROAMPHETAMINE SACCHARATE, AMPHETAMINE ASPARTATE MONOHYDRATE, DEXTROAMPHETAMINE SULFATE AND AMPHETAMINE SULFATE 5; 5; 5; 5 MG/1; MG/1; MG/1; MG/1
20 CAPSULE, EXTENDED RELEASE ORAL EVERY MORNING
Qty: 30 CAPSULE | Refills: 0 | Status: SHIPPED | OUTPATIENT
Start: 2021-11-09 | End: 2021-12-07 | Stop reason: SDUPTHER

## 2021-11-12 ENCOUNTER — OFFICE VISIT (OUTPATIENT)
Dept: PSYCHIATRY | Facility: CLINIC | Age: 31
End: 2021-11-12
Payer: COMMERCIAL

## 2021-11-12 DIAGNOSIS — F41.1 GENERALIZED ANXIETY DISORDER: Primary | ICD-10-CM

## 2021-11-12 PROCEDURE — 90834 PR PSYCHOTHERAPY W/PATIENT, 45 MIN: ICD-10-PCS | Mod: 95,,, | Performed by: SOCIAL WORKER

## 2021-11-12 PROCEDURE — 90834 PSYTX W PT 45 MINUTES: CPT | Mod: 95,,, | Performed by: SOCIAL WORKER

## 2021-11-23 ENCOUNTER — OFFICE VISIT (OUTPATIENT)
Dept: PSYCHIATRY | Facility: CLINIC | Age: 31
End: 2021-11-23
Payer: COMMERCIAL

## 2021-11-23 ENCOUNTER — TELEPHONE (OUTPATIENT)
Dept: PSYCHIATRY | Facility: CLINIC | Age: 31
End: 2021-11-23
Payer: COMMERCIAL

## 2021-11-23 ENCOUNTER — PATIENT MESSAGE (OUTPATIENT)
Dept: PSYCHIATRY | Facility: CLINIC | Age: 31
End: 2021-11-23
Payer: COMMERCIAL

## 2021-11-23 DIAGNOSIS — F41.9 ANXIETY: ICD-10-CM

## 2021-11-23 DIAGNOSIS — F43.10 PTSD (POST-TRAUMATIC STRESS DISORDER): Primary | ICD-10-CM

## 2021-11-23 PROCEDURE — 90837 PSYTX W PT 60 MINUTES: CPT | Mod: 95,,, | Performed by: PSYCHOLOGIST

## 2021-11-23 PROCEDURE — 90837 PR PSYCHOTHERAPY W/PATIENT, 60 MIN: ICD-10-PCS | Mod: 95,,, | Performed by: PSYCHOLOGIST

## 2021-11-24 ENCOUNTER — OFFICE VISIT (OUTPATIENT)
Dept: INTERNAL MEDICINE | Facility: CLINIC | Age: 31
End: 2021-11-24
Payer: COMMERCIAL

## 2021-11-24 DIAGNOSIS — R05.9 COUGH: ICD-10-CM

## 2021-11-24 DIAGNOSIS — J01.90 ACUTE NON-RECURRENT SINUSITIS, UNSPECIFIED LOCATION: Primary | ICD-10-CM

## 2021-11-24 PROCEDURE — 99213 OFFICE O/P EST LOW 20 MIN: CPT | Mod: 95,,, | Performed by: NURSE PRACTITIONER

## 2021-11-24 PROCEDURE — 99213 PR OFFICE/OUTPT VISIT, EST, LEVL III, 20-29 MIN: ICD-10-PCS | Mod: 95,,, | Performed by: NURSE PRACTITIONER

## 2021-11-24 RX ORDER — FLUTICASONE PROPIONATE 50 MCG
2 SPRAY, SUSPENSION (ML) NASAL DAILY
Qty: 16 G | Refills: 0 | Status: SHIPPED | OUTPATIENT
Start: 2021-11-24 | End: 2021-12-15

## 2021-11-24 RX ORDER — FLUCONAZOLE 150 MG/1
150 TABLET ORAL DAILY
Qty: 1 TABLET | Refills: 0 | Status: SHIPPED | OUTPATIENT
Start: 2021-11-24 | End: 2021-11-25

## 2021-11-24 RX ORDER — PROMETHAZINE HYDROCHLORIDE AND DEXTROMETHORPHAN HYDROBROMIDE 6.25; 15 MG/5ML; MG/5ML
5 SYRUP ORAL NIGHTLY PRN
Qty: 120 ML | Refills: 0 | Status: SHIPPED | OUTPATIENT
Start: 2021-11-24 | End: 2021-12-15

## 2021-11-24 RX ORDER — AMOXICILLIN AND CLAVULANATE POTASSIUM 875; 125 MG/1; MG/1
1 TABLET, FILM COATED ORAL 2 TIMES DAILY
Qty: 20 TABLET | Refills: 0 | Status: SHIPPED | OUTPATIENT
Start: 2021-11-24 | End: 2021-12-04

## 2021-11-24 RX ORDER — METHYLPREDNISOLONE 4 MG/1
TABLET ORAL
Qty: 21 TABLET | Refills: 0 | Status: SHIPPED | OUTPATIENT
Start: 2021-11-24 | End: 2021-12-15

## 2021-11-30 ENCOUNTER — OFFICE VISIT (OUTPATIENT)
Dept: PSYCHIATRY | Facility: CLINIC | Age: 31
End: 2021-11-30
Payer: COMMERCIAL

## 2021-11-30 ENCOUNTER — PATIENT MESSAGE (OUTPATIENT)
Dept: PSYCHIATRY | Facility: CLINIC | Age: 31
End: 2021-11-30
Payer: COMMERCIAL

## 2021-11-30 DIAGNOSIS — F41.9 ANXIETY: ICD-10-CM

## 2021-11-30 DIAGNOSIS — F43.10 PTSD (POST-TRAUMATIC STRESS DISORDER): Primary | ICD-10-CM

## 2021-11-30 PROCEDURE — 90837 PSYTX W PT 60 MINUTES: CPT | Mod: 95,,, | Performed by: PSYCHOLOGIST

## 2021-11-30 PROCEDURE — 90837 PR PSYCHOTHERAPY W/PATIENT, 60 MIN: ICD-10-PCS | Mod: 95,,, | Performed by: PSYCHOLOGIST

## 2021-12-07 ENCOUNTER — OFFICE VISIT (OUTPATIENT)
Dept: PSYCHIATRY | Facility: CLINIC | Age: 31
End: 2021-12-07
Payer: COMMERCIAL

## 2021-12-07 ENCOUNTER — OFFICE VISIT (OUTPATIENT)
Dept: OPHTHALMOLOGY | Facility: CLINIC | Age: 31
End: 2021-12-07
Payer: COMMERCIAL

## 2021-12-07 ENCOUNTER — PATIENT MESSAGE (OUTPATIENT)
Dept: PSYCHIATRY | Facility: CLINIC | Age: 31
End: 2021-12-07
Payer: COMMERCIAL

## 2021-12-07 DIAGNOSIS — F41.9 ANXIETY: ICD-10-CM

## 2021-12-07 DIAGNOSIS — H52.13 MYOPIA OF BOTH EYES WITH ASTIGMATISM: ICD-10-CM

## 2021-12-07 DIAGNOSIS — H52.203 MYOPIA OF BOTH EYES WITH ASTIGMATISM: ICD-10-CM

## 2021-12-07 DIAGNOSIS — F41.8 DEPRESSION WITH ANXIETY: ICD-10-CM

## 2021-12-07 DIAGNOSIS — F43.10 PTSD (POST-TRAUMATIC STRESS DISORDER): Primary | ICD-10-CM

## 2021-12-07 DIAGNOSIS — N94.3 PMS (PREMENSTRUAL SYNDROME): ICD-10-CM

## 2021-12-07 DIAGNOSIS — Z01.00 ENCOUNTER FOR EYE EXAM: Primary | ICD-10-CM

## 2021-12-07 DIAGNOSIS — F98.8 ATTENTION DEFICIT DISORDER, UNSPECIFIED HYPERACTIVITY PRESENCE: ICD-10-CM

## 2021-12-07 PROCEDURE — 92014 COMPRE OPH EXAM EST PT 1/>: CPT | Mod: S$GLB,,, | Performed by: OPTOMETRIST

## 2021-12-07 PROCEDURE — 99999 PR PBB SHADOW E&M-EST. PATIENT-LVL II: CPT | Mod: PBBFAC,,, | Performed by: OPTOMETRIST

## 2021-12-07 PROCEDURE — 92014 PR EYE EXAM, EST PATIENT,COMPREHESV: ICD-10-PCS | Mod: S$GLB,,, | Performed by: OPTOMETRIST

## 2021-12-07 PROCEDURE — 90834 PR PSYCHOTHERAPY W/PATIENT, 45 MIN: ICD-10-PCS | Mod: 95,,, | Performed by: PSYCHOLOGIST

## 2021-12-07 PROCEDURE — 92015 DETERMINE REFRACTIVE STATE: CPT | Mod: S$GLB,,, | Performed by: OPTOMETRIST

## 2021-12-07 PROCEDURE — 99999 PR PBB SHADOW E&M-EST. PATIENT-LVL II: ICD-10-PCS | Mod: PBBFAC,,, | Performed by: OPTOMETRIST

## 2021-12-07 PROCEDURE — 92015 PR REFRACTION: ICD-10-PCS | Mod: S$GLB,,, | Performed by: OPTOMETRIST

## 2021-12-07 PROCEDURE — 90834 PSYTX W PT 45 MINUTES: CPT | Mod: 95,,, | Performed by: PSYCHOLOGIST

## 2021-12-07 RX ORDER — DEXTROAMPHETAMINE SACCHARATE, AMPHETAMINE ASPARTATE, DEXTROAMPHETAMINE SULFATE AND AMPHETAMINE SULFATE 2.5; 2.5; 2.5; 2.5 MG/1; MG/1; MG/1; MG/1
10 TABLET ORAL DAILY
Qty: 30 TABLET | Refills: 0 | Status: SHIPPED | OUTPATIENT
Start: 2021-12-07 | End: 2022-03-28 | Stop reason: SDUPTHER

## 2021-12-07 RX ORDER — DEXTROAMPHETAMINE SACCHARATE, AMPHETAMINE ASPARTATE MONOHYDRATE, DEXTROAMPHETAMINE SULFATE AND AMPHETAMINE SULFATE 5; 5; 5; 5 MG/1; MG/1; MG/1; MG/1
20 CAPSULE, EXTENDED RELEASE ORAL EVERY MORNING
Qty: 30 CAPSULE | Refills: 0 | Status: SHIPPED | OUTPATIENT
Start: 2021-12-07 | End: 2022-01-14 | Stop reason: SDUPTHER

## 2021-12-07 RX ORDER — SERTRALINE HYDROCHLORIDE 100 MG/1
100 TABLET, FILM COATED ORAL DAILY
Qty: 90 TABLET | Refills: 2 | Status: SHIPPED | OUTPATIENT
Start: 2021-12-07 | End: 2022-03-28 | Stop reason: SDUPTHER

## 2021-12-14 ENCOUNTER — OFFICE VISIT (OUTPATIENT)
Dept: PSYCHIATRY | Facility: CLINIC | Age: 31
End: 2021-12-14
Payer: COMMERCIAL

## 2021-12-14 DIAGNOSIS — F41.9 ANXIETY: ICD-10-CM

## 2021-12-14 DIAGNOSIS — F43.10 PTSD (POST-TRAUMATIC STRESS DISORDER): Primary | ICD-10-CM

## 2021-12-14 PROCEDURE — 90837 PR PSYCHOTHERAPY W/PATIENT, 60 MIN: ICD-10-PCS | Mod: 95,,, | Performed by: PSYCHOLOGIST

## 2021-12-14 PROCEDURE — 90837 PSYTX W PT 60 MINUTES: CPT | Mod: 95,,, | Performed by: PSYCHOLOGIST

## 2021-12-15 ENCOUNTER — OFFICE VISIT (OUTPATIENT)
Dept: INTERNAL MEDICINE | Facility: CLINIC | Age: 31
End: 2021-12-15
Payer: COMMERCIAL

## 2021-12-15 VITALS
WEIGHT: 176.38 LBS | HEART RATE: 115 BPM | HEIGHT: 67 IN | OXYGEN SATURATION: 98 % | DIASTOLIC BLOOD PRESSURE: 80 MMHG | RESPIRATION RATE: 18 BRPM | TEMPERATURE: 99 F | BODY MASS INDEX: 27.68 KG/M2 | SYSTOLIC BLOOD PRESSURE: 132 MMHG

## 2021-12-15 DIAGNOSIS — N76.0 BV (BACTERIAL VAGINOSIS): ICD-10-CM

## 2021-12-15 DIAGNOSIS — F90.9 ATTENTION DEFICIT HYPERACTIVITY DISORDER (ADHD), UNSPECIFIED ADHD TYPE: ICD-10-CM

## 2021-12-15 DIAGNOSIS — F41.9 ANXIETY: Primary | ICD-10-CM

## 2021-12-15 DIAGNOSIS — B96.89 BV (BACTERIAL VAGINOSIS): ICD-10-CM

## 2021-12-15 DIAGNOSIS — F98.8 ATTENTION DEFICIT DISORDER, UNSPECIFIED HYPERACTIVITY PRESENCE: ICD-10-CM

## 2021-12-15 PROCEDURE — 99213 PR OFFICE/OUTPT VISIT, EST, LEVL III, 20-29 MIN: ICD-10-PCS | Mod: S$GLB,,, | Performed by: FAMILY MEDICINE

## 2021-12-15 PROCEDURE — 99999 PR PBB SHADOW E&M-EST. PATIENT-LVL IV: CPT | Mod: PBBFAC,,, | Performed by: FAMILY MEDICINE

## 2021-12-15 PROCEDURE — 99213 OFFICE O/P EST LOW 20 MIN: CPT | Mod: S$GLB,,, | Performed by: FAMILY MEDICINE

## 2021-12-15 PROCEDURE — 99999 PR PBB SHADOW E&M-EST. PATIENT-LVL IV: ICD-10-PCS | Mod: PBBFAC,,, | Performed by: FAMILY MEDICINE

## 2021-12-15 RX ORDER — METRONIDAZOLE 7.5 MG/G
1 GEL VAGINAL 2 TIMES DAILY
Qty: 70 G | Refills: 2 | Status: SHIPPED | OUTPATIENT
Start: 2021-12-15 | End: 2023-01-06

## 2021-12-21 ENCOUNTER — OFFICE VISIT (OUTPATIENT)
Dept: PSYCHIATRY | Facility: CLINIC | Age: 31
End: 2021-12-21
Payer: COMMERCIAL

## 2021-12-21 DIAGNOSIS — F41.9 ANXIETY: ICD-10-CM

## 2021-12-21 DIAGNOSIS — F43.10 PTSD (POST-TRAUMATIC STRESS DISORDER): Primary | ICD-10-CM

## 2021-12-21 PROCEDURE — 90834 PSYTX W PT 45 MINUTES: CPT | Mod: 95,,, | Performed by: PSYCHOLOGIST

## 2021-12-21 PROCEDURE — 90834 PR PSYCHOTHERAPY W/PATIENT, 45 MIN: ICD-10-PCS | Mod: 95,,, | Performed by: PSYCHOLOGIST

## 2021-12-22 ENCOUNTER — LAB VISIT (OUTPATIENT)
Dept: PRIMARY CARE CLINIC | Facility: OTHER | Age: 31
End: 2021-12-22
Attending: INTERNAL MEDICINE
Payer: COMMERCIAL

## 2021-12-22 DIAGNOSIS — Z20.822 ENCOUNTER FOR LABORATORY TESTING FOR COVID-19 VIRUS: ICD-10-CM

## 2021-12-22 LAB
SARS-COV-2 RNA RESP QL NAA+PROBE: NOT DETECTED
SARS-COV-2- CYCLE NUMBER: NORMAL

## 2021-12-22 PROCEDURE — U0003 INFECTIOUS AGENT DETECTION BY NUCLEIC ACID (DNA OR RNA); SEVERE ACUTE RESPIRATORY SYNDROME CORONAVIRUS 2 (SARS-COV-2) (CORONAVIRUS DISEASE [COVID-19]), AMPLIFIED PROBE TECHNIQUE, MAKING USE OF HIGH THROUGHPUT TECHNOLOGIES AS DESCRIBED BY CMS-2020-01-R: HCPCS | Performed by: INTERNAL MEDICINE

## 2021-12-27 ENCOUNTER — PATIENT MESSAGE (OUTPATIENT)
Dept: PHARMACY | Facility: CLINIC | Age: 31
End: 2021-12-27
Payer: COMMERCIAL

## 2022-01-11 ENCOUNTER — OFFICE VISIT (OUTPATIENT)
Dept: PSYCHIATRY | Facility: CLINIC | Age: 32
End: 2022-01-11
Payer: COMMERCIAL

## 2022-01-11 DIAGNOSIS — F41.9 ANXIETY: ICD-10-CM

## 2022-01-11 DIAGNOSIS — F43.10 PTSD (POST-TRAUMATIC STRESS DISORDER): Primary | ICD-10-CM

## 2022-01-11 PROCEDURE — 90837 PR PSYCHOTHERAPY W/PATIENT, 60 MIN: ICD-10-PCS | Mod: 95,,, | Performed by: PSYCHOLOGIST

## 2022-01-11 PROCEDURE — 90837 PSYTX W PT 60 MINUTES: CPT | Mod: 95,,, | Performed by: PSYCHOLOGIST

## 2022-01-13 ENCOUNTER — LAB VISIT (OUTPATIENT)
Dept: PRIMARY CARE CLINIC | Facility: CLINIC | Age: 32
End: 2022-01-13
Payer: COMMERCIAL

## 2022-01-13 ENCOUNTER — LAB VISIT (OUTPATIENT)
Dept: PRIMARY CARE CLINIC | Facility: OTHER | Age: 32
End: 2022-01-13
Attending: INTERNAL MEDICINE
Payer: COMMERCIAL

## 2022-01-13 DIAGNOSIS — R05.9 COUGH: ICD-10-CM

## 2022-01-13 DIAGNOSIS — R50.9 FEVER, UNSPECIFIED FEVER CAUSE: ICD-10-CM

## 2022-01-13 DIAGNOSIS — R50.9 FEVER: ICD-10-CM

## 2022-01-13 DIAGNOSIS — J02.9 SORE THROAT: ICD-10-CM

## 2022-01-13 DIAGNOSIS — Z20.822 ENCOUNTER FOR LABORATORY TESTING FOR COVID-19 VIRUS: ICD-10-CM

## 2022-01-13 DIAGNOSIS — R51.9 NONINTRACTABLE HEADACHE, UNSPECIFIED CHRONICITY PATTERN, UNSPECIFIED HEADACHE TYPE: ICD-10-CM

## 2022-01-13 DIAGNOSIS — R11.0 NAUSEA: ICD-10-CM

## 2022-01-13 DIAGNOSIS — R09.81 NASAL CONGESTION: ICD-10-CM

## 2022-01-13 LAB
CTP QC/QA: YES
SARS-COV-2 AG RESP QL IA.RAPID: NEGATIVE

## 2022-01-13 PROCEDURE — 87811 SARS CORONAVIRUS 2 ANTIGEN POCT, MANUAL READ: ICD-10-PCS | Mod: S$GLB,,, | Performed by: PREVENTIVE MEDICINE

## 2022-01-13 PROCEDURE — U0003 INFECTIOUS AGENT DETECTION BY NUCLEIC ACID (DNA OR RNA); SEVERE ACUTE RESPIRATORY SYNDROME CORONAVIRUS 2 (SARS-COV-2) (CORONAVIRUS DISEASE [COVID-19]), AMPLIFIED PROBE TECHNIQUE, MAKING USE OF HIGH THROUGHPUT TECHNOLOGIES AS DESCRIBED BY CMS-2020-01-R: HCPCS | Performed by: INTERNAL MEDICINE

## 2022-01-13 PROCEDURE — 87811 SARS-COV-2 COVID19 W/OPTIC: CPT | Mod: S$GLB,,, | Performed by: PREVENTIVE MEDICINE

## 2022-01-14 ENCOUNTER — OFFICE VISIT (OUTPATIENT)
Dept: URGENT CARE | Facility: CLINIC | Age: 32
End: 2022-01-14
Payer: COMMERCIAL

## 2022-01-14 VITALS
HEART RATE: 112 BPM | WEIGHT: 176 LBS | TEMPERATURE: 100 F | OXYGEN SATURATION: 99 % | BODY MASS INDEX: 27.62 KG/M2 | DIASTOLIC BLOOD PRESSURE: 91 MMHG | RESPIRATION RATE: 18 BRPM | SYSTOLIC BLOOD PRESSURE: 137 MMHG | HEIGHT: 67 IN

## 2022-01-14 DIAGNOSIS — B34.9 VIRAL ILLNESS: ICD-10-CM

## 2022-01-14 DIAGNOSIS — R50.9 FEVER, UNSPECIFIED FEVER CAUSE: Primary | ICD-10-CM

## 2022-01-14 DIAGNOSIS — F98.8 ATTENTION DEFICIT DISORDER, UNSPECIFIED HYPERACTIVITY PRESENCE: ICD-10-CM

## 2022-01-14 PROBLEM — F90.9 ATTENTION-DEFICIT HYPERACTIVITY DISORDER, UNSPECIFIED TYPE: Status: ACTIVE | Noted: 2020-03-30

## 2022-01-14 LAB
CTP QC/QA: YES
CTP QC/QA: YES
POC MOLECULAR INFLUENZA A AGN: NEGATIVE
POC MOLECULAR INFLUENZA B AGN: NEGATIVE
SARS-COV-2 RDRP RESP QL NAA+PROBE: NEGATIVE
SARS-COV-2 RNA RESP QL NAA+PROBE: NOT DETECTED
SARS-COV-2- CYCLE NUMBER: NORMAL

## 2022-01-14 PROCEDURE — 3075F PR MOST RECENT SYSTOLIC BLOOD PRESS GE 130-139MM HG: ICD-10-PCS | Mod: CPTII,S$GLB,, | Performed by: EMERGENCY MEDICINE

## 2022-01-14 PROCEDURE — 87502 INFLUENZA DNA AMP PROBE: CPT | Mod: QW,S$GLB,, | Performed by: EMERGENCY MEDICINE

## 2022-01-14 PROCEDURE — 1160F RVW MEDS BY RX/DR IN RCRD: CPT | Mod: CPTII,S$GLB,, | Performed by: EMERGENCY MEDICINE

## 2022-01-14 PROCEDURE — 3008F PR BODY MASS INDEX (BMI) DOCUMENTED: ICD-10-PCS | Mod: CPTII,S$GLB,, | Performed by: EMERGENCY MEDICINE

## 2022-01-14 PROCEDURE — 99212 OFFICE O/P EST SF 10 MIN: CPT | Mod: S$GLB,,, | Performed by: EMERGENCY MEDICINE

## 2022-01-14 PROCEDURE — 99212 PR OFFICE/OUTPT VISIT, EST, LEVL II, 10-19 MIN: ICD-10-PCS | Mod: S$GLB,,, | Performed by: EMERGENCY MEDICINE

## 2022-01-14 PROCEDURE — 1159F PR MEDICATION LIST DOCUMENTED IN MEDICAL RECORD: ICD-10-PCS | Mod: CPTII,S$GLB,, | Performed by: EMERGENCY MEDICINE

## 2022-01-14 PROCEDURE — 3075F SYST BP GE 130 - 139MM HG: CPT | Mod: CPTII,S$GLB,, | Performed by: EMERGENCY MEDICINE

## 2022-01-14 PROCEDURE — 1159F MED LIST DOCD IN RCRD: CPT | Mod: CPTII,S$GLB,, | Performed by: EMERGENCY MEDICINE

## 2022-01-14 PROCEDURE — 3080F PR MOST RECENT DIASTOLIC BLOOD PRESSURE >= 90 MM HG: ICD-10-PCS | Mod: CPTII,S$GLB,, | Performed by: EMERGENCY MEDICINE

## 2022-01-14 PROCEDURE — U0002 COVID-19 LAB TEST NON-CDC: HCPCS | Mod: QW,S$GLB,, | Performed by: EMERGENCY MEDICINE

## 2022-01-14 PROCEDURE — 87502 POCT INFLUENZA A/B MOLECULAR: ICD-10-PCS | Mod: QW,S$GLB,, | Performed by: EMERGENCY MEDICINE

## 2022-01-14 PROCEDURE — 3080F DIAST BP >= 90 MM HG: CPT | Mod: CPTII,S$GLB,, | Performed by: EMERGENCY MEDICINE

## 2022-01-14 PROCEDURE — U0002: ICD-10-PCS | Mod: QW,S$GLB,, | Performed by: EMERGENCY MEDICINE

## 2022-01-14 PROCEDURE — 1160F PR REVIEW ALL MEDS BY PRESCRIBER/CLIN PHARMACIST DOCUMENTED: ICD-10-PCS | Mod: CPTII,S$GLB,, | Performed by: EMERGENCY MEDICINE

## 2022-01-14 PROCEDURE — 3008F BODY MASS INDEX DOCD: CPT | Mod: CPTII,S$GLB,, | Performed by: EMERGENCY MEDICINE

## 2022-01-14 RX ORDER — DEXTROAMPHETAMINE SACCHARATE, AMPHETAMINE ASPARTATE MONOHYDRATE, DEXTROAMPHETAMINE SULFATE AND AMPHETAMINE SULFATE 5; 5; 5; 5 MG/1; MG/1; MG/1; MG/1
20 CAPSULE, EXTENDED RELEASE ORAL EVERY MORNING
Qty: 30 CAPSULE | Refills: 0 | Status: SHIPPED | OUTPATIENT
Start: 2022-01-14 | End: 2022-02-25 | Stop reason: SDUPTHER

## 2022-01-14 NOTE — TELEPHONE ENCOUNTER
No new care gaps identified.  Powered by [employer]. Reference number: [id number].   1/14/2022 11:13:02 AM CST

## 2022-01-14 NOTE — LETTER
January 14, 2022      [facility]  [address] , [address]  [place] [address]  Phone: [phone]  Fax: [phone]       Patient: Billie Nicolas   YOB: 1990  Date of Visit: 01/14/2022    To Whom It May Concern:    Beth Nicolas  was at [facility] on 01/14/2022. The patient may return to work/school on 01/17/2022 with no restrictions. If you have any questions or concerns, or if I can be of further assistance, please do not hesitate to contact me.    Sincerely,      Abby Patel MD

## 2022-01-14 NOTE — PATIENT INSTRUCTIONS
Use over the counter(OTC) antihistamine like claritin or zyrtec daily.  Flonase: 2 sprays per nostril 1-2 times a day.   Nasal saline drops: 2-4 drops per nostril 10-15 times a day.   Tylenol or Motrin for fever or pain per label instructions.  Consider use of OTC cough medicine like Robitussin DM.  Warm saltwater gargles to 3 times a day.  Rest and drink plenty of fluids.  Avoid OTC decongestants if you have high blood pressure. This includes multi-cold symptom preparations.    Follow up in 2-3 days with PCP if no improvement or any worsening.

## 2022-01-14 NOTE — PROGRESS NOTES
"Subjective:       Patient ID: Billie Nicolas is a 31 y.o. female.    Vitals:  height is 5' 7" (1.702 m) and weight is 79.8 kg (176 lb). Her temperature is 99.7 °F (37.6 °C). Her blood pressure is 137/91 (abnormal) and her pulse is 112 (abnormal). Her respiration is 18 and oxygen saturation is 99%.     Chief Complaint: COVID-19 Concerns    31-year-old female presents to urgent care for evaluation of URI symptoms which started on the 12th.  Pt states she started having low grade fever on 1/12/2022. Pt also c/o headache, sore throat, nasal congestion, night sweats, fatigue and brain fog. Pt's partner is positive for COVID. Pt is an employee of [employer] but has already gone through [employer].  Patient states she has had multiple negative test for COVID and her PCR also was negative.  However, patient continues to run fever and have body aches.  Also having upper respiratory symptoms.    Other  This is a new problem. The current episode started in the past 7 days. The problem occurs constantly. The problem has been unchanged. Associated symptoms include fatigue, a fever, headaches, myalgias and a sore throat. Pertinent negatives include no abdominal pain, anorexia, arthralgias, change in bowel habit, chest pain, chills, congestion, coughing, diaphoresis, joint swelling, nausea, neck pain, numbness, rash, swollen glands, urinary symptoms, vertigo, visual change or vomiting. Nothing aggravates the symptoms. She has tried nothing for the symptoms. The treatment provided no relief.       Constitution: Positive for fatigue and fever. Negative for chills and sweating.   HENT: Positive for sore throat. Negative for congestion.    Neck: Negative for neck pain.   Cardiovascular: Negative for chest pain.   Respiratory: Negative for cough.    Gastrointestinal: Negative for abdominal pain, nausea and vomiting.   Musculoskeletal: Positive for muscle ache. Negative for joint pain and joint swelling.   Skin: Negative for rash. "   Neurological: Positive for headaches. Negative for history of vertigo and numbness.       Objective:      Physical Exam   Constitutional: She is oriented to person, place, and time. She appears well-developed. She is cooperative.  Non-toxic appearance. She appears ill. No distress.   HENT:   Head: Normocephalic and atraumatic.   Ears:   Right Ear: Hearing and external ear normal.   Left Ear: Hearing and external ear normal.   Nose: No mucosal edema or nasal deformity. No epistaxis. Right sinus exhibits no maxillary sinus tenderness and no frontal sinus tenderness. Left sinus exhibits no maxillary sinus tenderness and no frontal sinus tenderness.   Mouth/Throat: Uvula is midline and mucous membranes are normal. No trismus in the jaw. Normal dentition. No uvula swelling. No posterior oropharyngeal edema.   Eyes: Conjunctivae and lids are normal. No scleral icterus.      extraocular movement intact   Neck: Trachea normal and phonation normal. Neck supple. No edema present. No erythema present. No neck rigidity present.   Cardiovascular: Regular rhythm, normal heart sounds and normal pulses. Tachycardia present.   Pulmonary/Chest: Effort normal and breath sounds normal. No respiratory distress. She has no decreased breath sounds. She has no wheezes. She has no rhonchi.   Abdominal: Normal appearance.   Musculoskeletal: Normal range of motion.         General: No deformity. Normal range of motion.   Neurological: She is alert and oriented to person, place, and time. She exhibits normal muscle tone. Coordination normal.   Skin: Skin is warm, dry, intact, not diaphoretic and not pale.   Psychiatric: Her speech is normal and behavior is normal. Judgment and thought content normal.   Nursing note and vitals reviewed.        Assessment:       1. Fever, unspecified fever cause    2. Viral illness        Results for orders placed or performed in visit on 01/14/22   POCT COVID-19 Rapid Screening   Result Value Ref Range    POC  Rapid COVID Negative Negative     Acceptable Yes    POCT Influenza A/B MOLECULAR   Result Value Ref Range    POC Molecular Influenza A Ag Negative Negative, Not Reported    POC Molecular Influenza B Ag Negative Negative, Not Reported     Acceptable Yes          Despite negative COVID and influenza tests, patient clearly has viral illness and has low-grade fever with resultant tachycardia.  She is clearly contagious to other people and for this reason is being given a work note through Monday.  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  If this is not the case, she is instructed to reach out to clinic to me on Sunday for an extension on her sick note.  She verbalizes understanding of and agreement with this plan  Plan:         Fever, unspecified fever cause  -     POCT COVID-19 Rapid Screening  -     POCT Influenza A/B MOLECULAR    Viral illness

## 2022-01-16 ENCOUNTER — OFFICE VISIT (OUTPATIENT)
Dept: URGENT CARE | Facility: CLINIC | Age: 32
End: 2022-01-16
Payer: COMMERCIAL

## 2022-01-16 VITALS
OXYGEN SATURATION: 100 % | HEIGHT: 67 IN | HEART RATE: 126 BPM | SYSTOLIC BLOOD PRESSURE: 149 MMHG | BODY MASS INDEX: 27.61 KG/M2 | RESPIRATION RATE: 18 BRPM | WEIGHT: 175.94 LBS | TEMPERATURE: 99 F | DIASTOLIC BLOOD PRESSURE: 90 MMHG

## 2022-01-16 DIAGNOSIS — R50.9 FEVER, UNSPECIFIED FEVER CAUSE: Primary | ICD-10-CM

## 2022-01-16 LAB
CTP QC/QA: YES
SARS-COV-2 RDRP RESP QL NAA+PROBE: NEGATIVE

## 2022-01-16 PROCEDURE — U0002 COVID-19 LAB TEST NON-CDC: HCPCS | Mod: QW,S$GLB,, | Performed by: EMERGENCY MEDICINE

## 2022-01-16 PROCEDURE — 1159F MED LIST DOCD IN RCRD: CPT | Mod: CPTII,S$GLB,, | Performed by: EMERGENCY MEDICINE

## 2022-01-16 PROCEDURE — 1160F PR REVIEW ALL MEDS BY PRESCRIBER/CLIN PHARMACIST DOCUMENTED: ICD-10-PCS | Mod: CPTII,S$GLB,, | Performed by: EMERGENCY MEDICINE

## 2022-01-16 PROCEDURE — 3077F PR MOST RECENT SYSTOLIC BLOOD PRESSURE >= 140 MM HG: ICD-10-PCS | Mod: CPTII,S$GLB,, | Performed by: EMERGENCY MEDICINE

## 2022-01-16 PROCEDURE — 1159F PR MEDICATION LIST DOCUMENTED IN MEDICAL RECORD: ICD-10-PCS | Mod: CPTII,S$GLB,, | Performed by: EMERGENCY MEDICINE

## 2022-01-16 PROCEDURE — 3080F PR MOST RECENT DIASTOLIC BLOOD PRESSURE >= 90 MM HG: ICD-10-PCS | Mod: CPTII,S$GLB,, | Performed by: EMERGENCY MEDICINE

## 2022-01-16 PROCEDURE — 1160F RVW MEDS BY RX/DR IN RCRD: CPT | Mod: CPTII,S$GLB,, | Performed by: EMERGENCY MEDICINE

## 2022-01-16 PROCEDURE — 99213 PR OFFICE/OUTPT VISIT, EST, LEVL III, 20-29 MIN: ICD-10-PCS | Mod: S$GLB,CS,, | Performed by: EMERGENCY MEDICINE

## 2022-01-16 PROCEDURE — 3080F DIAST BP >= 90 MM HG: CPT | Mod: CPTII,S$GLB,, | Performed by: EMERGENCY MEDICINE

## 2022-01-16 PROCEDURE — 3008F PR BODY MASS INDEX (BMI) DOCUMENTED: ICD-10-PCS | Mod: CPTII,S$GLB,, | Performed by: EMERGENCY MEDICINE

## 2022-01-16 PROCEDURE — 87070 CULTURE OTHR SPECIMN AEROBIC: CPT | Performed by: EMERGENCY MEDICINE

## 2022-01-16 PROCEDURE — 99213 OFFICE O/P EST LOW 20 MIN: CPT | Mod: S$GLB,CS,, | Performed by: EMERGENCY MEDICINE

## 2022-01-16 PROCEDURE — 3008F BODY MASS INDEX DOCD: CPT | Mod: CPTII,S$GLB,, | Performed by: EMERGENCY MEDICINE

## 2022-01-16 PROCEDURE — 3077F SYST BP >= 140 MM HG: CPT | Mod: CPTII,S$GLB,, | Performed by: EMERGENCY MEDICINE

## 2022-01-16 PROCEDURE — U0002: ICD-10-PCS | Mod: QW,S$GLB,, | Performed by: EMERGENCY MEDICINE

## 2022-01-16 NOTE — LETTER
January 16, 2022      [facility]  [id number] , [address]  [place] [address]  Phone: [phone]  Fax: [phone]       Patient: Billie Nicolas   YOB: 1990  Date of Visit: 01/16/2022    To Whom It May Concern:    Beth Nicolas  was at [facility] on 01/16/2022. The patient may return to work/school on 01/19/2022 with no restrictions. If you have any questions or concerns, or if I can be of further assistance, please do not hesitate to contact me.    Sincerely,      Abby Patel MD

## 2022-01-16 NOTE — PROGRESS NOTES
"Subjective:       Patient ID: Billie Nicolas is a 31 y.o. female.    Vitals:  height is 5' 7" (1.702 m) and weight is 79.8 kg (175 lb 14.8 oz). Her respiration is 18.     Chief Complaint: Fever    31-year-old female presents to urgent care for a 2nd visit this weekend for re-evaluation of her URI symptoms.  Pt state that her symptoms got worse since Friday when she had a negative COVID test.  Symptoms concerning for COVID given that her partner has COVID currently.  Patient has had multiple negative nasopharyngeal swabs for COVID.  Patient definitely reports cough and congestion symptoms.  Feels like she is dizzy year and having more body aches and higher fever than she was when last here.    Fever   The problem has been gradually worsening. The maximum temperature noted was 100 to 100.9 F. Associated symptoms include chest pain, congestion, coughing, ear pain, headaches, muscle aches, sleepiness and a sore throat. Pertinent negatives include no abdominal pain, diarrhea, nausea, rash, urinary pain, vomiting or wheezing. She has tried NSAIDs for the symptoms. The treatment provided no relief.   Risk factors: no contaminated food, no contaminated water, no hx of cancer, no immunosuppression, no occupational exposure, no recent sickness, no recent travel and no sick contacts        Constitution: Positive for fever.   HENT: Positive for ear pain, congestion and sore throat.    Cardiovascular: Positive for chest pain.   Respiratory: Positive for cough. Negative for wheezing.    Gastrointestinal: Negative for abdominal pain, nausea, vomiting and diarrhea.   Genitourinary: Negative for dysuria.   Skin: Negative for rash.   Neurological: Positive for headaches.       Objective:      Physical Exam   Constitutional: She is oriented to person, place, and time. She appears well-developed. She is cooperative.  Non-toxic appearance. She does not appear ill. No distress.   HENT:   Head: Normocephalic and atraumatic. "   Ears:   Right Ear: Hearing and external ear normal.   Left Ear: Hearing and external ear normal.   Nose: Congestion present. No mucosal edema, rhinorrhea or nasal deformity. No epistaxis. Right sinus exhibits no maxillary sinus tenderness and no frontal sinus tenderness. Left sinus exhibits no maxillary sinus tenderness and no frontal sinus tenderness.   Mouth/Throat: Uvula is midline and mucous membranes are normal. No trismus in the jaw. Normal dentition. No uvula swelling. No oropharyngeal exudate, posterior oropharyngeal edema or posterior oropharyngeal erythema.      Comments: No tonsillar exudates or enlargement.  There is postnasal drip and cobblestoning.  Eyes: Conjunctivae and lids are normal. No scleral icterus.      extraocular movement intact   Neck: Trachea normal and phonation normal. Neck supple. No edema present. No erythema present. No neck rigidity present.   Cardiovascular: Normal rate, regular rhythm, normal heart sounds and normal pulses.   Pulmonary/Chest: Effort normal and breath sounds normal. No respiratory distress. She has no decreased breath sounds. She has no rhonchi.   Abdominal: Normal appearance.   Musculoskeletal: Normal range of motion.         General: No deformity. Normal range of motion.   Neurological: She is alert and oriented to person, place, and time. She exhibits normal muscle tone. Coordination normal.   Skin: Skin is warm, dry, intact, not diaphoretic and not pale.   Psychiatric: Her speech is normal and behavior is normal. Judgment and thought content normal.   Nursing note and vitals reviewed.        Assessment:       1. Fever, unspecified fever cause        Results for orders placed or performed in visit on 01/16/22   POCT COVID-19 Rapid Screening   Result Value Ref Range    POC Rapid COVID Negative Negative     Acceptable Yes      Rapid strep neg. Doing throat culture given protracted course and continued fever, sore throat.   Plan:         Fever,  unspecified fever cause  -     POCT COVID-19 Rapid Screening

## 2022-01-16 NOTE — PATIENT INSTRUCTIONS
Really focus on rest and hydration over the next few days.  A culture is being done.  If this indicates a need for antibiotic therapy, you will be contacted by phone and a prescription called in.        Use over the counter(OTC) antihistamine like claritin or zyrtec daily.  Flonase: 2 sprays per nostril 1-2 times a day.   Nasal saline drops: 2-4 drops per nostril 10-15 times a day.   Tylenol or Motrin for fever or pain per label instructions.  Consider use of OTC cough medicine like Robitussin DM.  Warm saltwater gargles to 3 times a day.  Rest and drink plenty of fluids.  Avoid OTC decongestants if you have high blood pressure. This includes multi-cold symptom preparations.    Follow up in 2-3 days with PCP if no improvement or any worsening.       Viral Upper Respiratory Infection Discharge Instructions, Adult   About this topic   You have an upper respiratory infection or URI. A URI can affect your nose, throat, ears, and sinuses. A virus is the cause of almost all URIs and antibiotics will not help you feel better more quickly. The common cold is an example of a viral URI.  URIs are easy to spread from person to person, most often through coughing or sneezing. A URI will almost always get better in a week or two without any treatment.         What care is needed at home?   · Ask your doctor what you need to do when you go home. Make sure you ask questions if you do not understand what the doctor says.  · If you smoke, try to quit. Your doctor or nurse can help.  · Drink lots of fluids like water, juice, or broth. This will help replace any fluids lost if you have a runny nose or fever. Warm tea or soup can help soothe a sore throat.  · If the air in your home feels dry, use a cool mist humidifier. This can help a stuffy nose and make it easier to breathe.  · You can also use saline nose drops to relieve stuffiness.  · If you decide to take over-the-counter cough or cold medicines, follow the directions on the  label carefully. Be sure you do not take more than 1 medicine that contains acetaminophen. Also, if you have a heart problem or high blood pressure, check with your doctor before you take any of these medicines.  · Wash your hands often. Cough or sneeze into a tissue or your elbow instead of your hands. This will help keep others healthy.  What follow-up care is needed?   Your doctor may ask you to make visits to the office to check on your progress. Be sure to keep these visits.  What drugs may be needed?   The doctor may order drugs to:  · Open up the tubes of your lungs  · Treat viral infection  · Relieve or stop coughing  · Help with pain from a sore throat  · Relieve runny and stuffy nose  · Provide oxygen  Will physical activity be limited?   You need to rest for a few days to let your body recover from the infection.  What changes to diet are needed?   Eat soft foods like soup if swallowing is too painful.  What problems could happen?   · Asthma attack  · Sinus infections  · Lung problems like pneumonia and bronchitis  · Severe fluid loss. This is dehydration.  What can be done to prevent this health problem?   · Wash your hands often with soap and water for at least 20 seconds, especially after coughing or sneezing. Alcohol-based hand sanitizers also work to kill the virus.  · If you are sick, cover your mouth and nose with tissue when you cough or sneeze. You can also cough into your elbow. Throw away tissues in the trash and wash your hands after touching used tissues.  · Do not get too close (kissing, hugging) to people who are sick.  · Do not share towels or hankies with anyone who is sick. Clean commonly handled things like door handles, remotes, toys, and phones. Wipe them with a disinfectant.  · Stay away from crowded places.  · Cover your nose and mouth when you sneeze or cough.  · Take vitamin C to help build up your body's ability to fight disease.  · Get a flu shot each year.  When do I need to  call the doctor?   · You have trouble breathing when talking or sitting still.  · You have a fever of 100.4°F (38°C) or higher for several days, chills, a very bad sore throat, or ear or sinus pain.  · You develop a new fever after several days of feeling the same or improving.  · You develop chest pain when you cough.  · You have a cough that lasts more than 10 days.  · You cough up blood, or the color of the mucus you cough up changes.  Teach Back: Helping You Understand   The Teach Back Method helps you understand the information we are giving you. After you talk with the staff, tell them in your own words what you learned. This helps to make sure the staff has described each thing clearly. It also helps to explain things that may have been confusing. Before going home, make sure you can do these:  · I can tell you about my condition.  · I can tell you what may help ease my signs.  · I can tell you what I will do if I have a fever, chills, breathing very fast, or trouble breathing.  Where can I learn more?   American Lung Association  https://www.lung.org/blog/can-you-exercise-with-a-cold   American Lung Association  https://www.lung.org/lung-health-diseases/lung-disease-lookup/influenza/facts-about-the-common-cold   NHS Choices  https://www.nhs.uk/conditions/respiratory-tract-infection/   UpToDate  https://www.[employer].com/contents/the-common-cold-in-adults-beyond-the-basics   Last Reviewed Date   2021-06-08  Consumer Information Use and Disclaimer   This information is not specific medical advice and does not replace information you receive from your health care provider. This is only a brief summary of general information. It does NOT include all information about conditions, illnesses, injuries, tests, procedures, treatments, therapies, discharge instructions or life-style choices that may apply to you. You must talk with your health care provider for complete information about your health and treatment  options. This information should not be used to decide whether or not to accept your health care providers advice, instructions or recommendations. Only your health care provider has the knowledge and training to provide advice that is right for you.  Copyright   Copyright © 2021 UpToDate, Inc. and its affiliates and/or licensors. All rights reserved.

## 2022-01-18 ENCOUNTER — OFFICE VISIT (OUTPATIENT)
Dept: PSYCHIATRY | Facility: CLINIC | Age: 32
End: 2022-01-18
Payer: COMMERCIAL

## 2022-01-18 DIAGNOSIS — F41.9 ANXIETY: ICD-10-CM

## 2022-01-18 DIAGNOSIS — F43.10 PTSD (POST-TRAUMATIC STRESS DISORDER): Primary | ICD-10-CM

## 2022-01-18 LAB — BACTERIA THROAT CULT: NORMAL

## 2022-01-18 PROCEDURE — 90837 PSYTX W PT 60 MINUTES: CPT | Mod: 95,,, | Performed by: PSYCHOLOGIST

## 2022-01-18 PROCEDURE — 90837 PR PSYCHOTHERAPY W/PATIENT, 60 MIN: ICD-10-PCS | Mod: 95,,, | Performed by: PSYCHOLOGIST
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
## 2022-01-21 ENCOUNTER — OFFICE VISIT (OUTPATIENT)
Dept: INTERNAL MEDICINE | Facility: CLINIC | Age: 32
End: 2022-01-21
Payer: COMMERCIAL

## 2022-01-21 ENCOUNTER — TELEPHONE (OUTPATIENT)
Dept: INTERNAL MEDICINE | Facility: CLINIC | Age: 32
End: 2022-01-21
Payer: COMMERCIAL

## 2022-01-21 ENCOUNTER — HOSPITAL ENCOUNTER (OUTPATIENT)
Dept: RADIOLOGY | Facility: HOSPITAL | Age: 32
Discharge: HOME | End: 2022-01-21
Attending: PHYSICIAN ASSISTANT
Payer: COMMERCIAL

## 2022-01-21 VITALS
HEIGHT: 67 IN | BODY MASS INDEX: 28.58 KG/M2 | WEIGHT: 182.13 LBS | SYSTOLIC BLOOD PRESSURE: 124 MMHG | RESPIRATION RATE: 18 BRPM | HEART RATE: 93 BPM | DIASTOLIC BLOOD PRESSURE: 78 MMHG | TEMPERATURE: 98 F | OXYGEN SATURATION: 98 %

## 2022-01-21 DIAGNOSIS — R50.9 FEVER, UNSPECIFIED FEVER CAUSE: ICD-10-CM

## 2022-01-21 DIAGNOSIS — Z20.822 SUSPECTED COVID-19 VIRUS INFECTION: ICD-10-CM

## 2022-01-21 DIAGNOSIS — R50.9 FEVER, UNSPECIFIED FEVER CAUSE: Primary | ICD-10-CM

## 2022-01-21 DIAGNOSIS — Z20.822 CLOSE EXPOSURE TO COVID-19 VIRUS: ICD-10-CM

## 2022-01-21 DIAGNOSIS — R05.9 COUGH: ICD-10-CM

## 2022-01-21 LAB
BILIRUB UR QL STRIP: NEGATIVE
CLARITY UR: CLEAR
COLOR UR: NORMAL
CTP QC/QA: YES
GLUCOSE UR QL STRIP: NEGATIVE
HGB UR QL STRIP: NEGATIVE
KETONES UR QL STRIP: NEGATIVE
LEUKOCYTE ESTERASE UR QL STRIP: NEGATIVE
NITRITE UR QL STRIP: NEGATIVE
PH UR STRIP: 7 [PH] (ref 5–8)
PROT UR QL STRIP: NEGATIVE
SARS-COV-2 RDRP RESP QL NAA+PROBE: NEGATIVE
SP GR UR STRIP: 1.01 (ref 1–1.03)
URN SPEC COLLECT METH UR: NORMAL
UROBILINOGEN UR STRIP-ACNC: NEGATIVE EU/DL

## 2022-01-21 PROCEDURE — 1160F PR REVIEW ALL MEDS BY PRESCRIBER/CLIN PHARMACIST DOCUMENTED: ICD-10-PCS | Mod: CPTII,S$GLB,, | Performed by: PHYSICIAN ASSISTANT

## 2022-01-21 PROCEDURE — 1160F RVW MEDS BY RX/DR IN RCRD: CPT | Mod: CPTII,S$GLB,, | Performed by: PHYSICIAN ASSISTANT

## 2022-01-21 PROCEDURE — 81002 URINALYSIS NONAUTO W/O SCOPE: CPT | Performed by: PHYSICIAN ASSISTANT

## 2022-01-21 PROCEDURE — 87086 URINE CULTURE/COLONY COUNT: CPT | Performed by: PHYSICIAN ASSISTANT

## 2022-01-21 PROCEDURE — 1159F PR MEDICATION LIST DOCUMENTED IN MEDICAL RECORD: ICD-10-PCS | Mod: CPTII,S$GLB,, | Performed by: PHYSICIAN ASSISTANT

## 2022-01-21 PROCEDURE — U0002 COVID-19 LAB TEST NON-CDC: HCPCS | Mod: QW,S$GLB,, | Performed by: PHYSICIAN ASSISTANT

## 2022-01-21 PROCEDURE — 1159F MED LIST DOCD IN RCRD: CPT | Mod: CPTII,S$GLB,, | Performed by: PHYSICIAN ASSISTANT

## 2022-01-21 PROCEDURE — 71046 X-RAY EXAM CHEST 2 VIEWS: CPT | Mod: TC

## 2022-01-21 PROCEDURE — 71046 X-RAY EXAM CHEST 2 VIEWS: CPT | Mod: 26,,, | Performed by: RADIOLOGY

## 2022-01-21 PROCEDURE — 3008F BODY MASS INDEX DOCD: CPT | Mod: CPTII,S$GLB,, | Performed by: PHYSICIAN ASSISTANT

## 2022-01-21 PROCEDURE — 3074F PR MOST RECENT SYSTOLIC BLOOD PRESSURE < 130 MM HG: ICD-10-PCS | Mod: CPTII,S$GLB,, | Performed by: PHYSICIAN ASSISTANT

## 2022-01-21 PROCEDURE — 3078F PR MOST RECENT DIASTOLIC BLOOD PRESSURE < 80 MM HG: ICD-10-PCS | Mod: CPTII,S$GLB,, | Performed by: PHYSICIAN ASSISTANT

## 2022-01-21 PROCEDURE — 71046 XR CHEST PA AND LATERAL: ICD-10-PCS | Mod: 26,,, | Performed by: RADIOLOGY

## 2022-01-21 PROCEDURE — 99214 OFFICE O/P EST MOD 30 MIN: CPT | Mod: S$GLB,,, | Performed by: PHYSICIAN ASSISTANT

## 2022-01-21 PROCEDURE — 3008F PR BODY MASS INDEX (BMI) DOCUMENTED: ICD-10-PCS | Mod: CPTII,S$GLB,, | Performed by: PHYSICIAN ASSISTANT

## 2022-01-21 PROCEDURE — 3078F DIAST BP <80 MM HG: CPT | Mod: CPTII,S$GLB,, | Performed by: PHYSICIAN ASSISTANT

## 2022-01-21 PROCEDURE — 99999 PR PBB SHADOW E&M-EST. PATIENT-LVL IV: ICD-10-PCS | Mod: PBBFAC,,, | Performed by: PHYSICIAN ASSISTANT

## 2022-01-21 PROCEDURE — 99214 PR OFFICE/OUTPT VISIT, EST, LEVL IV, 30-39 MIN: ICD-10-PCS | Mod: S$GLB,,, | Performed by: PHYSICIAN ASSISTANT

## 2022-01-21 PROCEDURE — 99999 PR PBB SHADOW E&M-EST. PATIENT-LVL IV: CPT | Mod: PBBFAC,,, | Performed by: PHYSICIAN ASSISTANT

## 2022-01-21 PROCEDURE — 3074F SYST BP LT 130 MM HG: CPT | Mod: CPTII,S$GLB,, | Performed by: PHYSICIAN ASSISTANT

## 2022-01-21 PROCEDURE — U0002: ICD-10-PCS | Mod: QW,S$GLB,, | Performed by: PHYSICIAN ASSISTANT

## 2022-01-21 NOTE — PROGRESS NOTES
"Subjective:      Patient ID: Billie Nicolas is a 31 y.o. female.    Chief Complaint: No chief complaint on file.    Patient is known to me, being seen today for COVID concerns, persistent fever.    Partner COVID + 1/12    [facility] x2 visits, [facility] 3 days ago  Has been tested for COVID 6x, including PCR, all negative  Flu, strep and respiratory culture negative     Her symptoms started 1/12, HAs, fatigue, congestion, sore throat, insomnia, body aches     Persistent fatigue and low grade fevers (99.5 last night, as high as 100.6 initially), PND     COVID vaccine and booster completed     Treatment thus far: Tylenol, fluids     Review of Systems   Constitutional: Positive for fever.   HENT: Positive for congestion, ear pain (mild, L), postnasal drip and sore throat.    Respiratory: Positive for cough (minimal). Negative for shortness of breath and wheezing.    Cardiovascular: Negative for chest pain.   Gastrointestinal: Positive for nausea. Negative for abdominal pain, diarrhea and vomiting.   Genitourinary: Negative for dysuria, frequency and hematuria.   Musculoskeletal: Positive for myalgias. Negative for back pain.   Skin: Negative for rash.   Neurological: Positive for headaches.       Objective:   /78 (BP Location: Right arm, Patient Position: Sitting, BP Method: Large (Manual))   Pulse 93   Temp 97.9 °F (36.6 °C) (Tympanic)   Resp 18   Ht 5' 7" (1.702 m)   Wt 82.6 kg (182 lb 1.6 oz)   LMP 01/21/2022   SpO2 98%   BMI 28.52 kg/m²   Physical Exam  Constitutional:       General: She is not in acute distress.     Appearance: Normal appearance. She is well-developed. She is not ill-appearing.   HENT:      Head: Normocephalic and atraumatic.      Right Ear: Hearing, tympanic membrane, ear canal and external ear normal.      Left Ear: Hearing, tympanic membrane, ear canal and external ear normal.      Nose: Nose normal.      Mouth/Throat:      Mouth: Mucous membranes are " moist.      Pharynx: Oropharynx is clear.   Cardiovascular:      Rate and Rhythm: Normal rate and regular rhythm.      Heart sounds: Normal heart sounds. No murmur heard.      Pulmonary:      Effort: Pulmonary effort is normal. No respiratory distress.      Breath sounds: Normal breath sounds. No decreased breath sounds.   Musculoskeletal:      Right lower leg: No edema.      Left lower leg: No edema.   Skin:     General: Skin is warm and dry.      Findings: No rash.   Psychiatric:         Speech: Speech normal.         Behavior: Behavior normal.         Thought Content: Thought content normal.       Assessment:      1. Fever, unspecified fever cause    2. Suspected COVID-19 virus infection    3. Cough       Plan:   Fever, unspecified fever cause  -     X-Ray Chest PA And Lateral; Future; Expected date: 01/21/2022  -     CBC Auto Differential; Future; Expected date: 01/21/2022  -     Comprehensive Metabolic Panel; Future; Expected date: 01/21/2022  -     Urinalysis  -     Urine culture  -     POCT COVID-19 Rapid Screening    Suspected COVID-19 virus infection  -     X-Ray Chest PA And Lateral; Future; Expected date: 01/21/2022  -     POCT COVID-19 Rapid Screening    Cough  -     X-Ray Chest PA And Lateral; Future; Expected date: 01/21/2022      Despite negative, still suspect COVID infection    Suspect symptoms will continue to subside, symptomatic care    Will r/o other underlying cause of fever    Discussed worsening signs/symptoms and when to return to clinic or go to ED.   Patient expresses understanding and agrees with treatment plan.      daily

## 2022-01-23 LAB — BACTERIA UR CULT: NO GROWTH
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
## 2022-01-25 ENCOUNTER — OFFICE VISIT (OUTPATIENT)
Dept: PSYCHIATRY | Facility: CLINIC | Age: 32
End: 2022-01-25
Payer: COMMERCIAL

## 2022-01-25 DIAGNOSIS — F43.10 PTSD (POST-TRAUMATIC STRESS DISORDER): Primary | ICD-10-CM

## 2022-01-25 DIAGNOSIS — F41.9 ANXIETY: ICD-10-CM

## 2022-01-25 PROCEDURE — 90837 PR PSYCHOTHERAPY W/PATIENT, 60 MIN: ICD-10-PCS | Mod: 95,,, | Performed by: PSYCHOLOGIST

## 2022-01-25 PROCEDURE — 90837 PSYTX W PT 60 MINUTES: CPT | Mod: 95,,, | Performed by: PSYCHOLOGIST
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
## 2022-02-01 ENCOUNTER — OFFICE VISIT (OUTPATIENT)
Dept: PSYCHIATRY | Facility: CLINIC | Age: 32
End: 2022-02-01
Payer: COMMERCIAL

## 2022-02-01 DIAGNOSIS — F41.9 ANXIETY: ICD-10-CM

## 2022-02-01 DIAGNOSIS — F43.10 PTSD (POST-TRAUMATIC STRESS DISORDER): Primary | ICD-10-CM

## 2022-02-01 PROCEDURE — 90837 PSYTX W PT 60 MINUTES: CPT | Mod: 95,,, | Performed by: PSYCHOLOGIST

## 2022-02-01 PROCEDURE — 90837 PR PSYCHOTHERAPY W/PATIENT, 60 MIN: ICD-10-PCS | Mod: 95,,, | Performed by: PSYCHOLOGIST
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
## 2022-02-08 ENCOUNTER — OFFICE VISIT (OUTPATIENT)
Dept: PSYCHIATRY | Facility: CLINIC | Age: 32
End: 2022-02-08
Payer: COMMERCIAL

## 2022-02-08 DIAGNOSIS — F43.10 PTSD (POST-TRAUMATIC STRESS DISORDER): Primary | ICD-10-CM

## 2022-02-08 DIAGNOSIS — F41.9 ANXIETY: ICD-10-CM

## 2022-02-08 PROCEDURE — 90837 PSYTX W PT 60 MINUTES: CPT | Mod: 95,,, | Performed by: PSYCHOLOGIST

## 2022-02-08 PROCEDURE — 90837 PR PSYCHOTHERAPY W/PATIENT, 60 MIN: ICD-10-PCS | Mod: 95,,, | Performed by: PSYCHOLOGIST
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
## 2022-02-15 ENCOUNTER — OFFICE VISIT (OUTPATIENT)
Dept: PSYCHIATRY | Facility: CLINIC | Age: 32
End: 2022-02-15
Payer: COMMERCIAL

## 2022-02-15 DIAGNOSIS — F43.10 PTSD (POST-TRAUMATIC STRESS DISORDER): Primary | ICD-10-CM

## 2022-02-15 DIAGNOSIS — F41.9 ANXIETY: ICD-10-CM

## 2022-02-15 PROCEDURE — 90837 PSYTX W PT 60 MINUTES: CPT | Mod: 95,,, | Performed by: PSYCHOLOGIST

## 2022-02-15 PROCEDURE — 90837 PR PSYCHOTHERAPY W/PATIENT, 60 MIN: ICD-10-PCS | Mod: 95,,, | Performed by: PSYCHOLOGIST

## 2022-02-17 ENCOUNTER — PATIENT MESSAGE (OUTPATIENT)
Dept: PSYCHIATRY | Facility: CLINIC | Age: 32
End: 2022-02-17
Payer: COMMERCIAL
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
## 2022-02-22 ENCOUNTER — OFFICE VISIT (OUTPATIENT)
Dept: PSYCHIATRY | Facility: CLINIC | Age: 32
End: 2022-02-22
Payer: COMMERCIAL

## 2022-02-22 ENCOUNTER — PATIENT MESSAGE (OUTPATIENT)
Dept: PSYCHIATRY | Facility: CLINIC | Age: 32
End: 2022-02-22
Payer: COMMERCIAL

## 2022-02-22 DIAGNOSIS — F43.10 PTSD (POST-TRAUMATIC STRESS DISORDER): Primary | ICD-10-CM

## 2022-02-22 DIAGNOSIS — F41.9 ANXIETY: ICD-10-CM

## 2022-02-22 PROCEDURE — 90837 PSYTX W PT 60 MINUTES: CPT | Mod: 95,,, | Performed by: PSYCHOLOGIST

## 2022-02-22 PROCEDURE — 90837 PR PSYCHOTHERAPY W/PATIENT, 60 MIN: ICD-10-PCS | Mod: 95,,, | Performed by: PSYCHOLOGIST

## 2022-02-25 ENCOUNTER — PATIENT MESSAGE (OUTPATIENT)
Dept: INTERNAL MEDICINE | Facility: CLINIC | Age: 32
End: 2022-02-25
Payer: COMMERCIAL

## 2022-02-25 DIAGNOSIS — F98.8 ATTENTION DEFICIT DISORDER, UNSPECIFIED HYPERACTIVITY PRESENCE: ICD-10-CM

## 2022-02-25 RX ORDER — DEXTROAMPHETAMINE SACCHARATE, AMPHETAMINE ASPARTATE MONOHYDRATE, DEXTROAMPHETAMINE SULFATE AND AMPHETAMINE SULFATE 5; 5; 5; 5 MG/1; MG/1; MG/1; MG/1
20 CAPSULE, EXTENDED RELEASE ORAL EVERY MORNING
Qty: 30 CAPSULE | Refills: 0 | Status: SHIPPED | OUTPATIENT
Start: 2022-02-25 | End: 2022-03-28 | Stop reason: SDUPTHER

## 2022-02-25 NOTE — TELEPHONE ENCOUNTER
No new care gaps identified.  Powered by [employer]. Reference number: [id number].   2/25/2022 4:06:16 PM CST

## 2022-03-28 ENCOUNTER — OFFICE VISIT (OUTPATIENT)
Dept: INTERNAL MEDICINE | Facility: CLINIC | Age: 32
End: 2022-03-28
Payer: COMMERCIAL

## 2022-03-28 DIAGNOSIS — E04.1 THYROID NODULE: ICD-10-CM

## 2022-03-28 DIAGNOSIS — F32.A ANXIETY AND DEPRESSION: ICD-10-CM

## 2022-03-28 DIAGNOSIS — F41.9 ANXIETY AND DEPRESSION: ICD-10-CM

## 2022-03-28 DIAGNOSIS — F41.8 DEPRESSION WITH ANXIETY: ICD-10-CM

## 2022-03-28 DIAGNOSIS — F98.8 ATTENTION DEFICIT DISORDER, UNSPECIFIED HYPERACTIVITY PRESENCE: Primary | ICD-10-CM

## 2022-03-28 DIAGNOSIS — F98.8 ATTENTION DEFICIT DISORDER, UNSPECIFIED HYPERACTIVITY PRESENCE: ICD-10-CM

## 2022-03-28 DIAGNOSIS — N94.3 PMS (PREMENSTRUAL SYNDROME): ICD-10-CM

## 2022-03-28 DIAGNOSIS — Z11.3 ROUTINE SCREENING FOR STI (SEXUALLY TRANSMITTED INFECTION): ICD-10-CM

## 2022-03-28 PROCEDURE — 99214 OFFICE O/P EST MOD 30 MIN: CPT | Mod: 95,,, | Performed by: FAMILY MEDICINE

## 2022-03-28 PROCEDURE — 99214 PR OFFICE/OUTPT VISIT, EST, LEVL IV, 30-39 MIN: ICD-10-PCS | Mod: 95,,, | Performed by: FAMILY MEDICINE

## 2022-03-28 RX ORDER — SERTRALINE HYDROCHLORIDE 50 MG/1
50 TABLET, FILM COATED ORAL DAILY
Qty: 90 TABLET | Refills: 3 | Status: SHIPPED | OUTPATIENT
Start: 2022-03-28 | End: 2023-01-06

## 2022-03-28 RX ORDER — DEXTROAMPHETAMINE SACCHARATE, AMPHETAMINE ASPARTATE, DEXTROAMPHETAMINE SULFATE AND AMPHETAMINE SULFATE 2.5; 2.5; 2.5; 2.5 MG/1; MG/1; MG/1; MG/1
10 TABLET ORAL DAILY
Qty: 30 TABLET | Refills: 0 | Status: CANCELLED | OUTPATIENT
Start: 2022-03-28

## 2022-03-28 RX ORDER — DEXTROAMPHETAMINE SACCHARATE, AMPHETAMINE ASPARTATE, DEXTROAMPHETAMINE SULFATE AND AMPHETAMINE SULFATE 2.5; 2.5; 2.5; 2.5 MG/1; MG/1; MG/1; MG/1
10 TABLET ORAL DAILY
Qty: 30 TABLET | Refills: 0 | Status: SHIPPED | OUTPATIENT
Start: 2022-03-28 | End: 2022-04-28 | Stop reason: SDUPTHER

## 2022-03-28 RX ORDER — DEXTROAMPHETAMINE SACCHARATE, AMPHETAMINE ASPARTATE MONOHYDRATE, DEXTROAMPHETAMINE SULFATE AND AMPHETAMINE SULFATE 5; 5; 5; 5 MG/1; MG/1; MG/1; MG/1
20 CAPSULE, EXTENDED RELEASE ORAL EVERY MORNING
Qty: 30 CAPSULE | Refills: 0 | Status: SHIPPED | OUTPATIENT
Start: 2022-03-28 | End: 2022-04-28 | Stop reason: SDUPTHER

## 2022-03-28 RX ORDER — SERTRALINE HYDROCHLORIDE 100 MG/1
100 TABLET, FILM COATED ORAL DAILY
Qty: 90 TABLET | Refills: 2 | Status: SHIPPED | OUTPATIENT
Start: 2022-03-28 | End: 2022-09-30

## 2022-03-28 RX ORDER — DEXTROAMPHETAMINE SACCHARATE, AMPHETAMINE ASPARTATE MONOHYDRATE, DEXTROAMPHETAMINE SULFATE AND AMPHETAMINE SULFATE 5; 5; 5; 5 MG/1; MG/1; MG/1; MG/1
20 CAPSULE, EXTENDED RELEASE ORAL EVERY MORNING
Qty: 30 CAPSULE | Refills: 0 | Status: CANCELLED | OUTPATIENT
Start: 2022-03-28

## 2022-03-28 RX ORDER — VALACYCLOVIR HYDROCHLORIDE 500 MG/1
500 TABLET, FILM COATED ORAL DAILY
Qty: 90 TABLET | Refills: 3 | Status: SHIPPED | OUTPATIENT
Start: 2022-03-28 | End: 2023-01-18 | Stop reason: SDUPTHER

## 2022-03-28 RX ORDER — VILAZODONE HYDROCHLORIDE 40 MG/1
40 TABLET ORAL DAILY
Qty: 90 TABLET | Refills: 3 | Status: SHIPPED | OUTPATIENT
Start: 2022-03-28 | End: 2023-01-18 | Stop reason: SDUPTHER

## 2022-03-28 NOTE — TELEPHONE ENCOUNTER
No new care gaps identified.  Powered by [employer]. Reference number: [id number].   3/28/2022 9:58:16 AM CDT

## 2022-03-28 NOTE — PROGRESS NOTES
The patient location is: [place] ([facility])  The chief complaint leading to consultation is: med check, medication refills    Visit type: audiovisual    Face to Face time with patient: 8 minutes  8 minutes of total time spent on the encounter, which includes face to face time and non-face to face time preparing to see the patient (eg, review of tests), Obtaining and/or reviewing separately obtained history, Documenting clinical information in the electronic or other health record, Independently interpreting results (not separately reported) and communicating results to the patient/family/caregiver, or Care coordination (not separately reported).         Each patient to whom he or she provides medical services by telemedicine is:  (1) informed of the relationship between the physician and patient and the respective role of any other health care provider with respect to management of the patient; and (2) notified that he or she may decline to receive medical services by telemedicine and may withdraw from such care at any time.    Billie Nicolas  31 y.o. White female    No chief complaint on file.      HPI: Here today for ADHD follow up. Reports being stable on medication. Denies weight change, palpitations or abuse.    Changed jobs from hospital to clinic    Stable on medication for anxiety depression, would like refills on everything in her medication list    Current Outpatient Medications on File Prior to Visit:  dextroamphetamine-amphetamine (ADDERALL XR) 20 MG 24 hr capsule, Take 1 capsule (20 mg total) by mouth every morning., Disp: 30 capsule, Rfl: 0  dextroamphetamine-amphetamine (ADDERALL) 10 mg Tab, Take 1 tablet (10 mg total) by mouth once daily., Disp: 30 tablet, Rfl: 0  metroNIDAZOLE (METROGEL) 0.75 % vaginal gel, Place 1 applicatorful vaginally 2 (two) times daily., Disp: 70 g, Rfl: 2  sertraline (ZOLOFT) 100 MG tablet, Take 1 tablet (100 mg total) by mouth once daily., Disp: 90 tablet, Rfl:  2  sertraline (ZOLOFT) 50 MG tablet, Take 1 tablet (50 mg total) by mouth once daily., Disp: 90 tablet, Rfl: 3  valACYclovir (VALTREX) 500 MG tablet, TAKE ONE TABLET BY MOUTH EVERY DAY, Disp: 90 tablet, Rfl: 3  vilazodone (VIIBRYD) 40 mg Tab tablet, Take 1 tablet (40 mg total) by mouth once daily., Disp: 90 tablet, Rfl: 3    No current facility-administered medications on file prior to visit.      Allergies:  Review of patient's allergies indicates:   -- Sulfa (sulfonamide antibiotics) -- Hives   -- Sulfamethoxazole-trimethoprim     PHYSICAL EXAM:    GENERAL: Pleasant, no acute distress  LUNGS: Unlabored respirations    ASSESSMENT/PLAN:  Attention deficit disorder, unspecified hyperactivity presence  -     dextroamphetamine-amphetamine (ADDERALL) 10 mg Tab; Take 1 tablet (10 mg total) by mouth once daily.  Dispense: 30 tablet; Refill: 0  -     dextroamphetamine-amphetamine (ADDERALL XR) 20 MG 24 hr capsule; Take 1 capsule (20 mg total) by mouth every morning.  Dispense: 30 capsule; Refill: 0    Anxiety and depression  -     vilazodone (VIIBRYD) 40 mg Tab tablet; Take 1 tablet (40 mg total) by mouth once daily.  Dispense: 90 tablet; Refill: 3    PMS (premenstrual syndrome)  -     sertraline (ZOLOFT) 100 MG tablet; Take 1 tablet (100 mg total) by mouth once daily.  Dispense: 90 tablet; Refill: 2    Depression with anxiety  -     sertraline (ZOLOFT) 100 MG tablet; Take 1 tablet (100 mg total) by mouth once daily.  Dispense: 90 tablet; Refill: 2    Thyroid nodule  -     TSH; Future; Expected date: 03/28/2022    Routine screening for STI (sexually transmitted infection)  -     Hepatitis B e Antigen; Future; Expected date: 03/28/2022  -     Hepatitis B Surface Antigen; Future; Expected date: 03/28/2022  -     RPR; Future; Expected date: 03/28/2022  -     HIV 1/2 Ag/Ab (4th Gen); Future; Expected date: 03/28/2022  -     C. trachomatis/N. gonorrhoeae by AMP DNA [place]; Urine; Future; Expected date: 03/28/2022    Other  orders  -     valACYclovir (VALTREX) 500 MG tablet; TAKE ONE TABLET BY MOUTH EVERY DAY  Dispense: 90 tablet; Refill: 3  -     sertraline (ZOLOFT) 50 MG tablet; Take 1 tablet (50 mg total) by mouth once daily.  Dispense: 90 tablet; Refill: 3          RTC: 3 months

## 2022-04-12 ENCOUNTER — OFFICE VISIT (OUTPATIENT)
Dept: PRIMARY CARE CLINIC | Facility: CLINIC | Age: 32
End: 2022-04-12
Payer: COMMERCIAL

## 2022-04-12 DIAGNOSIS — J01.00 ACUTE MAXILLARY SINUSITIS, RECURRENCE NOT SPECIFIED: Primary | ICD-10-CM

## 2022-04-12 PROCEDURE — 99213 PR OFFICE/OUTPT VISIT, EST, LEVL III, 20-29 MIN: ICD-10-PCS | Mod: 95,,, | Performed by: NURSE PRACTITIONER

## 2022-04-12 PROCEDURE — 99213 OFFICE O/P EST LOW 20 MIN: CPT | Mod: 95,,, | Performed by: NURSE PRACTITIONER

## 2022-04-12 RX ORDER — DOXYCYCLINE 100 MG/1
100 CAPSULE ORAL EVERY 12 HOURS
Qty: 14 CAPSULE | Refills: 0 | Status: SHIPPED | OUTPATIENT
Start: 2022-04-12 | End: 2022-04-19

## 2022-04-12 RX ORDER — AZITHROMYCIN 500 MG/1
500 TABLET, FILM COATED ORAL DAILY
Qty: 5 TABLET | Refills: 0 | Status: SHIPPED | OUTPATIENT
Start: 2022-04-12 | End: 2022-04-17

## 2022-04-12 NOTE — PROGRESS NOTES
Subjective:       Patient ID: Billie Nicolas is a 31 y.o. female.    Chief Complaint: Sinus pressure and tenderness   The patient location is: [place]  Visit type: audiovisual    Face to Face time with patient: 11 min  12  minutes of total time spent on the encounter, which includes face to face time and non-face to face time preparing to see the patient (eg, review of tests), Obtaining and/or reviewing separately obtained history, Documenting clinical information in the electronic or other health record, Independently interpreting results (not separately reported) and communicating results to the patient/family/caregiver, or Care coordination (not separately reported).         Each patient to whom he or she provides medical services by telemedicine is:  (1) informed of the relationship between the physician and patient and the respective role of any other health care provider with respect to management of the patient; and (2) notified that he or she may decline to receive medical services by telemedicine and may withdraw from such care at any time.    Notes:   History of Present Illness:   Billie Nicolas 31 y.o. female consulted today with reports of sinus pressure and tenderness that has been present for about 2 weeks with no improvement with self treatment. Denies any other problems or concerns at this time.     Past Medical History:   Diagnosis Date    Abnormal cervical Pap smear with positive HPV DNA test     ADHD, predominantly inattentive type     Allergy     Anxiety     Asthma     Bulimia     Major depression, recurrent     Migraine headache      Family History   Problem Relation Age of Onset    Cancer Mother         cervical    Heart disease Mother     Mitral valve prolapse Mother     Hypertension Mother     Hypertension Maternal Grandmother     Heart disease Maternal Grandfather         Passed at 54 yoa    Hodgkin's lymphoma Maternal Grandfather     Cancer  Maternal Grandfather     Hypertension Maternal Grandfather     Diabetes Sister     Melanoma Neg Hx      Social History     Socioeconomic History    Marital status: Significant Other    Number of children: 0   Occupational History    Occupation: Pharmacy Intern   Tobacco Use    Smoking status: Never Smoker    Smokeless tobacco: Never Used   Substance and Sexual Activity    Alcohol use: Yes     Comment: rarely     Drug use: No    Sexual activity: Yes     Partners: Male   Other Topics Concern    Are you pregnant or think you may be? No    Breast-feeding No     Social Determinants of Health     Financial Resource Strain: Low Risk     Difficulty of Paying Living Expenses: Not hard at all   Food Insecurity: No Food Insecurity    Worried About Running Out of Food in the Last Year: Never true    Ran Out of Food in the Last Year: Never true   Transportation Needs: No Transportation Needs    Lack of Transportation (Medical): No    Lack of Transportation (Non-Medical): No   Physical Activity: Insufficiently Active    Days of Exercise per Week: 3 days    Minutes of Exercise per Session: 20 min   Stress: Stress Concern Present    Feeling of Stress : To some extent   Social Connections: Unknown    Frequency of Communication with Friends and Family: Three times a week    Frequency of Social Gatherings with Friends and Family: Once a week    Active Member of Clubs or Organizations: Yes    Attends Club or Organization Meetings: 1 to 4 times per year    Marital Status: Living with partner   Housing Stability: Low Risk     Unable to Pay for Housing in the Last Year: No    Number of Places Lived in the Last Year: 1    Unstable Housing in the Last Year: No     Outpatient Encounter Medications as of 4/12/2022   Medication Sig Dispense Refill    dextroamphetamine-amphetamine (ADDERALL XR) 20 MG 24 hr capsule Take 1 capsule (20 mg total) by mouth every morning. 30 capsule 0    dextroamphetamine-amphetamine  (ADDERALL) 10 mg Tab Take 1 tablet (10 mg total) by mouth once daily. 30 tablet 0    metroNIDAZOLE (METROGEL) 0.75 % vaginal gel Place 1 applicatorful vaginally 2 (two) times daily. 70 g 2    sertraline (ZOLOFT) 100 MG tablet Take 1 tablet (100 mg total) by mouth once daily. 90 tablet 2    sertraline (ZOLOFT) 50 MG tablet Take 1 tablet (50 mg total) by mouth once daily. 90 tablet 3    valACYclovir (VALTREX) 500 MG tablet TAKE ONE TABLET BY MOUTH EVERY DAY 90 tablet 3    vilazodone (VIIBRYD) 40 mg Tab tablet Take 1 tablet (40 mg total) by mouth once daily. 90 tablet 3     No facility-administered encounter medications on file as of 4/12/2022.       Review of Systems   HENT: Positive for rhinorrhea and sore throat. Negative for ear discharge and hearing loss.    Respiratory: Positive for cough.    Gastrointestinal: Positive for abdominal pain and diarrhea. Negative for vomiting.   Musculoskeletal: Negative for neck pain.   Skin: Negative for rash.   Neurological: Positive for headaches.       Objective:      There were no vitals taken for this visit.  Physical Exam  Constitutional:       Appearance: Normal appearance. She is not ill-appearing or toxic-appearing.   Neurological:      Mental Status: She is alert.         Results for orders placed or performed in visit on 01/21/22   CBC Auto Differential   Result Value Ref Range    WBC 4.99 3.90 - 12.70 K/uL    RBC 4.39 4.00 - 5.40 M/uL    Hemoglobin 13.1 12.0 - 16.0 g/dL    Hematocrit 41.0 37.0 - 48.5 %    MCV 93 82 - 98 fL    MCH 29.8 27.0 - 31.0 pg    MCHC 32.0 32.0 - 36.0 g/dL    RDW 13.3 11.5 - 14.5 %    Platelets 298 150 - 450 K/uL    MPV 9.0 (L) 9.2 - 12.9 fL    Immature Granulocytes 0.6 (H) 0.0 - 0.5 %    Gran # (ANC) 2.8 1.8 - 7.7 K/uL    Immature Grans (Abs) 0.03 0.00 - 0.04 K/uL    Lymph # 1.6 1.0 - 4.8 K/uL    Mono # 0.4 0.3 - 1.0 K/uL    Eos # 0.1 0.0 - 0.5 K/uL    Baso # 0.03 0.00 - 0.20 K/uL    nRBC 0 0 /100 WBC    Gran % 56.5 38.0 - 73.0 %    Lymph %  31.5 18.0 - 48.0 %    Mono % 8.4 4.0 - 15.0 %    Eosinophil % 2.4 0.0 - 8.0 %    Basophil % 0.6 0.0 - 1.9 %    Differential Method Automated    Comprehensive Metabolic Panel   Result Value Ref Range    Sodium 139 136 - 145 mmol/L    Potassium 4.0 3.5 - 5.1 mmol/L    Chloride 106 95 - 110 mmol/L    CO2 25 23 - 29 mmol/L    Glucose 73 70 - 110 mg/dL    BUN 13 6 - 20 mg/dL    Creatinine 0.9 0.5 - 1.4 mg/dL    Calcium 9.2 8.7 - 10.5 mg/dL    Total Protein 7.2 6.0 - 8.4 g/dL    Albumin 4.4 3.5 - 5.2 g/dL    Total Bilirubin 0.4 0.1 - 1.0 mg/dL    Alkaline Phosphatase 66 55 - 135 U/L    AST 17 10 - 40 U/L    ALT 14 10 - 44 U/L    Anion Gap 8 8 - 16 mmol/L    eGFR if African American >60 >60 mL/min/1.73 m^2    eGFR if non African American >60 >60 mL/min/1.73 m^2     Assessment:       No diagnosis found.    Plan:   There are no diagnoses linked to this encounter.         [facility]   [address] [place] [address] [address]  [place] [address]  Office [phone]  Fax [phone]

## 2022-04-13 ENCOUNTER — PATIENT MESSAGE (OUTPATIENT)
Dept: PSYCHIATRY | Facility: CLINIC | Age: 32
End: 2022-04-13
Payer: COMMERCIAL

## 2022-04-28 DIAGNOSIS — F98.8 ATTENTION DEFICIT DISORDER, UNSPECIFIED HYPERACTIVITY PRESENCE: ICD-10-CM

## 2022-04-28 NOTE — TELEPHONE ENCOUNTER
No new care gaps identified.  Powered by [employer]. Reference number: [id number].   4/28/2022 1:48:07 PM CDT

## 2022-04-29 RX ORDER — DEXTROAMPHETAMINE SACCHARATE, AMPHETAMINE ASPARTATE MONOHYDRATE, DEXTROAMPHETAMINE SULFATE AND AMPHETAMINE SULFATE 5; 5; 5; 5 MG/1; MG/1; MG/1; MG/1
20 CAPSULE, EXTENDED RELEASE ORAL EVERY MORNING
Qty: 30 CAPSULE | Refills: 0 | Status: SHIPPED | OUTPATIENT
Start: 2022-04-29 | End: 2022-05-30 | Stop reason: SDUPTHER

## 2022-04-29 RX ORDER — DEXTROAMPHETAMINE SACCHARATE, AMPHETAMINE ASPARTATE, DEXTROAMPHETAMINE SULFATE AND AMPHETAMINE SULFATE 2.5; 2.5; 2.5; 2.5 MG/1; MG/1; MG/1; MG/1
10 TABLET ORAL DAILY
Qty: 30 TABLET | Refills: 0 | Status: SHIPPED | OUTPATIENT
Start: 2022-04-29 | End: 2022-05-30 | Stop reason: SDUPTHER

## 2022-04-29 RX ORDER — DEXTROAMPHETAMINE SACCHARATE, AMPHETAMINE ASPARTATE, DEXTROAMPHETAMINE SULFATE AND AMPHETAMINE SULFATE 2.5; 2.5; 2.5; 2.5 MG/1; MG/1; MG/1; MG/1
10 TABLET ORAL DAILY
Qty: 30 TABLET | Refills: 0 | OUTPATIENT
Start: 2022-04-29

## 2022-04-29 RX ORDER — DEXTROAMPHETAMINE SACCHARATE, AMPHETAMINE ASPARTATE MONOHYDRATE, DEXTROAMPHETAMINE SULFATE AND AMPHETAMINE SULFATE 5; 5; 5; 5 MG/1; MG/1; MG/1; MG/1
20 CAPSULE, EXTENDED RELEASE ORAL EVERY MORNING
Qty: 30 CAPSULE | Refills: 0 | OUTPATIENT
Start: 2022-04-29

## 2022-05-04 ENCOUNTER — PATIENT MESSAGE (OUTPATIENT)
Dept: INTERNAL MEDICINE | Facility: CLINIC | Age: 32
End: 2022-05-04
Payer: COMMERCIAL

## 2022-05-04 DIAGNOSIS — Z30.431 FAMILY PLANNING, IUD (INTRAUTERINE DEVICE) CHECK/REINSERTION/REMOVAL: Primary | ICD-10-CM

## 2022-05-05 ENCOUNTER — PATIENT MESSAGE (OUTPATIENT)
Dept: INTERNAL MEDICINE | Facility: CLINIC | Age: 32
End: 2022-05-05
Payer: COMMERCIAL

## 2022-05-10 ENCOUNTER — TELEPHONE (OUTPATIENT)
Dept: OBSTETRICS AND GYNECOLOGY | Facility: CLINIC | Age: 32
End: 2022-05-10
Payer: COMMERCIAL

## 2022-05-10 ENCOUNTER — OFFICE VISIT (OUTPATIENT)
Dept: OBSTETRICS AND GYNECOLOGY | Facility: CLINIC | Age: 32
End: 2022-05-10
Payer: COMMERCIAL

## 2022-05-10 VITALS
HEIGHT: 67 IN | WEIGHT: 186.31 LBS | BODY MASS INDEX: 29.24 KG/M2 | SYSTOLIC BLOOD PRESSURE: 152 MMHG | DIASTOLIC BLOOD PRESSURE: 82 MMHG

## 2022-05-10 DIAGNOSIS — Z30.431 FAMILY PLANNING, IUD (INTRAUTERINE DEVICE) CHECK/REINSERTION/REMOVAL: ICD-10-CM

## 2022-05-10 DIAGNOSIS — Z30.014 ENCOUNTER FOR INITIAL PRESCRIPTION OF INTRAUTERINE CONTRACEPTIVE DEVICE (IUD): Primary | ICD-10-CM

## 2022-05-10 PROCEDURE — 3077F PR MOST RECENT SYSTOLIC BLOOD PRESSURE >= 140 MM HG: ICD-10-PCS | Mod: CPTII,S$GLB,, | Performed by: NURSE PRACTITIONER

## 2022-05-10 PROCEDURE — 1159F PR MEDICATION LIST DOCUMENTED IN MEDICAL RECORD: ICD-10-PCS | Mod: CPTII,S$GLB,, | Performed by: NURSE PRACTITIONER

## 2022-05-10 PROCEDURE — 3079F DIAST BP 80-89 MM HG: CPT | Mod: CPTII,S$GLB,, | Performed by: NURSE PRACTITIONER

## 2022-05-10 PROCEDURE — 1160F RVW MEDS BY RX/DR IN RCRD: CPT | Mod: CPTII,S$GLB,, | Performed by: NURSE PRACTITIONER

## 2022-05-10 PROCEDURE — 3008F PR BODY MASS INDEX (BMI) DOCUMENTED: ICD-10-PCS | Mod: CPTII,S$GLB,, | Performed by: NURSE PRACTITIONER

## 2022-05-10 PROCEDURE — 3079F PR MOST RECENT DIASTOLIC BLOOD PRESSURE 80-89 MM HG: ICD-10-PCS | Mod: CPTII,S$GLB,, | Performed by: NURSE PRACTITIONER

## 2022-05-10 PROCEDURE — 3008F BODY MASS INDEX DOCD: CPT | Mod: CPTII,S$GLB,, | Performed by: NURSE PRACTITIONER

## 2022-05-10 PROCEDURE — 99202 OFFICE O/P NEW SF 15 MIN: CPT | Mod: S$GLB,,, | Performed by: NURSE PRACTITIONER

## 2022-05-10 PROCEDURE — 99999 PR PBB SHADOW E&M-EST. PATIENT-LVL III: ICD-10-PCS | Mod: PBBFAC,,, | Performed by: NURSE PRACTITIONER

## 2022-05-10 PROCEDURE — 99999 PR PBB SHADOW E&M-EST. PATIENT-LVL III: CPT | Mod: PBBFAC,,, | Performed by: NURSE PRACTITIONER

## 2022-05-10 PROCEDURE — 99202 PR OFFICE/OUTPT VISIT, NEW, LEVL II, 15-29 MIN: ICD-10-PCS | Mod: S$GLB,,, | Performed by: NURSE PRACTITIONER

## 2022-05-10 PROCEDURE — 1160F PR REVIEW ALL MEDS BY PRESCRIBER/CLIN PHARMACIST DOCUMENTED: ICD-10-PCS | Mod: CPTII,S$GLB,, | Performed by: NURSE PRACTITIONER

## 2022-05-10 PROCEDURE — 1159F MED LIST DOCD IN RCRD: CPT | Mod: CPTII,S$GLB,, | Performed by: NURSE PRACTITIONER

## 2022-05-10 PROCEDURE — 3077F SYST BP >= 140 MM HG: CPT | Mod: CPTII,S$GLB,, | Performed by: NURSE PRACTITIONER

## 2022-05-10 NOTE — PROGRESS NOTES
Chief Complaint   Patient presents with    Contraception        Billie Yamilka Nicolas is a 31 y.o. female No obstetric history on file.   Desires Paragard iud insertion     Past Medical History:   Diagnosis Date    Abnormal cervical Pap smear with positive HPV DNA test     ADHD, predominantly inattentive type     Allergy     Anxiety     Asthma     Bulimia     Major depression, recurrent     Migraine headache      Past Surgical History:   Procedure Laterality Date    ABSCESS DRAINAGE  12/25/2017    abscess lanced     TONSILLECTOMY      WISDOM TOOTH EXTRACTION       Social History     Tobacco Use    Smoking status: Never Smoker    Smokeless tobacco: Never Used   Substance Use Topics    Alcohol use: Yes     Comment: rarely     Drug use: No     Family History   Problem Relation Age of Onset    Cancer Mother         cervical    Heart disease Mother     Mitral valve prolapse Mother     Hypertension Mother     Hypertension Maternal Grandmother     Heart disease Maternal Grandfather         Passed at 54 yoa    Hodgkin's lymphoma Maternal Grandfather     Cancer Maternal Grandfather     Hypertension Maternal Grandfather     Diabetes Sister     Melanoma Neg Hx      OB History   No obstetric history on file.       Medication List with Changes/Refills   Current Medications    DEXTROAMPHETAMINE-AMPHETAMINE (ADDERALL XR) 20 MG 24 HR CAPSULE    Take 1 capsule (20 mg total) by mouth every morning.    DEXTROAMPHETAMINE-AMPHETAMINE (ADDERALL) 10 MG TAB    Take 1 tablet (10 mg total) by mouth once daily.    METRONIDAZOLE (METROGEL) 0.75 % VAGINAL GEL    Place 1 applicatorful vaginally 2 (two) times daily.    SERTRALINE (ZOLOFT) 100 MG TABLET    Take 1 tablet (100 mg total) by mouth once daily.    SERTRALINE (ZOLOFT) 50 MG TABLET    Take 1 tablet (50 mg total) by mouth once daily.    VALACYCLOVIR (VALTREX) 500 MG TABLET    TAKE ONE TABLET BY MOUTH EVERY DAY    VILAZODONE (VIIBRYD) 40 MG TAB TABLET     "Take 1 tablet (40 mg total) by mouth once daily.      BP (!) 152/82   Ht 5' 7" (1.702 m)   Wt 84.5 kg (186 lb 4.6 oz)   LMP 05/03/2022   BMI 29.18 kg/m²     ROS:  Review of Systems      PHYSICAL EXAM:  OBGyn Exam        ASSESSMENT and PLAN:    ICD-10-CM ICD-9-CM    1. Encounter for initial prescription of intrauterine contraceptive device (IUD)  Z30.014 V25.02 Device Authorization Order   2. Family planning, IUD (intrauterine device) check/reinsertion/removal  Z30.431 V25.42 Ambulatory referral/consult to Obstetrics / Gynecology       Vera Lagos NP     "

## 2022-05-10 NOTE — TELEPHONE ENCOUNTER
----- Message from Kavitha Bo sent at 5/10/2022 12:53 PM CDT -----  Contact: Billie  .Type:  Same Day Appointment Request    Caller is requesting a same day appointment.  Caller declined first available appointment listed below.    Name of Caller:Billie  When is the first available appointment?5/16/22  Symptoms:to establish care/ birth control consult  Best Call Back Number:.[phone]   Additional Information: Patient reports having a previous appointment for 3:00 pm virtual and could come earlier for appointment. Please give patient a call back to notify.  Thank you,  GH

## 2022-05-13 ENCOUNTER — PATIENT MESSAGE (OUTPATIENT)
Dept: INTERNAL MEDICINE | Facility: CLINIC | Age: 32
End: 2022-05-13
Payer: COMMERCIAL

## 2022-05-13 ENCOUNTER — PATIENT MESSAGE (OUTPATIENT)
Dept: OBSTETRICS AND GYNECOLOGY | Facility: CLINIC | Age: 32
End: 2022-05-13
Payer: COMMERCIAL

## 2022-05-13 RX ORDER — ALPRAZOLAM 0.5 MG/1
0.5 TABLET ORAL ONCE
Qty: 1 TABLET | Refills: 0 | Status: SHIPPED | OUTPATIENT
Start: 2022-05-13 | End: 2022-09-30

## 2022-05-24 ENCOUNTER — PATIENT MESSAGE (OUTPATIENT)
Dept: OBSTETRICS AND GYNECOLOGY | Facility: CLINIC | Age: 32
End: 2022-05-24
Payer: COMMERCIAL

## 2022-05-30 DIAGNOSIS — F98.8 ATTENTION DEFICIT DISORDER, UNSPECIFIED HYPERACTIVITY PRESENCE: ICD-10-CM

## 2022-05-30 RX ORDER — DEXTROAMPHETAMINE SACCHARATE, AMPHETAMINE ASPARTATE, DEXTROAMPHETAMINE SULFATE AND AMPHETAMINE SULFATE 2.5; 2.5; 2.5; 2.5 MG/1; MG/1; MG/1; MG/1
10 TABLET ORAL DAILY
Qty: 30 TABLET | Refills: 0 | Status: SHIPPED | OUTPATIENT
Start: 2022-05-30 | End: 2022-06-28 | Stop reason: SDUPTHER

## 2022-05-30 RX ORDER — DEXTROAMPHETAMINE SACCHARATE, AMPHETAMINE ASPARTATE MONOHYDRATE, DEXTROAMPHETAMINE SULFATE AND AMPHETAMINE SULFATE 5; 5; 5; 5 MG/1; MG/1; MG/1; MG/1
20 CAPSULE, EXTENDED RELEASE ORAL EVERY MORNING
Qty: 30 CAPSULE | Refills: 0 | Status: SHIPPED | OUTPATIENT
Start: 2022-05-30 | End: 2022-06-28 | Stop reason: SDUPTHER

## 2022-05-30 NOTE — TELEPHONE ENCOUNTER
No new care gaps identified.  [facility] Embedded Care Gaps. Reference number: [id number]. 5/30/2022   6:46:11 AM ALESSANDRAT

## 2022-06-02 ENCOUNTER — PATIENT MESSAGE (OUTPATIENT)
Dept: INTERNAL MEDICINE | Facility: CLINIC | Age: 32
End: 2022-06-02
Payer: COMMERCIAL

## 2022-06-02 ENCOUNTER — PATIENT MESSAGE (OUTPATIENT)
Dept: PSYCHIATRY | Facility: CLINIC | Age: 32
End: 2022-06-02
Payer: COMMERCIAL

## 2022-06-02 DIAGNOSIS — Z23 NEED FOR HPV VACCINE: Primary | ICD-10-CM

## 2022-06-03 ENCOUNTER — CLINICAL SUPPORT (OUTPATIENT)
Dept: INTERNAL MEDICINE | Facility: CLINIC | Age: 32
End: 2022-06-03
Payer: COMMERCIAL

## 2022-06-03 DIAGNOSIS — Z23 NEED FOR HPV VACCINE: ICD-10-CM

## 2022-06-03 PROCEDURE — 99999 PR PBB SHADOW E&M-EST. PATIENT-LVL II: CPT | Mod: PBBFAC,,,

## 2022-06-03 PROCEDURE — 90651 9VHPV VACCINE 2/3 DOSE IM: CPT | Mod: S$GLB,,, | Performed by: FAMILY MEDICINE

## 2022-06-03 PROCEDURE — 90471 HPV VACCINE 9-VALENT 3 DOSE IM: ICD-10-PCS | Mod: S$GLB,,, | Performed by: FAMILY MEDICINE

## 2022-06-03 PROCEDURE — 90651 HPV VACCINE 9-VALENT 3 DOSE IM: ICD-10-PCS | Mod: S$GLB,,, | Performed by: FAMILY MEDICINE

## 2022-06-03 PROCEDURE — 99999 PR PBB SHADOW E&M-EST. PATIENT-LVL II: ICD-10-PCS | Mod: PBBFAC,,,

## 2022-06-03 PROCEDURE — 90471 IMMUNIZATION ADMIN: CPT | Mod: S$GLB,,, | Performed by: FAMILY MEDICINE

## 2022-06-03 NOTE — PROGRESS NOTES
Pt here for HPV injection   Injection given as ordered  Tolerated well  Advised pt to wait 15 min after injection   Pt verbalized understanding

## 2022-06-28 DIAGNOSIS — F98.8 ATTENTION DEFICIT DISORDER, UNSPECIFIED HYPERACTIVITY PRESENCE: ICD-10-CM

## 2022-06-28 RX ORDER — DEXTROAMPHETAMINE SACCHARATE, AMPHETAMINE ASPARTATE MONOHYDRATE, DEXTROAMPHETAMINE SULFATE AND AMPHETAMINE SULFATE 5; 5; 5; 5 MG/1; MG/1; MG/1; MG/1
20 CAPSULE, EXTENDED RELEASE ORAL EVERY MORNING
Qty: 30 CAPSULE | Refills: 0 | Status: SHIPPED | OUTPATIENT
Start: 2022-06-28 | End: 2022-08-01 | Stop reason: SDUPTHER

## 2022-06-28 RX ORDER — DEXTROAMPHETAMINE SACCHARATE, AMPHETAMINE ASPARTATE, DEXTROAMPHETAMINE SULFATE AND AMPHETAMINE SULFATE 2.5; 2.5; 2.5; 2.5 MG/1; MG/1; MG/1; MG/1
10 TABLET ORAL DAILY
Qty: 30 TABLET | Refills: 0 | Status: SHIPPED | OUTPATIENT
Start: 2022-06-28 | End: 2022-08-01 | Stop reason: SDUPTHER

## 2022-06-28 NOTE — TELEPHONE ENCOUNTER
No new care gaps identified.  [facility] Embedded Care Gaps. Reference number: [id number]. 6/28/2022   9:47:58 AM ALESSANDRAT

## 2022-06-29 ENCOUNTER — PATIENT MESSAGE (OUTPATIENT)
Dept: INTERNAL MEDICINE | Facility: CLINIC | Age: 32
End: 2022-06-29

## 2022-06-29 ENCOUNTER — LAB VISIT (OUTPATIENT)
Dept: LAB | Facility: HOSPITAL | Age: 32
End: 2022-06-29
Attending: FAMILY MEDICINE
Payer: COMMERCIAL

## 2022-06-29 ENCOUNTER — OFFICE VISIT (OUTPATIENT)
Dept: INTERNAL MEDICINE | Facility: CLINIC | Age: 32
End: 2022-06-29
Payer: COMMERCIAL

## 2022-06-29 VITALS — DIASTOLIC BLOOD PRESSURE: 78 MMHG | SYSTOLIC BLOOD PRESSURE: 124 MMHG

## 2022-06-29 DIAGNOSIS — Z11.3 ROUTINE SCREENING FOR STI (SEXUALLY TRANSMITTED INFECTION): ICD-10-CM

## 2022-06-29 DIAGNOSIS — Z30.9 ENCOUNTER FOR CONTRACEPTIVE MANAGEMENT, UNSPECIFIED TYPE: ICD-10-CM

## 2022-06-29 DIAGNOSIS — N64.4 BREAST TENDERNESS IN FEMALE: ICD-10-CM

## 2022-06-29 DIAGNOSIS — E04.1 THYROID NODULE: ICD-10-CM

## 2022-06-29 DIAGNOSIS — N64.4 BREAST TENDERNESS IN FEMALE: Primary | ICD-10-CM

## 2022-06-29 PROCEDURE — 83001 ASSAY OF GONADOTROPIN (FSH): CPT | Performed by: FAMILY MEDICINE

## 2022-06-29 PROCEDURE — 87389 HIV-1 AG W/HIV-1&-2 AB AG IA: CPT | Performed by: FAMILY MEDICINE

## 2022-06-29 PROCEDURE — 84702 CHORIONIC GONADOTROPIN TEST: CPT | Performed by: FAMILY MEDICINE

## 2022-06-29 PROCEDURE — 87340 HEPATITIS B SURFACE AG IA: CPT | Performed by: FAMILY MEDICINE

## 2022-06-29 PROCEDURE — 84443 ASSAY THYROID STIM HORMONE: CPT | Performed by: FAMILY MEDICINE

## 2022-06-29 PROCEDURE — 3078F DIAST BP <80 MM HG: CPT | Mod: CPTII,95,, | Performed by: FAMILY MEDICINE

## 2022-06-29 PROCEDURE — 83002 ASSAY OF GONADOTROPIN (LH): CPT | Performed by: FAMILY MEDICINE

## 2022-06-29 PROCEDURE — 86592 SYPHILIS TEST NON-TREP QUAL: CPT | Performed by: FAMILY MEDICINE

## 2022-06-29 PROCEDURE — 3078F PR MOST RECENT DIASTOLIC BLOOD PRESSURE < 80 MM HG: ICD-10-PCS | Mod: CPTII,95,, | Performed by: FAMILY MEDICINE

## 2022-06-29 PROCEDURE — 99213 OFFICE O/P EST LOW 20 MIN: CPT | Mod: 95,,, | Performed by: FAMILY MEDICINE

## 2022-06-29 PROCEDURE — 3074F PR MOST RECENT SYSTOLIC BLOOD PRESSURE < 130 MM HG: ICD-10-PCS | Mod: CPTII,95,, | Performed by: FAMILY MEDICINE

## 2022-06-29 PROCEDURE — 99213 PR OFFICE/OUTPT VISIT, EST, LEVL III, 20-29 MIN: ICD-10-PCS | Mod: 95,,, | Performed by: FAMILY MEDICINE

## 2022-06-29 PROCEDURE — 3074F SYST BP LT 130 MM HG: CPT | Mod: CPTII,95,, | Performed by: FAMILY MEDICINE

## 2022-06-29 PROCEDURE — 82672 ASSAY OF ESTROGEN: CPT | Performed by: FAMILY MEDICINE

## 2022-06-29 PROCEDURE — 84144 ASSAY OF PROGESTERONE: CPT | Performed by: FAMILY MEDICINE

## 2022-06-29 PROCEDURE — 87350 HEPATITIS BE AG IA: CPT | Performed by: FAMILY MEDICINE

## 2022-06-29 PROCEDURE — 36415 COLL VENOUS BLD VENIPUNCTURE: CPT | Performed by: FAMILY MEDICINE

## 2022-06-29 RX ORDER — LEVONORGESTREL / ETHINYL ESTRADIOL AND ETHINYL ESTRADIOL 150-30(84)
1 KIT ORAL DAILY
Qty: 91 EACH | Refills: 3 | Status: SHIPPED | OUTPATIENT
Start: 2022-06-29 | End: 2022-09-30

## 2022-06-29 NOTE — PROGRESS NOTES
The patient location is: [place] ([facility])  The chief complaint leading to consultation is: follow up/ birth control    Visit type: audiovisual    Face to Face time with patient: 12 min  12 minutes of total time spent on the encounter, which includes face to face time and non-face to face time preparing to see the patient (eg, review of tests), Obtaining and/or reviewing separately obtained history, Documenting clinical information in the electronic or other health record, Independently interpreting results (not separately reported) and communicating results to the patient/family/caregiver, or Care coordination (not separately reported).         Each patient to whom he or she provides medical services by telemedicine is:  (1) informed of the relationship between the physician and patient and the respective role of any other health care provider with respect to management of the patient; and (2) notified that he or she may decline to receive medical services by telemedicine and may withdraw from such care at any time.    Billie Nicolas  31 y.o. White female    No chief complaint on file.      HPI: Here today for breast tenderness similar to when she was pregnant and miscarried.     Tried birth control in the past and had breakthrough bleeding.   Cycle normal  Last month had symptoms of pregnancy   Multiple negative pregnancy test   Currently on cycle   Breast became tender and enlarged     Current Outpatient Medications on File Prior to Visit:  ALPRAZolam (XANAX) 0.5 MG tablet, Take 1 tablet (0.5 mg total) by mouth once. for 1 dose, Disp: 1 tablet, Rfl: 0  dextroamphetamine-amphetamine (ADDERALL XR) 20 MG 24 hr capsule, Take 1 capsule (20 mg total) by mouth every morning., Disp: 30 capsule, Rfl: 0  dextroamphetamine-amphetamine (ADDERALL) 10 mg Tab, Take 1 tablet (10 mg total) by mouth once daily., Disp: 30 tablet, Rfl: 0  metroNIDAZOLE (METROGEL) 0.75 % vaginal gel, Place 1 applicatorful vaginally 2 (two)  times daily., Disp: 70 g, Rfl: 2  sertraline (ZOLOFT) 100 MG tablet, Take 1 tablet (100 mg total) by mouth once daily., Disp: 90 tablet, Rfl: 2  sertraline (ZOLOFT) 50 MG tablet, Take 1 tablet (50 mg total) by mouth once daily., Disp: 90 tablet, Rfl: 3  valACYclovir (VALTREX) 500 MG tablet, TAKE ONE TABLET BY MOUTH EVERY DAY, Disp: 90 tablet, Rfl: 3  vilazodone (VIIBRYD) 40 mg Tab tablet, Take 1 tablet (40 mg total) by mouth once daily., Disp: 90 tablet, Rfl: 3    No current facility-administered medications on file prior to visit.      Allergies:  Review of patient's allergies indicates:   -- Sulfa (sulfonamide antibiotics) -- Hives   -- Sulfamethoxazole-trimethoprim     PHYSICAL EXAM:    GENERAL: Pleasant, no acute distress  LUNGS: Unlabored respirations    ASSESSMENT/PLAN:  Breast tenderness in female  -     PROGESTERONE; Future; Expected date: 06/29/2022  -     ESTROGENS, TOTAL; Future; Expected date: 06/29/2022  -     Luteinizing Hormone; Future; Expected date: 06/29/2022  -     Follicle Stimulating Hormone; Future; Expected date: 06/29/2022  -     HCG, Quantitative; Future; Expected date: 06/29/2022    Routine screening for STI (sexually transmitted infection)  -     Hepatitis B e Antigen; Future; Expected date: 06/29/2022  -     Hepatitis B Surface Antigen; Future; Expected date: 06/29/2022  -     RPR; Future; Expected date: 06/29/2022  -     HIV 1/2 Ag/Ab (4th Gen); Future; Expected date: 06/29/2022    Encounter for contraceptive management, unspecified type  -     L norgest/e.estradioL-e.estrad (SEASONIQUE) 0.15 mg-30 mcg (84)/10 mcg (7) 3MPk; Take 1 tablet by mouth once daily at 6am.  Dispense: 91 each; Refill: 3      Follow up 3 months     RTC: 3 months

## 2022-06-30 LAB
FSH SERPL-ACNC: 6.9 MIU/ML
HCG INTACT+B SERPL-ACNC: <2.4 MIU/ML
LH SERPL-ACNC: 6.5 MIU/ML
PROGEST SERPL-MCNC: 0.3 NG/ML
TSH SERPL DL<=0.005 MIU/L-ACNC: 2.72 UIU/ML (ref 0.4–4)

## 2022-07-05 LAB
ESTROGEN SERPL-MCNC: 123 PG/ML
HBV E AG SERPL QL IA: NONREACTIVE

## 2022-07-13 ENCOUNTER — OFFICE VISIT (OUTPATIENT)
Dept: FAMILY MEDICINE | Facility: CLINIC | Age: 32
End: 2022-07-13
Payer: COMMERCIAL

## 2022-07-13 DIAGNOSIS — J06.9 UPPER RESPIRATORY TRACT INFECTION, UNSPECIFIED TYPE: Primary | ICD-10-CM

## 2022-07-13 PROCEDURE — 1159F MED LIST DOCD IN RCRD: CPT | Mod: CPTII,95,, | Performed by: FAMILY MEDICINE

## 2022-07-13 PROCEDURE — 99213 OFFICE O/P EST LOW 20 MIN: CPT | Mod: 95,,, | Performed by: FAMILY MEDICINE

## 2022-07-13 PROCEDURE — 99213 PR OFFICE/OUTPT VISIT, EST, LEVL III, 20-29 MIN: ICD-10-PCS | Mod: 95,,, | Performed by: FAMILY MEDICINE

## 2022-07-13 PROCEDURE — 1160F PR REVIEW ALL MEDS BY PRESCRIBER/CLIN PHARMACIST DOCUMENTED: ICD-10-PCS | Mod: CPTII,95,, | Performed by: FAMILY MEDICINE

## 2022-07-13 PROCEDURE — 1159F PR MEDICATION LIST DOCUMENTED IN MEDICAL RECORD: ICD-10-PCS | Mod: CPTII,95,, | Performed by: FAMILY MEDICINE

## 2022-07-13 PROCEDURE — 1160F RVW MEDS BY RX/DR IN RCRD: CPT | Mod: CPTII,95,, | Performed by: FAMILY MEDICINE

## 2022-07-13 RX ORDER — AZITHROMYCIN 250 MG/1
TABLET, FILM COATED ORAL
Qty: 6 TABLET | Refills: 0 | Status: SHIPPED | OUTPATIENT
Start: 2022-07-13 | End: 2022-09-30

## 2022-07-13 RX ORDER — METHYLPREDNISOLONE 4 MG/1
TABLET ORAL
Qty: 21 TABLET | Refills: 0 | Status: SHIPPED | OUTPATIENT
Start: 2022-07-13 | End: 2022-08-03

## 2022-07-13 RX ORDER — ALBUTEROL SULFATE 90 UG/1
2 AEROSOL, METERED RESPIRATORY (INHALATION) EVERY 6 HOURS PRN
Qty: 18 G | Refills: 0 | Status: SHIPPED | OUTPATIENT
Start: 2022-07-13 | End: 2023-09-05

## 2022-07-13 NOTE — PROGRESS NOTES
The patient location is: Home  The chief complaint leading to consultation is:Upper rsp congestion   Visit type: Virtual visit with synchronous audio and video  Total time spent with patient: 15 minutes  Each patient to whom he or she provides medical services by telemedicine is:  (1) informed of the relationship between the physician and patient and the respective role of any other health care provider with respect to management of the patient; and (2) notified that he or she may decline to receive medical services by telemedicine and may withdraw from such care at any time.    Notes:   Billie Nicolas presents with moderate upper respiratory congestion,rhinnorhea,moderate cough past 2-3 days. mild dyspnea Sl nausea and cramps Denies vomiting,diarrhea or significant fever.HA,STCOVID neg today    Past Medical History:   Diagnosis Date    Abnormal cervical Pap smear with positive HPV DNA test     ADHD, predominantly inattentive type     Allergy     Anxiety     Asthma     Bulimia     Major depression, recurrent     Migraine headache      Past Surgical History:   Procedure Laterality Date    ABSCESS DRAINAGE  12/25/2017    abscess lanced     TONSILLECTOMY      WISDOM TOOTH EXTRACTION       Review of patient's allergies indicates:   Allergen Reactions    Sulfa (sulfonamide antibiotics) Hives    Sulfamethoxazole-trimethoprim      Current Outpatient Medications on File Prior to Visit   Medication Sig Dispense Refill    ALPRAZolam (XANAX) 0.5 MG tablet Take 1 tablet (0.5 mg total) by mouth once. for 1 dose 1 tablet 0    dextroamphetamine-amphetamine (ADDERALL XR) 20 MG 24 hr capsule Take 1 capsule (20 mg total) by mouth every morning. 30 capsule 0    dextroamphetamine-amphetamine (ADDERALL) 10 mg Tab Take 1 tablet (10 mg total) by mouth once daily. 30 tablet 0    L norgest/e.estradioL-e.estrad (SEASONIQUE) 0.15 mg-30 mcg (84)/10 mcg (7) 3MPk Take 1 tablet by mouth once daily at 6am. 91 each 3     metroNIDAZOLE (METROGEL) 0.75 % vaginal gel Place 1 applicatorful vaginally 2 (two) times daily. 70 g 2    sertraline (ZOLOFT) 100 MG tablet Take 1 tablet (100 mg total) by mouth once daily. 90 tablet 2    sertraline (ZOLOFT) 50 MG tablet Take 1 tablet (50 mg total) by mouth once daily. 90 tablet 3    valACYclovir (VALTREX) 500 MG tablet TAKE ONE TABLET BY MOUTH EVERY DAY 90 tablet 3    vilazodone (VIIBRYD) 40 mg Tab tablet Take 1 tablet (40 mg total) by mouth once daily. 90 tablet 3     No current facility-administered medications on file prior to visit.     Social History     Socioeconomic History    Marital status: Significant Other    Number of children: 0   Occupational History    Occupation: Pharmacy Intern   Tobacco Use    Smoking status: Never Smoker    Smokeless tobacco: Never Used   Substance and Sexual Activity    Alcohol use: Yes     Comment: rarely     Drug use: No    Sexual activity: Yes     Partners: Male   Other Topics Concern    Are you pregnant or think you may be? No    Breast-feeding No     Social Determinants of Health     Financial Resource Strain: Low Risk     Difficulty of Paying Living Expenses: Not hard at all   Food Insecurity: No Food Insecurity    Worried About Running Out of Food in the Last Year: Never true    Ran Out of Food in the Last Year: Never true   Transportation Needs: No Transportation Needs    Lack of Transportation (Medical): No    Lack of Transportation (Non-Medical): No   Physical Activity: Insufficiently Active    Days of Exercise per Week: 2 days    Minutes of Exercise per Session: 20 min   Stress: Stress Concern Present    Feeling of Stress : To some extent   Social Connections: Unknown    Frequency of Communication with Friends and Family: More than three times a week    Frequency of Social Gatherings with Friends and Family: Once a week    Active Member of Clubs or Organizations: Yes    Attends Club or Organization Meetings: 1 to 4 times per  year    Marital Status: Living with partner   Housing Stability: Low Risk     Unable to Pay for Housing in the Last Year: No    Number of Places Lived in the Last Year: 1    Unstable Housing in the Last Year: No     Family History   Problem Relation Age of Onset    Cancer Mother         cervical    Heart disease Mother     Mitral valve prolapse Mother     Hypertension Mother     Hypertension Maternal Grandmother     Heart disease Maternal Grandfather         Passed at 54 yoa    Hodgkin's lymphoma Maternal Grandfather     Cancer Maternal Grandfather     Hypertension Maternal Grandfather     Diabetes Sister     Melanoma Neg Hx          ROS:  SKIN: No rashes, itching or changes in color or texture of skin.  EYES: Visual acuity fine. No photophobia, ocular pain or diplopia.EARS: Denies ear pain, discharge or vertigo.NOSE: No loss of smell, no epistaxis some postnasal drip.MOUTH & THROAT: No hoarseness or change in voice. No excessive gum bleeding.CHEST: Denies LEMONS, cyanosis, wheezing  CARDIOVASCULAR: Denies chest pain, PND, orthopnea or reduced exercise tolerance.  ABDOMEN:  No weight loss.No abdominal pain, no hematemesis or blood in stool.  URINARY: No flank pain, dysuria or hematuria.  PERIPHERAL VASCULAR: No claudication or cyanosis.  MUSCULOSKELETAL: Negative   NEUROLOGIC: No history of seizures, paralysis, alteration of gait or coordination.    PE: Heent:Normocephalic with no recent cranial trauma,conjunctiva clear  Chest:No tachypnea. Sl wheezing, no rhonchi on forced expiration  Abdomen:Soft, non tender to patient palpation  Impression: Upper Respiratory Infection. 465.9  Diagnoses and all orders for this visit:    Upper respiratory tract infection, unspecified type    Other orders  -     methylPREDNISolone (MEDROL DOSEPACK) 4 mg tablet; use as directed  -     azithromycin (Z-KATHRYN) 250 MG tablet; Take 2 tabs today then one tab daily for 4 days  -     albuterol (PROVENTIL HFA) 90 mcg/actuation  inhaler; Inhale 2 puffs into the lungs every 6 (six) hours as needed for Wheezing. Rescue    Symptomatic fu  Answers for HPI/ROS submitted by the patient on 7/13/2022  Chronicity: new  Onset: in the past 7 days  Progression since onset: waxing and waning  Frequency: every few hours  Cough characteristics: productive of sputum  chest pain: Yes  chills: Yes  ear congestion: Yes  ear pain: No  fever: Yes  headaches: Yes  heartburn: No  hemoptysis: No  myalgias: Yes  nasal congestion: Yes  postnasal drip: Yes  rash: No  rhinorrhea: Yes  shortness of breath: Yes  sore throat: Yes  sweats: Yes  weight loss: No  wheezing: Yes  Aggravated by: lying down  asthma: Yes  bronchiectasis: No  bronchitis: No  COPD: No  emphysema: No  environmental allergies: Yes  pneumonia: No  Treatments tried: OTC cough suppressant, body position changes, rest  Improvement on treatment: mild

## 2022-07-15 ENCOUNTER — OFFICE VISIT (OUTPATIENT)
Dept: URGENT CARE | Facility: CLINIC | Age: 32
End: 2022-07-15
Payer: COMMERCIAL

## 2022-07-15 VITALS
HEART RATE: 106 BPM | SYSTOLIC BLOOD PRESSURE: 151 MMHG | RESPIRATION RATE: 14 BRPM | DIASTOLIC BLOOD PRESSURE: 86 MMHG | TEMPERATURE: 99 F | OXYGEN SATURATION: 99 %

## 2022-07-15 DIAGNOSIS — J06.9 VIRAL URI WITH COUGH: Primary | ICD-10-CM

## 2022-07-15 DIAGNOSIS — R05.9 COUGH: ICD-10-CM

## 2022-07-15 LAB
CTP QC/QA: YES
SARS-COV-2 RDRP RESP QL NAA+PROBE: NEGATIVE

## 2022-07-15 PROCEDURE — 1159F MED LIST DOCD IN RCRD: CPT | Mod: CPTII,S$GLB,, | Performed by: PHYSICIAN ASSISTANT

## 2022-07-15 PROCEDURE — 1159F PR MEDICATION LIST DOCUMENTED IN MEDICAL RECORD: ICD-10-PCS | Mod: CPTII,S$GLB,, | Performed by: PHYSICIAN ASSISTANT

## 2022-07-15 PROCEDURE — 1160F PR REVIEW ALL MEDS BY PRESCRIBER/CLIN PHARMACIST DOCUMENTED: ICD-10-PCS | Mod: CPTII,S$GLB,, | Performed by: PHYSICIAN ASSISTANT

## 2022-07-15 PROCEDURE — U0002 COVID-19 LAB TEST NON-CDC: HCPCS | Mod: QW,S$GLB,, | Performed by: PHYSICIAN ASSISTANT

## 2022-07-15 PROCEDURE — 3077F SYST BP >= 140 MM HG: CPT | Mod: CPTII,S$GLB,, | Performed by: PHYSICIAN ASSISTANT

## 2022-07-15 PROCEDURE — 3079F DIAST BP 80-89 MM HG: CPT | Mod: CPTII,S$GLB,, | Performed by: PHYSICIAN ASSISTANT

## 2022-07-15 PROCEDURE — 1160F RVW MEDS BY RX/DR IN RCRD: CPT | Mod: CPTII,S$GLB,, | Performed by: PHYSICIAN ASSISTANT

## 2022-07-15 PROCEDURE — 99214 OFFICE O/P EST MOD 30 MIN: CPT | Mod: S$GLB,,, | Performed by: PHYSICIAN ASSISTANT

## 2022-07-15 PROCEDURE — 99214 PR OFFICE/OUTPT VISIT, EST, LEVL IV, 30-39 MIN: ICD-10-PCS | Mod: S$GLB,,, | Performed by: PHYSICIAN ASSISTANT

## 2022-07-15 PROCEDURE — 3077F PR MOST RECENT SYSTOLIC BLOOD PRESSURE >= 140 MM HG: ICD-10-PCS | Mod: CPTII,S$GLB,, | Performed by: PHYSICIAN ASSISTANT

## 2022-07-15 PROCEDURE — 3079F PR MOST RECENT DIASTOLIC BLOOD PRESSURE 80-89 MM HG: ICD-10-PCS | Mod: CPTII,S$GLB,, | Performed by: PHYSICIAN ASSISTANT

## 2022-07-15 PROCEDURE — U0002: ICD-10-PCS | Mod: QW,S$GLB,, | Performed by: PHYSICIAN ASSISTANT

## 2022-07-15 RX ORDER — PROMETHAZINE HYDROCHLORIDE AND DEXTROMETHORPHAN HYDROBROMIDE 6.25; 15 MG/5ML; MG/5ML
5 SYRUP ORAL EVERY 4 HOURS PRN
Qty: 118 ML | Refills: 0 | Status: SHIPPED | OUTPATIENT
Start: 2022-07-15 | End: 2022-09-30

## 2022-07-15 NOTE — PROGRESS NOTES
Subjective:       Patient ID: Billie Nicolas is a 31 y.o. female.    Vitals:  tympanic temperature is 98.5 °F (36.9 °C). Her blood pressure is 151/86 (abnormal) and her pulse is 106. Her respiration is 14 and oxygen saturation is 99%.     Chief Complaint: Sore Throat (Entered by patient)    Patient presents with sore throat, cough, shortness of breath, wheezing, headache, fatigue, sweats.  Last negative covid test was Wednesday.  Fever 100.8 max.  Dx with URI 7/13/22.  Telemedicine visit on Wednesday where she was given steroids and a z monica.  States symptoms are not improving.      Sore Throat   This is a new problem. The current episode started in the past 7 days (3). The problem has been gradually worsening. The maximum temperature recorded prior to her arrival was 100.4 - 100.9 F. Associated symptoms include congestion, coughing, ear pain, headaches, a hoarse voice, shortness of breath and trouble swallowing. Pertinent negatives include no abdominal pain, diarrhea, drooling, ear discharge, plugged ear sensation, neck pain, stridor, swollen glands or vomiting. She has had no exposure to strep or mono. She has tried acetaminophen (Cepacol, Benzocaine Throat, Abx, Steroids) for the symptoms. The treatment provided mild relief.       HENT: Positive for ear pain, congestion, sore throat and trouble swallowing. Negative for ear discharge and drooling.    Neck: Negative for neck pain.   Respiratory: Positive for cough and shortness of breath. Negative for stridor.    Gastrointestinal: Negative for abdominal pain, vomiting and diarrhea.   Neurological: Positive for headaches.       Objective:      Physical Exam   Constitutional: She is oriented to person, place, and time. She appears well-developed.   HENT:   Head: Normocephalic and atraumatic.   Ears:   Right Ear: External ear normal.   Left Ear: External ear normal.   Nose: Nose normal.   Mouth/Throat: Oropharynx is clear and moist. Mucous membranes are  moist.   Eyes: EOM are normal. Pupils are equal, round, and reactive to light.   Neck: Neck supple.   Cardiovascular: Normal rate, regular rhythm, normal heart sounds and normal pulses.   Pulmonary/Chest: Effort normal and breath sounds normal.   Musculoskeletal: Normal range of motion.         General: Normal range of motion.   Neurological: She is alert and oriented to person, place, and time.   Skin: Skin is warm and dry.   Vitals reviewed.        Assessment:       1. Viral URI with cough    2. Cough          Plan:         Viral URI with cough  -     promethazine-dextromethorphan (PROMETHAZINE-DM) 6.25-15 mg/5 mL Syrp; Take 5 mLs by mouth every 4 (four) hours as needed (cough).  Dispense: 118 mL; Refill: 0    Cough  -     POCT COVID-19 Rapid Screening  -     promethazine-dextromethorphan (PROMETHAZINE-DM) 6.25-15 mg/5 mL Syrp; Take 5 mLs by mouth every 4 (four) hours as needed (cough).  Dispense: 118 mL; Refill: 0          Results for orders placed or performed in visit on 07/15/22   POCT COVID-19 Rapid Screening   Result Value Ref Range    POC Rapid COVID Negative Negative     Acceptable Yes          Increase fluids.  Get plenty of rest.   Normal saline nasal wash to irrigate sinuses and for congestion/runny nose.  Cool mist humidifier/vaporizer.  Practice good handwashing.  Mucinex for cough and chest congestion.  Tylenol or Ibuprofen for fever, headache and body aches.  Warm salt water gargles for throat comfort.  Chloraseptic spray or lozenges for throat comfort.  See PCP or go to ER if symptoms worsen or fail to improve with treatment.

## 2022-08-01 DIAGNOSIS — F98.8 ATTENTION DEFICIT DISORDER, UNSPECIFIED HYPERACTIVITY PRESENCE: ICD-10-CM

## 2022-08-01 RX ORDER — DEXTROAMPHETAMINE SACCHARATE, AMPHETAMINE ASPARTATE MONOHYDRATE, DEXTROAMPHETAMINE SULFATE AND AMPHETAMINE SULFATE 5; 5; 5; 5 MG/1; MG/1; MG/1; MG/1
20 CAPSULE, EXTENDED RELEASE ORAL EVERY MORNING
Qty: 30 CAPSULE | Refills: 0 | Status: SHIPPED | OUTPATIENT
Start: 2022-08-01 | End: 2022-09-01 | Stop reason: SDUPTHER

## 2022-08-01 RX ORDER — DEXTROAMPHETAMINE SACCHARATE, AMPHETAMINE ASPARTATE, DEXTROAMPHETAMINE SULFATE AND AMPHETAMINE SULFATE 2.5; 2.5; 2.5; 2.5 MG/1; MG/1; MG/1; MG/1
10 TABLET ORAL DAILY
Qty: 30 TABLET | Refills: 0 | Status: SHIPPED | OUTPATIENT
Start: 2022-08-01 | End: 2022-09-01 | Stop reason: SDUPTHER

## 2022-08-01 NOTE — TELEPHONE ENCOUNTER
No new care gaps identified.  [facility] Embedded Care Gaps. Reference number: [id number]. 8/01/2022   7:49:42 AM ALESSANDRAT

## 2022-09-01 DIAGNOSIS — F98.8 ATTENTION DEFICIT DISORDER, UNSPECIFIED HYPERACTIVITY PRESENCE: ICD-10-CM

## 2022-09-01 NOTE — TELEPHONE ENCOUNTER
No new care gaps identified.  Health system Embedded Care Gaps. Reference number: [id number]. 9/01/2022   4:49:19 PM CDT

## 2022-09-02 RX ORDER — DEXTROAMPHETAMINE SACCHARATE, AMPHETAMINE ASPARTATE MONOHYDRATE, DEXTROAMPHETAMINE SULFATE AND AMPHETAMINE SULFATE 5; 5; 5; 5 MG/1; MG/1; MG/1; MG/1
20 CAPSULE, EXTENDED RELEASE ORAL EVERY MORNING
Qty: 30 CAPSULE | Refills: 0 | Status: SHIPPED | OUTPATIENT
Start: 2022-09-02 | End: 2022-09-30 | Stop reason: SDUPTHER

## 2022-09-02 RX ORDER — DEXTROAMPHETAMINE SACCHARATE, AMPHETAMINE ASPARTATE, DEXTROAMPHETAMINE SULFATE AND AMPHETAMINE SULFATE 2.5; 2.5; 2.5; 2.5 MG/1; MG/1; MG/1; MG/1
10 TABLET ORAL DAILY
Qty: 30 TABLET | Refills: 0 | Status: SHIPPED | OUTPATIENT
Start: 2022-09-02 | End: 2022-09-30 | Stop reason: SDUPTHER
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
## 2022-09-15 ENCOUNTER — PATIENT MESSAGE (OUTPATIENT)
Dept: INTERNAL MEDICINE | Facility: CLINIC | Age: 32
End: 2022-09-15
Payer: COMMERCIAL

## 2022-09-30 ENCOUNTER — OFFICE VISIT (OUTPATIENT)
Dept: INTERNAL MEDICINE | Facility: CLINIC | Age: 32
End: 2022-09-30
Payer: COMMERCIAL

## 2022-09-30 VITALS
SYSTOLIC BLOOD PRESSURE: 124 MMHG | RESPIRATION RATE: 18 BRPM | HEART RATE: 101 BPM | BODY MASS INDEX: 30.76 KG/M2 | OXYGEN SATURATION: 97 % | WEIGHT: 196 LBS | HEIGHT: 67 IN | DIASTOLIC BLOOD PRESSURE: 84 MMHG

## 2022-09-30 DIAGNOSIS — F98.8 ATTENTION DEFICIT DISORDER, UNSPECIFIED HYPERACTIVITY PRESENCE: ICD-10-CM

## 2022-09-30 DIAGNOSIS — Z13.31 POSITIVE DEPRESSION SCREENING: ICD-10-CM

## 2022-09-30 DIAGNOSIS — Z00.00 ROUTINE PHYSICAL EXAMINATION: Primary | ICD-10-CM

## 2022-09-30 PROCEDURE — 3008F BODY MASS INDEX DOCD: CPT | Mod: CPTII,S$GLB,, | Performed by: FAMILY MEDICINE

## 2022-09-30 PROCEDURE — 99395 PR PREVENTIVE VISIT,EST,18-39: ICD-10-PCS | Mod: S$GLB,,, | Performed by: FAMILY MEDICINE

## 2022-09-30 PROCEDURE — 99999 PR PBB SHADOW E&M-EST. PATIENT-LVL IV: CPT | Mod: PBBFAC,,, | Performed by: FAMILY MEDICINE

## 2022-09-30 PROCEDURE — 1159F PR MEDICATION LIST DOCUMENTED IN MEDICAL RECORD: ICD-10-PCS | Mod: CPTII,S$GLB,, | Performed by: FAMILY MEDICINE

## 2022-09-30 PROCEDURE — 1160F RVW MEDS BY RX/DR IN RCRD: CPT | Mod: CPTII,S$GLB,, | Performed by: FAMILY MEDICINE

## 2022-09-30 PROCEDURE — 3079F DIAST BP 80-89 MM HG: CPT | Mod: CPTII,S$GLB,, | Performed by: FAMILY MEDICINE

## 2022-09-30 PROCEDURE — 1160F PR REVIEW ALL MEDS BY PRESCRIBER/CLIN PHARMACIST DOCUMENTED: ICD-10-PCS | Mod: CPTII,S$GLB,, | Performed by: FAMILY MEDICINE

## 2022-09-30 PROCEDURE — 99395 PREV VISIT EST AGE 18-39: CPT | Mod: S$GLB,,, | Performed by: FAMILY MEDICINE

## 2022-09-30 PROCEDURE — 99999 PR PBB SHADOW E&M-EST. PATIENT-LVL IV: ICD-10-PCS | Mod: PBBFAC,,, | Performed by: FAMILY MEDICINE

## 2022-09-30 PROCEDURE — 3079F PR MOST RECENT DIASTOLIC BLOOD PRESSURE 80-89 MM HG: ICD-10-PCS | Mod: CPTII,S$GLB,, | Performed by: FAMILY MEDICINE

## 2022-09-30 PROCEDURE — 3008F PR BODY MASS INDEX (BMI) DOCUMENTED: ICD-10-PCS | Mod: CPTII,S$GLB,, | Performed by: FAMILY MEDICINE

## 2022-09-30 PROCEDURE — 3074F PR MOST RECENT SYSTOLIC BLOOD PRESSURE < 130 MM HG: ICD-10-PCS | Mod: CPTII,S$GLB,, | Performed by: FAMILY MEDICINE

## 2022-09-30 PROCEDURE — 3074F SYST BP LT 130 MM HG: CPT | Mod: CPTII,S$GLB,, | Performed by: FAMILY MEDICINE

## 2022-09-30 PROCEDURE — 1159F MED LIST DOCD IN RCRD: CPT | Mod: CPTII,S$GLB,, | Performed by: FAMILY MEDICINE

## 2022-09-30 RX ORDER — DEXTROAMPHETAMINE SACCHARATE, AMPHETAMINE ASPARTATE MONOHYDRATE, DEXTROAMPHETAMINE SULFATE AND AMPHETAMINE SULFATE 5; 5; 5; 5 MG/1; MG/1; MG/1; MG/1
20 CAPSULE, EXTENDED RELEASE ORAL EVERY MORNING
Qty: 30 CAPSULE | Refills: 0 | Status: SHIPPED | OUTPATIENT
Start: 2022-09-30 | End: 2022-11-07 | Stop reason: SDUPTHER

## 2022-09-30 RX ORDER — DEXTROAMPHETAMINE SACCHARATE, AMPHETAMINE ASPARTATE, DEXTROAMPHETAMINE SULFATE AND AMPHETAMINE SULFATE 2.5; 2.5; 2.5; 2.5 MG/1; MG/1; MG/1; MG/1
10 TABLET ORAL DAILY
Qty: 30 TABLET | Refills: 0 | Status: SHIPPED | OUTPATIENT
Start: 2022-09-30 | End: 2022-11-07 | Stop reason: SDUPTHER

## 2022-09-30 RX ORDER — BUPROPION HYDROCHLORIDE 150 MG/1
150 TABLET ORAL DAILY
Qty: 90 TABLET | Refills: 0 | Status: SHIPPED | OUTPATIENT
Start: 2022-09-30 | End: 2022-12-07 | Stop reason: SDUPTHER

## 2022-09-30 NOTE — PROGRESS NOTES
Billie Nicolas  09/30/2022  [id number]    Yanni Ramírez MD  Patient Care Team:  Yanni Ramírez MD as PCP - General (Internal Medicine)  Precious Cho LPN as Care Coordinator (Internal Medicine)    Has the patient seen any provider outside of the network since the last visit ? (no). If yes, HIPPA forms completed and records requested.      Visit Type:a scheduled routine follow-up visit    Chief Complaint:  Chief Complaint   Patient presents with    Annual Exam    Medication Refill       History of Present Illness:  HPI Ms. Nicolas presents today for her annual exam.   No changes in the her medical, social, surgical or family history other than worsening of her anxiety/depression.     Recent break up     Has established with a therapist outside of [facility]    Notes weight gain     Review of Systems   Constitutional:  Negative for chills and fever.   HENT:  Negative for congestion and tinnitus.    Eyes:  Negative for blurred vision, pain and discharge.   Respiratory:  Negative for cough and wheezing.    Cardiovascular:  Negative for chest pain, palpitations, orthopnea and leg swelling.   Gastrointestinal:  Negative for abdominal pain, blood in stool, constipation, diarrhea, heartburn, nausea and vomiting.   Genitourinary:  Negative for dysuria, flank pain, frequency, hematuria and urgency.   Skin:  Negative for itching and rash.   Neurological:  Negative for dizziness, tingling and headaches.   Psychiatric/Behavioral:  Positive for depression. The patient is nervous/anxious and has insomnia.        Screening Questionnaires:    In the last two weeks how often have you felt down, depressed, or hopeless ( frequent )    In the last two weeks how often have you had little interest or pleasure in doing  (frequent )    In the last two weeks how often have you been bothered by the following problems:  1. Feeling nervous, anxious, or on edge ( constant )    2. Not being able to stop or control worrying (  intermittent)    3. Worrying too much about different things ( constant)    4. Trouble relaxing ( constant )    5. Being so restless that it is hard to sit still  (no )    6. Becoming easily annoyed or irritable (frequent)    7. Feeling afraid as if something awful might happen (frequent )    How often do you have a drink containing Alcohol? occasional, social use     Do you exercise  (no ) moderately active    Do you take a baby Aspirin daily ( no)    Do you have an advance directive ( no ) The patient was given information regarding Living Will/Durable Power-of- if requested.     The following were reviewed: Active problem list, medication list, allergies, family history, social history, and Health Maintenance.     History:  Past Medical History:   Diagnosis Date    Abnormal cervical Pap smear with positive HPV DNA test     ADHD, predominantly inattentive type     Allergy     Anxiety     Asthma     Bulimia     Major depression, recurrent     Migraine headache      Past Surgical History:   Procedure Laterality Date    ABSCESS DRAINAGE  12/25/2017    abscess lanced     TONSILLECTOMY      WISDOM TOOTH EXTRACTION       Family History   Problem Relation Age of Onset    Cancer Mother         cervical    Heart disease Mother     Mitral valve prolapse Mother     Hypertension Mother     Hypertension Maternal Grandmother     Heart disease Maternal Grandfather         Passed at 54 yoa    Hodgkin's lymphoma Maternal Grandfather     Cancer Maternal Grandfather     Hypertension Maternal Grandfather     Diabetes Sister     Melanoma Neg Hx      Social History     Socioeconomic History    Marital status: Significant Other    Number of children: 0   Occupational History    Occupation: Pharmacy Intern   Tobacco Use    Smoking status: Never    Smokeless tobacco: Never   Substance and Sexual Activity    Alcohol use: Yes     Comment: rarely     Drug use: No    Sexual activity: Yes     Partners: Male   Other Topics Concern     Are you pregnant or think you may be? No    Breast-feeding No     Social Determinants of Health     Financial Resource Strain: Low Risk     Difficulty of Paying Living Expenses: Not hard at all   Food Insecurity: No Food Insecurity    Worried About Running Out of Food in the Last Year: Never true    Ran Out of Food in the Last Year: Never true   Transportation Needs: No Transportation Needs    Lack of Transportation (Medical): No    Lack of Transportation (Non-Medical): No   Physical Activity: Insufficiently Active    Days of Exercise per Week: 2 days    Minutes of Exercise per Session: 20 min   Stress: Stress Concern Present    Feeling of Stress : Very much   Social Connections: Unknown    Frequency of Communication with Friends and Family: Twice a week    Frequency of Social Gatherings with Friends and Family: Never    Active Member of Clubs or Organizations: No    Attends Club or Organization Meetings: Never    Marital Status: Living with partner   Housing Stability: Low Risk     Unable to Pay for Housing in the Last Year: No    Number of Places Lived in the Last Year: 1    Unstable Housing in the Last Year: No     Patient Active Problem List   Diagnosis    Allergy    Anxiety    Asthma    Migraine headache    Attention-deficit hyperactivity disorder, unspecified type     Review of patient's allergies indicates:   Allergen Reactions    Sulfa (sulfonamide antibiotics) Hives    Sulfamethoxazole-trimethoprim        Health Maintenance  Health Maintenance Topics with due status: Not Due       Topic Last Completion Date    TETANUS VACCINE 12/10/2015    Cervical Cancer Screening 01/17/2020     Health Maintenance Due   Topic Date Due    COVID-19 Vaccine (4 - Booster for Pfizer series) 11/24/2021    Influenza Vaccine (1) 09/01/2022       Medications:  Current Outpatient Medications on File Prior to Visit   Medication Sig Dispense Refill    albuterol (PROVENTIL HFA) 90 mcg/actuation inhaler Inhale 2 puffs into the lungs  every 6 (six) hours as needed for Wheezing. Rescue 18 g 0    dextroamphetamine-amphetamine (ADDERALL XR) 20 MG 24 hr capsule Take 1 capsule (20 mg total) by mouth every morning. 30 capsule 0    dextroamphetamine-amphetamine (ADDERALL) 10 mg Tab Take 1 tablet (10 mg total) by mouth once daily. 30 tablet 0    metroNIDAZOLE (METROGEL) 0.75 % vaginal gel Place 1 applicatorful vaginally 2 (two) times daily. 70 g 2    sertraline (ZOLOFT) 100 MG tablet Take 1 tablet (100 mg total) by mouth once daily. 90 tablet 2    sertraline (ZOLOFT) 50 MG tablet Take 1 tablet (50 mg total) by mouth once daily. 90 tablet 3    valACYclovir (VALTREX) 500 MG tablet TAKE ONE TABLET BY MOUTH EVERY DAY 90 tablet 3    vilazodone (VIIBRYD) 40 mg Tab tablet Take 1 tablet (40 mg total) by mouth once daily. 90 tablet 3    [DISCONTINUED] ALPRAZolam (XANAX) 0.5 MG tablet Take 1 tablet (0.5 mg total) by mouth once. for 1 dose (Patient not taking: Reported on 9/30/2022) 1 tablet 0    [DISCONTINUED] azithromycin (Z-KATHRYN) 250 MG tablet Take 2 tablets by mouth today then take one tablet by mouth daily for 4 days (Patient not taking: Reported on 9/30/2022) 6 tablet 0    [DISCONTINUED] L norgest/e.estradioL-e.estrad (SEASONIQUE) 0.15 mg-30 mcg (84)/10 mcg (7) 3MPk Take 1 tablet by mouth once daily at 6am. (Patient not taking: Reported on 9/30/2022) 91 each 3    [DISCONTINUED] promethazine-dextromethorphan (PROMETHAZINE-DM) 6.25-15 mg/5 mL Syrp Take 5 mLs by mouth every 4 (four) hours as needed (cough). (Patient not taking: Reported on 9/30/2022) 118 mL 0     No current facility-administered medications on file prior to visit.       Medications have been reviewed and reconciled with patient at visit today.    Barriers to medications present (no )    Adverse reactions to current medications (no)    Over the counter medications reviewed (Yes) and if needed added to active Medication list.    Exam:  Vitals:    09/30/22 1355   BP: 124/84   Pulse: 101   Resp: 18      Weight: 88.9 kg (195 lb 15.8 oz)   Body mass index is 30.7 kg/m².      Physical Exam  Vitals reviewed.   Constitutional:       General: She is not in acute distress.     Appearance: Normal appearance.   HENT:      Head: Normocephalic and atraumatic.      Right Ear: External ear normal.      Left Ear: External ear normal.      Nose: Nose normal.   Eyes:      General:         Right eye: No discharge.         Left eye: No discharge.      Pupils: Pupils are equal, round, and reactive to light.   Neck:      Thyroid: No thyromegaly.   Cardiovascular:      Rate and Rhythm: Normal rate and regular rhythm.      Heart sounds: Normal heart sounds. No murmur heard.  Pulmonary:      Effort: Pulmonary effort is normal. No respiratory distress.      Breath sounds: Normal breath sounds. No wheezing.   Abdominal:      General: Bowel sounds are normal. There is no distension.      Palpations: Abdomen is soft.      Tenderness: There is no abdominal tenderness.   Musculoskeletal:         General: Normal range of motion.      Cervical back: Normal range of motion and neck supple.   Skin:     General: Skin is warm and dry.      Findings: No rash.   Neurological:      Mental Status: She is alert and oriented to person, place, and time.      Coordination: Coordination normal.   Psychiatric:         Behavior: Behavior normal.       Laboratory Reviewed: (Yes)  Lab Results   Component Value Date    WBC 4.99 01/21/2022    HGB 13.1 01/21/2022    HCT 41.0 01/21/2022     01/21/2022    CHOL 160 02/01/2017    TRIG 38 02/01/2017    HDL 69 02/01/2017    ALT 14 01/21/2022    AST 17 01/21/2022     01/21/2022    K 4.0 01/21/2022     01/21/2022    CREATININE 0.9 01/21/2022    BUN 13 01/21/2022    CO2 25 01/21/2022    TSH 2.719 06/29/2022       Assessment:  The encounter diagnosis was Positive depression screening.    Plan:  Positive depression screening  Comments:  I have reviewed the positive depression score which warrants active  treatment with psychotherapy and/or medications.      -Patient's lab results were reviewed and discussed with patient  -Treatment options and alternatives were discussed with the patient. Patient expressed understanding. Patient was given the opportunity to ask questions and be an active participant in their medical care. Patient had no further questions or concerns at this time.   -Documentation of patient's health and condition was obtained from family member who was present during visit.  -Patient is an overall moderate risk for health complications from their medical conditions.     Follow up: No follow-ups on file.      Care Plan/Goals: Reviewed N/A   Goals    None             After visit summary printed and given to patient upon discharge.  Patient goals and care plan are included in After visit summary.

## 2022-12-07 DIAGNOSIS — F98.8 ATTENTION DEFICIT DISORDER, UNSPECIFIED HYPERACTIVITY PRESENCE: ICD-10-CM

## 2022-12-07 DIAGNOSIS — Z13.31 POSITIVE DEPRESSION SCREENING: ICD-10-CM

## 2022-12-07 NOTE — TELEPHONE ENCOUNTER
No new care gaps identified.  [facility] Embedded Care Gaps. Reference number: [id number]. 12/07/2022   4:21:45 PM CST

## 2022-12-07 NOTE — TELEPHONE ENCOUNTER
No new care gaps identified.  [facility] Embedded Care Gaps. Reference number: [id number]. 12/07/2022   4:21:16 PM CST

## 2022-12-08 RX ORDER — DEXTROAMPHETAMINE SACCHARATE, AMPHETAMINE ASPARTATE MONOHYDRATE, DEXTROAMPHETAMINE SULFATE AND AMPHETAMINE SULFATE 5; 5; 5; 5 MG/1; MG/1; MG/1; MG/1
20 CAPSULE, EXTENDED RELEASE ORAL EVERY MORNING
Qty: 30 CAPSULE | Refills: 0 | Status: SHIPPED | OUTPATIENT
Start: 2022-12-08 | End: 2023-01-18 | Stop reason: DRUGHIGH

## 2022-12-08 RX ORDER — DEXTROAMPHETAMINE SACCHARATE, AMPHETAMINE ASPARTATE, DEXTROAMPHETAMINE SULFATE AND AMPHETAMINE SULFATE 2.5; 2.5; 2.5; 2.5 MG/1; MG/1; MG/1; MG/1
10 TABLET ORAL DAILY
Qty: 30 TABLET | Refills: 0 | Status: SHIPPED | OUTPATIENT
Start: 2022-12-08 | End: 2023-01-06 | Stop reason: SDUPTHER

## 2022-12-08 RX ORDER — BUPROPION HYDROCHLORIDE 150 MG/1
150 TABLET ORAL DAILY
Qty: 90 TABLET | Refills: 0 | Status: SHIPPED | OUTPATIENT
Start: 2022-12-08 | End: 2023-01-06 | Stop reason: SDUPTHER

## 2022-12-08 NOTE — TELEPHONE ENCOUNTER
Refill Routing Note   Medication(s) are not appropriate for processing by [facility] for the following reason(s):      - Medication is a new start (<3 months)    [facility] action(s):  Defer          Medication reconciliation completed: No     Appointments  past 12m or future 3m with PCP    Date Provider   Last Visit   9/30/2022 Yanni Ramírez MD   Next Visit   12/7/2022 Yanni Ramírez MD   ED visits in past 90 days: 0        Note composed:3:30 PM 12/08/2022

## 2022-12-30 ENCOUNTER — PATIENT MESSAGE (OUTPATIENT)
Dept: INTERNAL MEDICINE | Facility: CLINIC | Age: 32
End: 2022-12-30
Payer: COMMERCIAL

## 2023-01-06 ENCOUNTER — OFFICE VISIT (OUTPATIENT)
Dept: INTERNAL MEDICINE | Facility: CLINIC | Age: 33
End: 2023-01-06
Payer: COMMERCIAL

## 2023-01-06 VITALS — DIASTOLIC BLOOD PRESSURE: 84 MMHG | SYSTOLIC BLOOD PRESSURE: 124 MMHG

## 2023-01-06 DIAGNOSIS — Z13.31 POSITIVE DEPRESSION SCREENING: ICD-10-CM

## 2023-01-06 DIAGNOSIS — F98.8 ATTENTION DEFICIT DISORDER, UNSPECIFIED HYPERACTIVITY PRESENCE: ICD-10-CM

## 2023-01-06 PROCEDURE — 3074F SYST BP LT 130 MM HG: CPT | Mod: CPTII,95,, | Performed by: FAMILY MEDICINE

## 2023-01-06 PROCEDURE — 99213 OFFICE O/P EST LOW 20 MIN: CPT | Mod: 95,,, | Performed by: FAMILY MEDICINE

## 2023-01-06 PROCEDURE — 3079F PR MOST RECENT DIASTOLIC BLOOD PRESSURE 80-89 MM HG: ICD-10-PCS | Mod: CPTII,95,, | Performed by: FAMILY MEDICINE

## 2023-01-06 PROCEDURE — 99213 PR OFFICE/OUTPT VISIT, EST, LEVL III, 20-29 MIN: ICD-10-PCS | Mod: 95,,, | Performed by: FAMILY MEDICINE

## 2023-01-06 PROCEDURE — 3074F PR MOST RECENT SYSTOLIC BLOOD PRESSURE < 130 MM HG: ICD-10-PCS | Mod: CPTII,95,, | Performed by: FAMILY MEDICINE

## 2023-01-06 PROCEDURE — 3079F DIAST BP 80-89 MM HG: CPT | Mod: CPTII,95,, | Performed by: FAMILY MEDICINE

## 2023-01-06 RX ORDER — LISDEXAMFETAMINE DIMESYLATE 40 MG/1
40 CAPSULE ORAL DAILY
Qty: 30 CAPSULE | Refills: 0 | Status: SHIPPED | OUTPATIENT
Start: 2023-01-06 | End: 2023-02-10 | Stop reason: SDUPTHER

## 2023-01-06 RX ORDER — DEXTROAMPHETAMINE SACCHARATE, AMPHETAMINE ASPARTATE, DEXTROAMPHETAMINE SULFATE AND AMPHETAMINE SULFATE 2.5; 2.5; 2.5; 2.5 MG/1; MG/1; MG/1; MG/1
10 TABLET ORAL DAILY
Qty: 30 TABLET | Refills: 0 | Status: SHIPPED | OUTPATIENT
Start: 2023-01-06 | End: 2023-02-10 | Stop reason: SDUPTHER

## 2023-01-06 RX ORDER — BUPROPION HYDROCHLORIDE 150 MG/1
150 TABLET ORAL DAILY
Qty: 90 TABLET | Refills: 0 | Status: SHIPPED | OUTPATIENT
Start: 2023-01-06 | End: 2023-01-18 | Stop reason: SDUPTHER

## 2023-01-06 NOTE — PROGRESS NOTES
The patient location is: [place] ([facility])  The chief complaint leading to consultation is: follow up ADHD medication    Visit type: audiovisual    Face to Face time with patient: 23 minutes  23 minutes of total time spent on the encounter, which includes face to face time and non-face to face time preparing to see the patient (eg, review of tests), Obtaining and/or reviewing separately obtained history, Documenting clinical information in the electronic or other health record, Independently interpreting results (not separately reported) and communicating results to the patient/family/caregiver, or Care coordination (not separately reported).       Each patient to whom he or she provides medical services by telemedicine is:  (1) informed of the relationship between the physician and patient and the respective role of any other health care provider with respect to management of the patient; and (2) notified that he or she may decline to receive medical services by telemedicine and may withdraw from such care at any time.    Notes:   Billie Nicolas  32 y.o. White female  CC: follow up       HPI: Here today for ADHD follow up. Reports being stable on medication. Denies weight change, palpitations or abuse.    Spoke to thearpist and she feels that Ms Nicolas should increase her Adderall XR dose  She use to be on Vyvanse and prefers Vyvanse     Answers submitted by the patient for this visit:  Review of Systems Questionnaire (Submitted on 1/6/2023)  activity change: No  unexpected weight change: No  neck pain: No  hearing loss: No  rhinorrhea: No  trouble swallowing: No  eye discharge: No  visual disturbance: No  chest tightness: Yes  wheezing: Yes  chest pain: No  palpitations: No  blood in stool: No  constipation: No  vomiting: No  diarrhea: No  polydipsia: No  polyuria: No  difficulty urinating: No  hematuria: No  menstrual problem: No  dysuria: No  joint swelling: No  arthralgias: No  headaches:  No  weakness: No  confusion: No  dysphoric mood: No      Current Outpatient Medications on File Prior to Visit:  albuterol (PROVENTIL HFA) 90 mcg/actuation inhaler, Inhale 2 puffs into the lungs every 6 (six) hours as needed for Wheezing. Rescue, Disp: 18 g, Rfl: 0  buPROPion (WELLBUTRIN XL) 150 MG TB24 tablet, Take 1 tablet (150 mg total) by mouth once daily., Disp: 90 tablet, Rfl: 0  dextroamphetamine-amphetamine (ADDERALL XR) 20 MG 24 hr capsule, Take 1 capsule (20 mg total) by mouth every morning., Disp: 30 capsule, Rfl: 0  dextroamphetamine-amphetamine (ADDERALL) 10 mg Tab, Take 1 tablet (10 mg total) by mouth once daily., Disp: 30 tablet, Rfl: 0  metroNIDAZOLE (METROGEL) 0.75 % vaginal gel, Place 1 applicatorful vaginally 2 (two) times daily., Disp: 70 g, Rfl: 2  sertraline (ZOLOFT) 50 MG tablet, Take 1 tablet (50 mg total) by mouth once daily., Disp: 90 tablet, Rfl: 3  valACYclovir (VALTREX) 500 MG tablet, TAKE ONE TABLET BY MOUTH EVERY DAY, Disp: 90 tablet, Rfl: 3  vilazodone (VIIBRYD) 40 mg Tab tablet, Take 1 tablet (40 mg total) by mouth once daily., Disp: 90 tablet, Rfl: 3    No current facility-administered medications on file prior to visit.      Allergies:  Review of patient's allergies indicates:   -- Sulfa (sulfonamide antibiotics) -- Hives   -- Sulfamethoxazole-trimethoprim     PHYSICAL EXAM:  VITAL SIGNS: Reviewed nurse's note  GENERAL: Pleasant, no acute distress  LUNGS: Unlabored respirations    ASSESSMENT/PLAN:  Attention deficit disorder, unspecified hyperactivity presence  -     lisdexamfetamine (VYVANSE) 40 MG Cap; Take 1 capsule (40 mg total) by mouth once daily.  Dispense: 30 capsule; Refill: 0  -     dextroamphetamine-amphetamine (ADDERALL) 10 mg Tab; Take 1 tablet (10 mg total) by mouth once daily.  Dispense: 30 tablet; Refill: 0    Positive depression screening  Comments:  I have reviewed the positive depression score which warrants active treatment with psychotherapy and/or  medications.  Orders:  -     buPROPion (WELLBUTRIN XL) 150 MG TB24 tablet; Take 1 tablet (150 mg total) by mouth once daily.  Dispense: 90 tablet; Refill: 0          RTC: 3 months

## 2023-01-18 ENCOUNTER — OFFICE VISIT (OUTPATIENT)
Dept: INTERNAL MEDICINE | Facility: CLINIC | Age: 33
End: 2023-01-18
Payer: COMMERCIAL

## 2023-01-18 VITALS
BODY MASS INDEX: 30.86 KG/M2 | SYSTOLIC BLOOD PRESSURE: 118 MMHG | RESPIRATION RATE: 18 BRPM | OXYGEN SATURATION: 98 % | HEIGHT: 67 IN | HEART RATE: 98 BPM | TEMPERATURE: 98 F | WEIGHT: 196.63 LBS | DIASTOLIC BLOOD PRESSURE: 80 MMHG

## 2023-01-18 DIAGNOSIS — F32.A ANXIETY AND DEPRESSION: ICD-10-CM

## 2023-01-18 DIAGNOSIS — F41.9 ANXIETY AND DEPRESSION: ICD-10-CM

## 2023-01-18 DIAGNOSIS — Z13.31 POSITIVE DEPRESSION SCREENING: ICD-10-CM

## 2023-01-18 DIAGNOSIS — Z12.4 PAP SMEAR FOR CERVICAL CANCER SCREENING: Primary | ICD-10-CM

## 2023-01-18 PROCEDURE — 1159F MED LIST DOCD IN RCRD: CPT | Mod: CPTII,S$GLB,, | Performed by: FAMILY MEDICINE

## 2023-01-18 PROCEDURE — 3074F PR MOST RECENT SYSTOLIC BLOOD PRESSURE < 130 MM HG: ICD-10-PCS | Mod: CPTII,S$GLB,, | Performed by: FAMILY MEDICINE

## 2023-01-18 PROCEDURE — 99395 PR PREVENTIVE VISIT,EST,18-39: ICD-10-PCS | Mod: S$GLB,,, | Performed by: FAMILY MEDICINE

## 2023-01-18 PROCEDURE — 88141 PR  CYTOPATH CERV/VAG INTERPRET: ICD-10-PCS | Mod: ,,, | Performed by: PATHOLOGY

## 2023-01-18 PROCEDURE — 88141 CYTOPATH C/V INTERPRET: CPT | Mod: ,,, | Performed by: PATHOLOGY

## 2023-01-18 PROCEDURE — 88175 CYTOPATH C/V AUTO FLUID REDO: CPT | Performed by: PATHOLOGY

## 2023-01-18 PROCEDURE — 3079F DIAST BP 80-89 MM HG: CPT | Mod: CPTII,S$GLB,, | Performed by: FAMILY MEDICINE

## 2023-01-18 PROCEDURE — 3074F SYST BP LT 130 MM HG: CPT | Mod: CPTII,S$GLB,, | Performed by: FAMILY MEDICINE

## 2023-01-18 PROCEDURE — 87624 HPV HI-RISK TYP POOLED RSLT: CPT | Performed by: FAMILY MEDICINE

## 2023-01-18 PROCEDURE — 3008F PR BODY MASS INDEX (BMI) DOCUMENTED: ICD-10-PCS | Mod: CPTII,S$GLB,, | Performed by: FAMILY MEDICINE

## 2023-01-18 PROCEDURE — 3008F BODY MASS INDEX DOCD: CPT | Mod: CPTII,S$GLB,, | Performed by: FAMILY MEDICINE

## 2023-01-18 PROCEDURE — 1159F PR MEDICATION LIST DOCUMENTED IN MEDICAL RECORD: ICD-10-PCS | Mod: CPTII,S$GLB,, | Performed by: FAMILY MEDICINE

## 2023-01-18 PROCEDURE — 99999 PR PBB SHADOW E&M-EST. PATIENT-LVL III: CPT | Mod: PBBFAC,,, | Performed by: FAMILY MEDICINE

## 2023-01-18 PROCEDURE — 99395 PREV VISIT EST AGE 18-39: CPT | Mod: S$GLB,,, | Performed by: FAMILY MEDICINE

## 2023-01-18 PROCEDURE — 99999 PR PBB SHADOW E&M-EST. PATIENT-LVL III: ICD-10-PCS | Mod: PBBFAC,,, | Performed by: FAMILY MEDICINE

## 2023-01-18 PROCEDURE — 3079F PR MOST RECENT DIASTOLIC BLOOD PRESSURE 80-89 MM HG: ICD-10-PCS | Mod: CPTII,S$GLB,, | Performed by: FAMILY MEDICINE

## 2023-01-18 RX ORDER — VILAZODONE HYDROCHLORIDE 40 MG/1
40 TABLET ORAL DAILY
Qty: 90 TABLET | Refills: 3 | Status: SHIPPED | OUTPATIENT
Start: 2023-01-18 | End: 2023-05-18

## 2023-01-18 RX ORDER — BUPROPION HYDROCHLORIDE 150 MG/1
150 TABLET ORAL DAILY
Qty: 90 TABLET | Refills: 3 | Status: SHIPPED | OUTPATIENT
Start: 2023-01-18 | End: 2023-04-04

## 2023-01-18 RX ORDER — VALACYCLOVIR HYDROCHLORIDE 500 MG/1
500 TABLET, FILM COATED ORAL DAILY
Qty: 90 TABLET | Refills: 3 | Status: SHIPPED | OUTPATIENT
Start: 2023-01-18 | End: 2024-03-20

## 2023-01-18 NOTE — PROGRESS NOTES
"SUBJECTIVE:   32 y.o. female for annual routine Pap and checkup.  Current Outpatient Medications   Medication Sig Dispense Refill    albuterol (PROVENTIL HFA) 90 mcg/actuation inhaler Inhale 2 puffs into the lungs every 6 (six) hours as needed for Wheezing. Rescue 18 g 0    dextroamphetamine-amphetamine (ADDERALL) 10 mg Tab Take 1 tablet (10 mg total) by mouth once daily. 30 tablet 0    lisdexamfetamine (VYVANSE) 40 MG Cap Take 1 capsule (40 mg total) by mouth once daily. 30 capsule 0    buPROPion (WELLBUTRIN XL) 150 MG TB24 tablet Take 1 tablet (150 mg total) by mouth once daily. 90 tablet 3    dextroamphetamine-amphetamine (ADDERALL XR) 20 MG 24 hr capsule Take 1 capsule (20 mg total) by mouth every morning. (Patient not taking: Reported on 1/18/2023) 30 capsule 0    valACYclovir (VALTREX) 500 MG tablet TAKE ONE TABLET BY MOUTH EVERY DAY 90 tablet 3    vilazodone (VIIBRYD) 40 mg Tab tablet Take 1 tablet (40 mg total) by mouth once daily. 90 tablet 3     No current facility-administered medications for this visit.     Allergies: Sulfa (sulfonamide antibiotics) and Sulfamethoxazole-trimethoprim   Patient's last menstrual period was 01/12/2023.    ROS:  Feeling well. No dyspnea or chest pain on exertion.  No abdominal pain, change in bowel habits, black or bloody stools.  No urinary tract symptoms. GYN ROS: normal menses, no abnormal bleeding, pelvic pain or discharge, no breast pain or new or enlarging lumps on self exam. No neurological complaints.    OBJECTIVE:   The patient appears well, alert, oriented x 3, in no distress.  /80   Pulse 98   Temp 97.7 °F (36.5 °C) (Tympanic)   Resp 18   Ht 5' 7" (1.702 m)   Wt 89.2 kg (196 lb 10.4 oz)   LMP 01/12/2023   SpO2 98%   BMI 30.80 kg/m²   ENT normal.  Neck supple. No adenopathy or thyromegaly. MARILU. Lungs are clear, good air entry, no wheezes, rhonchi or rales. S1 and S2 normal, no murmurs, regular rate and rhythm. Abdomen soft without tenderness, " guarding, mass or organomegaly. Extremities show no edema, normal peripheral pulses. Neurological is normal, no focal findings.    BREAST EXAM: breasts appear normal, no suspicious masses, no skin or nipple changes or axillary nodes, not examined    PELVIC EXAM: normal external genitalia, vulva, vagina, cervix, uterus and adnexa    ASSESSMENT:   well woman  Med refill     PLAN:   pap smear    Refilled medication for anxiety/Depression     Follow up as indicated by pap

## 2023-01-23 ENCOUNTER — PATIENT MESSAGE (OUTPATIENT)
Dept: INTERNAL MEDICINE | Facility: CLINIC | Age: 33
End: 2023-01-23
Payer: COMMERCIAL

## 2023-01-23 DIAGNOSIS — Z11.3 ROUTINE SCREENING FOR STI (SEXUALLY TRANSMITTED INFECTION): Primary | ICD-10-CM

## 2023-01-23 RX ORDER — METRONIDAZOLE 500 MG/1
2000 TABLET ORAL ONCE
Qty: 4 TABLET | Refills: 0 | Status: SHIPPED | OUTPATIENT
Start: 2023-01-23 | End: 2023-01-25

## 2023-01-24 ENCOUNTER — LAB VISIT (OUTPATIENT)
Dept: LAB | Facility: HOSPITAL | Age: 33
End: 2023-01-24
Attending: FAMILY MEDICINE
Payer: COMMERCIAL

## 2023-01-24 DIAGNOSIS — Z11.3 ROUTINE SCREENING FOR STI (SEXUALLY TRANSMITTED INFECTION): ICD-10-CM

## 2023-01-24 LAB
HBV SURFACE AG SERPL QL IA: NORMAL
HIV 1+2 AB+HIV1 P24 AG SERPL QL IA: NORMAL

## 2023-01-24 PROCEDURE — 36415 COLL VENOUS BLD VENIPUNCTURE: CPT | Performed by: FAMILY MEDICINE

## 2023-01-24 PROCEDURE — 87661 TRICHOMONAS VAGINALIS AMPLIF: CPT | Performed by: FAMILY MEDICINE

## 2023-01-24 PROCEDURE — 87389 HIV-1 AG W/HIV-1&-2 AB AG IA: CPT | Performed by: FAMILY MEDICINE

## 2023-01-24 PROCEDURE — 87350 HEPATITIS BE AG IA: CPT | Performed by: FAMILY MEDICINE

## 2023-01-24 PROCEDURE — 86592 SYPHILIS TEST NON-TREP QUAL: CPT | Performed by: FAMILY MEDICINE

## 2023-01-24 PROCEDURE — 87340 HEPATITIS B SURFACE AG IA: CPT | Performed by: FAMILY MEDICINE

## 2023-01-25 LAB
HPV HR 12 DNA SPEC QL NAA+PROBE: NEGATIVE
HPV16 AG SPEC QL: NEGATIVE
HPV18 DNA SPEC QL NAA+PROBE: NEGATIVE
RPR SER QL: NORMAL

## 2023-01-26 LAB
FINAL PATHOLOGIC DIAGNOSIS: NORMAL
Lab: NORMAL

## 2023-01-27 LAB
HBV E AG SERPL QL IA: NONREACTIVE
T VAGINALIS RRNA SPEC QL NAA+PROBE: NOT DETECTED
TRICHOMONAS VAGINALIS RNA, QUAL, SOURCE: NORMAL

## 2023-02-10 DIAGNOSIS — F98.8 ATTENTION DEFICIT DISORDER, UNSPECIFIED HYPERACTIVITY PRESENCE: ICD-10-CM

## 2023-02-10 RX ORDER — DEXTROAMPHETAMINE SACCHARATE, AMPHETAMINE ASPARTATE, DEXTROAMPHETAMINE SULFATE AND AMPHETAMINE SULFATE 2.5; 2.5; 2.5; 2.5 MG/1; MG/1; MG/1; MG/1
10 TABLET ORAL DAILY
Qty: 30 TABLET | Refills: 0 | Status: SHIPPED | OUTPATIENT
Start: 2023-02-10 | End: 2023-03-13 | Stop reason: SDUPTHER

## 2023-02-10 RX ORDER — LISDEXAMFETAMINE DIMESYLATE 40 MG/1
40 CAPSULE ORAL DAILY
Qty: 30 CAPSULE | Refills: 0 | Status: SHIPPED | OUTPATIENT
Start: 2023-02-10 | End: 2023-03-13 | Stop reason: SDUPTHER

## 2023-02-10 NOTE — TELEPHONE ENCOUNTER
Care Due:                  Date            Visit Type   Department     Provider  --------------------------------------------------------------------------------                                EP -                              PRIMARY      [facility] INTER[facility]  Last Visit: 01-      [facility]  Next Visit: None Scheduled  None         None Found                                                            Last  Test          Frequency    Reason                     Performed    Due Date  --------------------------------------------------------------------------------    CBC.........  12 months..  valACYclovir.............  01- 01-    Cr..........  12 months..  valACYclovir.............  01- 01-    [facility] Embedded Care Gaps. Reference number: [id number]. 2/10/2023   9:26:48 AM CST

## 2023-02-10 NOTE — TELEPHONE ENCOUNTER
Refill Routing Note   Medication(s) are not appropriate for processing by [facility] for the following reason(s):       Med OP    [facility] action(s):  Route         Medication reconciliation completed: No   Extended chart review required: No  Alert overridden per protocol: No    Care gaps identified:  Yes       Appointments  past 12m or future 3m with PCP    Date Provider   Last Visit   1/18/2023 Yanni Ramírez MD   Next Visit   Visit date not found Yanni Ramírez MD   ED visits in past 90 days: 0        Note composed:10:12 AM 02/10/2023

## 2023-03-13 DIAGNOSIS — F98.8 ATTENTION DEFICIT DISORDER, UNSPECIFIED HYPERACTIVITY PRESENCE: ICD-10-CM

## 2023-03-13 RX ORDER — DEXTROAMPHETAMINE SACCHARATE, AMPHETAMINE ASPARTATE, DEXTROAMPHETAMINE SULFATE AND AMPHETAMINE SULFATE 2.5; 2.5; 2.5; 2.5 MG/1; MG/1; MG/1; MG/1
10 TABLET ORAL DAILY
Qty: 30 TABLET | Refills: 0 | Status: SHIPPED | OUTPATIENT
Start: 2023-03-13 | End: 2023-05-18 | Stop reason: SDUPTHER

## 2023-03-13 RX ORDER — LISDEXAMFETAMINE DIMESYLATE 40 MG/1
40 CAPSULE ORAL DAILY
Qty: 30 CAPSULE | Refills: 0 | Status: SHIPPED | OUTPATIENT
Start: 2023-03-13 | End: 2023-04-13 | Stop reason: SDUPTHER

## 2023-03-13 NOTE — TELEPHONE ENCOUNTER
No new care gaps identified.  [facility] Embedded Care Gaps. Reference number: [id number]. 3/13/2023   5:48:58 AM ALESSANDRAT

## 2023-04-03 ENCOUNTER — PATIENT MESSAGE (OUTPATIENT)
Dept: INTERNAL MEDICINE | Facility: CLINIC | Age: 33
End: 2023-04-03
Payer: COMMERCIAL

## 2023-04-04 RX ORDER — BUPROPION HYDROCHLORIDE 300 MG/1
300 TABLET ORAL DAILY
Qty: 90 TABLET | Refills: 1 | Status: SHIPPED | OUTPATIENT
Start: 2023-04-04 | End: 2023-07-27 | Stop reason: SDUPTHER

## 2023-04-13 DIAGNOSIS — F98.8 ATTENTION DEFICIT DISORDER, UNSPECIFIED HYPERACTIVITY PRESENCE: ICD-10-CM

## 2023-04-13 RX ORDER — LISDEXAMFETAMINE DIMESYLATE 40 MG/1
40 CAPSULE ORAL DAILY
Qty: 30 CAPSULE | Refills: 0 | Status: SHIPPED | OUTPATIENT
Start: 2023-04-13 | End: 2023-05-18 | Stop reason: SDUPTHER

## 2023-04-13 NOTE — TELEPHONE ENCOUNTER
Care Due:                  Date            Visit Type   Department     Provider  --------------------------------------------------------------------------------                                EP -                              PRIMARY      [facility] INTERNAL  Last Visit: 01-      CARE ([facility])   MEDICINE       Yanni  Ramírez  Next Visit: None Scheduled  None         None Found                                                            Last  Test          Frequency    Reason                     Performed    Due Date  --------------------------------------------------------------------------------    CBC.........  12 months..  valACYclovir.............  01- 01-    Cr..........  12 months..  valACYclovir.............  01- 01-    [facility] Embedded Care Gaps. Reference number: [id number]. 4/13/2023   9:58:26 AM CDT

## 2023-05-18 ENCOUNTER — OFFICE VISIT (OUTPATIENT)
Dept: INTERNAL MEDICINE | Facility: CLINIC | Age: 33
End: 2023-05-18
Payer: COMMERCIAL

## 2023-05-18 ENCOUNTER — PATIENT MESSAGE (OUTPATIENT)
Dept: OPHTHALMOLOGY | Facility: CLINIC | Age: 33
End: 2023-05-18

## 2023-05-18 ENCOUNTER — OFFICE VISIT (OUTPATIENT)
Dept: OPHTHALMOLOGY | Facility: CLINIC | Age: 33
End: 2023-05-18
Payer: COMMERCIAL

## 2023-05-18 ENCOUNTER — PATIENT MESSAGE (OUTPATIENT)
Dept: INTERNAL MEDICINE | Facility: CLINIC | Age: 33
End: 2023-05-18

## 2023-05-18 DIAGNOSIS — F98.8 ATTENTION DEFICIT DISORDER, UNSPECIFIED HYPERACTIVITY PRESENCE: ICD-10-CM

## 2023-05-18 DIAGNOSIS — F33.9 DEPRESSION, RECURRENT: Primary | ICD-10-CM

## 2023-05-18 DIAGNOSIS — Z01.00 ENCOUNTER FOR EYE EXAM: Primary | ICD-10-CM

## 2023-05-18 DIAGNOSIS — Z46.0 ENCOUNTER FOR FITTING OR ADJUSTMENT OF SPECTACLES OR CONTACT LENSES: ICD-10-CM

## 2023-05-18 DIAGNOSIS — H52.13 MYOPIA OF BOTH EYES WITH ASTIGMATISM: ICD-10-CM

## 2023-05-18 DIAGNOSIS — H52.203 MYOPIA OF BOTH EYES WITH ASTIGMATISM: ICD-10-CM

## 2023-05-18 PROCEDURE — 99213 PR OFFICE/OUTPT VISIT, EST, LEVL III, 20-29 MIN: ICD-10-PCS | Mod: 95,,, | Performed by: FAMILY MEDICINE

## 2023-05-18 PROCEDURE — 92015 PR REFRACTION: ICD-10-PCS | Mod: S$GLB,,, | Performed by: OPTOMETRIST

## 2023-05-18 PROCEDURE — 99213 OFFICE O/P EST LOW 20 MIN: CPT | Mod: 95,,, | Performed by: FAMILY MEDICINE

## 2023-05-18 PROCEDURE — 92014 PR EYE EXAM, EST PATIENT,COMPREHESV: ICD-10-PCS | Mod: S$GLB,,, | Performed by: OPTOMETRIST

## 2023-05-18 PROCEDURE — 99999 PR PBB SHADOW E&M-EST. PATIENT-LVL II: CPT | Mod: PBBFAC,,, | Performed by: OPTOMETRIST

## 2023-05-18 PROCEDURE — 99999 PR PBB SHADOW E&M-EST. PATIENT-LVL II: ICD-10-PCS | Mod: PBBFAC,,, | Performed by: OPTOMETRIST

## 2023-05-18 PROCEDURE — 1160F RVW MEDS BY RX/DR IN RCRD: CPT | Mod: CPTII,95,, | Performed by: FAMILY MEDICINE

## 2023-05-18 PROCEDURE — 92015 DETERMINE REFRACTIVE STATE: CPT | Mod: S$GLB,,, | Performed by: OPTOMETRIST

## 2023-05-18 PROCEDURE — 1160F PR REVIEW ALL MEDS BY PRESCRIBER/CLIN PHARMACIST DOCUMENTED: ICD-10-PCS | Mod: CPTII,95,, | Performed by: FAMILY MEDICINE

## 2023-05-18 PROCEDURE — 92014 COMPRE OPH EXAM EST PT 1/>: CPT | Mod: S$GLB,,, | Performed by: OPTOMETRIST

## 2023-05-18 PROCEDURE — 1159F MED LIST DOCD IN RCRD: CPT | Mod: CPTII,95,, | Performed by: FAMILY MEDICINE

## 2023-05-18 PROCEDURE — 1159F PR MEDICATION LIST DOCUMENTED IN MEDICAL RECORD: ICD-10-PCS | Mod: CPTII,95,, | Performed by: FAMILY MEDICINE

## 2023-05-18 RX ORDER — VILAZODONE HYDROCHLORIDE 10 MG/1
10 TABLET ORAL DAILY
Qty: 30 EACH | Refills: 11 | Status: SHIPPED | OUTPATIENT
Start: 2023-05-18 | End: 2023-07-07

## 2023-05-18 RX ORDER — LISDEXAMFETAMINE DIMESYLATE 40 MG/1
40 CAPSULE ORAL DAILY
Qty: 30 CAPSULE | Refills: 0 | Status: SHIPPED | OUTPATIENT
Start: 2023-05-18 | End: 2023-06-18 | Stop reason: SDUPTHER

## 2023-05-18 RX ORDER — DEXTROAMPHETAMINE SACCHARATE, AMPHETAMINE ASPARTATE, DEXTROAMPHETAMINE SULFATE AND AMPHETAMINE SULFATE 2.5; 2.5; 2.5; 2.5 MG/1; MG/1; MG/1; MG/1
10 TABLET ORAL DAILY
Qty: 30 TABLET | Refills: 0 | Status: SHIPPED | OUTPATIENT
Start: 2023-05-18 | End: 2023-06-18 | Stop reason: SDUPTHER

## 2023-05-18 NOTE — PROGRESS NOTES
HPI    No visual complaints.  Last eye exam 12/07/2021 TRF.  Update glasses and contact lenses RX.  Last dilation 01/05/2017.  Last edited by Kristina Darby MA on 5/18/2023  8:10 AM.            Assessment /Plan     For exam results, see Encounter Report.    Encounter for eye exam    Encounter for fitting or adjustment of spectacles or contact lenses    Myopia of both eyes with astigmatism      No pathology.    Discussed CL charges.  Dispense Final Rx for glasses  No changes CL Rx  RTC 1 year DFE  Discussed above and answered questions.

## 2023-05-18 NOTE — PROGRESS NOTES
The patient location is: [place] (home)  The chief complaint leading to consultation is: follow up/menstrual symptoms/adhd    Visit type: audiovisual    Face to Face time with patient: 14 minutes  14 minutes of total time spent on the encounter, which includes face to face time and non-face to face time preparing to see the patient (eg, review of tests), Obtaining and/or reviewing separately obtained history, Documenting clinical information in the electronic or other health record, Independently interpreting results (not separately reported) and communicating results to the patient/family/caregiver, or Care coordination (not separately reported).       Each patient to whom he or she provides medical services by telemedicine is:  (1) informed of the relationship between the physician and patient and the respective role of any other health care provider with respect to management of the patient; and (2) notified that he or she may decline to receive medical services by telemedicine and may withdraw from such care at any time.  Billie Nicolas  32 y.o. White female        HPI: Here today for ADHD follow up. Reports being stable on medication. Denies weight change, palpitations or abuse.    Current Outpatient Medications on File Prior to Visit:  albuterol (PROVENTIL HFA) 90 mcg/actuation inhaler, Inhale 2 puffs into the lungs every 6 (six) hours as needed for Wheezing. Rescue, Disp: 18 g, Rfl: 0  buPROPion (WELLBUTRIN XL) 300 MG 24 hr tablet, Take 1 tablet (300 mg total) by mouth once daily., Disp: 90 tablet, Rfl: 1  dextroamphetamine-amphetamine (ADDERALL) 10 mg Tab, Take 1 tablet (10 mg total) by mouth once daily., Disp: 30 tablet, Rfl: 0  lisdexamfetamine (VYVANSE) 40 MG Cap, Take 1 capsule (40 mg total) by mouth once daily., Disp: 30 capsule, Rfl: 0  valACYclovir (VALTREX) 500 MG tablet, TAKE ONE TABLET BY MOUTH EVERY DAY, Disp: 90 tablet, Rfl: 3  vilazodone (VIIBRYD) 40 mg Tab tablet, Take 1 tablet (40  mg total) by mouth once daily., Disp: 90 tablet, Rfl: 3    No current facility-administered medications on file prior to visit.      Allergies:  Review of patient's allergies indicates:   -- Sulfa (sulfonamide antibiotics) -- Hives   -- Sulfamethoxazole-trimethoprim     PHYSICAL EXAM:  VITAL SIGNS: Reviewed nurse's note  GENERAL: Pleasant, no acute distress  LUNGS: Unlabored respirations    ASSESSMENT/PLAN:  1. ADHD--refills provided.  Depression, recurrent  -     Ambulatory referral/consult to Psychiatry; Future; Expected date: 05/25/2023    Attention deficit disorder, unspecified hyperactivity presence  -     dextroamphetamine-amphetamine (ADDERALL) 10 mg Tab; Take 1 tablet (10 mg total) by mouth once daily.  Dispense: 30 tablet; Refill: 0  -     lisdexamfetamine (VYVANSE) 40 MG Cap; Take 1 capsule (40 mg total) by mouth once daily.  Dispense: 30 capsule; Refill: 0  -     Ambulatory referral/consult to Psychiatry; Future; Expected date: 05/25/2023    Other orders  -     vilazodone (VIIBRYD) 10 mg Tab tablet; Take 1 tablet (10 mg total) by mouth once daily.  Dispense: 30 each; Refill: 11    Titrating off of Viibryd restarting Zoloft    RTC: 3 months

## 2023-05-19 ENCOUNTER — TELEPHONE (OUTPATIENT)
Dept: PSYCHIATRY | Facility: CLINIC | Age: 33
End: 2023-05-19
Payer: COMMERCIAL

## 2023-05-19 ENCOUNTER — PATIENT MESSAGE (OUTPATIENT)
Dept: PSYCHIATRY | Facility: CLINIC | Age: 33
End: 2023-05-19
Payer: COMMERCIAL

## 2023-06-01 ENCOUNTER — OFFICE VISIT (OUTPATIENT)
Dept: OBSTETRICS AND GYNECOLOGY | Facility: CLINIC | Age: 33
End: 2023-06-01
Payer: COMMERCIAL

## 2023-06-01 VITALS
BODY MASS INDEX: 31.38 KG/M2 | DIASTOLIC BLOOD PRESSURE: 90 MMHG | HEIGHT: 67 IN | WEIGHT: 199.94 LBS | SYSTOLIC BLOOD PRESSURE: 120 MMHG

## 2023-06-01 DIAGNOSIS — Z30.09 CONSULTATION FOR STERILIZATION: Primary | ICD-10-CM

## 2023-06-01 DIAGNOSIS — Z30.2 ENCOUNTER FOR FEMALE STERILIZATION PROCEDURE: Primary | ICD-10-CM

## 2023-06-01 PROCEDURE — 99213 OFFICE O/P EST LOW 20 MIN: CPT | Mod: S$GLB,,, | Performed by: OBSTETRICS & GYNECOLOGY

## 2023-06-01 PROCEDURE — 3008F BODY MASS INDEX DOCD: CPT | Mod: CPTII,S$GLB,, | Performed by: OBSTETRICS & GYNECOLOGY

## 2023-06-01 PROCEDURE — 3080F DIAST BP >= 90 MM HG: CPT | Mod: CPTII,S$GLB,, | Performed by: OBSTETRICS & GYNECOLOGY

## 2023-06-01 PROCEDURE — 3074F SYST BP LT 130 MM HG: CPT | Mod: CPTII,S$GLB,, | Performed by: OBSTETRICS & GYNECOLOGY

## 2023-06-01 PROCEDURE — 99999 PR PBB SHADOW E&M-EST. PATIENT-LVL III: ICD-10-PCS | Mod: PBBFAC,,, | Performed by: OBSTETRICS & GYNECOLOGY

## 2023-06-01 PROCEDURE — 1159F MED LIST DOCD IN RCRD: CPT | Mod: CPTII,S$GLB,, | Performed by: OBSTETRICS & GYNECOLOGY

## 2023-06-01 PROCEDURE — 99999 PR PBB SHADOW E&M-EST. PATIENT-LVL III: CPT | Mod: PBBFAC,,, | Performed by: OBSTETRICS & GYNECOLOGY

## 2023-06-01 PROCEDURE — 99213 PR OFFICE/OUTPT VISIT, EST, LEVL III, 20-29 MIN: ICD-10-PCS | Mod: S$GLB,,, | Performed by: OBSTETRICS & GYNECOLOGY

## 2023-06-01 PROCEDURE — 3080F PR MOST RECENT DIASTOLIC BLOOD PRESSURE >= 90 MM HG: ICD-10-PCS | Mod: CPTII,S$GLB,, | Performed by: OBSTETRICS & GYNECOLOGY

## 2023-06-01 PROCEDURE — 3008F PR BODY MASS INDEX (BMI) DOCUMENTED: ICD-10-PCS | Mod: CPTII,S$GLB,, | Performed by: OBSTETRICS & GYNECOLOGY

## 2023-06-01 PROCEDURE — 1160F PR REVIEW ALL MEDS BY PRESCRIBER/CLIN PHARMACIST DOCUMENTED: ICD-10-PCS | Mod: CPTII,S$GLB,, | Performed by: OBSTETRICS & GYNECOLOGY

## 2023-06-01 PROCEDURE — 1160F RVW MEDS BY RX/DR IN RCRD: CPT | Mod: CPTII,S$GLB,, | Performed by: OBSTETRICS & GYNECOLOGY

## 2023-06-01 PROCEDURE — 3074F PR MOST RECENT SYSTOLIC BLOOD PRESSURE < 130 MM HG: ICD-10-PCS | Mod: CPTII,S$GLB,, | Performed by: OBSTETRICS & GYNECOLOGY

## 2023-06-01 PROCEDURE — 1159F PR MEDICATION LIST DOCUMENTED IN MEDICAL RECORD: ICD-10-PCS | Mod: CPTII,S$GLB,, | Performed by: OBSTETRICS & GYNECOLOGY

## 2023-06-01 NOTE — PROGRESS NOTES
Subjective:      Patient ID: Billie Nicolas is a 32 y.o. female.    Chief Complaint:  Surgical Consult      History of Present Illness  HPI  Pt would like to discuss sterilization.  Pt reports that she has never had any desire for pregnancy and has significant concerns about being pregnant.  Pt also reports concern about current (and future) political climate and how it is affecting women's reproductive rights.  Pt desires permanent sterilization and is not interested in contraception as she has not had a good experience with various options (IUD, OCP, etc).  She would like to discuss options.  Denies coercion.  Last pap NILM in .    GYN & OB History  No LMP recorded.   Date of Last Pap: 2023    OB History    Para Term  AB Living   2 0     2 0   SAB IAB Ectopic Multiple Live Births   2       0      # Outcome Date GA Lbr Jaime/2nd Weight Sex Delivery Anes PTL Lv   2 SAB            1 SAB                Review of Systems  Review of Systems   Constitutional:  Negative for activity change, appetite change, chills, fatigue, fever and unexpected weight change.   Respiratory:  Negative for shortness of breath.    Cardiovascular:  Negative for chest pain, palpitations and leg swelling.   Gastrointestinal:  Negative for abdominal pain, bloating, blood in stool, constipation, diarrhea, nausea and vomiting.   Genitourinary:  Negative for dysmenorrhea, dyspareunia, dysuria, flank pain, frequency, genital sores, hematuria, menorrhagia, menstrual problem, pelvic pain, urgency, vaginal bleeding, vaginal discharge, vaginal pain, urinary incontinence, postcoital bleeding, vaginal dryness and vaginal odor.   Musculoskeletal:  Negative for back pain.   Neurological:  Negative for syncope and headaches.        Objective:     Physical Exam:   Constitutional: She is oriented to person, place, and time. She appears well-developed and well-nourished. No distress.                           Neurological: She  is alert and oriented to person, place, and time.     Psychiatric: She has a normal mood and affect. Her behavior is normal. Thought content normal.       Assessment:     1. Consultation for sterilization             Plan:     Consultation for sterilization  -    Pt was counseled on fertility planning options, including contraception and sterilization options.  Pt is done with her fertility and desires permanent sterilization.  Surgery details, indications, risks, benefits, and alternatives were discussed.  In particular, irreversible nature of procedure and increased risk of regret due to no children were discussed in detail.  Will have PA contact pt to schedule LSC Bilateral Salpingectomy.

## 2023-06-06 ENCOUNTER — PATIENT MESSAGE (OUTPATIENT)
Dept: INTERNAL MEDICINE | Facility: CLINIC | Age: 33
End: 2023-06-06
Payer: COMMERCIAL

## 2023-06-06 RX ORDER — SERTRALINE HYDROCHLORIDE 50 MG/1
50 TABLET, FILM COATED ORAL DAILY
Qty: 30 TABLET | Refills: 1 | Status: SHIPPED | OUTPATIENT
Start: 2023-06-06 | End: 2023-07-07

## 2023-06-18 DIAGNOSIS — F98.8 ATTENTION DEFICIT DISORDER, UNSPECIFIED HYPERACTIVITY PRESENCE: ICD-10-CM

## 2023-06-18 NOTE — TELEPHONE ENCOUNTER
Care Due:                  Date            Visit Type   Department     Provider  --------------------------------------------------------------------------------                                EP -                              PRIMARY      [facility]  Last Visit: 01-      CARE ([facility])   MEDICINE       Yannirivka Jonesb  Next Visit: None Scheduled  None         None Found                                                            Last  Test          Frequency    Reason                     Performed    Due Date  --------------------------------------------------------------------------------    CBC.........  12 months..  valACYclovir.............  01- 01-    Cr..........  12 months..  valACYclovir.............  01- 01-    [facility] Embedded Care Due Messages. Reference number: [id number].   6/18/2023 10:16:46 AM CDT

## 2023-06-19 RX ORDER — LISDEXAMFETAMINE DIMESYLATE 40 MG/1
40 CAPSULE ORAL DAILY
Qty: 30 CAPSULE | Refills: 0 | Status: SHIPPED | OUTPATIENT
Start: 2023-06-19 | End: 2023-07-27

## 2023-06-19 RX ORDER — DEXTROAMPHETAMINE SACCHARATE, AMPHETAMINE ASPARTATE, DEXTROAMPHETAMINE SULFATE AND AMPHETAMINE SULFATE 2.5; 2.5; 2.5; 2.5 MG/1; MG/1; MG/1; MG/1
10 TABLET ORAL DAILY
Qty: 30 TABLET | Refills: 0 | Status: SHIPPED | OUTPATIENT
Start: 2023-06-19 | End: 2023-07-27 | Stop reason: SDUPTHER

## 2023-06-19 NOTE — TELEPHONE ENCOUNTER
Refill Routing Note   Medication(s) are not appropriate for processing by [facility] for the following reason(s):      Medication outside of protocol    [facility] action(s):  Route Care Due:  None identified   Medication Therapy Plan: FLOS      Appointments  past 12m or future 3m with PCP    Date Provider   Last Visit   5/18/2023 Yanni Ramírez MD   Next Visit   Visit date not found Yanni Ramírez MD   ED visits in past 90 days: 0        Note composed:7:10 AM 06/19/2023

## 2023-07-07 ENCOUNTER — OFFICE VISIT (OUTPATIENT)
Dept: INTERNAL MEDICINE | Facility: CLINIC | Age: 33
End: 2023-07-07
Payer: COMMERCIAL

## 2023-07-07 DIAGNOSIS — Z00.00 PREVENTATIVE HEALTH CARE: ICD-10-CM

## 2023-07-07 DIAGNOSIS — F41.9 ANXIETY: ICD-10-CM

## 2023-07-07 DIAGNOSIS — Z11.3 SCREEN FOR STD (SEXUALLY TRANSMITTED DISEASE): Primary | ICD-10-CM

## 2023-07-07 PROCEDURE — 1160F RVW MEDS BY RX/DR IN RCRD: CPT | Mod: CPTII,95,, | Performed by: PHYSICIAN ASSISTANT

## 2023-07-07 PROCEDURE — 1159F MED LIST DOCD IN RCRD: CPT | Mod: CPTII,95,, | Performed by: PHYSICIAN ASSISTANT

## 2023-07-07 PROCEDURE — 1160F PR REVIEW ALL MEDS BY PRESCRIBER/CLIN PHARMACIST DOCUMENTED: ICD-10-PCS | Mod: CPTII,95,, | Performed by: PHYSICIAN ASSISTANT

## 2023-07-07 PROCEDURE — 99214 PR OFFICE/OUTPT VISIT, EST, LEVL IV, 30-39 MIN: ICD-10-PCS | Mod: 95,,, | Performed by: PHYSICIAN ASSISTANT

## 2023-07-07 PROCEDURE — 99214 OFFICE O/P EST MOD 30 MIN: CPT | Mod: 95,,, | Performed by: PHYSICIAN ASSISTANT

## 2023-07-07 PROCEDURE — 1159F PR MEDICATION LIST DOCUMENTED IN MEDICAL RECORD: ICD-10-PCS | Mod: CPTII,95,, | Performed by: PHYSICIAN ASSISTANT

## 2023-07-07 RX ORDER — SERTRALINE HYDROCHLORIDE 100 MG/1
100 TABLET, FILM COATED ORAL DAILY
Qty: 90 TABLET | Refills: 1 | Status: SHIPPED | OUTPATIENT
Start: 2023-07-07 | End: 2023-10-13 | Stop reason: SDUPTHER

## 2023-07-07 NOTE — PROGRESS NOTES
Subjective:      Patient ID: Billie Nicolas is a 32 y.o. female.    Chief Complaint: No chief complaint on file.      The patient location is: [place]   The chief complaint leading to consultation is: follow up    Visit type: audiovisual    Face to Face time with patient: 4:39-4:45  15 minutes of total time spent on the encounter, which includes face to face time and non-face to face time preparing to see the patient (eg, review of tests), Obtaining and/or reviewing separately obtained history, Documenting clinical information in the electronic or other health record, Independently interpreting results (not separately reported) and communicating results to the patient/family/caregiver, or Care coordination (not separately reported).     Each patient to whom he or she provides medical services by telemedicine is:  (1) informed of the relationship between the physician and patient and the respective role of any other health care provider with respect to management of the patient; and (2) notified that he or she may decline to receive medical services by telemedicine and may withdraw from such care at any time.    Notes: Patient is known to me, previous patient of Dr. Ramírez and needs to establish with a new PCP.    ADHD- taking vyvanse 40mg, adderall 10mg   Anxiety/depression- zoloft 50mg, wellbutrin 300mg  Last visit May 2023, decided to titrate off viibryd at that time and restarted zoloft 50mg  Would like to increase zoloft dose, typically takes for PMDD, denies side effects     Has upcoming appt with Psychiatry on 7/27    Requests STD screen    Review of Systems   Constitutional:  Negative for activity change and unexpected weight change.   HENT:  Negative for hearing loss, rhinorrhea and trouble swallowing.    Eyes:  Negative for discharge and visual disturbance.   Respiratory:  Negative for chest tightness and wheezing.    Cardiovascular:  Negative for chest pain and palpitations.   Gastrointestinal:   Negative for blood in stool, constipation, diarrhea and vomiting.   Endocrine: Negative for polydipsia and polyuria.   Genitourinary:  Negative for difficulty urinating, dysuria, hematuria and menstrual problem.   Musculoskeletal:  Negative for arthralgias, joint swelling and neck pain.   Neurological:  Negative for weakness and headaches.   Psychiatric/Behavioral:  Negative for confusion and dysphoric mood.      Objective:   There were no vitals taken for this visit.  Physical Exam  Constitutional:       General: She is not in acute distress.     Appearance: She is well-developed. She is not ill-appearing or diaphoretic.   HENT:      Head: Normocephalic and atraumatic.      Right Ear: External ear normal.      Left Ear: External ear normal.   Eyes:      General: Lids are normal.         Right eye: No discharge.         Left eye: No discharge.      Conjunctiva/sclera: Conjunctivae normal.      Right eye: Right conjunctiva is not injected.      Left eye: Left conjunctiva is not injected.   Pulmonary:      Effort: Pulmonary effort is normal. No respiratory distress.   Skin:     General: Skin is warm and dry.      Findings: No rash.   Neurological:      Mental Status: She is alert and oriented to person, place, and time.   Psychiatric:         Speech: Speech normal.         Behavior: Behavior normal.         Thought Content: Thought content normal.         Judgment: Judgment normal.     Assessment:      1. Screen for STD (sexually transmitted disease)    2. Preventative health care    3. Anxiety       Plan:   Screen for STD (sexually transmitted disease)  -     RPR; Future; Expected date: 07/07/2023  -     HIV 1/2 Ag/Ab (4th Gen); Future; Expected date: 07/07/2023  -     HERPES SIMPLEX 1&2 IGG; Future; Expected date: 07/07/2023  -     HEPATITIS PANEL, ACUTE; Future; Expected date: 07/07/2023  -     C. trachomatis/N. gonorrhoeae by AMP DNA; Future; Expected date: 07/07/2023    Preventative health care  -     CBC Auto  Differential; Future; Expected date: 07/07/2023  -     Comprehensive Metabolic Panel; Future; Expected date: 07/07/2023  -     Hemoglobin A1C; Future; Expected date: 07/07/2023  -     Lipid Panel; Future; Expected date: 07/07/2023  -     TSH; Future; Expected date: 07/07/2023    Anxiety  -     sertraline (ZOLOFT) 100 MG tablet; Take 1 tablet (100 mg total) by mouth once daily.  Dispense: 90 tablet; Refill: 1      Fasting labs and f/u PCP to establish care

## 2023-07-19 RX ORDER — MULTIVITAMIN
1 TABLET ORAL DAILY
COMMUNITY
End: 2023-08-21

## 2023-07-27 ENCOUNTER — OFFICE VISIT (OUTPATIENT)
Dept: OBSTETRICS AND GYNECOLOGY | Facility: CLINIC | Age: 33
End: 2023-07-27
Payer: COMMERCIAL

## 2023-07-27 ENCOUNTER — OFFICE VISIT (OUTPATIENT)
Dept: PSYCHIATRY | Facility: CLINIC | Age: 33
End: 2023-07-27
Payer: COMMERCIAL

## 2023-07-27 ENCOUNTER — LAB VISIT (OUTPATIENT)
Dept: LAB | Facility: HOSPITAL | Age: 33
End: 2023-07-27
Attending: PHYSICIAN ASSISTANT
Payer: COMMERCIAL

## 2023-07-27 VITALS
BODY MASS INDEX: 30.04 KG/M2 | SYSTOLIC BLOOD PRESSURE: 112 MMHG | WEIGHT: 191.38 LBS | HEIGHT: 67 IN | DIASTOLIC BLOOD PRESSURE: 82 MMHG

## 2023-07-27 VITALS
DIASTOLIC BLOOD PRESSURE: 88 MMHG | WEIGHT: 191.56 LBS | SYSTOLIC BLOOD PRESSURE: 135 MMHG | BODY MASS INDEX: 30.01 KG/M2 | HEART RATE: 96 BPM

## 2023-07-27 DIAGNOSIS — Z00.00 PREVENTATIVE HEALTH CARE: ICD-10-CM

## 2023-07-27 DIAGNOSIS — Z30.2 ENCOUNTER FOR FEMALE STERILIZATION PROCEDURE: ICD-10-CM

## 2023-07-27 DIAGNOSIS — F90.9 ATTENTION DEFICIT HYPERACTIVITY DISORDER (ADHD), UNSPECIFIED ADHD TYPE: Primary | ICD-10-CM

## 2023-07-27 DIAGNOSIS — F41.9 ANXIETY: ICD-10-CM

## 2023-07-27 DIAGNOSIS — F33.42 MAJOR DEPRESSIVE DISORDER, RECURRENT EPISODE, IN FULL REMISSION: ICD-10-CM

## 2023-07-27 DIAGNOSIS — F41.9 ANXIETY DUE TO INVASIVE PROCEDURE: ICD-10-CM

## 2023-07-27 DIAGNOSIS — Z30.2 ENCOUNTER FOR FEMALE STERILIZATION PROCEDURE: Primary | ICD-10-CM

## 2023-07-27 DIAGNOSIS — F43.12 CHRONIC POST-TRAUMATIC STRESS DISORDER (PTSD): ICD-10-CM

## 2023-07-27 DIAGNOSIS — Z11.3 SCREEN FOR STD (SEXUALLY TRANSMITTED DISEASE): ICD-10-CM

## 2023-07-27 LAB
ANION GAP SERPL CALC-SCNC: 15 MMOL/L (ref 8–16)
BASOPHILS # BLD AUTO: 0.04 K/UL (ref 0–0.2)
BASOPHILS NFR BLD: 0.7 % (ref 0–1.9)
BUN SERPL-MCNC: 20 MG/DL (ref 6–20)
CALCIUM SERPL-MCNC: 9.1 MG/DL (ref 8.7–10.5)
CHLORIDE SERPL-SCNC: 105 MMOL/L (ref 95–110)
CHOLEST SERPL-MCNC: 174 MG/DL (ref 120–199)
CHOLEST/HDLC SERPL: 2.3 {RATIO} (ref 2–5)
CO2 SERPL-SCNC: 20 MMOL/L (ref 23–29)
CREAT SERPL-MCNC: 0.9 MG/DL (ref 0.5–1.4)
DIFFERENTIAL METHOD: ABNORMAL
EOSINOPHIL # BLD AUTO: 0.1 K/UL (ref 0–0.5)
EOSINOPHIL NFR BLD: 1.9 % (ref 0–8)
ERYTHROCYTE [DISTWIDTH] IN BLOOD BY AUTOMATED COUNT: 13.5 % (ref 11.5–14.5)
EST. GFR  (NO RACE VARIABLE): >60 ML/MIN/1.73 M^2
ESTIMATED AVG GLUCOSE: 103 MG/DL (ref 68–131)
GLUCOSE SERPL-MCNC: 77 MG/DL (ref 70–110)
HAV IGM SERPL QL IA: NORMAL
HBA1C MFR BLD: 5.2 % (ref 4–5.6)
HBV CORE IGM SERPL QL IA: NORMAL
HBV SURFACE AG SERPL QL IA: NORMAL
HCT VFR BLD AUTO: 44.3 % (ref 37–48.5)
HCV AB SERPL QL IA: NORMAL
HDLC SERPL-MCNC: 77 MG/DL (ref 40–75)
HDLC SERPL: 44.3 % (ref 20–50)
HGB BLD-MCNC: 13.9 G/DL (ref 12–16)
HIV 1+2 AB+HIV1 P24 AG SERPL QL IA: NORMAL
IMM GRANULOCYTES # BLD AUTO: 0.02 K/UL (ref 0–0.04)
IMM GRANULOCYTES NFR BLD AUTO: 0.4 % (ref 0–0.5)
LDLC SERPL CALC-MCNC: 88.6 MG/DL (ref 63–159)
LYMPHOCYTES # BLD AUTO: 1.6 K/UL (ref 1–4.8)
LYMPHOCYTES NFR BLD: 29.8 % (ref 18–48)
MCH RBC QN AUTO: 29.2 PG (ref 27–31)
MCHC RBC AUTO-ENTMCNC: 31.4 G/DL (ref 32–36)
MCV RBC AUTO: 93 FL (ref 82–98)
MONOCYTES # BLD AUTO: 0.5 K/UL (ref 0.3–1)
MONOCYTES NFR BLD: 9.7 % (ref 4–15)
NEUTROPHILS # BLD AUTO: 3.1 K/UL (ref 1.8–7.7)
NEUTROPHILS NFR BLD: 57.5 % (ref 38–73)
NONHDLC SERPL-MCNC: 97 MG/DL
NRBC BLD-RTO: 0 /100 WBC
PLATELET # BLD AUTO: 342 K/UL (ref 150–450)
PMV BLD AUTO: 9.2 FL (ref 9.2–12.9)
POTASSIUM SERPL-SCNC: 4.1 MMOL/L (ref 3.5–5.1)
RBC # BLD AUTO: 4.76 M/UL (ref 4–5.4)
SODIUM SERPL-SCNC: 140 MMOL/L (ref 136–145)
TRIGL SERPL-MCNC: 42 MG/DL (ref 30–150)
TSH SERPL DL<=0.005 MIU/L-ACNC: 2.79 UIU/ML (ref 0.4–4)
WBC # BLD AUTO: 5.37 K/UL (ref 3.9–12.7)

## 2023-07-27 PROCEDURE — 1159F MED LIST DOCD IN RCRD: CPT | Mod: CPTII,S$GLB,, | Performed by: PSYCHOLOGIST

## 2023-07-27 PROCEDURE — 80048 BASIC METABOLIC PNL TOTAL CA: CPT | Performed by: PHYSICIAN ASSISTANT

## 2023-07-27 PROCEDURE — 80074 ACUTE HEPATITIS PANEL: CPT | Performed by: PHYSICIAN ASSISTANT

## 2023-07-27 PROCEDURE — 36415 COLL VENOUS BLD VENIPUNCTURE: CPT | Performed by: PHYSICIAN ASSISTANT

## 2023-07-27 PROCEDURE — 3075F PR MOST RECENT SYSTOLIC BLOOD PRESS GE 130-139MM HG: ICD-10-PCS | Mod: CPTII,S$GLB,, | Performed by: PSYCHOLOGIST

## 2023-07-27 PROCEDURE — 99999 PR PBB SHADOW E&M-EST. PATIENT-LVL III: ICD-10-PCS | Mod: PBBFAC,,, | Performed by: PSYCHOLOGIST

## 2023-07-27 PROCEDURE — 3079F PR MOST RECENT DIASTOLIC BLOOD PRESSURE 80-89 MM HG: ICD-10-PCS | Mod: CPTII,S$GLB,, | Performed by: PSYCHOLOGIST

## 2023-07-27 PROCEDURE — 83036 HEMOGLOBIN GLYCOSYLATED A1C: CPT | Performed by: PHYSICIAN ASSISTANT

## 2023-07-27 PROCEDURE — 3008F BODY MASS INDEX DOCD: CPT | Mod: CPTII,S$GLB,, | Performed by: PSYCHOLOGIST

## 2023-07-27 PROCEDURE — 86695 HERPES SIMPLEX TYPE 1 TEST: CPT | Performed by: PHYSICIAN ASSISTANT

## 2023-07-27 PROCEDURE — 99499 UNLISTED E&M SERVICE: CPT | Mod: S$GLB,,, | Performed by: OBSTETRICS & GYNECOLOGY

## 2023-07-27 PROCEDURE — 99999 PR PBB SHADOW E&M-EST. PATIENT-LVL III: CPT | Mod: PBBFAC,,, | Performed by: PSYCHOLOGIST

## 2023-07-27 PROCEDURE — 86592 SYPHILIS TEST NON-TREP QUAL: CPT | Performed by: PHYSICIAN ASSISTANT

## 2023-07-27 PROCEDURE — 3079F DIAST BP 80-89 MM HG: CPT | Mod: CPTII,S$GLB,, | Performed by: PSYCHOLOGIST

## 2023-07-27 PROCEDURE — 99499 NO LOS: ICD-10-PCS | Mod: S$GLB,,, | Performed by: OBSTETRICS & GYNECOLOGY

## 2023-07-27 PROCEDURE — 84443 ASSAY THYROID STIM HORMONE: CPT | Performed by: PHYSICIAN ASSISTANT

## 2023-07-27 PROCEDURE — 80061 LIPID PANEL: CPT | Performed by: PHYSICIAN ASSISTANT

## 2023-07-27 PROCEDURE — 99999 PR PBB SHADOW E&M-EST. PATIENT-LVL III: ICD-10-PCS | Mod: PBBFAC,,, | Performed by: OBSTETRICS & GYNECOLOGY

## 2023-07-27 PROCEDURE — 1159F PR MEDICATION LIST DOCUMENTED IN MEDICAL RECORD: ICD-10-PCS | Mod: CPTII,S$GLB,, | Performed by: PSYCHOLOGIST

## 2023-07-27 PROCEDURE — 3008F PR BODY MASS INDEX (BMI) DOCUMENTED: ICD-10-PCS | Mod: CPTII,S$GLB,, | Performed by: PSYCHOLOGIST

## 2023-07-27 PROCEDURE — 87389 HIV-1 AG W/HIV-1&-2 AB AG IA: CPT | Performed by: PHYSICIAN ASSISTANT

## 2023-07-27 PROCEDURE — 85025 COMPLETE CBC W/AUTO DIFF WBC: CPT | Performed by: PHYSICIAN ASSISTANT

## 2023-07-27 PROCEDURE — 99999 PR PBB SHADOW E&M-EST. PATIENT-LVL III: CPT | Mod: PBBFAC,,, | Performed by: OBSTETRICS & GYNECOLOGY

## 2023-07-27 PROCEDURE — 3075F SYST BP GE 130 - 139MM HG: CPT | Mod: CPTII,S$GLB,, | Performed by: PSYCHOLOGIST

## 2023-07-27 PROCEDURE — 99205 OFFICE O/P NEW HI 60 MIN: CPT | Mod: S$GLB,,, | Performed by: PSYCHOLOGIST

## 2023-07-27 PROCEDURE — 99205 PR OFFICE/OUTPT VISIT, NEW, LEVL V, 60-74 MIN: ICD-10-PCS | Mod: S$GLB,,, | Performed by: PSYCHOLOGIST

## 2023-07-27 RX ORDER — LISDEXAMFETAMINE DIMESYLATE 40 MG/1
40 CAPSULE ORAL DAILY
Qty: 90 CAPSULE | Refills: 0 | Status: SHIPPED | OUTPATIENT
Start: 2023-07-27 | End: 2023-10-13

## 2023-07-27 RX ORDER — BUPROPION HYDROCHLORIDE 300 MG/1
300 TABLET ORAL DAILY
Qty: 90 TABLET | Refills: 1 | Status: SHIPPED | OUTPATIENT
Start: 2023-07-27 | End: 2023-10-13 | Stop reason: SDUPTHER

## 2023-07-27 RX ORDER — MUPIROCIN 20 MG/G
OINTMENT TOPICAL
Status: CANCELLED | OUTPATIENT
Start: 2023-07-27

## 2023-07-27 RX ORDER — FAMOTIDINE 20 MG/1
20 TABLET, FILM COATED ORAL
Status: CANCELLED | OUTPATIENT
Start: 2023-07-27

## 2023-07-27 RX ORDER — SODIUM CHLORIDE 9 MG/ML
INJECTION, SOLUTION INTRAVENOUS CONTINUOUS
Status: CANCELLED | OUTPATIENT
Start: 2023-07-27

## 2023-07-27 RX ORDER — DEXTROAMPHETAMINE SACCHARATE, AMPHETAMINE ASPARTATE, DEXTROAMPHETAMINE SULFATE AND AMPHETAMINE SULFATE 2.5; 2.5; 2.5; 2.5 MG/1; MG/1; MG/1; MG/1
10 TABLET ORAL DAILY
Qty: 90 TABLET | Refills: 0 | Status: SHIPPED | OUTPATIENT
Start: 2023-07-27 | End: 2023-10-13 | Stop reason: SDUPTHER

## 2023-07-27 RX ORDER — DIAZEPAM 5 MG/1
5 TABLET ORAL EVERY 8 HOURS PRN
Qty: 2 TABLET | Refills: 0 | Status: SHIPPED | OUTPATIENT
Start: 2023-07-27 | End: 2023-08-21
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
## 2023-07-27 NOTE — PATIENT INSTRUCTIONS

## 2023-07-27 NOTE — H&P (VIEW-ONLY)
[facility]  Obstetrics & Gynecology  History & Physical    Patient Name: Billie Nicolas  MRN: [id number]  Admission Date: (Not on file)  Primary Care Provider: Leonie Rodriguez DO    Subjective:     Chief Complaint/Reason for Admission: sterilization surgery    History of Present Illness:   31 yo  who has never desired pregnancy/child-bearing and now desires permanent sterilization is here for scheduled sterilization surgery.  Pt reports no complaints and is ready for surgery.    Current Outpatient Medications on File Prior to Visit   Medication Sig    buPROPion (WELLBUTRIN XL) 300 MG 24 hr tablet Take 1 tablet (300 mg total) by mouth once daily.    dextroamphetamine-amphetamine (ADDERALL) 10 mg Tab Take 1 tablet (10 mg total) by mouth once daily.    lisdexamfetamine (VYVANSE) 40 MG Cap Take 1 capsule (40 mg total) by mouth once daily.    multivitamin (THERAGRAN) per tablet Take 1 tablet by mouth once daily.    sertraline (ZOLOFT) 100 MG tablet Take 1 tablet (100 mg total) by mouth once daily.    valACYclovir (VALTREX) 500 MG tablet TAKE ONE TABLET BY MOUTH EVERY DAY    albuterol (PROVENTIL HFA) 90 mcg/actuation inhaler Inhale 2 puffs into the lungs every 6 (six) hours as needed for Wheezing. Rescue    [DISCONTINUED] buPROPion (WELLBUTRIN XL) 300 MG 24 hr tablet Take 1 tablet (300 mg total) by mouth once daily.    [DISCONTINUED] dextroamphetamine-amphetamine (ADDERALL) 10 mg Tab Take 1 tablet (10 mg total) by mouth once daily.    [DISCONTINUED] lisdexamfetamine (VYVANSE) 40 MG Cap Take 1 capsule (40 mg total) by mouth once daily.     No current facility-administered medications on file prior to visit.       Review of patient's allergies indicates:   Allergen Reactions    Sulfa (sulfonamide antibiotics) Hives    Sulfamethoxazole-trimethoprim        Past Medical History:   Diagnosis Date    Abnormal cervical Pap smear with positive HPV DNA test     ADHD, predominantly inattentive type     Allergy      Anxiety     Asthma     Bulimia     Major depression, recurrent     Migraine headache      OB History    Para Term  AB Living   2 0     2 0   SAB IAB Ectopic Multiple Live Births   2       0      # Outcome Date GA Lbr Jaime/2nd Weight Sex Delivery Anes PTL Lv   2 SAB            1 SAB              Past Surgical History:   Procedure Laterality Date    ABSCESS DRAINAGE  2017    abscess lanced     TONSILLECTOMY AND ADENOIDECTOMY      WISDOM TOOTH EXTRACTION       Family History       Problem Relation (Age of Onset)    Cancer Mother, Maternal Grandfather    Diabetes Sister    Heart disease Mother, Maternal Grandfather    Hodgkin's lymphoma Maternal Grandfather    Hypertension Mother, Maternal Grandmother, Maternal Grandfather    Mitral valve prolapse Mother          Tobacco Use    Smoking status: Never    Smokeless tobacco: Never   Substance and Sexual Activity    Alcohol use: Yes     Comment: rarely     Drug use: No    Sexual activity: Yes     Partners: Male     Review of Systems   Constitutional:  Negative for activity change, appetite change, chills, fatigue, fever and unexpected weight change.   Respiratory:  Negative for shortness of breath.    Cardiovascular:  Negative for chest pain, palpitations and leg swelling.   Gastrointestinal:  Negative for abdominal pain, bloating, blood in stool, constipation, diarrhea, nausea and vomiting.   Genitourinary:  Negative for dysmenorrhea, dyspareunia, dysuria, flank pain, frequency, genital sores, hematuria, menorrhagia, menstrual problem, pelvic pain, urgency, vaginal bleeding, vaginal discharge, vaginal pain, urinary incontinence, postcoital bleeding, vaginal dryness and vaginal odor.   Musculoskeletal:  Negative for back pain.   Neurological:  Negative for syncope and headaches.   Objective:     Vital Signs (Most Recent):  BP: 112/82 (23 1055) Vital Signs (24h Range):  [unfilled]     Weight: 86.8 kg (191 lb 5.8 oz)  Body mass index is 29.97  kg/m².  Patient's last menstrual period was 07/18/2023.    Physical Exam:   Constitutional: She is oriented to person, place, and time. She appears well-developed and well-nourished. No distress.    HENT:   Head: Normocephalic and atraumatic.    Eyes: Pupils are equal, round, and reactive to light. EOM are normal.     Cardiovascular:  Normal rate, regular rhythm and normal heart sounds.             Pulmonary/Chest: Effort normal and breath sounds normal.        Abdominal: Soft. Bowel sounds are normal. She exhibits no distension. There is no abdominal tenderness.             Musculoskeletal: Normal range of motion and moves all extremeties. No tenderness or edema.       Neurological: She is alert and oriented to person, place, and time.    Skin: Skin is warm and dry.    Psychiatric: She has a normal mood and affect. Her behavior is normal. Thought content normal.     Laboratory:  I have personallly reviewed all pertinent lab results from the last 24 hours.    Diagnostic Results:  Labs: Reviewed  Pap reviewed    Assessment/Plan:     There are no hospital problems to display for this patient.    Encounter for female sterilization procedure  -     Procedure risks, benefits, and alternatives were discussed.  In particular, irreversible nature of procedure and increased risk of regret due to no children were discussed in detail.  Pt voiced understanding and expressed consent for LSC Bilateral Salpingectomy.  Hospital forms were reviewed with pt and signed.  Pre-operative instructions were given.    Anxiety due to invasive procedure  -     diazePAM (VALIUM) 5 MG tablet; Take 1 tablet (5 mg total) by mouth every 8 (eight) hours as needed for Anxiety.  Dispense: 2 tablet; Refill: 0    Elvin Shoemaker MD  Obstetrics & Gynecology  [facility]

## 2023-07-27 NOTE — PROGRESS NOTES
Pt is here for pre-operative visit.  Pt reports no complaints.  Ready for surgery.  States that she normally gets a lot of anxiety prior to medical procedures and requests pre-procedure anxiolytic.    ROS Negative     Physical Exam:  See H+P    A/P:  Encounter for female sterilization procedure  -     Procedure risks, benefits, and alternatives were discussed.  In particular, irreversible nature of procedure and increased risk of regret due to no children were discussed in detail.  Pt voiced understanding and expressed consent for LSC Bilateral Salpingectomy.  Hospital forms were reviewed with pt and signed.  Pre-operative instructions were given.    Anxiety due to invasive procedure  -     diazePAM (VALIUM) 5 MG tablet; Take 1 tablet (5 mg total) by mouth every 8 (eight) hours as needed for Anxiety.  Dispense: 2 tablet; Refill: 0

## 2023-07-27 NOTE — H&P
[facility]  Obstetrics & Gynecology  History & Physical    Patient Name: Billie Nicolas  MRN: [id number]  Admission Date: (Not on file)  Primary Care Provider: Leonie Rodriguez DO    Subjective:     Chief Complaint/Reason for Admission: sterilization surgery    History of Present Illness:   33 yo  who has never desired pregnancy/child-bearing and now desires permanent sterilization is here for scheduled sterilization surgery.  Pt reports no complaints and is ready for surgery.    Current Outpatient Medications on File Prior to Visit   Medication Sig    buPROPion (WELLBUTRIN XL) 300 MG 24 hr tablet Take 1 tablet (300 mg total) by mouth once daily.    dextroamphetamine-amphetamine (ADDERALL) 10 mg Tab Take 1 tablet (10 mg total) by mouth once daily.    lisdexamfetamine (VYVANSE) 40 MG Cap Take 1 capsule (40 mg total) by mouth once daily.    multivitamin (THERAGRAN) per tablet Take 1 tablet by mouth once daily.    sertraline (ZOLOFT) 100 MG tablet Take 1 tablet (100 mg total) by mouth once daily.    valACYclovir (VALTREX) 500 MG tablet TAKE ONE TABLET BY MOUTH EVERY DAY    albuterol (PROVENTIL HFA) 90 mcg/actuation inhaler Inhale 2 puffs into the lungs every 6 (six) hours as needed for Wheezing. Rescue    [DISCONTINUED] buPROPion (WELLBUTRIN XL) 300 MG 24 hr tablet Take 1 tablet (300 mg total) by mouth once daily.    [DISCONTINUED] dextroamphetamine-amphetamine (ADDERALL) 10 mg Tab Take 1 tablet (10 mg total) by mouth once daily.    [DISCONTINUED] lisdexamfetamine (VYVANSE) 40 MG Cap Take 1 capsule (40 mg total) by mouth once daily.     No current facility-administered medications on file prior to visit.       Review of patient's allergies indicates:   Allergen Reactions    Sulfa (sulfonamide antibiotics) Hives    Sulfamethoxazole-trimethoprim        Past Medical History:   Diagnosis Date    Abnormal cervical Pap smear with positive HPV DNA test     ADHD, predominantly inattentive type     Allergy      Anxiety     Asthma     Bulimia     Major depression, recurrent     Migraine headache      OB History    Para Term  AB Living   2 0     2 0   SAB IAB Ectopic Multiple Live Births   2       0      # Outcome Date GA Lbr Jaime/2nd Weight Sex Delivery Anes PTL Lv   2 SAB            1 SAB              Past Surgical History:   Procedure Laterality Date    ABSCESS DRAINAGE  2017    abscess lanced     TONSILLECTOMY AND ADENOIDECTOMY      WISDOM TOOTH EXTRACTION       Family History       Problem Relation (Age of Onset)    Cancer Mother, Maternal Grandfather    Diabetes Sister    Heart disease Mother, Maternal Grandfather    Hodgkin's lymphoma Maternal Grandfather    Hypertension Mother, Maternal Grandmother, Maternal Grandfather    Mitral valve prolapse Mother          Tobacco Use    Smoking status: Never    Smokeless tobacco: Never   Substance and Sexual Activity    Alcohol use: Yes     Comment: rarely     Drug use: No    Sexual activity: Yes     Partners: Male     Review of Systems   Constitutional:  Negative for activity change, appetite change, chills, fatigue, fever and unexpected weight change.   Respiratory:  Negative for shortness of breath.    Cardiovascular:  Negative for chest pain, palpitations and leg swelling.   Gastrointestinal:  Negative for abdominal pain, bloating, blood in stool, constipation, diarrhea, nausea and vomiting.   Genitourinary:  Negative for dysmenorrhea, dyspareunia, dysuria, flank pain, frequency, genital sores, hematuria, menorrhagia, menstrual problem, pelvic pain, urgency, vaginal bleeding, vaginal discharge, vaginal pain, urinary incontinence, postcoital bleeding, vaginal dryness and vaginal odor.   Musculoskeletal:  Negative for back pain.   Neurological:  Negative for syncope and headaches.   Objective:     Vital Signs (Most Recent):  BP: 112/82 (23 1055) Vital Signs (24h Range):  [unfilled]     Weight: 86.8 kg (191 lb 5.8 oz)  Body mass index is 29.97  kg/m².  Patient's last menstrual period was 07/18/2023.    Physical Exam:   Constitutional: She is oriented to person, place, and time. She appears well-developed and well-nourished. No distress.    HENT:   Head: Normocephalic and atraumatic.    Eyes: Pupils are equal, round, and reactive to light. EOM are normal.     Cardiovascular:  Normal rate, regular rhythm and normal heart sounds.             Pulmonary/Chest: Effort normal and breath sounds normal.        Abdominal: Soft. Bowel sounds are normal. She exhibits no distension. There is no abdominal tenderness.             Musculoskeletal: Normal range of motion and moves all extremeties. No tenderness or edema.       Neurological: She is alert and oriented to person, place, and time.    Skin: Skin is warm and dry.    Psychiatric: She has a normal mood and affect. Her behavior is normal. Thought content normal.     Laboratory:  I have personallly reviewed all pertinent lab results from the last 24 hours.    Diagnostic Results:  Labs: Reviewed  Pap reviewed    Assessment/Plan:     There are no hospital problems to display for this patient.    Encounter for female sterilization procedure  -     Procedure risks, benefits, and alternatives were discussed.  In particular, irreversible nature of procedure and increased risk of regret due to no children were discussed in detail.  Pt voiced understanding and expressed consent for LSC Bilateral Salpingectomy.  Hospital forms were reviewed with pt and signed.  Pre-operative instructions were given.    Anxiety due to invasive procedure  -     diazePAM (VALIUM) 5 MG tablet; Take 1 tablet (5 mg total) by mouth every 8 (eight) hours as needed for Anxiety.  Dispense: 2 tablet; Refill: 0    Elvin Shoemaker MD  Obstetrics & Gynecology  [facility]

## 2023-07-28 ENCOUNTER — PATIENT MESSAGE (OUTPATIENT)
Dept: PREADMISSION TESTING | Facility: HOSPITAL | Age: 33
End: 2023-07-28
Payer: COMMERCIAL

## 2023-07-28 ENCOUNTER — ANESTHESIA EVENT (OUTPATIENT)
Dept: SURGERY | Facility: HOSPITAL | Age: 33
End: 2023-07-28
Payer: COMMERCIAL

## 2023-07-28 LAB
HSV1 IGG SERPL QL IA: POSITIVE
HSV2 IGG SERPL QL IA: NEGATIVE
RPR SER QL: NORMAL

## 2023-07-28 NOTE — ANESTHESIA PREPROCEDURE EVALUATION
07/28/2023  Billie Nicolas is a 32 y.o., female.    Past Medical History:   Diagnosis Date    Abnormal cervical Pap smear with positive HPV DNA test     ADHD, predominantly inattentive type     Allergy     Anxiety     Asthma     Bulimia     Major depression, recurrent     Migraine headache      Past Surgical History:   Procedure Laterality Date    ABSCESS DRAINAGE  12/25/2017    abscess lanced     TONSILLECTOMY AND ADENOIDECTOMY      WISDOM TOOTH EXTRACTION           Pre-op Assessment    I have reviewed the Patient Summary Reports.    I have reviewed the NPO Status.   I have reviewed the Medications.     Review of Systems  Anesthesia Hx:  N/V. History of prior surgery of interest to airway management or planning: Personal Hx of Anesthesia complications, Post-Operative Nausea/Vomiting   Social:  Non-Smoker    EENT/Dental:   chronic allergic rhinitis   Cardiovascular:  Cardiovascular Normal     Pulmonary:   Asthma mild    Renal/:  Renal/ Normal     Hepatic/GI:  Hepatic/GI Normal    Neurological:   Headaches    Endocrine:  Obesity / BMI > 30  Psych:   anxiety depression          Physical Exam  General: Alert and Oriented    Airway:  Mallampati: II   Mouth Opening: Normal  TM Distance: Normal  Tongue: Normal  Neck ROM: Normal ROM    Dental:  Intact    Chest/Lungs:  Clear to auscultation, Normal Respiratory Rate    Heart:  Rate: Normal  Rhythm: Regular Rhythm        Anesthesia Plan  Type of Anesthesia, risks & benefits discussed:    Anesthesia Type: Gen ETT  Intra-op Monitoring Plan: Standard ASA Monitors  Post Op Pain Control Plan: multimodal analgesia and IV/PO Opioids PRN  Induction:  IV  Informed Consent: Informed consent signed with the Patient and all parties understand the risks and agree with anesthesia plan.  All questions answered. Patient consented to blood products? No  ASA  Score: 2  Day of Surgery Review of History & Physical: H&P Update referred to the surgeon/provider.    Ready For Surgery From Anesthesia Perspective.     .

## 2023-07-31 ENCOUNTER — HOSPITAL ENCOUNTER (OUTPATIENT)
Facility: HOSPITAL | Age: 33
Discharge: HOME | End: 2023-07-31
Attending: OBSTETRICS & GYNECOLOGY | Admitting: OBSTETRICS & GYNECOLOGY
Payer: COMMERCIAL

## 2023-07-31 ENCOUNTER — ANESTHESIA (OUTPATIENT)
Dept: SURGERY | Facility: HOSPITAL | Age: 33
End: 2023-07-31
Payer: COMMERCIAL

## 2023-07-31 VITALS
WEIGHT: 195.88 LBS | SYSTOLIC BLOOD PRESSURE: 130 MMHG | RESPIRATION RATE: 19 BRPM | HEART RATE: 73 BPM | TEMPERATURE: 98 F | DIASTOLIC BLOOD PRESSURE: 85 MMHG | OXYGEN SATURATION: 97 % | HEIGHT: 67 IN | BODY MASS INDEX: 30.74 KG/M2

## 2023-07-31 DIAGNOSIS — Z30.2 ENCOUNTER FOR FEMALE STERILIZATION PROCEDURE: ICD-10-CM

## 2023-07-31 DIAGNOSIS — Z90.79 STATUS POST BILATERAL SALPINGECTOMY: Primary | ICD-10-CM

## 2023-07-31 LAB
B-HCG UR QL: NEGATIVE
CTP QC/QA: YES

## 2023-07-31 PROCEDURE — 63600175 PHARM REV CODE 636 W HCPCS: Performed by: OBSTETRICS & GYNECOLOGY

## 2023-07-31 PROCEDURE — 81025 URINE PREGNANCY TEST: CPT | Performed by: OBSTETRICS & GYNECOLOGY

## 2023-07-31 PROCEDURE — D9220A PRA ANESTHESIA: ICD-10-PCS | Mod: ANES,,, | Performed by: ANESTHESIOLOGY

## 2023-07-31 PROCEDURE — 58661 LAPAROSCOPY REMOVE ADNEXA: CPT | Mod: AS,50,, | Performed by: PHYSICIAN ASSISTANT

## 2023-07-31 PROCEDURE — 58661 PR LAP,RMV  ADNEXAL STRUCTURE: ICD-10-PCS | Mod: 50,,, | Performed by: OBSTETRICS & GYNECOLOGY

## 2023-07-31 PROCEDURE — 36000709 HC OR TIME LEV III EA ADD 15 MIN: Performed by: OBSTETRICS & GYNECOLOGY

## 2023-07-31 PROCEDURE — 25000003 PHARM REV CODE 250: Performed by: NURSE ANESTHETIST, CERTIFIED REGISTERED

## 2023-07-31 PROCEDURE — 71000033 HC RECOVERY, INTIAL HOUR: Performed by: OBSTETRICS & GYNECOLOGY

## 2023-07-31 PROCEDURE — 37000008 HC ANESTHESIA 1ST 15 MINUTES: Performed by: OBSTETRICS & GYNECOLOGY

## 2023-07-31 PROCEDURE — D9220A PRA ANESTHESIA: Mod: ANES,,, | Performed by: ANESTHESIOLOGY

## 2023-07-31 PROCEDURE — 58661 LAPAROSCOPY REMOVE ADNEXA: CPT | Mod: 50,,, | Performed by: OBSTETRICS & GYNECOLOGY

## 2023-07-31 PROCEDURE — 25000003 PHARM REV CODE 250: Performed by: OBSTETRICS & GYNECOLOGY

## 2023-07-31 PROCEDURE — 27201423 OPTIME MED/SURG SUP & DEVICES STERILE SUPPLY: Performed by: OBSTETRICS & GYNECOLOGY

## 2023-07-31 PROCEDURE — 88302 PR  SURG PATH,LEVEL II: ICD-10-PCS | Mod: 26,,, | Performed by: PATHOLOGY

## 2023-07-31 PROCEDURE — 25000003 PHARM REV CODE 250: Performed by: ANESTHESIOLOGY

## 2023-07-31 PROCEDURE — D9220A PRA ANESTHESIA: ICD-10-PCS | Mod: CRNA,,, | Performed by: NURSE ANESTHETIST, CERTIFIED REGISTERED

## 2023-07-31 PROCEDURE — 88302 TISSUE EXAM BY PATHOLOGIST: CPT | Mod: 59 | Performed by: PATHOLOGY

## 2023-07-31 PROCEDURE — 71000015 HC POSTOP RECOV 1ST HR: Performed by: OBSTETRICS & GYNECOLOGY

## 2023-07-31 PROCEDURE — 63600175 PHARM REV CODE 636 W HCPCS: Performed by: NURSE ANESTHETIST, CERTIFIED REGISTERED

## 2023-07-31 PROCEDURE — 37000009 HC ANESTHESIA EA ADD 15 MINS: Performed by: OBSTETRICS & GYNECOLOGY

## 2023-07-31 PROCEDURE — 88302 TISSUE EXAM BY PATHOLOGIST: CPT | Mod: 26,,, | Performed by: PATHOLOGY

## 2023-07-31 PROCEDURE — 36000708 HC OR TIME LEV III 1ST 15 MIN: Performed by: OBSTETRICS & GYNECOLOGY

## 2023-07-31 PROCEDURE — 58661 PR LAP,RMV  ADNEXAL STRUCTURE: ICD-10-PCS | Mod: AS,50,, | Performed by: PHYSICIAN ASSISTANT

## 2023-07-31 PROCEDURE — D9220A PRA ANESTHESIA: Mod: CRNA,,, | Performed by: NURSE ANESTHETIST, CERTIFIED REGISTERED

## 2023-07-31 RX ORDER — KETOROLAC TROMETHAMINE 30 MG/ML
30 INJECTION, SOLUTION INTRAMUSCULAR; INTRAVENOUS EVERY 6 HOURS
Status: DISCONTINUED | OUTPATIENT
Start: 2023-07-31 | End: 2023-07-31 | Stop reason: HOSPADM

## 2023-07-31 RX ORDER — ONDANSETRON 8 MG/1
8 TABLET, ORALLY DISINTEGRATING ORAL EVERY 8 HOURS PRN
Status: DISCONTINUED | OUTPATIENT
Start: 2023-07-31 | End: 2023-07-31 | Stop reason: HOSPADM

## 2023-07-31 RX ORDER — FENTANYL CITRATE 50 UG/ML
INJECTION, SOLUTION INTRAMUSCULAR; INTRAVENOUS
Status: DISCONTINUED | OUTPATIENT
Start: 2023-07-31 | End: 2023-07-31

## 2023-07-31 RX ORDER — SUCCINYLCHOLINE CHLORIDE 20 MG/ML
INJECTION INTRAMUSCULAR; INTRAVENOUS
Status: DISCONTINUED | OUTPATIENT
Start: 2023-07-31 | End: 2023-07-31

## 2023-07-31 RX ORDER — HYDROCODONE BITARTRATE AND ACETAMINOPHEN 5; 325 MG/1; MG/1
1 TABLET ORAL EVERY 4 HOURS PRN
Status: DISCONTINUED | OUTPATIENT
Start: 2023-07-31 | End: 2023-07-31 | Stop reason: HOSPADM

## 2023-07-31 RX ORDER — ONDANSETRON 2 MG/ML
4 INJECTION INTRAMUSCULAR; INTRAVENOUS ONCE AS NEEDED
Status: DISCONTINUED | OUTPATIENT
Start: 2023-07-31 | End: 2023-07-31 | Stop reason: HOSPADM

## 2023-07-31 RX ORDER — ONDANSETRON 4 MG/1
8 TABLET, ORALLY DISINTEGRATING ORAL EVERY 6 HOURS PRN
Qty: 10 TABLET | Refills: 0 | Status: SHIPPED | OUTPATIENT
Start: 2023-07-31 | End: 2023-08-21

## 2023-07-31 RX ORDER — MEPERIDINE HYDROCHLORIDE 25 MG/ML
12.5 INJECTION INTRAMUSCULAR; INTRAVENOUS; SUBCUTANEOUS ONCE
Status: DISCONTINUED | OUTPATIENT
Start: 2023-07-31 | End: 2023-07-31 | Stop reason: HOSPADM

## 2023-07-31 RX ORDER — MUPIROCIN 20 MG/G
OINTMENT TOPICAL
Status: DISCONTINUED | OUTPATIENT
Start: 2023-07-31 | End: 2023-07-31 | Stop reason: HOSPADM

## 2023-07-31 RX ORDER — KETOROLAC TROMETHAMINE 30 MG/ML
INJECTION, SOLUTION INTRAMUSCULAR; INTRAVENOUS
Status: DISCONTINUED | OUTPATIENT
Start: 2023-07-31 | End: 2023-07-31

## 2023-07-31 RX ORDER — HYDROMORPHONE HYDROCHLORIDE 2 MG/ML
1 INJECTION, SOLUTION INTRAMUSCULAR; INTRAVENOUS; SUBCUTANEOUS EVERY 6 HOURS PRN
Status: DISCONTINUED | OUTPATIENT
Start: 2023-07-31 | End: 2023-07-31 | Stop reason: HOSPADM

## 2023-07-31 RX ORDER — DOCUSATE SODIUM 100 MG/1
100 CAPSULE, LIQUID FILLED ORAL 2 TIMES DAILY
COMMUNITY
End: 2023-08-21

## 2023-07-31 RX ORDER — HYDROCODONE BITARTRATE AND ACETAMINOPHEN 5; 325 MG/1; MG/1
1 TABLET ORAL
Status: DISCONTINUED | OUTPATIENT
Start: 2023-07-31 | End: 2023-07-31 | Stop reason: HOSPADM

## 2023-07-31 RX ORDER — DIPHENHYDRAMINE HYDROCHLORIDE 50 MG/ML
25 INJECTION INTRAMUSCULAR; INTRAVENOUS EVERY 4 HOURS PRN
Status: DISCONTINUED | OUTPATIENT
Start: 2023-07-31 | End: 2023-07-31 | Stop reason: HOSPADM

## 2023-07-31 RX ORDER — HYDROCODONE BITARTRATE AND ACETAMINOPHEN 5; 325 MG/1; MG/1
1 TABLET ORAL EVERY 4 HOURS PRN
Qty: 14 TABLET | Refills: 0 | Status: SHIPPED | OUTPATIENT
Start: 2023-07-31 | End: 2023-08-21

## 2023-07-31 RX ORDER — ROCURONIUM BROMIDE 10 MG/ML
INJECTION, SOLUTION INTRAVENOUS
Status: DISCONTINUED | OUTPATIENT
Start: 2023-07-31 | End: 2023-07-31

## 2023-07-31 RX ORDER — ONDANSETRON 2 MG/ML
INJECTION INTRAMUSCULAR; INTRAVENOUS
Status: DISCONTINUED | OUTPATIENT
Start: 2023-07-31 | End: 2023-07-31

## 2023-07-31 RX ORDER — FAMOTIDINE 20 MG/1
20 TABLET, FILM COATED ORAL
Status: COMPLETED | OUTPATIENT
Start: 2023-07-31 | End: 2023-07-31

## 2023-07-31 RX ORDER — DIPHENHYDRAMINE HYDROCHLORIDE 50 MG/ML
25 INJECTION INTRAMUSCULAR; INTRAVENOUS EVERY 6 HOURS PRN
Status: DISCONTINUED | OUTPATIENT
Start: 2023-07-31 | End: 2023-07-31 | Stop reason: HOSPADM

## 2023-07-31 RX ORDER — NEOSTIGMINE METHYLSULFATE 0.5 MG/ML
INJECTION, SOLUTION INTRAVENOUS
Status: DISCONTINUED | OUTPATIENT
Start: 2023-07-31 | End: 2023-07-31

## 2023-07-31 RX ORDER — SCOLOPAMINE TRANSDERMAL SYSTEM 1 MG/1
1 PATCH, EXTENDED RELEASE TRANSDERMAL
Status: DISCONTINUED | OUTPATIENT
Start: 2023-07-31 | End: 2023-07-31 | Stop reason: HOSPADM

## 2023-07-31 RX ORDER — FENTANYL CITRATE 50 UG/ML
25 INJECTION, SOLUTION INTRAMUSCULAR; INTRAVENOUS EVERY 5 MIN PRN
Status: DISCONTINUED | OUTPATIENT
Start: 2023-07-31 | End: 2023-07-31 | Stop reason: HOSPADM

## 2023-07-31 RX ORDER — DEXAMETHASONE SODIUM PHOSPHATE 4 MG/ML
INJECTION, SOLUTION INTRA-ARTICULAR; INTRALESIONAL; INTRAMUSCULAR; INTRAVENOUS; SOFT TISSUE
Status: DISCONTINUED | OUTPATIENT
Start: 2023-07-31 | End: 2023-07-31

## 2023-07-31 RX ORDER — LIDOCAINE HYDROCHLORIDE 20 MG/ML
INJECTION INTRAVENOUS
Status: DISCONTINUED | OUTPATIENT
Start: 2023-07-31 | End: 2023-07-31

## 2023-07-31 RX ORDER — DIPHENHYDRAMINE HCL 25 MG
25 CAPSULE ORAL EVERY 4 HOURS PRN
Status: DISCONTINUED | OUTPATIENT
Start: 2023-07-31 | End: 2023-07-31 | Stop reason: HOSPADM

## 2023-07-31 RX ORDER — SODIUM CHLORIDE, SODIUM LACTATE, POTASSIUM CHLORIDE, CALCIUM CHLORIDE 600; 310; 30; 20 MG/100ML; MG/100ML; MG/100ML; MG/100ML
INJECTION, SOLUTION INTRAVENOUS CONTINUOUS
Status: DISCONTINUED | OUTPATIENT
Start: 2023-07-31 | End: 2023-07-31 | Stop reason: HOSPADM

## 2023-07-31 RX ORDER — HYDROCODONE BITARTRATE AND ACETAMINOPHEN 10; 325 MG/1; MG/1
1 TABLET ORAL EVERY 4 HOURS PRN
Status: DISCONTINUED | OUTPATIENT
Start: 2023-07-31 | End: 2023-07-31 | Stop reason: HOSPADM

## 2023-07-31 RX ORDER — PROPOFOL 10 MG/ML
VIAL (ML) INTRAVENOUS
Status: DISCONTINUED | OUTPATIENT
Start: 2023-07-31 | End: 2023-07-31

## 2023-07-31 RX ORDER — SODIUM CHLORIDE 9 MG/ML
INJECTION, SOLUTION INTRAVENOUS CONTINUOUS
Status: DISCONTINUED | OUTPATIENT
Start: 2023-07-31 | End: 2023-07-31 | Stop reason: HOSPADM

## 2023-07-31 RX ORDER — SIMETHICONE 125 MG
125 TABLET,CHEWABLE ORAL EVERY 6 HOURS PRN
COMMUNITY
End: 2023-08-21

## 2023-07-31 RX ORDER — IBUPROFEN 800 MG/1
800 TABLET ORAL EVERY 8 HOURS PRN
Qty: 30 TABLET | Refills: 0 | Status: SHIPPED | OUTPATIENT
Start: 2023-07-31 | End: 2023-08-21

## 2023-07-31 RX ORDER — PROCHLORPERAZINE EDISYLATE 5 MG/ML
5 INJECTION INTRAMUSCULAR; INTRAVENOUS EVERY 6 HOURS PRN
Status: DISCONTINUED | OUTPATIENT
Start: 2023-07-31 | End: 2023-07-31 | Stop reason: HOSPADM

## 2023-07-31 RX ORDER — MIDAZOLAM HYDROCHLORIDE 1 MG/ML
INJECTION INTRAMUSCULAR; INTRAVENOUS
Status: DISCONTINUED | OUTPATIENT
Start: 2023-07-31 | End: 2023-07-31

## 2023-07-31 RX ADMIN — SUCCINYLCHOLINE CHLORIDE 160 MG: 20 INJECTION, SOLUTION INTRAMUSCULAR; INTRAVENOUS; PARENTERAL at 09:07

## 2023-07-31 RX ADMIN — ONDANSETRON 8 MG: 2 INJECTION INTRAMUSCULAR; INTRAVENOUS at 09:07

## 2023-07-31 RX ADMIN — ROCURONIUM BROMIDE 5 MG: 10 SOLUTION INTRAVENOUS at 09:07

## 2023-07-31 RX ADMIN — SODIUM CHLORIDE, POTASSIUM CHLORIDE, SODIUM LACTATE AND CALCIUM CHLORIDE: 600; 310; 30; 20 INJECTION, SOLUTION INTRAVENOUS at 09:07

## 2023-07-31 RX ADMIN — FAMOTIDINE 20 MG: 20 TABLET ORAL at 09:07

## 2023-07-31 RX ADMIN — LIDOCAINE HYDROCHLORIDE 50 MG: 20 INJECTION INTRAVENOUS at 09:07

## 2023-07-31 RX ADMIN — GLYCOPYRROLATE 0.4 MG: 0.2 INJECTION, SOLUTION INTRAMUSCULAR; INTRAVITREAL at 10:07

## 2023-07-31 RX ADMIN — HYDROCODONE BITARTRATE AND ACETAMINOPHEN 1 TABLET: 5; 325 TABLET ORAL at 11:07

## 2023-07-31 RX ADMIN — NEOSTIGMINE METHYLSULFATE 3 MG: 0.5 INJECTION INTRAVENOUS at 10:07

## 2023-07-31 RX ADMIN — KETOROLAC TROMETHAMINE 30 MG: 30 INJECTION, SOLUTION INTRAMUSCULAR; INTRAVENOUS at 10:07

## 2023-07-31 RX ADMIN — PROPOFOL 150 MG: 10 INJECTION, EMULSION INTRAVENOUS at 09:07

## 2023-07-31 RX ADMIN — FENTANYL CITRATE 100 MCG: 50 INJECTION, SOLUTION INTRAMUSCULAR; INTRAVENOUS at 09:07

## 2023-07-31 RX ADMIN — SCOPOLAMINE 1 PATCH: 1 SYSTEM TRANSDERMAL at 10:07

## 2023-07-31 RX ADMIN — DEXAMETHASONE SODIUM PHOSPHATE 8 MG: 4 INJECTION, SOLUTION INTRA-ARTICULAR; INTRALESIONAL; INTRAMUSCULAR; INTRAVENOUS; SOFT TISSUE at 09:07

## 2023-07-31 RX ADMIN — MIDAZOLAM HYDROCHLORIDE 2 MG: 1 INJECTION, SOLUTION INTRAMUSCULAR; INTRAVENOUS at 09:07

## 2023-07-31 RX ADMIN — ROCURONIUM BROMIDE 25 MG: 10 SOLUTION INTRAVENOUS at 09:07

## 2023-07-31 NOTE — TRANSFER OF CARE
"Anesthesia Transfer of Care Note    Patient: Billie Nicolas    Procedure(s) Performed: Procedure(s) (LRB):  SALPINGECTOMY, LAPAROSCOPIC (Bilateral)    Patient location: PACU    Anesthesia Type: general    Transport from OR: Transported from OR on room air with adequate spontaneous ventilation    Post pain: adequate analgesia    Post assessment: no apparent anesthetic complications and tolerated procedure well    Level of consciousness: awake    Nausea/Vomiting: no nausea/vomiting    Complications: none    Transfer of care protocol was followed      Last vitals:   Visit Vitals  BP (!) 142/84 (BP Location: Right arm, Patient Position: Sitting)   Pulse 87   Temp 36.5 °C (97.7 °F) (Temporal)   Resp 19   Ht 5' 7" (1.702 m)   Wt 88.9 kg (195 lb 14.1 oz)   SpO2 98%   Breastfeeding No   BMI 30.68 kg/m²     "

## 2023-07-31 NOTE — ANESTHESIA PROCEDURE NOTES
Intubation    Date/Time: 7/31/2023 9:44 AM    Performed by: Ghanshyam Sanabria CRNA  Authorized by: Skye Cortes MD    Intubation:     Induction:  Intravenous    Intubated:  Postinduction    Mask Ventilation:  Easy mask    Attempts:  1    Attempted By:  CRNA    Method of Intubation:  Video laryngoscopy    Blade:  Lanier 3    Laryngeal View Grade: Grade I - full view of cords      Difficult Airway Encountered?: No      Complications:  None    Airway Device:  Oral endotracheal tube    Airway Device Size:  7.5    Style/Cuff Inflation:  Cuffed (inflated to minimal occlusive pressure)    Tube secured:  21    Secured at:  The teeth    Placement Verified By:  Capnometry    Complicating Factors:  None    Findings Post-Intubation:  BS equal bilateral and atraumatic/condition of teeth unchanged

## 2023-07-31 NOTE — OP NOTE
OPERATIVE NOTE    DATE:  07/31/2023    PRE-PROCEDURE COUNSELING:  Patient counseled on the risks, benefits, and alternatives to procedure.  Please see preoperative consents.   SCDs were applied and working prior to anesthesia induction.    PRE-PROCEDURE DIAGNOSIS:  Desires sterilization    POST-PROCEDURE DIAGNOSIS: Same    ANESTHESIA:  General Endotracheal Anesthesia    PROCEDURE:  Laparoscopic Bilateral Salpingectomy     SURGEON:  Elvin Shoemaker MD    ASSISTANT: JAYCE Webb (presence necessary for procedure)    FINDINGS: Small uterus with no descent, normal appearing tubes and ovaries    SPECIMEN:  Right and Left Fallopian Tubes    EBL: 5 mL     COMPLICATIONS: None    IMPLANTS:  None    PROCEDURE IN DETAIL:  TIME OUT PERFORMED  SCDs were on prior to anesthesia induction.  Patient was placed in dorsal lithotomy position, then prepped and draped in routine fashion. A sterile speculum was placed in the vagina. Anterior lip of the cervix was grasped with a single tooth tenaculum and a ZUMI manipultor was placed without difficulty.  Instruments were then removed from the vagina and the bladder drained with a disposable catheter.    Attention was then turned to the abdomen where the anterior abdominal wall was grasped with towel clamps and elevated.  A Veress needle was gently introduced through the umbilicus and water drop test performed to confirm intraabdominal placement.  The abdomen was insufflated with CO2 gas to an intraabdominal pressure of 15 mmHg.  Veress was then removed and a 5 mm  incision was made in the umbilicus.  A 5 mm trocar was placed into the abdomen and general anatomic survey was performed revealing the above findings. Under direct visualization, ancillary 8 mm RLQ and a 5 mm LLQ ports were placed.  The fallopian tubes were grasped and excised in their entirety with the Harmonic Ace instrument.  Specimens were removed throught the RLQ port and sent to pathology.  Excellent hemostasis was  noted.  Abdomen was deflated and all instruments were removed.  Skin wounds were approximated with 4-0 Monocryl and an adhesive glue layer was added for reinforcement.  Lap, sharp, and instrument counts were correct x 2.  Pictures were taken and added to the chart.      Patient was sent to the recovery room in stable condition    CONDITION: stable    DISPOSITION: recovery room

## 2023-07-31 NOTE — PROGRESS NOTES
Subjective:       Patient ID: Billei Nicolas is a 28 y.o. female.    Chief Complaint: Abscess (right leg)    HPI Ms. Nicolas presents today with abscess/cellulitis of the right leg.   Friday had an ingrown hair in the right leg, she was picking with it and the next day noticed erythema    She noted a red area that has started to spread up toward the knee.    Last month abscess positive for MRSA in axilla     Review of Systems   Constitutional: Negative.    Cardiovascular: Negative.    Musculoskeletal: Negative.    Skin: Positive for color change and rash.           Past Medical History:   Diagnosis Date    Abnormal cervical Pap smear with positive HPV DNA test     ADHD, predominantly inattentive type     Allergy     Anxiety     Asthma     Bulimia     Major depression, recurrent     Migraine headache      Objective:        Physical Exam      Not fluctuant   Not draining today      Assessment/Plan:     Abscess of right leg  -     doxycycline (VIBRAMYCIN) 100 MG Cap; Take 1 capsule (100 mg total) by mouth 2 (two) times daily. for 10 days  Dispense: 20 capsule; Refill: 0    History of MRSA infection  -     doxycycline (VIBRAMYCIN) 100 MG Cap; Take 1 capsule (100 mg total) by mouth 2 (two) times daily. for 10 days  Dispense: 20 capsule; Refill: 0    Went with what she was treated with before for MRSA.   She had a reaction to Bactrim and Clindamycin    Discussed when to go to the ED for evaluation and possible IV antibiotics.        If area of redness continues to spread up and down the leg  I did not want to pick with this today to try and get another culture    Follow-up if symptoms worsen or fail to improve.    Yanni Ramírez MD  [facility]   Family Medicine   730a- /90 Patient in no acute distress I will prescribe low-dose Norvasc 5 mg for 2 weeks and patient and instructed to follow-up with his PMD for repeat blood pressure and medication adjustment.  Patient has no chest pain, no shortness of breath no headache, no vision changes.  ACS ruled out as EKG has no ischemic changes or evidence of STEMI, high-sensitivity troponin is negative and patient has no cardiac risk factors.  PE ruled out as D-dimer is negative and patient has no thromboembolism risk factors.  Chest x-ray shows no pneumothorax or pulmonary pathology.   Pt is well appearing, has no new complaints and able to walk with normal gait. Pt is stable for discharge and follow up with medical doctor. Pt educated on care and need for follow up. Discussed anticipatory guidance and return precautions. Questions answered. I had a detailed discussion with the patient regarding the historical points, exam findings, and any diagnostic results supporting the discharge diagnosis.

## 2023-07-31 NOTE — ANESTHESIA POSTPROCEDURE EVALUATION
Anesthesia Post Evaluation    Patient: Billie Nicolas    Procedure(s) Performed: Procedure(s) (LRB):  SALPINGECTOMY, LAPAROSCOPIC (Bilateral)    Final Anesthesia Type: general      Patient location during evaluation: PACU  Patient participation: Yes- Able to Participate  Level of consciousness: awake and alert and oriented  Post-procedure vital signs: reviewed and stable  Pain management: adequate  Airway patency: patent    PONV status at discharge: No PONV  Anesthetic complications: no      Cardiovascular status: blood pressure returned to baseline, stable and hemodynamically stable  Respiratory status: unassisted  Hydration status: euvolemic  Follow-up not needed.          Vitals Value Taken Time   /60 07/31/23 1114   Temp 36.6 °C (97.9 °F) 07/31/23 1040   Pulse 67 07/31/23 1115   Resp 18 07/31/23 1108   SpO2 95 % 07/31/23 1115   Vitals shown include unvalidated device data.      No case tracking events are documented in the log.      Pain/Medhat Score: Pain Rating Prior to Med Admin: 2 (7/31/2023 11:01 AM)  Medhat Score: 10 (7/31/2023 11:00 AM)

## 2023-07-31 NOTE — PLAN OF CARE
Patient sitting up in bed drinking water in NAD, VSS, RR equal and unlabored. Patient significant other at bedside per request. Patient reports mild pain 2/10 to abdomen, pain medication administration discussed with patient and significant other. Patient met discharge criteria.

## 2023-08-03 LAB
FINAL PATHOLOGIC DIAGNOSIS: NORMAL
GROSS: NORMAL
Lab: NORMAL

## 2023-08-21 ENCOUNTER — LAB VISIT (OUTPATIENT)
Dept: LAB | Facility: HOSPITAL | Age: 33
End: 2023-08-21
Payer: COMMERCIAL

## 2023-08-21 ENCOUNTER — PATIENT MESSAGE (OUTPATIENT)
Dept: OBSTETRICS AND GYNECOLOGY | Facility: CLINIC | Age: 33
End: 2023-08-21
Payer: COMMERCIAL

## 2023-08-21 ENCOUNTER — OFFICE VISIT (OUTPATIENT)
Dept: INTERNAL MEDICINE | Facility: CLINIC | Age: 33
End: 2023-08-21
Payer: COMMERCIAL

## 2023-08-21 ENCOUNTER — PATIENT MESSAGE (OUTPATIENT)
Dept: INTERNAL MEDICINE | Facility: CLINIC | Age: 33
End: 2023-08-21

## 2023-08-21 DIAGNOSIS — N30.90 CYSTITIS: Primary | ICD-10-CM

## 2023-08-21 DIAGNOSIS — R39.9 URINARY SYMPTOM OR SIGN: ICD-10-CM

## 2023-08-21 LAB
BACTERIA #/AREA URNS HPF: ABNORMAL /HPF
BILIRUB UR QL STRIP: NEGATIVE
CLARITY UR: ABNORMAL
COLOR UR: YELLOW
GLUCOSE UR QL STRIP: NEGATIVE
HGB UR QL STRIP: ABNORMAL
KETONES UR QL STRIP: NEGATIVE
LEUKOCYTE ESTERASE UR QL STRIP: ABNORMAL
MICROSCOPIC COMMENT: ABNORMAL
NITRITE UR QL STRIP: POSITIVE
PH UR STRIP: 6 [PH] (ref 5–8)
PROT UR QL STRIP: NEGATIVE
RBC #/AREA URNS HPF: 6 /HPF (ref 0–4)
SP GR UR STRIP: 1.01 (ref 1–1.03)
SQUAMOUS #/AREA URNS HPF: 2 /HPF
URN SPEC COLLECT METH UR: ABNORMAL
WBC #/AREA URNS HPF: 11 /HPF (ref 0–5)
WBC CLUMPS URNS QL MICRO: ABNORMAL

## 2023-08-21 PROCEDURE — 1159F PR MEDICATION LIST DOCUMENTED IN MEDICAL RECORD: ICD-10-PCS | Mod: CPTII,95,,

## 2023-08-21 PROCEDURE — 1160F RVW MEDS BY RX/DR IN RCRD: CPT | Mod: CPTII,95,,

## 2023-08-21 PROCEDURE — 99213 OFFICE O/P EST LOW 20 MIN: CPT | Mod: 95,,,

## 2023-08-21 PROCEDURE — 87088 URINE BACTERIA CULTURE: CPT

## 2023-08-21 PROCEDURE — 99213 PR OFFICE/OUTPT VISIT, EST, LEVL III, 20-29 MIN: ICD-10-PCS | Mod: 95,,,

## 2023-08-21 PROCEDURE — 3044F HG A1C LEVEL LT 7.0%: CPT | Mod: CPTII,95,,

## 2023-08-21 PROCEDURE — 1159F MED LIST DOCD IN RCRD: CPT | Mod: CPTII,95,,

## 2023-08-21 PROCEDURE — 87186 SC STD MICRODIL/AGAR DIL: CPT

## 2023-08-21 PROCEDURE — 3044F PR MOST RECENT HEMOGLOBIN A1C LEVEL <7.0%: ICD-10-PCS | Mod: CPTII,95,,

## 2023-08-21 PROCEDURE — 87086 URINE CULTURE/COLONY COUNT: CPT

## 2023-08-21 PROCEDURE — 81000 URINALYSIS NONAUTO W/SCOPE: CPT

## 2023-08-21 PROCEDURE — 87077 CULTURE AEROBIC IDENTIFY: CPT

## 2023-08-21 PROCEDURE — 1160F PR REVIEW ALL MEDS BY PRESCRIBER/CLIN PHARMACIST DOCUMENTED: ICD-10-PCS | Mod: CPTII,95,,

## 2023-08-21 NOTE — PROGRESS NOTES
Billie Nicolas  08/21/2023  [id number]    Leonie Rodriguez DO  Patient Care Team:  Leonie Rodriguez DO as PCP - General  Precious Cho LPN as Care Coordinator (Internal Medicine)          Visit Type:an urgent visit for a new problem    The patient location is: [place]  The chief complaint leading to consultation is:   Chief Complaint   Patient presents with    Urinary Tract Infection        Visit type: audiovisual    Face to Face time with patient: 11  minutes of total time spent on the encounter, which includes face to face time and non-face to face time preparing to see the patient (eg, review of tests), Obtaining and/or reviewing separately obtained history, Documenting clinical information in the electronic or other health record, Independently interpreting results (not separately reported) and communicating results to the patient/family/caregiver, or Care coordination (not separately reported).         Each patient to whom he or she provides medical services by telemedicine is:  (1) informed of the relationship between the physician and patient and the respective role of any other health care provider with respect to management of the patient; and (2) notified that he or she may decline to receive medical services by telemedicine and may withdraw from such care at any time.    Notes:      32 year old female presents today for UTI symptoms   Symptom onset 6 days ago, discomfort, urinary urgency, and sensation of incomplete voiding. No blood detected. Urine smells exactly like the last time I had a UTI in which cx grew Klebsiella. Phenazopyridine helps with the discomfort. I've been upping my water intake attempting to flush it out, but no luck.     History:  Past Medical History:   Diagnosis Date    Abnormal cervical Pap smear with positive HPV DNA test     ADHD, predominantly inattentive type     Allergy     Anxiety     Asthma     Bulimia     Major depression, recurrent     Migraine headache      Past  Surgical History:   Procedure Laterality Date    ABSCESS DRAINAGE  12/25/2017    abscess lanced     LAPAROSCOPIC SALPINGECTOMY Bilateral 7/31/2023    Procedure: SALPINGECTOMY, LAPAROSCOPIC;  Surgeon: Elvin Shoemaker MD;  Location: [facility];  Service: OB/GYN;  Laterality: Bilateral;    TONSILLECTOMY AND ADENOIDECTOMY      WISDOM TOOTH EXTRACTION       Family History   Problem Relation Age of Onset    Cancer Mother         cervical    Heart disease Mother     Mitral valve prolapse Mother     Hypertension Mother     Hypertension Maternal Grandmother     Heart disease Maternal Grandfather         Passed at 54 yoa    Hodgkin's lymphoma Maternal Grandfather     Cancer Maternal Grandfather     Hypertension Maternal Grandfather     Diabetes Sister     Melanoma Neg Hx      Social History     Socioeconomic History    Marital status: Significant Other    Number of children: 0   Occupational History    Occupation: Pharmacy Intern   Tobacco Use    Smoking status: Never    Smokeless tobacco: Never   Substance and Sexual Activity    Alcohol use: Yes     Comment: rarely     Drug use: No    Sexual activity: Yes     Partners: Male   Other Topics Concern    Are you pregnant or think you may be? No    Breast-feeding No     Social Determinants of Health     Financial Resource Strain: Low Risk  (8/21/2023)    Overall Financial Resource Strain (CARDIA)     Difficulty of Paying Living Expenses: Not hard at all   Food Insecurity: No Food Insecurity (8/21/2023)    Hunger Vital Sign     Worried About Running Out of Food in the Last Year: Never true     Ran Out of Food in the Last Year: Never true   Transportation Needs: No Transportation Needs (8/21/2023)    PRAPARE - Transportation     Lack of Transportation (Medical): No     Lack of Transportation (Non-Medical): No   Physical Activity: Sufficiently Active (8/21/2023)    Exercise Vital Sign     Days of Exercise per Week: 4 days     Minutes of Exercise per Session: 60 min   Stress: Stress  Concern Present (8/21/2023)    [employer] - Occupational Stress Questionnaire     Feeling of Stress : To some extent   Social Connections: Unknown (8/21/2023)    Social Connection and Isolation Panel [NHANES]     Frequency of Communication with Friends and Family: More than three times a week     Frequency of Social Gatherings with Friends and Family: More than three times a week     Active Member of Clubs or Organizations: Yes     Attends Club or Organization Meetings: 1 to 4 times per year     Marital Status: Never    Housing Stability: Low Risk  (8/21/2023)    Housing Stability Vital Sign     Unable to Pay for Housing in the Last Year: No     Number of Places Lived in the Last Year: 1     Unstable Housing in the Last Year: No     Patient Active Problem List   Diagnosis    Allergy    Anxiety    Asthma    Migraine headache    ADHD    Depression, recurrent    Status post bilateral salpingectomy     Review of patient's allergies indicates:   Allergen Reactions    Sulfa (sulfonamide antibiotics) Hives    Sulfamethoxazole-trimethoprim        The following were reviewed at this visit: active problem list, medication list, allergies, family history, social history, and health maintenance.    Medications:  Current Outpatient Medications on File Prior to Visit   Medication Sig Dispense Refill    albuterol (PROVENTIL HFA) 90 mcg/actuation inhaler Inhale 2 puffs into the lungs every 6 (six) hours as needed for Wheezing. Rescue 18 g 0    buPROPion (WELLBUTRIN XL) 300 MG 24 hr tablet Take 1 tablet (300 mg total) by mouth once daily. 90 tablet 1    dextroamphetamine-amphetamine (ADDERALL) 10 mg Tab Take 1 tablet (10 mg total) by mouth once daily. 90 tablet 0    diazePAM (VALIUM) 5 MG tablet Take 1 tablet (5 mg total) by mouth every 8 (eight) hours as needed for Anxiety. 2 tablet 0    docusate sodium (COLACE) 100 MG capsule Take 100 mg by mouth 2 (two) times daily.       HYDROcodone-acetaminophen (NORCO) 5-325 mg per tablet Take 1 tablet by mouth every 4 (four) hours as needed for Pain. 14 tablet 0    ibuprofen (ADVIL,MOTRIN) 800 MG tablet Take 1 tablet (800 mg total) by mouth every 8 (eight) hours as needed for Pain. 30 tablet 0    lisdexamfetamine (VYVANSE) 40 MG Cap Take 1 capsule (40 mg total) by mouth once daily. 90 capsule 0    multivitamin (THERAGRAN) per tablet Take 1 tablet by mouth once daily.      ondansetron (ZOFRAN-ODT) 4 MG TbDL Take 2 tablets (8 mg total) by mouth every 6 (six) hours as needed (nausea). 10 tablet 0    sertraline (ZOLOFT) 100 MG tablet Take 1 tablet (100 mg total) by mouth once daily. 90 tablet 1    simethicone (MYLICON) 125 MG chewable tablet Take 125 mg by mouth every 6 (six) hours as needed for Flatulence.      valACYclovir (VALTREX) 500 MG tablet TAKE ONE TABLET BY MOUTH EVERY DAY 90 tablet 3     No current facility-administered medications on file prior to visit.       Medications have been reviewed and reconciled with patient at this visit.  Barriers to medications reviewed with patient.    Adverse reactions to current medications reviewed with patient..    Over the counter medications reviewed and reconciled with patient.    Exam:  Wt Readings from Last 3 Encounters:   07/31/23 88.9 kg (195 lb 14.1 oz)   07/27/23 86.8 kg (191 lb 5.8 oz)   06/01/23 90.7 kg (199 lb 15.3 oz)     Temp Readings from Last 3 Encounters:   07/31/23 97.9 °F (36.6 °C) (Temporal)   01/18/23 97.7 °F (36.5 °C) (Tympanic)   07/15/22 98.5 °F (36.9 °C) (Tympanic)     BP Readings from Last 3 Encounters:   07/31/23 130/85   07/27/23 112/82   06/01/23 (!) 120/90     Pulse Readings from Last 3 Encounters:   07/31/23 73   01/18/23 98   09/30/22 101     There is no height or weight on file to calculate BMI.      Review of Systems   Constitutional:  Negative for chills and weight loss.   Gastrointestinal:  Negative for constipation, nausea and vomiting.   Genitourinary:  Positive for  dysuria, frequency and urgency. Negative for flank pain and hematuria.   Skin:  Negative for rash.     Physical Exam  Nursing note reviewed.   Pulmonary:      Effort: Pulmonary effort is normal. No respiratory distress.   Neurological:      Mental Status: She is alert and oriented to person, place, and time.   Psychiatric:         Mood and Affect: Mood normal.         Behavior: Behavior normal.         Thought Content: Thought content normal.         Judgment: Judgment normal.         Laboratory Reviewed ({Yes)  Lab Results   Component Value Date    WBC 5.37 07/27/2023    HGB 13.9 07/27/2023    HCT 44.3 07/27/2023     07/27/2023    CHOL 174 07/27/2023    TRIG 42 07/27/2023    HDL 77 (H) 07/27/2023    ALT 14 01/21/2022    AST 17 01/21/2022     07/27/2023    K 4.1 07/27/2023     07/27/2023    CREATININE 0.9 07/27/2023    BUN 20 07/27/2023    CO2 20 (L) 07/27/2023    TSH 2.787 07/27/2023    HGBA1C 5.2 07/27/2023       Billie was seen today for urinary tract infection.    Diagnoses and all orders for this visit:    Urinary symptom or sign  -     Urinalysis, Reflex to Urine Culture; Future      Citrus and caffeine are bladder irritants. If you consume high amounts of caffeine and or citrus products, recommend decreasing the amount. Increase water intake to 6-8 glasses of water a day        Care Plan/Goals: Reviewed    Goals    None         Follow up: No follow-ups on file.    After visit summary was printed and given to patient upon discharge today.  Patient goals and care plan are included in After Visit Summary.      Answers submitted by the patient for this visit:  Painful Urination Questionnaire (Submitted on 8/21/2023)  Chief Complaint: Dysuria  Chronicity: new  Onset: in the past 7 days  Frequency: every urination  Progression since onset: waxing and waning  Pain quality: burning  Pain - numeric: 5/10  Fever: no fever  Fever duration: less than 1 day  Sexually active?: Yes  History of  pyelonephritis?: No  discharge: No  hesitancy: Yes  possible pregnancy: No  sweats: No  withholding: No  behavior changes: No  Treatments tried: home medications, increased fluids  Improvement on treatment: mild  Pain severity: mild  catheterization: Yes  diabetes insipidus: No  diabetes mellitus: No  genitourinary reflux: No  hypertension: No  recurrent UTIs: No  single kidney: No  STD: No  urinary stasis: No  urological procedure: No  kidney stones: No

## 2023-08-22 RX ORDER — CIPROFLOXACIN 500 MG/1
500 TABLET ORAL EVERY 12 HOURS
Qty: 14 TABLET | Refills: 0 | Status: SHIPPED | OUTPATIENT
Start: 2023-08-22 | End: 2023-09-05

## 2023-08-24 LAB — BACTERIA UR CULT: ABNORMAL

## 2023-09-05 ENCOUNTER — OFFICE VISIT (OUTPATIENT)
Dept: OBSTETRICS AND GYNECOLOGY | Facility: CLINIC | Age: 33
End: 2023-09-05
Payer: COMMERCIAL

## 2023-09-05 ENCOUNTER — OFFICE VISIT (OUTPATIENT)
Dept: INTERNAL MEDICINE | Facility: CLINIC | Age: 33
End: 2023-09-05
Payer: COMMERCIAL

## 2023-09-05 VITALS
HEIGHT: 67 IN | OXYGEN SATURATION: 99 % | SYSTOLIC BLOOD PRESSURE: 132 MMHG | RESPIRATION RATE: 18 BRPM | WEIGHT: 194 LBS | HEART RATE: 97 BPM | DIASTOLIC BLOOD PRESSURE: 78 MMHG | TEMPERATURE: 98 F | BODY MASS INDEX: 30.45 KG/M2

## 2023-09-05 VITALS
HEIGHT: 67 IN | WEIGHT: 192.69 LBS | DIASTOLIC BLOOD PRESSURE: 74 MMHG | SYSTOLIC BLOOD PRESSURE: 124 MMHG | BODY MASS INDEX: 30.24 KG/M2

## 2023-09-05 DIAGNOSIS — B00.9 HSV-1 INFECTION: ICD-10-CM

## 2023-09-05 DIAGNOSIS — F32.A ANXIETY AND DEPRESSION: ICD-10-CM

## 2023-09-05 DIAGNOSIS — Z90.79 STATUS POST BILATERAL SALPINGECTOMY: Primary | ICD-10-CM

## 2023-09-05 DIAGNOSIS — F41.9 ANXIETY AND DEPRESSION: ICD-10-CM

## 2023-09-05 DIAGNOSIS — Z00.00 ANNUAL PHYSICAL EXAM: Primary | ICD-10-CM

## 2023-09-05 DIAGNOSIS — F90.9 ATTENTION DEFICIT HYPERACTIVITY DISORDER (ADHD), UNSPECIFIED ADHD TYPE: ICD-10-CM

## 2023-09-05 PROCEDURE — 1159F PR MEDICATION LIST DOCUMENTED IN MEDICAL RECORD: ICD-10-PCS | Mod: CPTII,S$GLB,, | Performed by: INTERNAL MEDICINE

## 2023-09-05 PROCEDURE — 3074F PR MOST RECENT SYSTOLIC BLOOD PRESSURE < 130 MM HG: ICD-10-PCS | Mod: CPTII,S$GLB,, | Performed by: OBSTETRICS & GYNECOLOGY

## 2023-09-05 PROCEDURE — 3078F PR MOST RECENT DIASTOLIC BLOOD PRESSURE < 80 MM HG: ICD-10-PCS | Mod: CPTII,S$GLB,, | Performed by: INTERNAL MEDICINE

## 2023-09-05 PROCEDURE — 1160F PR REVIEW ALL MEDS BY PRESCRIBER/CLIN PHARMACIST DOCUMENTED: ICD-10-PCS | Mod: CPTII,S$GLB,, | Performed by: INTERNAL MEDICINE

## 2023-09-05 PROCEDURE — 99395 PREV VISIT EST AGE 18-39: CPT | Mod: S$GLB,,, | Performed by: INTERNAL MEDICINE

## 2023-09-05 PROCEDURE — 3044F PR MOST RECENT HEMOGLOBIN A1C LEVEL <7.0%: ICD-10-PCS | Mod: CPTII,S$GLB,, | Performed by: OBSTETRICS & GYNECOLOGY

## 2023-09-05 PROCEDURE — 3075F PR MOST RECENT SYSTOLIC BLOOD PRESS GE 130-139MM HG: ICD-10-PCS | Mod: CPTII,S$GLB,, | Performed by: INTERNAL MEDICINE

## 2023-09-05 PROCEDURE — 3044F PR MOST RECENT HEMOGLOBIN A1C LEVEL <7.0%: ICD-10-PCS | Mod: CPTII,S$GLB,, | Performed by: INTERNAL MEDICINE

## 2023-09-05 PROCEDURE — 1159F PR MEDICATION LIST DOCUMENTED IN MEDICAL RECORD: ICD-10-PCS | Mod: CPTII,S$GLB,, | Performed by: OBSTETRICS & GYNECOLOGY

## 2023-09-05 PROCEDURE — 1159F MED LIST DOCD IN RCRD: CPT | Mod: CPTII,S$GLB,, | Performed by: OBSTETRICS & GYNECOLOGY

## 2023-09-05 PROCEDURE — 3008F BODY MASS INDEX DOCD: CPT | Mod: CPTII,S$GLB,, | Performed by: OBSTETRICS & GYNECOLOGY

## 2023-09-05 PROCEDURE — 1159F MED LIST DOCD IN RCRD: CPT | Mod: CPTII,S$GLB,, | Performed by: INTERNAL MEDICINE

## 2023-09-05 PROCEDURE — 99999 PR PBB SHADOW E&M-EST. PATIENT-LVL III: CPT | Mod: PBBFAC,,, | Performed by: OBSTETRICS & GYNECOLOGY

## 2023-09-05 PROCEDURE — 99024 POSTOP FOLLOW-UP VISIT: CPT | Mod: S$GLB,,, | Performed by: OBSTETRICS & GYNECOLOGY

## 2023-09-05 PROCEDURE — 3044F HG A1C LEVEL LT 7.0%: CPT | Mod: CPTII,S$GLB,, | Performed by: INTERNAL MEDICINE

## 2023-09-05 PROCEDURE — 3078F PR MOST RECENT DIASTOLIC BLOOD PRESSURE < 80 MM HG: ICD-10-PCS | Mod: CPTII,S$GLB,, | Performed by: OBSTETRICS & GYNECOLOGY

## 2023-09-05 PROCEDURE — 3078F DIAST BP <80 MM HG: CPT | Mod: CPTII,S$GLB,, | Performed by: OBSTETRICS & GYNECOLOGY

## 2023-09-05 PROCEDURE — 99999 PR PBB SHADOW E&M-EST. PATIENT-LVL IV: ICD-10-PCS | Mod: PBBFAC,,, | Performed by: INTERNAL MEDICINE

## 2023-09-05 PROCEDURE — 3008F PR BODY MASS INDEX (BMI) DOCUMENTED: ICD-10-PCS | Mod: CPTII,S$GLB,, | Performed by: OBSTETRICS & GYNECOLOGY

## 2023-09-05 PROCEDURE — 3075F SYST BP GE 130 - 139MM HG: CPT | Mod: CPTII,S$GLB,, | Performed by: INTERNAL MEDICINE

## 2023-09-05 PROCEDURE — 99999 PR PBB SHADOW E&M-EST. PATIENT-LVL IV: CPT | Mod: PBBFAC,,, | Performed by: INTERNAL MEDICINE

## 2023-09-05 PROCEDURE — 1160F RVW MEDS BY RX/DR IN RCRD: CPT | Mod: CPTII,S$GLB,, | Performed by: OBSTETRICS & GYNECOLOGY

## 2023-09-05 PROCEDURE — 3008F PR BODY MASS INDEX (BMI) DOCUMENTED: ICD-10-PCS | Mod: CPTII,S$GLB,, | Performed by: INTERNAL MEDICINE

## 2023-09-05 PROCEDURE — 3074F SYST BP LT 130 MM HG: CPT | Mod: CPTII,S$GLB,, | Performed by: OBSTETRICS & GYNECOLOGY

## 2023-09-05 PROCEDURE — 99395 PR PREVENTIVE VISIT,EST,18-39: ICD-10-PCS | Mod: S$GLB,,, | Performed by: INTERNAL MEDICINE

## 2023-09-05 PROCEDURE — 3078F DIAST BP <80 MM HG: CPT | Mod: CPTII,S$GLB,, | Performed by: INTERNAL MEDICINE

## 2023-09-05 PROCEDURE — 1160F RVW MEDS BY RX/DR IN RCRD: CPT | Mod: CPTII,S$GLB,, | Performed by: INTERNAL MEDICINE

## 2023-09-05 PROCEDURE — 99999 PR PBB SHADOW E&M-EST. PATIENT-LVL III: ICD-10-PCS | Mod: PBBFAC,,, | Performed by: OBSTETRICS & GYNECOLOGY

## 2023-09-05 PROCEDURE — 3044F HG A1C LEVEL LT 7.0%: CPT | Mod: CPTII,S$GLB,, | Performed by: OBSTETRICS & GYNECOLOGY

## 2023-09-05 PROCEDURE — 3008F BODY MASS INDEX DOCD: CPT | Mod: CPTII,S$GLB,, | Performed by: INTERNAL MEDICINE

## 2023-09-05 PROCEDURE — 99024 PR POST-OP FOLLOW-UP VISIT: ICD-10-PCS | Mod: S$GLB,,, | Performed by: OBSTETRICS & GYNECOLOGY

## 2023-09-05 PROCEDURE — 1160F PR REVIEW ALL MEDS BY PRESCRIBER/CLIN PHARMACIST DOCUMENTED: ICD-10-PCS | Mod: CPTII,S$GLB,, | Performed by: OBSTETRICS & GYNECOLOGY

## 2023-09-05 NOTE — PROGRESS NOTES
Subjective:      Patient ID: Billie Nicolas is a 32 y.o. female.    Chief Complaint:  Post-op Evaluation      History of Present Illness  HPI  Pt is s/p LSC Bilateral Salpingectomy on 2023.  Pt is here for her routine post-op visit.  Pt is doing well and is happy with results.  Denies pain or vaginal bleeding.  Reports normal bowel and bladder function.  Denies intercourse or strenuous activity since surgery.    GYN & OB History  Patient's last menstrual period was 2023 (exact date).   Date of Last Pap: 2023    OB History    Para Term  AB Living   2 0     2 0   SAB IAB Ectopic Multiple Live Births   2       0      # Outcome Date GA Lbr Jaime/2nd Weight Sex Delivery Anes PTL Lv   2 SAB            1 SAB                Review of Systems  Review of Systems   Constitutional:  Negative for activity change, appetite change, chills, fatigue, fever and unexpected weight change.   Respiratory:  Negative for shortness of breath.    Cardiovascular:  Negative for chest pain, palpitations and leg swelling.   Gastrointestinal:  Negative for abdominal pain, bloating, blood in stool, constipation, diarrhea, nausea and vomiting.   Genitourinary:  Negative for dysmenorrhea, dyspareunia, dysuria, flank pain, frequency, genital sores, hematuria, menorrhagia, menstrual problem, pelvic pain, urgency, vaginal bleeding, vaginal discharge, vaginal pain, urinary incontinence, postcoital bleeding, vaginal dryness and vaginal odor.   Musculoskeletal:  Negative for back pain.   Neurological:  Negative for syncope and headaches.          Objective:     Physical Exam:   Constitutional: She is oriented to person, place, and time. She appears well-developed and well-nourished. No distress.       Cardiovascular:  Normal rate and regular rhythm.             Pulmonary/Chest: Effort normal and breath sounds normal.        Abdominal: Soft. Bowel sounds are normal. She exhibits abdominal incision (all wounds healed  well). She exhibits no distension. There is no abdominal tenderness.     Genitourinary:    Vagina, uterus, right adnexa and left adnexa normal.      Pelvic exam was performed with patient supine.   There is no rash, tenderness, lesion or injury on the right labia. There is no rash, tenderness, lesion or injury on the left labia. Cervix is normal. Right adnexum displays no mass, no tenderness and no fullness. Left adnexum displays no mass, no tenderness and no fullness. No erythema,  no vaginal discharge, tenderness or bleeding in the vagina.    No foreign body in the vagina.      No signs of injury in the vagina.   Cervix exhibits no motion tenderness, no discharge and no friability. Uterus is not deviated, not enlarged and not tender.           Musculoskeletal: Normal range of motion and moves all extremeties. No tenderness or edema.       Neurological: She is alert and oriented to person, place, and time.    Skin: Skin is warm and dry.    Psychiatric: She has a normal mood and affect. Her behavior is normal. Thought content normal.         Assessment:     1. Status post bilateral salpingectomy             Plan:     Status post bilateral salpingectomy  -     Pt doing well and is happy with results.  Pathology benign.  Pt cleared for all activities.      Follow up in about 4 months (around 1/5/2024) for Annual exam.

## 2023-10-13 ENCOUNTER — OFFICE VISIT (OUTPATIENT)
Dept: PSYCHIATRY | Facility: CLINIC | Age: 33
End: 2023-10-13
Payer: COMMERCIAL

## 2023-10-13 DIAGNOSIS — F33.42 MAJOR DEPRESSIVE DISORDER, RECURRENT EPISODE, IN FULL REMISSION: ICD-10-CM

## 2023-10-13 DIAGNOSIS — F41.9 ANXIETY: ICD-10-CM

## 2023-10-13 DIAGNOSIS — F90.9 ATTENTION DEFICIT HYPERACTIVITY DISORDER (ADHD), UNSPECIFIED ADHD TYPE: Primary | ICD-10-CM

## 2023-10-13 PROCEDURE — 3044F HG A1C LEVEL LT 7.0%: CPT | Mod: CPTII,95,, | Performed by: PSYCHOLOGIST

## 2023-10-13 PROCEDURE — 1159F PR MEDICATION LIST DOCUMENTED IN MEDICAL RECORD: ICD-10-PCS | Mod: CPTII,95,, | Performed by: PSYCHOLOGIST

## 2023-10-13 PROCEDURE — 3044F PR MOST RECENT HEMOGLOBIN A1C LEVEL <7.0%: ICD-10-PCS | Mod: CPTII,95,, | Performed by: PSYCHOLOGIST

## 2023-10-13 PROCEDURE — 1159F MED LIST DOCD IN RCRD: CPT | Mod: CPTII,95,, | Performed by: PSYCHOLOGIST

## 2023-10-13 PROCEDURE — 99214 PR OFFICE/OUTPT VISIT, EST, LEVL IV, 30-39 MIN: ICD-10-PCS | Mod: 95,,, | Performed by: PSYCHOLOGIST

## 2023-10-13 PROCEDURE — 99214 OFFICE O/P EST MOD 30 MIN: CPT | Mod: 95,,, | Performed by: PSYCHOLOGIST

## 2023-10-13 RX ORDER — BUPROPION HYDROCHLORIDE 300 MG/1
300 TABLET ORAL DAILY
Qty: 90 TABLET | Refills: 1 | Status: SHIPPED | OUTPATIENT
Start: 2023-10-13 | End: 2024-07-06

## 2023-10-13 RX ORDER — SERTRALINE HYDROCHLORIDE 100 MG/1
100 TABLET, FILM COATED ORAL DAILY
Qty: 90 TABLET | Refills: 1 | Status: SHIPPED | OUTPATIENT
Start: 2023-10-13 | End: 2024-07-06

## 2023-10-13 RX ORDER — LISDEXAMFETAMINE DIMESYLATE 40 MG/1
40 CAPSULE ORAL DAILY
Qty: 90 CAPSULE | Refills: 0 | Status: SHIPPED | OUTPATIENT
Start: 2023-10-13 | End: 2023-11-30 | Stop reason: SDUPTHER

## 2023-10-13 RX ORDER — DEXTROAMPHETAMINE SACCHARATE, AMPHETAMINE ASPARTATE, DEXTROAMPHETAMINE SULFATE AND AMPHETAMINE SULFATE 2.5; 2.5; 2.5; 2.5 MG/1; MG/1; MG/1; MG/1
10 TABLET ORAL DAILY
Qty: 90 TABLET | Refills: 0 | Status: SHIPPED | OUTPATIENT
Start: 2023-10-13 | End: 2023-11-30 | Stop reason: SDUPTHER

## 2023-10-13 NOTE — PROGRESS NOTES
Outpatient Psychiatry Follow-Up Visit    10/13/2023    Timeframe: Corona Virus Outbreak     The patient location is: Patient's car (passenger)/ Patient reported that his/her location at the time of this visit was in the [facility]     Visit type: Virtual visit with synchronous audio and video     Each patient to whom he or she provides medical services by telehealth is: (1) informed of the relationship between the medical psychologist and patient and the respective role of any other health care provider with respect to management of the patient; and (2) notified that he or she may decline to receive medical services by telehealth and may withdraw from such care at any time.    I also informed patient of the following:   Radha Lovett, PhD, MPAP:  [place] medical license number: [id number]    My contact info:  [facility] / 2nd Floor  [address] [place] [address]   Ph: [phone]    If technology issues, call office phone: Ph: [phone]  If crisis: Dial 911 or go to nearest Emergency Room (ER)  If questions related to privacy practices: contact [facility] Information Department: [phone]    Chief Complaint:  Billie Nicolas is a 33 y.o. female who presents today for follow-up of anxiety and inattention/distractibility .       Preferred Name: Billie  Gender Identity: cis female  Preferred Pronouns:  she/her/they also okay    Impressions/Plan from last visit: Billie attended her visit. She has been stable on her medicines for depression, anxiety, and ADHD and wants to continue them as prescribed. She is in therapy with an outside therapist.      Medication Management: Discussed risks, benefits, and alternatives to treatment plan documented above with patient. I answered all patient questions related to this plan, and patient expressed understanding and agreement.   Continue Zoloft 100 mg, Wellbutrin  mg, Vyvanse 40 mg #90, Adderall 10 mg  "#90  Continue therapy--outside [facility]     Follow up in about 6 months (around 1/27/2024) for medication management.     Interval History and Content of Current Session: Billie attended her virtual visit. She can tell the difference when she does not take her "lunch Adderall," as it helps with evenings. She has done better with coping overall--physically doing better and medically cleared. She and her partner are headed to [place] for the eclipse--excited about that. We agreed to continue her medicines as prescribed.    Plan--continue Zoloft 100 mg, Wellbutrin  mg, Vyvanse 40 mg #90, Adderall 10 mg #90     since July 2023.            10/12/2023     2:02 PM 7/20/2023     9:58 AM 2/8/2022     3:01 PM   GAD7   1. Feeling nervous, anxious, or on edge? 0 1 1   2. Not being able to stop or control worrying? 0 0 1   3. Worrying too much about different things? 0 0 1   4. Trouble relaxing? 1 1 1   5. Being so restless that it is hard to sit still? 1 1 2   6. Becoming easily annoyed or irritable? 1 1 1   7. Feeling afraid as if something awful might happen? 0 0 1   8. If you checked off any problems, how difficult have these problems made it for you to do your work, take care of things at home, or get along with other people?   1   BEATRIZ-7 Score 3 4 8      0-4 = Minimal anxiety  5-9 = Mild anxiety  10-14 = Moderate anxiety  15-21 = Severe anxiety           9/30/2022     1:54 PM 12/28/2018     9:55 AM 7/6/2018     4:21 PM   Depression Patient Health Questionnaire   Over the last two weeks how often have you been bothered by little interest or pleasure in doing things Nearly every day Not at all Not at all   Over the last two weeks how often have you been bothered by feeling down, depressed or hopeless Nearly every day More than half the days Several days   PHQ-2 Total Score 6 2 1   Over the last two weeks how often have you been bothered by trouble falling or staying asleep, or sleeping too much Nearly every day   "   Over the last two weeks how often have you been bothered by feeling tired or having little energy Nearly every day     Over the last two weeks how often have you been bothered by a poor appetite or overeating Nearly every day     Over the last two weeks how often have you been bothered by feeling bad about yourself - or that you are a failure or have let yourself or your family down Nearly every day     Over the last two weeks how often have you been bothered by trouble concentrating on things, such as reading the newspaper or watching television Nearly every day     Over the last two weeks how often have you been bothered by moving or speaking so slowly that other people could have noticed. Or the opposite - being so fidgety or restless that you have been moving around a lot more than usual. Nearly every day     Over the last two weeks how often have you been bothered by thoughts that you would be better off dead, or of hurting yourself Not at all     If you checked off any problems, how difficult have these problems made it for you to do your work, take care of things at home or get along with other people? Somewhat difficult     PHQ-9 Score 24     PHQ-9 Interpretation Severe       0-4 = No intervention  5 to 9 = Mild  10 to 14 = Moderate  15 to 19 = Moderately severe  =20 = Severe    Review of Systems   PSYCHIATRIC: Pertinant items are noted in the narrative.    Past Medical, Family and Social History: The patient's past medical, family and social history have been reviewed and updated as appropriate within the electronic medical record - see encounter notes.      Current Outpatient Medications:     albuterol (PROVENTIL HFA) 90 mcg/actuation inhaler, Inhale 2 puffs into the lungs every 6 (six) hours as needed for Wheezing. Rescue, Disp: 18 g, Rfl: 0    buPROPion (WELLBUTRIN XL) 300 MG 24 hr tablet, Take 1 tablet (300 mg total) by mouth once daily., Disp: 90 tablet, Rfl: 1    dextroamphetamine-amphetamine  "(ADDERALL) 10 mg Tab, Take 1 tablet (10 mg total) by mouth once daily., Disp: 90 tablet, Rfl: 0    lisdexamfetamine (VYVANSE) 40 MG Cap, Take 1 capsule (40 mg total) by mouth once daily., Disp: 90 capsule, Rfl: 0    sertraline (ZOLOFT) 100 MG tablet, Take 1 tablet (100 mg total) by mouth once daily., Disp: 90 tablet, Rfl: 1    valACYclovir (VALTREX) 500 MG tablet, TAKE ONE TABLET BY MOUTH EVERY DAY, Disp: 90 tablet, Rfl: 3    Compliance: yes    Side effects: see above    Risk Parameters:  Patient reports no suicidal ideation  Patient reports no homicidal ideation  Patient reports no self-injurious behavior  Patient reports no violent behavior    Exam (detailed: at least 9 elements; comprehensive: all 15 elements)   Constitutional  Vitals:  Most recent vital signs were reviewed.   Last 3 sets of Vitals        7/31/2023     8:47 AM 9/5/2023     8:08 AM 9/5/2023     3:44 PM   Vitals - 1 value per visit   SYSTOLIC 142 132 124   DIASTOLIC 84 78 74   Pulse 87 97    Temp 97.7 °F (36.5 °C) 98.4 °F (36.9 °C)    Resp 19 18    SPO2 98 % 99 %    Weight (lb)  194.01 192.68   Weight (kg)  88 87.4   Height  5' 7" (1.702 m) 5' 7" (1.702 m)   BMI (Calculated)  30.4 30.2   Pain Score  Zero Zero          General:  age appropriate, casually dressed, neatly groomed     Musculoskeletal  Muscle Strength/Tone:  no tremor, no tic   Gait & Station:  video visit     Psychiatric  Speech:  no latency; no press   Behavior: wnl   Mood & Affect:  euthymic  congruent and appropriate   Thought Process:  normal and logical   Associations:  intact   Thought Content:  normal, no suicidality, no homicidality, delusions, or paranoia   Insight:  has awareness of illness   Judgement: behavior is adequate to circumstances   Orientation:  grossly intact   Memory: intact for content of interview   Language: grossly intact   Attention Span & Concentration:  Grossly intact   Fund of Knowledge:  intact and appropriate to age and level of education     Assessment " and Diagnosis   Status/Progress: Based on the examination today, the patient's problem(s) is/are well controlled.  New problems have not been presented today.   Co-morbidities and psychosocial stressors  are complicating management of the primary condition.  There are no active rule-out diagnoses for this patient at this time.     General Impression:     Encounter Diagnoses   Name Primary?    Attention deficit hyperactivity disorder (ADHD), unspecified ADHD type Yes    Anxiety     Major depressive disorder, recurrent episode, in full remission        Intervention/Counseling/Treatment Plan   Medication Management: Discussed risks, benefits, and alternatives to treatment plan documented above with patient. I answered all patient questions related to this plan, and patient expressed understanding and agreement.   continue Zoloft 100 mg, Wellbutrin  mg, Vyvanse 40 mg #90, Adderall 10 mg #90  Counseling as needed    Medication List with Changes/Refills   Current Medications    ALBUTEROL (PROVENTIL HFA) 90 MCG/ACTUATION INHALER    Inhale 2 puffs into the lungs every 6 (six) hours as needed for Wheezing. Rescue    BUPROPION (WELLBUTRIN XL) 300 MG 24 HR TABLET    Take 1 tablet (300 mg total) by mouth once daily.    DEXTROAMPHETAMINE-AMPHETAMINE (ADDERALL) 10 MG TAB    Take 1 tablet (10 mg total) by mouth once daily.    LISDEXAMFETAMINE (VYVANSE) 40 MG CAP    Take 1 capsule (40 mg total) by mouth once daily.    SERTRALINE (ZOLOFT) 100 MG TABLET    Take 1 tablet (100 mg total) by mouth once daily.    VALACYCLOVIR (VALTREX) 500 MG TABLET    TAKE ONE TABLET BY MOUTH EVERY DAY        Return to Clinic: 3 months    Time spent with pt including note preparation: 15 minutes       Radha Lovett, PhD, MP  Advanced Practice Medical Psychologist  [facility]  [place] [address]  [phone]   [phone] fax

## 2023-10-13 NOTE — PATIENT INSTRUCTIONS

## 2023-11-30 DIAGNOSIS — F90.9 ATTENTION DEFICIT HYPERACTIVITY DISORDER (ADHD), UNSPECIFIED ADHD TYPE: ICD-10-CM

## 2023-11-30 RX ORDER — LISDEXAMFETAMINE DIMESYLATE 40 MG/1
40 CAPSULE ORAL DAILY
Qty: 90 CAPSULE | Refills: 0 | Status: SHIPPED | OUTPATIENT
Start: 2023-11-30 | End: 2024-01-11 | Stop reason: DRUGHIGH

## 2023-11-30 RX ORDER — DEXTROAMPHETAMINE SACCHARATE, AMPHETAMINE ASPARTATE, DEXTROAMPHETAMINE SULFATE AND AMPHETAMINE SULFATE 2.5; 2.5; 2.5; 2.5 MG/1; MG/1; MG/1; MG/1
10 TABLET ORAL DAILY
Qty: 90 TABLET | Refills: 0 | Status: SHIPPED | OUTPATIENT
Start: 2023-11-30 | End: 2024-01-11 | Stop reason: SDUPTHER

## 2023-12-12 ENCOUNTER — OFFICE VISIT (OUTPATIENT)
Dept: URGENT CARE | Facility: CLINIC | Age: 33
End: 2023-12-12
Payer: COMMERCIAL

## 2023-12-12 VITALS
DIASTOLIC BLOOD PRESSURE: 85 MMHG | TEMPERATURE: 98 F | HEIGHT: 66 IN | SYSTOLIC BLOOD PRESSURE: 131 MMHG | OXYGEN SATURATION: 100 % | BODY MASS INDEX: 31.66 KG/M2 | RESPIRATION RATE: 20 BRPM | HEART RATE: 95 BPM | WEIGHT: 197 LBS

## 2023-12-12 DIAGNOSIS — R53.83 FATIGUE, UNSPECIFIED TYPE: ICD-10-CM

## 2023-12-12 DIAGNOSIS — A05.9 FOOD POISONING: Primary | ICD-10-CM

## 2023-12-12 DIAGNOSIS — R11.0 NAUSEA: ICD-10-CM

## 2023-12-12 DIAGNOSIS — R63.0 DECREASED APPETITE: ICD-10-CM

## 2023-12-12 DIAGNOSIS — R19.7 DIARRHEA, UNSPECIFIED TYPE: ICD-10-CM

## 2023-12-12 PROCEDURE — 99214 PR OFFICE/OUTPT VISIT, EST, LEVL IV, 30-39 MIN: ICD-10-PCS | Mod: 25,S$GLB,, | Performed by: NURSE PRACTITIONER

## 2023-12-12 PROCEDURE — 96372 THER/PROPH/DIAG INJ SC/IM: CPT | Mod: S$GLB,,, | Performed by: NURSE PRACTITIONER

## 2023-12-12 PROCEDURE — 96372 PR INJECTION,THERAP/PROPH/DIAG2ST, IM OR SUBCUT: ICD-10-PCS | Mod: S$GLB,,, | Performed by: NURSE PRACTITIONER

## 2023-12-12 PROCEDURE — 99214 OFFICE O/P EST MOD 30 MIN: CPT | Mod: 25,S$GLB,, | Performed by: NURSE PRACTITIONER

## 2023-12-12 RX ORDER — ONDANSETRON 8 MG/1
8 TABLET, ORALLY DISINTEGRATING ORAL EVERY 8 HOURS PRN
Qty: 6 TABLET | Refills: 0 | Status: SHIPPED | OUTPATIENT
Start: 2023-12-12 | End: 2023-12-15

## 2023-12-12 RX ORDER — PROMETHAZINE HYDROCHLORIDE 25 MG/ML
25 INJECTION, SOLUTION INTRAMUSCULAR; INTRAVENOUS
Status: COMPLETED | OUTPATIENT
Start: 2023-12-12 | End: 2023-12-12

## 2023-12-12 RX ADMIN — PROMETHAZINE HYDROCHLORIDE 25 MG: 25 INJECTION, SOLUTION INTRAMUSCULAR; INTRAVENOUS at 10:12

## 2023-12-12 NOTE — PATIENT INSTRUCTIONS
Rest  Hydration/increase fluids  [place] Diet: soups, smoothies, toast  Zofran 3 times daily as needed for nausea  Typical course and duration of illness discussed  Signs and symptoms of worsening discussed  Follow up as needed/with worsening

## 2023-12-12 NOTE — PROGRESS NOTES
"Subjective:      Patient ID: Billie Nicolas is a 33 y.o. female.    Vitals:  height is 5' 5.51" (1.664 m) and weight is 89.4 kg (196 lb 15.7 oz). Her oral temperature is 98.4 °F (36.9 °C). Her blood pressure is 131/85 and her pulse is 95. Her respiration is 20 and oxygen saturation is 100%.     Chief Complaint: Nausea (Abdominal pain, profound nausea despite 8 mg Zofran. Watery diarrhea x 8 times since 5 am despite Imodium. - Entered by patient)    34 y/o female presents for evaluation of nausea and diarrhea (watery x 8, non-bloody, no mucous, foul odor) x 1 day. Symptom onset at 5 am, states that symptoms woke her up. OTC Immodium with some relief, Zofran 8 mg without relief. No known sick contacts.  Ate home made Chicken Lo Mein for dinner, defrosted chicken from freezer and suspects that chicken could be causing illness. Also ate leftover breakfast from [employer] with hollandaise sauce that had been left out at room temperature for 2 hours. No known sick contacts.    Nausea  This is a new problem. The current episode started today. The problem occurs constantly. The problem has been unchanged. Associated symptoms include abdominal pain (crampy), a change in bowel habit, chills, fatigue, headaches, nausea and a sore throat (intermittent x few weeks). Pertinent negatives include no anorexia, arthralgias, chest pain, congestion, coughing, diaphoresis, fever, joint swelling, myalgias, neck pain, numbness, rash, swollen glands, urinary symptoms, vertigo, visual change, vomiting or weakness. Exacerbated by: motion and heat. Treatments tried: zofran 8 mg and immodium. The treatment provided no relief.       Constitution: Positive for appetite change, chills and fatigue. Negative for sweating and fever.   HENT:  Positive for sore throat (intermittent x few weeks). Negative for congestion.    Neck: neck negative. Negative for neck pain.   Cardiovascular: Negative.  Negative for chest pain.   Eyes: Negative.  "   Respiratory: Negative.  Negative for cough.    Gastrointestinal:  Positive for abdominal pain (crampy), nausea and diarrhea. Negative for vomiting.   Endocrine: negative.   Genitourinary: Negative.    Musculoskeletal: Negative.  Negative for joint pain, joint swelling and muscle ache.   Skin: Negative.  Negative for rash.   Allergic/Immunologic: Negative.    Neurological:  Positive for headaches. Negative for history of vertigo and numbness.   Hematologic/Lymphatic: Negative.    Psychiatric/Behavioral: Negative.        Objective:     Physical Exam   Constitutional: She is oriented to person, place, and time. She appears well-developed. She is cooperative.  Non-toxic appearance. She does not appear ill. No distress. awake  HENT:   Head: Normocephalic and atraumatic.   Ears:   Right Ear: Hearing and external ear normal.   Left Ear: Hearing and external ear normal.   Nose: Nose normal. No mucosal edema, rhinorrhea or nasal deformity. No epistaxis. Right sinus exhibits no maxillary sinus tenderness and no frontal sinus tenderness. Left sinus exhibits no maxillary sinus tenderness and no frontal sinus tenderness.   Mouth/Throat: Uvula is midline, oropharynx is clear and moist and mucous membranes are normal. No trismus in the jaw. Normal dentition. No uvula swelling. No oropharyngeal exudate, posterior oropharyngeal edema or posterior oropharyngeal erythema.   Eyes: Conjunctivae and lids are normal. Pupils are equal, round, and reactive to light. Right eye exhibits no discharge. Left eye exhibits no discharge. No scleral icterus. Extraocular movement intact   Neck: Trachea normal and phonation normal. Neck supple. No edema present. No erythema present. No neck rigidity present.   Cardiovascular: Normal rate, regular rhythm, normal heart sounds and normal pulses.   Pulmonary/Chest: Effort normal and breath sounds normal. No stridor. No respiratory distress. She has no decreased breath sounds. She has no wheezes. She has  no rhonchi. She has no rales. She exhibits no tenderness.   Abdominal: Normal appearance and bowel sounds are normal. Soft. flat abdomen There is generalized abdominal tenderness and tenderness in the epigastric area. There is no rebound, no guarding, no tenderness at McBurney's point, negative Dowd's sign, no left CVA tenderness, negative Rovsing's sign and no right CVA tenderness.   Musculoskeletal: Normal range of motion.         General: No deformity. Normal range of motion.   Neurological: no focal deficit. She is alert and oriented to person, place, and time. She exhibits normal muscle tone. Coordination normal.   Skin: Skin is warm, dry, intact, not diaphoretic and not pale.   Psychiatric: Her speech is normal and behavior is normal. Mood, judgment and thought content normal.   Nursing note and vitals reviewed.      Assessment:     1. Food poisoning    2. Diarrhea, unspecified type    3. Nausea    4. Fatigue, unspecified type    5. Decreased appetite        Plan:       Patient presents with symptoms that are consistent with food poisoning. Exam negative for cholecystitis/appendicitis. Plan is to manage symptoms, prevent worsening, and follow up as needed, discussed with patient who verbalizes understanding.     Food poisoning    Diarrhea, unspecified type    Nausea  -     promethazine injection 25 mg  -     ondansetron (ZOFRAN-ODT) 8 MG TbDL; Take 1 tablet (8 mg total) by mouth every 8 (eight) hours as needed (nausea).  Dispense: 6 tablet; Refill: 0    Fatigue, unspecified type    Decreased appetite                Patient Instructions   Rest  Hydration/increase fluids  [place] Diet: soups, smoothies, toast  Zofran 3 times daily as needed for nausea  Typical course and duration of illness discussed  Signs and symptoms of worsening discussed  Follow up as needed/with worsening

## 2023-12-12 NOTE — LETTER
December 12, 2023      [facility]  [address]  [facility] [address]  Phone: [phone]       Patient: Billie Nicolas   YOB: 1990  Date of Visit: 12/12/2023    To Whom It May Concern:    Beth Nicolas  was at [facility] on 12/12/2023. The patient may return to work/school on 12/13/2023 with no restrictions. If you have any questions or concerns, or if I can be of further assistance, please do not hesitate to contact me.    Sincerely,      Nael Lim, NP

## 2024-01-02 DIAGNOSIS — F90.9 ATTENTION DEFICIT HYPERACTIVITY DISORDER (ADHD), UNSPECIFIED ADHD TYPE: ICD-10-CM

## 2024-01-02 RX ORDER — DEXTROAMPHETAMINE SACCHARATE, AMPHETAMINE ASPARTATE, DEXTROAMPHETAMINE SULFATE AND AMPHETAMINE SULFATE 2.5; 2.5; 2.5; 2.5 MG/1; MG/1; MG/1; MG/1
10 TABLET ORAL DAILY
Qty: 90 TABLET | Refills: 0 | Status: CANCELLED | OUTPATIENT
Start: 2024-01-02 | End: 2024-04-01

## 2024-01-07 ENCOUNTER — ON-DEMAND VIRTUAL (OUTPATIENT)
Dept: URGENT CARE | Facility: CLINIC | Age: 34
End: 2024-01-07
Payer: COMMERCIAL

## 2024-01-07 DIAGNOSIS — J06.9 UPPER RESPIRATORY TRACT INFECTION, UNSPECIFIED TYPE: ICD-10-CM

## 2024-01-07 DIAGNOSIS — H92.02 LEFT EAR PAIN: Primary | ICD-10-CM

## 2024-01-07 PROCEDURE — 99213 OFFICE O/P EST LOW 20 MIN: CPT | Mod: CC,95,, | Performed by: NURSE PRACTITIONER

## 2024-01-07 RX ORDER — AMOXICILLIN 875 MG/1
875 TABLET, FILM COATED ORAL 2 TIMES DAILY
Qty: 20 TABLET | Refills: 0 | Status: SHIPPED | OUTPATIENT
Start: 2024-01-07 | End: 2024-01-18

## 2024-01-07 NOTE — PROGRESS NOTES
Subjective:      Patient ID: Billie Nicolas is a 33 y.o. female.    Vitals:  vitals were not taken for this visit.     Chief Complaint: Otalgia      Visit Type: TELE AUDIOVISUAL    Present with the patient at the time of consultation: TELEMED PRESENT WITH PATIENT: None    Past Medical History:   Diagnosis Date    Abnormal cervical Pap smear with positive HPV DNA test     ADHD, predominantly inattentive type     Allergy     Anxiety     Asthma     Bulimia     HSV-1 infection     Major depression, recurrent     Migraine headache      Past Surgical History:   Procedure Laterality Date    ABSCESS DRAINAGE  12/25/2017    abscess lanced     LAPAROSCOPIC SALPINGECTOMY Bilateral 7/31/2023    Procedure: SALPINGECTOMY, LAPAROSCOPIC;  Surgeon: Elvin Shoemaker MD;  Location: [facility];  Service: OB/GYN;  Laterality: Bilateral;    TONSILLECTOMY AND ADENOIDECTOMY      WISDOM TOOTH EXTRACTION       Review of patient's allergies indicates:   Allergen Reactions    Sulfa (sulfonamide antibiotics) Hives    Sulfamethoxazole-trimethoprim      Current Outpatient Medications on File Prior to Visit   Medication Sig Dispense Refill    albuterol (PROVENTIL HFA) 90 mcg/actuation inhaler Inhale 2 puffs into the lungs every 6 (six) hours as needed for Wheezing. Rescue 18 g 0    buPROPion (WELLBUTRIN XL) 300 MG 24 hr tablet Take 1 tablet (300 mg total) by mouth once daily. 90 tablet 1    dextroamphetamine-amphetamine (ADDERALL) 10 mg Tab Take 1 tablet (10 mg total) by mouth once daily. 90 tablet 0    lisdexamfetamine (VYVANSE) 40 MG Cap Take 1 capsule (40 mg total) by mouth once daily. 90 capsule 0    sertraline (ZOLOFT) 100 MG tablet Take 1 tablet (100 mg total) by mouth once daily. 90 tablet 1    valACYclovir (VALTREX) 500 MG tablet TAKE ONE TABLET BY MOUTH EVERY DAY 90 tablet 3     No current facility-administered medications on file prior to visit.     Family History   Problem Relation Age of Onset    Cancer Mother         cervical     Heart disease Mother     Mitral valve prolapse Mother     Hypertension Mother     Hypertension Maternal Grandmother     Heart disease Maternal Grandfather         Passed at 54 yoa    Hodgkin's lymphoma Maternal Grandfather     Cancer Maternal Grandfather     Hypertension Maternal Grandfather     Diabetes Sister     Melanoma Neg Hx        Medications Ordered                [facility]   [address] [place] [address]    Telephone: [phone]   Fax: [phone]   Hours: Mon-Fri, 8a-5:30p                         Internal Pharmacy (1 of 1)              amoxicillin (AMOXIL) 875 MG tablet    Sig: Take 1 tablet (875 mg total) by mouth 2 (two) times daily. for 10 days       Start: 1/7/24     Quantity: 20 tablet Refills: 0                           Ohs Peq Odvv Intake    1/7/2024  4:00 PM CST - Filed by Patient   Describe your reason for todays visit URI day 4 since symptom onset   What is your current physical address in the event of a medical emergency? [address] [place] [address]   Are you able to take your vital signs? Yes   Systolic Blood Pressure:    Diastolic Blood Pressure:    Weight: 193   Height: 66   Pulse: 103   Temperature: 99.2   Respiration rate: 21   Pulse Oxygen: 97   Please attach any relevant images or files          URI for the past 4 days.   Now having a terrible sore throat and now is having a really bad pain to left ear.   No SOB. 99.2 temp.         HENT:  Positive for ear pain. Negative for ear discharge, foreign body in ear, tinnitus and hearing loss.         Objective:   The physical exam was conducted virtually.  Physical Exam   Constitutional: She is oriented to person, place, and time. No distress.   HENT:   Head: Normocephalic and atraumatic.   Mouth/Throat: Oropharynx is clear and moist and mucous membranes are normal.   Eyes: Conjunctivae are normal. No scleral icterus.   Pulmonary/Chest: Effort normal. No respiratory distress.   Musculoskeletal:  Normal range of motion.         General: Normal range of motion.   Neurological: She is alert and oriented to person, place, and time.   Skin: Skin is not diaphoretic.   Psychiatric: Her behavior is normal. Judgment and thought content normal.   Vitals reviewed.      Assessment:     1. Left ear pain    2. Upper respiratory tract infection, unspecified type        Plan:       Left ear pain  -     amoxicillin (AMOXIL) 875 MG tablet; Take 1 tablet (875 mg total) by mouth 2 (two) times daily. for 10 days  Dispense: 20 tablet; Refill: 0    Upper respiratory tract infection, unspecified type               Thank you for choosing [facility]!    Our goal in the [facility] is to always provide outstanding medical care. You may receive a survey by mail or e-mail in the next week regarding your experience today. We would greatly appreciate you completing and returning the survey. Your feedback provides us with a way to recognize our staff who provide very good care, and it helps us learn how to improve when your experience was below our aspiration of excellence.         We appreciate you trusting us with your medical care. We hope you feel better soon. We will be happy to take care of you for all of your future medical needs.    You must understand that you've received an Urgent Care treatment only and that you may be released before all your medical problems are known or treated. You, the patient, will arrange for follow up care as instructed.    Follow up with your PCP or specialty clinic as directed in the next 1-2 weeks if not improved or as needed.  You can call [phone] to schedule an appointment with the appropriate provider.    If your condition worsens we recommend that you receive another evaluation in person, with your primary care provider, urgent care or at the emergency room immediately or contact your primary medical clinics after hours call service to discuss your  concerns.

## 2024-01-11 ENCOUNTER — OFFICE VISIT (OUTPATIENT)
Dept: PSYCHIATRY | Facility: CLINIC | Age: 34
End: 2024-01-11
Payer: COMMERCIAL

## 2024-01-11 DIAGNOSIS — F33.42 MAJOR DEPRESSIVE DISORDER, RECURRENT EPISODE, IN FULL REMISSION: ICD-10-CM

## 2024-01-11 DIAGNOSIS — F41.9 ANXIETY: ICD-10-CM

## 2024-01-11 DIAGNOSIS — F90.9 ATTENTION DEFICIT HYPERACTIVITY DISORDER (ADHD), UNSPECIFIED ADHD TYPE: Primary | ICD-10-CM

## 2024-01-11 PROCEDURE — 1159F MED LIST DOCD IN RCRD: CPT | Mod: CPTII,95,, | Performed by: PSYCHOLOGIST

## 2024-01-11 PROCEDURE — 99214 OFFICE O/P EST MOD 30 MIN: CPT | Mod: 95,,, | Performed by: PSYCHOLOGIST

## 2024-01-11 RX ORDER — LISDEXAMFETAMINE DIMESYLATE CAPSULES 60 MG/1
60 CAPSULE ORAL EVERY MORNING
Qty: 30 CAPSULE | Refills: 0 | Status: SHIPPED | OUTPATIENT
Start: 2024-01-11 | End: 2024-02-16

## 2024-01-11 RX ORDER — LISDEXAMFETAMINE DIMESYLATE CAPSULES 60 MG/1
60 CAPSULE ORAL EVERY MORNING
Qty: 30 CAPSULE | Refills: 0 | Status: SHIPPED | OUTPATIENT
Start: 2024-02-10 | End: 2024-03-16

## 2024-01-11 RX ORDER — DEXTROAMPHETAMINE SACCHARATE, AMPHETAMINE ASPARTATE, DEXTROAMPHETAMINE SULFATE AND AMPHETAMINE SULFATE 2.5; 2.5; 2.5; 2.5 MG/1; MG/1; MG/1; MG/1
10 TABLET ORAL DAILY
Qty: 30 TABLET | Refills: 0 | Status: SHIPPED | OUTPATIENT
Start: 2024-01-11 | End: 2024-02-11

## 2024-01-11 RX ORDER — LISDEXAMFETAMINE DIMESYLATE CAPSULES 60 MG/1
60 CAPSULE ORAL EVERY MORNING
Qty: 30 CAPSULE | Refills: 0 | Status: SHIPPED | OUTPATIENT
Start: 2024-03-11 | End: 2024-04-10

## 2024-01-11 NOTE — ADDENDUM NOTE
Addendum  created 01/11/24 1207 by Thalia Buckner, CRNA    Clinical Note Signed, Intraprocedure Blocks edited, SmartForm saved

## 2024-01-11 NOTE — PROGRESS NOTES
"Outpatient Psychiatry Follow-Up Visit    1/11/2024    Timeframe: Corona Virus Outbreak     The patient location is: Patient's work/ Patient reported that his/her location at the time of this visit was in the [facility]     Visit type: Virtual visit with synchronous audio and video     Each patient to whom he or she provides medical services by telehealth is: (1) informed of the relationship between the medical psychologist and patient and the respective role of any other health care provider with respect to management of the patient; and (2) notified that he or she may decline to receive medical services by telehealth and may withdraw from such care at any time.    I also informed patient of the following:   Radha Lovett, PhD, MPAP:  [place] medical license number: [id number]    My contact info:  [facility] / 2nd Floor  [address] [place] [address]   Ph: [phone]    If technology issues, call office phone: Ph: [phone]  If crisis: Dial 911 or go to nearest Emergency Room (ER)  If questions related to privacy practices: contact [facility] Information Department: [phone]    Chief Complaint:  Billie Nicolas is a 33 y.o. female who presents today for follow-up of anxiety and inattention/distractibility .       Preferred Name: Billie  Gender Identity: cis female  Preferred Pronouns:  she/her/they also okay    Impressions/Plan from last visit: Billie attended her virtual visit. She can tell the difference when she does not take her "lunch Adderall," as it helps with evenings. She has done better with coping overall--physically doing better and medically cleared. She and her partner are headed to [place] for the eclipse--excited about that. We agreed to continue her medicines as prescribed.    Plan--continue Zoloft 100 mg, Wellbutrin  mg, Vyvanse 40 mg #90, Adderall 10 mg #90     Interval History and Content of Current Session: Billie " attended her virtual visit. 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 Personally, there has been stress/grief--her step-grandfather . This was a big loss for her family. She was uncomforbale having to be around her mother (she is estranged from her mother), but she has set limits with her mom on getting her own help. She is only getting about 6 hours from Vyvanse and is taking Adderall around 12:30 to get her through the rest of her work day and early evening. We discussed getting 10-12 hours from Vyvanse and agreed to try a higher dose.  We agreed to go up 20 mg, and we discussed opening and dissolving the capsule in water if she needs to have a lower dose.  We sent just 1 prescription for the Adderall in case she needs it, though our goal is to only take Vyvanse in the mornings with no other stimulant therapy.  See plan below.    Plan--continue Zoloft 100 mg, Wellbutrin  mg, increase Vyvanse 60 mg  (sent 3 scripts), Adderall 10 mg (sent 1 script); Message if dosing needs adjustment on Vyvanse    Man (partner)     since 2023.        -----------------------------------------------------------------------------------------------------------  Therapist: Priscilla Malave)        2024    12:52 PM 10/12/2023     2:02 PM 2023     9:58 AM   GAD7   1. Feeling nervous, anxious, or on edge? 0 0 1   2. Not being able to stop or control worrying? 0 0 0   3. Worrying too much about different things? 0 0 0   4. Trouble relaxing? 1 1 1   5. Being so restless that it is hard to sit still? 1 1 1   6. Becoming easily annoyed or irritable? 1 1 1   7. Feeling afraid as if something awful might happen? 0 0 0   BEATRIZ-7 Score 3 3 4      0-4 = Minimal anxiety  5-9 = Mild anxiety  10-14 = Moderate anxiety  15-21 = Severe anxiety           2022     1:54 PM 2018     9:55 AM 2018     4:21 PM   Depression Patient Health Questionnaire   Over  the last two weeks how often have you been bothered by little interest or pleasure in doing things Nearly every day Not at all Not at all   Over the last two weeks how often have you been bothered by feeling down, depressed or hopeless Nearly every day More than half the days Several days   PHQ-2 Total Score 6 2 1   Over the last two weeks how often have you been bothered by trouble falling or staying asleep, or sleeping too much Nearly every day     Over the last two weeks how often have you been bothered by feeling tired or having little energy Nearly every day     Over the last two weeks how often have you been bothered by a poor appetite or overeating Nearly every day     Over the last two weeks how often have you been bothered by feeling bad about yourself - or that you are a failure or have let yourself or your family down Nearly every day     Over the last two weeks how often have you been bothered by trouble concentrating on things, such as reading the newspaper or watching television Nearly every day     Over the last two weeks how often have you been bothered by moving or speaking so slowly that other people could have noticed. Or the opposite - being so fidgety or restless that you have been moving around a lot more than usual. Nearly every day     Over the last two weeks how often have you been bothered by thoughts that you would be better off dead, or of hurting yourself Not at all     If you checked off any problems, how difficult have these problems made it for you to do your work, take care of things at home or get along with other people? Somewhat difficult     PHQ-9 Score 24     PHQ-9 Interpretation Severe       0-4 = No intervention  5 to 9 = Mild  10 to 14 = Moderate  15 to 19 = Moderately severe  =20 = Severe    Review of Systems   PSYCHIATRIC: Pertinant items are noted in the narrative.    Past Medical, Family and Social History: The patient's past medical, family and social history have been  reviewed and updated as appropriate within the electronic medical record - see encounter notes.      Current Outpatient Medications:     albuterol (PROVENTIL HFA) 90 mcg/actuation inhaler, Inhale 2 puffs into the lungs every 6 (six) hours as needed for Wheezing. Rescue, Disp: 18 g, Rfl: 0    amoxicillin (AMOXIL) 875 MG tablet, Take 1 tablet (875 mg total) by mouth 2 (two) times daily. for 10 days, Disp: 20 tablet, Rfl: 0    buPROPion (WELLBUTRIN XL) 300 MG 24 hr tablet, Take 1 tablet (300 mg total) by mouth once daily., Disp: 90 tablet, Rfl: 1    dextroamphetamine-amphetamine (ADDERALL) 10 mg Tab, Take 1 tablet (10 mg total) by mouth once daily., Disp: 30 tablet, Rfl: 0    lisdexamfetamine (VYVANSE) 60 MG capsule, Take 1 capsule (60 mg total) by mouth every morning., Disp: 30 capsule, Rfl: 0    [START ON 2/10/2024] lisdexamfetamine (VYVANSE) 60 MG capsule, Take 1 capsule (60 mg total) by mouth every morning., Disp: 30 capsule, Rfl: 0    [START ON 3/11/2024] lisdexamfetamine (VYVANSE) 60 MG capsule, Take 1 capsule (60 mg total) by mouth every morning., Disp: 30 capsule, Rfl: 0    sertraline (ZOLOFT) 100 MG tablet, Take 1 tablet (100 mg total) by mouth once daily., Disp: 90 tablet, Rfl: 1    valACYclovir (VALTREX) 500 MG tablet, TAKE ONE TABLET BY MOUTH EVERY DAY, Disp: 90 tablet, Rfl: 3    Compliance: yes    Side effects: see above    Risk Parameters:  Patient reports no suicidal ideation  Patient reports no homicidal ideation  Patient reports no self-injurious behavior  Patient reports no violent behavior    Exam (detailed: at least 9 elements; comprehensive: all 15 elements)   Constitutional  Vitals:  Most recent vital signs were reviewed.   Last 3 sets of Vitals        9/5/2023     8:08 AM 9/5/2023     3:44 PM 12/12/2023     9:23 AM   Vitals - 1 value per visit   SYSTOLIC 132 124 131   DIASTOLIC 78 74 85   Pulse 97  95   Temp 98.4 °F (36.9 °C)  98.4 °F (36.9 °C)   Resp 18  20   SPO2 99 %  100 %   Weight (lb)  "194.01 192.68 196.98   Weight (kg) 88 87.4 89.35   Height 5' 7" (1.702 m) 5' 7" (1.702 m) 5' 5.51" (1.664 m)   BMI (Calculated) 30.4 30.2 32.3   Pain Score Zero Zero Five          General:  age appropriate, casually dressed, neatly groomed     Musculoskeletal  Muscle Strength/Tone:  no tremor, no tic   Gait & Station:  video visit     Psychiatric  Speech:  no latency; no press   Behavior: wnl   Mood & Affect:  euthymic  congruent and appropriate   Thought Process:  normal and logical   Associations:  intact   Thought Content:  normal, no suicidality, no homicidality, delusions, or paranoia   Insight:  has awareness of illness   Judgement: behavior is adequate to circumstances   Orientation:  grossly intact   Memory: intact for content of interview   Language: grossly intact   Attention Span & Concentration:  Grossly intact   Fund of Knowledge:  intact and appropriate to age and level of education     Assessment and Diagnosis   Status/Progress: Based on the examination today, the patient's problem(s) is/are adequately but not ideally controlled.  New problems have not been presented today.   Co-morbidities and psychosocial stressors  are complicating management of the primary condition.  There are no active rule-out diagnoses for this patient at this time.     General Impression:     Encounter Diagnoses   Name Primary?    Attention deficit hyperactivity disorder (ADHD), unspecified ADHD type Yes    Anxiety     Major depressive disorder, recurrent episode, in full remission        Intervention/Counseling/Treatment Plan   Medication Management: Discussed risks, benefits, and alternatives to treatment plan documented above with patient. I answered all patient questions related to this plan, and patient expressed understanding and agreement.   continue Zoloft 100 mg, Wellbutrin  mg, increase Vyvanse 60 mg  (sent 3 scripts), Adderall 10 mg (sent 1 script)  Counseling as needed  Message if dosing needs adjustment on " Vyvanse      Medication List with Changes/Refills   New Medications    LISDEXAMFETAMINE (VYVANSE) 60 MG CAPSULE    Take 1 capsule (60 mg total) by mouth every morning.    LISDEXAMFETAMINE (VYVANSE) 60 MG CAPSULE    Take 1 capsule (60 mg total) by mouth every morning.    LISDEXAMFETAMINE (VYVANSE) 60 MG CAPSULE    Take 1 capsule (60 mg total) by mouth every morning.   Current Medications    ALBUTEROL (PROVENTIL HFA) 90 MCG/ACTUATION INHALER    Inhale 2 puffs into the lungs every 6 (six) hours as needed for Wheezing. Rescue    AMOXICILLIN (AMOXIL) 875 MG TABLET    Take 1 tablet (875 mg total) by mouth 2 (two) times daily. for 10 days    BUPROPION (WELLBUTRIN XL) 300 MG 24 HR TABLET    Take 1 tablet (300 mg total) by mouth once daily.    SERTRALINE (ZOLOFT) 100 MG TABLET    Take 1 tablet (100 mg total) by mouth once daily.    VALACYCLOVIR (VALTREX) 500 MG TABLET    TAKE ONE TABLET BY MOUTH EVERY DAY   Changed and/or Refilled Medications    Modified Medication Previous Medication    DEXTROAMPHETAMINE-AMPHETAMINE (ADDERALL) 10 MG TAB dextroamphetamine-amphetamine (ADDERALL) 10 mg Tab       Take 1 tablet (10 mg total) by mouth once daily.    Take 1 tablet (10 mg total) by mouth once daily.   Discontinued Medications    LISDEXAMFETAMINE (VYVANSE) 40 MG CAP    Take 1 capsule (40 mg total) by mouth once daily.        Return to Clinic: 3 months    Time spent with pt including note preparation: 31 minutes       Radha Lovett, PhD, MP  Advanced Practice Medical Psychologist  [facility]  [place] [address]  [phone]   [phone] fax

## 2024-01-11 NOTE — PATIENT INSTRUCTIONS

## 2024-02-01 DIAGNOSIS — F90.9 ATTENTION DEFICIT HYPERACTIVITY DISORDER (ADHD), UNSPECIFIED ADHD TYPE: ICD-10-CM

## 2024-02-22 ENCOUNTER — PATIENT MESSAGE (OUTPATIENT)
Dept: PSYCHIATRY | Facility: CLINIC | Age: 34
End: 2024-02-22
Payer: COMMERCIAL

## 2024-03-18 DIAGNOSIS — F90.9 ATTENTION DEFICIT HYPERACTIVITY DISORDER (ADHD), UNSPECIFIED ADHD TYPE: ICD-10-CM

## 2024-03-18 RX ORDER — LISDEXAMFETAMINE DIMESYLATE 60 MG/1
60 CAPSULE ORAL EVERY MORNING
Qty: 30 CAPSULE | Refills: 0 | OUTPATIENT
Start: 2024-03-18 | End: 2024-04-17

## 2024-03-18 RX ORDER — LISDEXAMFETAMINE DIMESYLATE 40 MG/1
40 CAPSULE ORAL DAILY
Qty: 90 CAPSULE | Refills: 0 | OUTPATIENT
Start: 2024-03-18 | End: 2024-06-16

## 2024-03-18 RX ORDER — DEXTROAMPHETAMINE SACCHARATE, AMPHETAMINE ASPARTATE, DEXTROAMPHETAMINE SULFATE AND AMPHETAMINE SULFATE 2.5; 2.5; 2.5; 2.5 MG/1; MG/1; MG/1; MG/1
10 TABLET ORAL DAILY
Qty: 30 TABLET | Refills: 0 | Status: SHIPPED | OUTPATIENT
Start: 2024-03-18 | End: 2024-04-10 | Stop reason: SDUPTHER

## 2024-03-20 ENCOUNTER — PATIENT MESSAGE (OUTPATIENT)
Dept: INTERNAL MEDICINE | Facility: CLINIC | Age: 34
End: 2024-03-20
Payer: COMMERCIAL

## 2024-03-20 RX ORDER — VALACYCLOVIR HYDROCHLORIDE 500 MG/1
500 TABLET, FILM COATED ORAL DAILY
Qty: 90 TABLET | Refills: 1 | Status: SHIPPED | OUTPATIENT
Start: 2024-03-20

## 2024-03-20 NOTE — TELEPHONE ENCOUNTER
No care due was identified.  [facility] Embedded Care Due Messages. Reference number: [id number].   3/20/2024 2:27:29 PM CDT

## 2024-03-29 ENCOUNTER — PATIENT MESSAGE (OUTPATIENT)
Dept: INTERNAL MEDICINE | Facility: CLINIC | Age: 34
End: 2024-03-29
Payer: COMMERCIAL

## 2024-03-29 DIAGNOSIS — R89.8 ABNORMAL GENETIC TEST: ICD-10-CM

## 2024-03-29 DIAGNOSIS — Z14.8 CARRIER OF GENE FOR LYNCH SYNDROME: ICD-10-CM

## 2024-03-29 DIAGNOSIS — Z80.9 FAMILY HISTORY OF CANCER IN MOTHER: Primary | ICD-10-CM

## 2024-04-04 ENCOUNTER — LAB VISIT (OUTPATIENT)
Dept: LAB | Facility: HOSPITAL | Age: 34
End: 2024-04-04
Attending: INTERNAL MEDICINE
Payer: COMMERCIAL

## 2024-04-04 ENCOUNTER — OFFICE VISIT (OUTPATIENT)
Dept: HEMATOLOGY/ONCOLOGY | Facility: CLINIC | Age: 34
End: 2024-04-04
Payer: COMMERCIAL

## 2024-04-04 VITALS
RESPIRATION RATE: 20 BRPM | BODY MASS INDEX: 31.18 KG/M2 | DIASTOLIC BLOOD PRESSURE: 90 MMHG | OXYGEN SATURATION: 99 % | WEIGHT: 194 LBS | TEMPERATURE: 98 F | HEIGHT: 66 IN | HEART RATE: 95 BPM | SYSTOLIC BLOOD PRESSURE: 138 MMHG

## 2024-04-04 DIAGNOSIS — Z80.9 FAMILY HISTORY OF CANCER IN MOTHER: ICD-10-CM

## 2024-04-04 DIAGNOSIS — Z14.8 CARRIER OF GENE FOR LYNCH SYNDROME: ICD-10-CM

## 2024-04-04 DIAGNOSIS — R89.8 ABNORMAL GENETIC TEST: ICD-10-CM

## 2024-04-04 LAB
BASOPHILS # BLD AUTO: 0.02 K/UL (ref 0–0.2)
BASOPHILS NFR BLD: 0.3 % (ref 0–1.9)
DIFFERENTIAL METHOD BLD: ABNORMAL
EOSINOPHIL # BLD AUTO: 0.1 K/UL (ref 0–0.5)
EOSINOPHIL NFR BLD: 2 % (ref 0–8)
ERYTHROCYTE [DISTWIDTH] IN BLOOD BY AUTOMATED COUNT: 13.6 % (ref 11.5–14.5)
FERRITIN SERPL-MCNC: 34 NG/ML (ref 20–300)
FOLATE SERPL-MCNC: 5.9 NG/ML (ref 4–24)
HCT VFR BLD AUTO: 41.5 % (ref 37–48.5)
HGB BLD-MCNC: 13.6 G/DL (ref 12–16)
IMM GRANULOCYTES # BLD AUTO: 0.01 K/UL (ref 0–0.04)
IMM GRANULOCYTES NFR BLD AUTO: 0.2 % (ref 0–0.5)
IRON SERPL-MCNC: 73 UG/DL (ref 30–160)
LYMPHOCYTES # BLD AUTO: 1.8 K/UL (ref 1–4.8)
LYMPHOCYTES NFR BLD: 30.7 % (ref 18–48)
MCH RBC QN AUTO: 29.7 PG (ref 27–31)
MCHC RBC AUTO-ENTMCNC: 32.8 G/DL (ref 32–36)
MCV RBC AUTO: 91 FL (ref 82–98)
MONOCYTES # BLD AUTO: 0.6 K/UL (ref 0.3–1)
MONOCYTES NFR BLD: 9.4 % (ref 4–15)
NEUTROPHILS # BLD AUTO: 3.4 K/UL (ref 1.8–7.7)
NEUTROPHILS NFR BLD: 57.4 % (ref 38–73)
NRBC BLD-RTO: 0 /100 WBC
PATH REV BLD -IMP: NORMAL
PLATELET # BLD AUTO: 313 K/UL (ref 150–450)
PMV BLD AUTO: 9 FL (ref 9.2–12.9)
RBC # BLD AUTO: 4.58 M/UL (ref 4–5.4)
SATURATED IRON: 18 % (ref 20–50)
TOTAL IRON BINDING CAPACITY: 400 UG/DL (ref 250–450)
TRANSFERRIN SERPL-MCNC: 270 MG/DL (ref 200–375)
VIT B12 SERPL-MCNC: 363 PG/ML (ref 210–950)
WBC # BLD AUTO: 5.96 K/UL (ref 3.9–12.7)

## 2024-04-04 PROCEDURE — 99999 PR PBB SHADOW E&M-EST. PATIENT-LVL IV: CPT | Mod: PBBFAC,,, | Performed by: INTERNAL MEDICINE

## 2024-04-04 PROCEDURE — 3080F DIAST BP >= 90 MM HG: CPT | Mod: CPTII,S$GLB,, | Performed by: INTERNAL MEDICINE

## 2024-04-04 PROCEDURE — 82746 ASSAY OF FOLIC ACID SERUM: CPT | Performed by: INTERNAL MEDICINE

## 2024-04-04 PROCEDURE — 82652 VIT D 1 25-DIHYDROXY: CPT | Performed by: INTERNAL MEDICINE

## 2024-04-04 PROCEDURE — 82728 ASSAY OF FERRITIN: CPT | Performed by: INTERNAL MEDICINE

## 2024-04-04 PROCEDURE — G2211 COMPLEX E/M VISIT ADD ON: HCPCS | Mod: S$GLB,,, | Performed by: INTERNAL MEDICINE

## 2024-04-04 PROCEDURE — 3075F SYST BP GE 130 - 139MM HG: CPT | Mod: CPTII,S$GLB,, | Performed by: INTERNAL MEDICINE

## 2024-04-04 PROCEDURE — 36415 COLL VENOUS BLD VENIPUNCTURE: CPT | Performed by: INTERNAL MEDICINE

## 2024-04-04 PROCEDURE — 3008F BODY MASS INDEX DOCD: CPT | Mod: CPTII,S$GLB,, | Performed by: INTERNAL MEDICINE

## 2024-04-04 PROCEDURE — 82607 VITAMIN B-12: CPT | Performed by: INTERNAL MEDICINE

## 2024-04-04 PROCEDURE — 99417 PROLNG OP E/M EACH 15 MIN: CPT | Mod: S$GLB,,, | Performed by: INTERNAL MEDICINE

## 2024-04-04 PROCEDURE — 83921 ORGANIC ACID SINGLE QUANT: CPT | Performed by: INTERNAL MEDICINE

## 2024-04-04 PROCEDURE — 85060 BLOOD SMEAR INTERPRETATION: CPT | Mod: ,,, | Performed by: PATHOLOGY

## 2024-04-04 PROCEDURE — 99215 OFFICE O/P EST HI 40 MIN: CPT | Mod: S$GLB,,, | Performed by: INTERNAL MEDICINE

## 2024-04-04 PROCEDURE — 83540 ASSAY OF IRON: CPT | Performed by: INTERNAL MEDICINE

## 2024-04-04 PROCEDURE — 85025 COMPLETE CBC W/AUTO DIFF WBC: CPT | Performed by: INTERNAL MEDICINE

## 2024-04-04 NOTE — PROGRESS NOTES
Patient ID: Billie Nicolas   Chief Complaint: Establish Care  MRN:  [id number]     Diagnosis:  Possible germline genetic mutations    Subjective   Billie Nicolas is a pleasant 33 y.o. female who presents to clinic to establish care for review of non-validated genetic testing.    She reports a significant family history of malignancy.  Because of this she decided to do non validated genetic testing ([employer], [facility], and [facility]).  She uploaded the reports to her portal when I reviewed these reports in detail.  The notable potential concerning mutations were those in MLH1, BRCA1  and MTHFR.  However, I discussed Billie in detail that the testing she had is non validated genetic testing.  We can not offer any management or surveillance recommendations based on these tests and I recommend that she see the genetic counselor for formal genetic testing recommendations.  She expressed understanding.      We had an in-depth conversation regarding potential surveillance recommendations if these mutations were proven to be positive on validated genetic testing.  We reviewed in detail the implications of BRCA 1 positivity and the associated cancers, potential surveillance, and potential preventative strategies.  We reviewed in detail the implications of MLH1 positivity and association with Roche syndrome; the associated malignancies, preventative strategies and surveillance.  We also reviewed the potential implication of MTHFR mutation.  I provided and reviewed with her the NCCN guidelines regarding BRCA 1 and MLH1 positive mutation carriers.    Billie reports that she has a family history significant for Paternal uncle pancreatic cancer 40s-50s, Maternal second or thrid cousin with liver and pancreatic (passed at 34 y.o.), maternal great uncle  in 50s of colon cancer, great uncle with hip and bone cancer in 60s-70s, mother with  cervical, uterus, bladder cancer at 33 y.o., maternal  grandfather HL at 27 and metasetatic large cell carcinoma of lung at 55 y.o., maternal great great grandmother with lung and bone cancer, and maternal great great grandfather with bone cancer.  She also has a family history significant for cardiovascular disease at young ages.    Additionally, she reports that she has been having heartburn, frequently in the last 2 months; her bowel movements have changed but she can not really describe the change in detail; she thinks that some times her stools are thinner than normal.  I do recommend GI evaluation.  I counseled her on self-breast exams.  All of her questions were answered satisfactorily.  She has no acute complaints today.      Review of Systems   Constitutional:  Positive for fatigue. Negative for activity change, appetite change, chills, diaphoresis and fever.   HENT:  Negative for nosebleeds. Sinus pain: vitamin d. Tinnitus: vitamin d\.   Respiratory:  Negative for shortness of breath.    Cardiovascular:  Negative for chest pain.   Gastrointestinal:  Positive for diarrhea (intermittently). Negative for abdominal distention, abdominal pain, anal bleeding, blood in stool, constipation, nausea and vomiting.   Genitourinary:  Negative for hematuria.   Musculoskeletal:  Negative for arthralgias, back pain and myalgias.   Skin:  Negative for rash.   Neurological:  Negative for weakness, light-headedness and headaches.   Hematological:  Bruises/bleeds easily.   Psychiatric/Behavioral:  The patient is not nervous/anxious.      History       Past Medical History:   Diagnosis Date    Abnormal cervical Pap smear with positive HPV DNA test     ADHD, predominantly inattentive type     Allergy     Anxiety     Asthma     Bulimia     HSV-1 infection     Major depression, recurrent     Migraine headache        Past Surgical History:   Procedure Laterality Date    ABSCESS DRAINAGE  12/25/2017    abscess lanced     LAPAROSCOPIC SALPINGECTOMY Bilateral 7/31/2023    Procedure:  "SALPINGECTOMY, LAPAROSCOPIC;  Surgeon: Elvin Shoemaker MD;  [facility];  Service: OB/GYN;  Laterality: Bilateral;    TONSILLECTOMY AND ADENOIDECTOMY      WISDOM TOOTH EXTRACTION         Family History   Problem Relation Name Age of Onset    Cervical cancer Mother Dana Esteabn        metastastized to uterus, bladder, LIU/BSO    Heart disease Mother Daan     Mitral valve prolapse Mother Dana     Hypertension Mother Dana     Hypertension Maternal Grandmother      Lung cancer Maternal Grandfather          metastasized, +smoking hx    Heart disease Maternal Grandfather          Passed at 54 yoa    Hodgkin's lymphoma Maternal Grandfather      Hypertension Maternal Grandfather      Clotting disorder Maternal Uncle      Liver cancer Other      Pancreatic cancer Other      Colon cancer Other  48    Pancreatic cancer Paternal Uncle      Melanoma Neg Hx         Review of patient's allergies indicates:   Allergen Reactions    Sulfa (sulfonamide antibiotics) Hives    Sulfamethoxazole-trimethoprim        Social History     Tobacco Use    Smoking status: Never     Passive exposure: Past    Smokeless tobacco: Never   Substance Use Topics    Alcohol use: Yes     Comment: rarely     Drug use: No       Physical Exam   ECOG:   ECOG SCORE              Vitals:  BP (!) 138/90   Pulse 95   Temp 98 °F (36.7 °C) (Temporal)   Resp 20   Ht 5' 6" (1.676 m)   Wt 88 kg (194 lb 0.1 oz)   SpO2 99%   BMI 31.31 kg/m²     Physical Exam:  Physical Exam       Labs   Labs:  Lab Visit on 04/04/2024   Component Date Value Ref Range Status    WBC 04/04/2024 5.96  3.90 - 12.70 K/uL Final    RBC 04/04/2024 4.58  4.00 - 5.40 M/uL Final    Hemoglobin 04/04/2024 13.6  12.0 - 16.0 g/dL Final    Hematocrit 04/04/2024 41.5  37.0 - 48.5 % Final    MCV 04/04/2024 91  82 - 98 fL Final    MCH 04/04/2024 29.7  27.0 - 31.0 pg Final    MCHC 04/04/2024 32.8  32.0 - 36.0 g/dL Final    RDW 04/04/2024 13.6  11.5 - 14.5 % Final    Platelets 04/04/2024 313  150 " - 450 K/uL Final    MPV 04/04/2024 9.0 (L)  9.2 - 12.9 fL Final    Immature Granulocytes 04/04/2024 0.2  0.0 - 0.5 % Final    Gran # (ANC) 04/04/2024 3.4  1.8 - 7.7 K/uL Final    Immature Grans (Abs) 04/04/2024 0.01  0.00 - 0.04 K/uL Final    Comment: Mild elevation in immature granulocytes is non specific and   can be seen in a variety of conditions including stress response,   acute inflammation, trauma and pregnancy. Correlation with other   laboratory and clinical findings is essential.      Lymph # 04/04/2024 1.8  1.0 - 4.8 K/uL Final    Mono # 04/04/2024 0.6  0.3 - 1.0 K/uL Final    Eos # 04/04/2024 0.1  0.0 - 0.5 K/uL Final    Baso # 04/04/2024 0.02  0.00 - 0.20 K/uL Final    nRBC 04/04/2024 0  0 /100 WBC Final    Gran % 04/04/2024 57.4  38.0 - 73.0 % Final    Lymph % 04/04/2024 30.7  18.0 - 48.0 % Final    Mono % 04/04/2024 9.4  4.0 - 15.0 % Final    Eosinophil % 04/04/2024 2.0  0.0 - 8.0 % Final    Basophil % 04/04/2024 0.3  0.0 - 1.9 % Final    Differential Method 04/04/2024 Automated   Final    Folate 04/04/2024 5.9  4.0 - 24.0 ng/mL Final    Vitamin B-12 04/04/2024 363  210 - 950 pg/mL Final    Methlymalonic Acid 04/04/2024 0.18  <0.40 umol/L Final    Comment: If applicable, any drug confirmation testing reported  here was developed and the performance characteristics  determined by [facility]. This   confirmation testing has not been cleared or approved  by the FDA. The laboratory is regulated under CLIA as  qualified to perform high-complexity testing. This test  is used for patient testing purposes. It should not be  regarded as investigational or for research.    Test performed at [facility],  [address], [place]  [address] [phone]  Yodit Castillo MD, PhD - Medical Director      Iron 04/04/2024 73  30 - 160 ug/dL Final    Transferrin 04/04/2024 270  200 - 375 mg/dL Final    TIBC 04/04/2024 400  250 - 450 ug/dL Final    Saturated Iron 04/04/2024 18 (L)   20 - 50 % Final    Ferritin 04/04/2024 34  20.0 - 300.0 ng/mL Final    Pathologist Review 04/04/2024 Review required   Final    Vit D, 1,25-Dihydroxy 04/04/2024 47  20 - 79 pg/mL Final    Comment: Vitamin D 1, 25 dihydroxy levels should be primarily used to  assess Vitamin D status in patients with renal disease and   hypercalcemia. Vitamin D 1,25-dihydroxy levels are generally  less than 5 pg/mL in end stage renal disease patients. The  preferred initial test for assessing Vitamin D status in the  general population is Vitamin D 25-hydroxy (VITD).    Test performed at [facility],  [address] , [place]  [address]     [phone]  Yodit Castillo MD, PhD - Medical Director      Lab Method 04/04/2024 See Comment   Final    Comment: This test was developed and its performance characteristics determined by [facility], a clinical laboratory located at [address] [place] [address]. These tests have not been cleared or approved by the U.S. Food and Drug   Administration. The FDA does not require this test to go through premarket FDA review.  [email or web address] is certified under CLIA-88 and accredited by the College of [employer] as qualified to perform high-complexity testing. This test is used for   clinical purposes and should not be regarded as investigational or for research.  Genomic DNA was analyzed by PCR-based [employer] TaqMan(R) and/or [employer] BHQ(R) probe-based methods to interrogate the variant locations listed in the Test results table above. In addition, CYP2D6 copy number status was assessed at sites within   the promoter, intron 2, intron 6, and exon 9. The test detects CYP2D6 deletions, duplications/multiplications, and hybrid alleles, but cannot differentiate dupl                           ications in the presence of a deletion.   Haplotypes, or combinations of inherited variants on a chromosome, are annotated according to legacy nomenclature  "for the genes and alleles in the table below. Less frequent haplotypes or novel alleles may be reported when appropriate.   CY - *1C, *1D, *1E, *1F, *1J, *1K, *1L, *1V, *1W  CYP2B6 - *4, *5, *6, *7, *9, *16, *18  CYP2C9 - *2, *3, *4, *5, *6, *8, *11  [id number] - *2, *3, *4, *4B, *10, *17  CYP2D6 - *2, *2A, *3, *4, *4M, *4N, *5, *6, *6C, *7, *8, *9, *10, *11, *12, *13, *14, *15, *17, *18, *19, *29, *31, *34, *35, *36, *39, 41, *42, *59, *63, *64, *68, *69, *70, *91, *109, *114  CY - *1B, *22  CY - *3, *6, *7  CYP4F2 - *3  DPYD - *2A, *13  FOEK5L0 - *5, *15, *17, *21  TPMT - *2, *3A, *3B, *3C, *4  UGT1A1 - *6, *28  The test does not detect all known and unknown variations in the genes tested, nor does absence of a detectable variant (designated as *1 for genes encoding drug metabolizing enzymes) rule out the presence of othe                           r, non-detected variants.   As with other common SNP genotyping techniques, these assays cannot differentiate between the maternal and paternal chromosomes. In cases where observed variants are associated with more than one haplotype, [facility] infers and reports the most likely   diplotype based on published allele frequency and/or ethnicity data. Inferences with potential clinical impact are reported in the Report and laboratory comments section.   The variant detection methods validated by [facility] provide >99.9% accuracy; however, PCR may be subject to general interference by factors such as reaction inhibitors and low quality or quantity of extracted DNA. When present, these interferents typically   yield no result rather than an inaccurate one. Very infrequent mutations or polymorphisms occurring in primer- or probe-binding regions may also affect testing and could produce an erroneous result or assay failure. Variant locations tested by the assay   but not assigned a genotype call ar                           e reported as "No Call." Test results and clinical " interpretation may be inaccurate for individuals who have undergone or are receiving non-autologous blood transfusions, tissue, and/or organ transplant therapies.   Although extremely rare, results could also be impacted by other factors not addressed above, such as laboratory error.  Due to the complexity of interpreting some genetic test results, such as those that may carry a probabilistic risk of disease, patients and providers should consider the benefits of consulting with a trained genetic counseling professional, physician, or   pharmacogenomic specialist.      Lab Comment 04/04/2024 See Comment   Final    Pathologist Review Peripheral Smear 04/04/2024 REVIEWED   Final    Comment:   Electronically reviewed and signed by:  Niya Manzo MD  Signed on 04/05/24 at 19:31  Review of the peripheral  blood smear reveals normocytic,   normochromic red blood cells.  There is slight anisocytosis and rare   ovalocytes.  Leukocytes are present in normal numbers and demonstrate   a normal differential count.  There is no significant increase in the   number of immature granulocytes and no blasts are identified.    Platelets are present in normal numbers and demonstrate a normal   morphology.    IMPRESSION:  Essentially normal peripheral blood smear.        Assessment and Plan   Genetic Counseling for Non-Validated Genetic Testing  Ms. Nicolas had non-validated genetic testing resulting with pertinent mutations of MLH1, homozygous MTHFR, and BRCA-1.  I reviewed her genetic test in detail with her.  I informed her that it is very important that we have validated genetic testing.  I reviewed the implications of MLH1 mutation in the diagnosis of Roche syndrome and the potential interventions and surveillance if this is proven positive on validated genetic testing as well as those of MLH1 and BRCA 1.  We reviewed some of the other genetic mutations that she was concerned about that clinically would not have much  significance and that do not have any supported research evidence for restrict surveillance plan and further testing.  I have already referred her to our genetic counselor so that the appropriate validated genetic testing can be ordered to cover all of the pertinent mutations.  She was also provided with a copy of the NCCN guidelines for the pertinent mutations and their surveillance recommendations understanding that these mutations have not been confirmed.      Cancer Screening  PAP Smear 01/18/2023: Final Diagnostic Interpretation. Negative for intraepithelial lesion or malignancy. Reactive cellular changes.       Chronic Medical Conditions  Asthma  Hx HSV-1  ADHD  Anxiety  Migraine Headaches  Hx bilateral salpingectomy        Med Onc Chart Routing      Follow up with physician . PRN   Follow up with [facility]    Infusion scheduling note    Injection scheduling note    Labs    Imaging    Pharmacy appointment    Other referrals       Additional referrals needed  Genetic counselor            > 90 minutes was spent in chart review, review of outside records and counseling of patient.      The patient was seen, interviewed and examined. Pertinent lab and radiologic studies were reviewed. Pt instructed to call should they develop concerning signs/symptoms or have further questions.        Portions of the record may have been created with voice recognition software. Occasional wrong-word or sound-a-like substitutions may have occurred due to the inherent limitations of voice recognition software. Read the chart carefully and recognize, using context, where substitutions have occurred.      Diya Queen MD    Hematology/Oncology

## 2024-04-05 LAB — PATH REV BLD -IMP: NORMAL

## 2024-04-08 ENCOUNTER — TELEPHONE (OUTPATIENT)
Dept: PSYCHIATRY | Facility: CLINIC | Age: 34
End: 2024-04-08
Payer: COMMERCIAL

## 2024-04-08 LAB — 1,25(OH)2D3 SERPL-MCNC: 47 PG/ML (ref 20–79)

## 2024-04-09 LAB — METHYLMALONATE SERPL-SCNC: 0.18 UMOL/L

## 2024-04-10 ENCOUNTER — OFFICE VISIT (OUTPATIENT)
Dept: PSYCHIATRY | Facility: CLINIC | Age: 34
End: 2024-04-10
Payer: COMMERCIAL

## 2024-04-10 DIAGNOSIS — F41.9 ANXIETY: ICD-10-CM

## 2024-04-10 DIAGNOSIS — F90.8 ATTENTION DEFICIT HYPERACTIVITY DISORDER (ADHD), OTHER TYPE: Primary | ICD-10-CM

## 2024-04-10 DIAGNOSIS — F33.42 MAJOR DEPRESSIVE DISORDER, RECURRENT EPISODE, IN FULL REMISSION: ICD-10-CM

## 2024-04-10 PROCEDURE — 99214 OFFICE O/P EST MOD 30 MIN: CPT | Mod: 95,,, | Performed by: PSYCHOLOGIST

## 2024-04-10 PROCEDURE — 1159F MED LIST DOCD IN RCRD: CPT | Mod: CPTII,95,, | Performed by: PSYCHOLOGIST

## 2024-04-10 RX ORDER — LISDEXAMFETAMINE DIMESYLATE 50 MG/1
50 CAPSULE ORAL EVERY MORNING
Qty: 30 CAPSULE | Refills: 0 | Status: SHIPPED | OUTPATIENT
Start: 2024-05-10 | End: 2024-06-14

## 2024-04-10 RX ORDER — DEXTROAMPHETAMINE SACCHARATE, AMPHETAMINE ASPARTATE, DEXTROAMPHETAMINE SULFATE AND AMPHETAMINE SULFATE 2.5; 2.5; 2.5; 2.5 MG/1; MG/1; MG/1; MG/1
10 TABLET ORAL DAILY
Qty: 30 TABLET | Refills: 0 | Status: SHIPPED | OUTPATIENT
Start: 2024-04-10 | End: 2024-06-14

## 2024-04-10 RX ORDER — BUPROPION HYDROCHLORIDE 300 MG/1
300 TABLET ORAL DAILY
Qty: 90 TABLET | Refills: 1 | Status: SHIPPED | OUTPATIENT
Start: 2024-04-10 | End: 2024-10-07

## 2024-04-10 RX ORDER — SERTRALINE HYDROCHLORIDE 100 MG/1
100 TABLET, FILM COATED ORAL DAILY
Qty: 90 TABLET | Refills: 1 | Status: SHIPPED | OUTPATIENT
Start: 2024-04-10 | End: 2024-10-07

## 2024-04-10 RX ORDER — LISDEXAMFETAMINE DIMESYLATE 50 MG/1
50 CAPSULE ORAL EVERY MORNING
Qty: 30 CAPSULE | Refills: 0 | Status: SHIPPED | OUTPATIENT
Start: 2024-04-10 | End: 2024-05-16

## 2024-04-10 RX ORDER — LISDEXAMFETAMINE DIMESYLATE 50 MG/1
50 CAPSULE ORAL EVERY MORNING
Qty: 30 CAPSULE | Refills: 0 | Status: SHIPPED | OUTPATIENT
Start: 2024-06-09 | End: 2024-07-19

## 2024-04-10 NOTE — PROGRESS NOTES
Outpatient Psychiatry Follow-Up Visit    4/10/2024    Timeframe: Corona Virus Outbreak     The patient location is: Patient's work/ Patient reported that his/her location at the time of this visit was in the [facility]     Visit type: Virtual visit with synchronous audio and video     Each patient to whom he or she provides medical services by telehealth is: (1) informed of the relationship between the medical psychologist and patient and the respective role of any other health care provider with respect to management of the patient; and (2) notified that he or she may decline to receive medical services by telehealth and may withdraw from such care at any time.    I also informed patient of the following:   Radha Lovett, PhD, MPAP:  [place] medical license number: [id number]    My contact info:  [facility] / 2nd Floor  [address] [place] [address]   Ph: [phone]    If technology issues, call office phone: Ph: [phone]  If crisis: Dial 911 or go to nearest Emergency Room (ER)  If questions related to privacy practices: contact [email or web address] Information Department: [phone]    Chief Complaint:  Billie Nicolas is a 33 y.o. female who presents today for follow-up of anxiety and inattention/distractibility .       Preferred Name: Billie  Gender Identity: cis female  Preferred Pronouns:  she/her/they also okay    Impressions/Plan from last visit: Billie attended her virtual visit. 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 Personally, there has been stress/grief--her step-grandfather . This was a big loss for her family. She was uncomforbale having to be around her mother (she is estranged from her mother), but she has set limits with her mom on getting her own help. She is only getting about 6 hours from Vyvanse and is taking Adderall around 12:30 to get her through the rest of her work day  "and early evening. We discussed getting 10-12 hours from Vyvanse and agreed to try a higher dose.  We agreed to go up 20 mg, and we discussed opening and dissolving the capsule in water if she needs to have a lower dose.  We sent just 1 prescription for the Adderall in case she needs it, though our goal is to only take Vyvanse in the mornings with no other stimulant therapy.  See plan below.    Plan--continue Zoloft 100 mg, Wellbutrin  mg, increase Vyvanse 60 mg  (sent 3 scripts), Adderall 10 mg (sent 1 script); Message if dosing needs adjustment on Vyvanse    Interval History and Content of Current Session: Billie attended her virtual visit. She recently ran her genetics and is homozygous for Roche Syndrome--who she thought was her father is refusing to take a paternity test. Her world has been changed, and her anxiety has increased. She is part of the "pre-vivors" group--because they get their screenings.    She is also getting evaluated for "Autism." She said that despite her improvement in her coping skills, she was still having "occasional meltdowns"--feels just as strong and overwhelming. She associates her meltdowns with "overload" and recognizes noise, smells, visual clutter.    **Dr. Deanna Avendano with [facility]--will have assessment  Dr. Avendano does groups for adults with Autism    Billie also sees her therapist regularly.   She is gardening; doing bead collage art.  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 She started in her new position in early February--loves what she does but does needs at least 30 minutes of alone time.    She does want to decrease her Vyvanse back to 50 mg--her appetite has been affected by the 60 mg. We considered adding Abilify for the emotional meltdowns but agreed to hold off until after her evaluation.     Plan--continue Zoloft 100 mg, Wellbutrin  mg, decrease Vyvanse 50 mg  (sent 3 scripts), Adderall 10 mg " (sent 1 script); send copy of psych serafin Conway (partner)     since January 2024.      -----------------------------------------------------------------------------------------------------------  Therapist: Priscilla Malave), trauma therapist        4/9/2024    11:25 AM 1/11/2024    12:52 PM 10/12/2023     2:02 PM   GAD7   1. Feeling nervous, anxious, or on edge? 1 0 0   2. Not being able to stop or control worrying? 2 0 0   3. Worrying too much about different things? 1 0 0   4. Trouble relaxing? 2 1 1   5. Being so restless that it is hard to sit still? 1 1 1   6. Becoming easily annoyed or irritable? 1 1 1   7. Feeling afraid as if something awful might happen? 1 0 0   BEATRIZ-7 Score 9 3 3      0-4 = Minimal anxiety  5-9 = Mild anxiety  10-14 = Moderate anxiety  15-21 = Severe anxiety           9/30/2022     1:54 PM 12/28/2018     9:55 AM 7/6/2018     4:21 PM   Depression Patient Health Questionnaire   Over the last two weeks how often have you been bothered by little interest or pleasure in doing things Nearly every day Not at all Not at all   Over the last two weeks how often have you been bothered by feeling down, depressed or hopeless Nearly every day More than half the days Several days   PHQ-2 Total Score 6 2 1   Over the last two weeks how often have you been bothered by trouble falling or staying asleep, or sleeping too much Nearly every day     Over the last two weeks how often have you been bothered by feeling tired or having little energy Nearly every day     Over the last two weeks how often have you been bothered by a poor appetite or overeating Nearly every day     Over the last two weeks how often have you been bothered by feeling bad about yourself - or that you are a failure or have let yourself or your family down Nearly every day     Over the last two weeks how often have you been bothered by trouble concentrating on things, such as reading the newspaper or watching television Nearly  every day     Over the last two weeks how often have you been bothered by moving or speaking so slowly that other people could have noticed. Or the opposite - being so fidgety or restless that you have been moving around a lot more than usual. Nearly every day     Over the last two weeks how often have you been bothered by thoughts that you would be better off dead, or of hurting yourself Not at all     If you checked off any problems, how difficult have these problems made it for you to do your work, take care of things at home or get along with other people? Somewhat difficult     PHQ-9 Score 24     PHQ-9 Interpretation Severe       0-4 = No intervention  5 to 9 = Mild  10 to 14 = Moderate  15 to 19 = Moderately severe  =20 = Severe    Review of Systems   PSYCHIATRIC: Pertinant items are noted in the narrative.    Past Medical, Family and Social History: The patient's past medical, family and social history have been reviewed and updated as appropriate within the electronic medical record - see encounter notes.      Current Outpatient Medications:     albuterol (PROVENTIL HFA) 90 mcg/actuation inhaler, Inhale 2 puffs into the lungs every 6 (six) hours as needed for Wheezing. Rescue, Disp: 18 g, Rfl: 0    buPROPion (WELLBUTRIN XL) 300 MG 24 hr tablet, Take 1 tablet (300 mg total) by mouth once daily., Disp: 90 tablet, Rfl: 1    dextroamphetamine-amphetamine (ADDERALL) 10 mg Tab, Take 1 tablet (10 mg total) by mouth once daily., Disp: 30 tablet, Rfl: 0    lisdexamfetamine (VYVANSE) 50 MG capsule, Take 1 capsule (50 mg total) by mouth every morning., Disp: 30 capsule, Rfl: 0    [START ON 5/10/2024] lisdexamfetamine (VYVANSE) 50 MG capsule, Take 1 capsule (50 mg total) by mouth every morning., Disp: 30 capsule, Rfl: 0    [START ON 6/9/2024] lisdexamfetamine (VYVANSE) 50 MG capsule, Take 1 capsule (50 mg total) by mouth every morning., Disp: 30 capsule, Rfl: 0    sertraline (ZOLOFT) 100 MG tablet, Take 1 tablet (100 mg  "total) by mouth once daily., Disp: 90 tablet, Rfl: 1    valACYclovir (VALTREX) 500 MG tablet, TAKE ONE TABLET BY MOUTH EVERY DAY, Disp: 90 tablet, Rfl: 1    Compliance: yes    Side effects: see above    Risk Parameters:  Patient reports no suicidal ideation  Patient reports no homicidal ideation  Patient reports no self-injurious behavior  Patient reports no violent behavior    Exam (detailed: at least 9 elements; comprehensive: all 15 elements)   Constitutional  Vitals:  Most recent vital signs were reviewed.   Last 3 sets of Vitals        9/5/2023     3:44 PM 12/12/2023     9:23 AM 4/4/2024     1:59 PM   Vitals - 1 value per visit   SYSTOLIC 124 131 138   DIASTOLIC 74 85 90   Pulse  95 95   Temp  98.4 °F (36.9 °C) 98 °F (36.7 °C)   Resp  20 20   SPO2  100 % 99 %   Weight (lb) 192.68 196.98 194.01   Weight (kg) 87.4 89.35 88   Height 5' 7" (1.702 m) 5' 5.51" (1.664 m) 5' 6" (1.676 m)   BMI (Calculated) 30.2 32.3 31.3   Pain Score Zero Five Zero          General:  age appropriate, casually dressed, neatly groomed     Musculoskeletal  Muscle Strength/Tone:  no tremor, no tic   Gait & Station:  video visit     Psychiatric  Speech:  no latency; no press   Behavior: wnl   Mood & Affect:  anxious  congruent and appropriate   Thought Process:  normal and logical   Associations:  intact   Thought Content:  normal, no suicidality, no homicidality, delusions, or paranoia   Insight:  has awareness of illness   Judgement: behavior is adequate to circumstances   Orientation:  grossly intact   Memory: intact for content of interview   Language: grossly intact   Attention Span & Concentration:  Grossly intact   Fund of Knowledge:  intact and appropriate to age and level of education     Assessment and Diagnosis   Status/Progress: Based on the examination today, the patient's problem(s) is/are adequately but not ideally controlled.  New problems have been presented today.   Co-morbidities and psychosocial stressors  are " complicating management of the primary condition.  There are no active rule-out diagnoses for this patient at this time.     General Impression:     Encounter Diagnoses   Name Primary?    Attention deficit hyperactivity disorder (ADHD), other type Yes    Anxiety     Major depressive disorder, recurrent episode, in full remission        Intervention/Counseling/Treatment Plan   Medication Management: Discussed risks, benefits, and alternatives to treatment plan documented above with patient. I answered all patient questions related to this plan, and patient expressed understanding and agreement.   continue Zoloft 100 mg, Wellbutrin  mg, decrease Vyvanse 50 mg  (sent 3 scripts), Adderall 10 mg (sent 1 script)  Counseling   Send copy of psych eval      Medication List with Changes/Refills   New Medications    LISDEXAMFETAMINE (VYVANSE) 50 MG CAPSULE    Take 1 capsule (50 mg total) by mouth every morning.    LISDEXAMFETAMINE (VYVANSE) 50 MG CAPSULE    Take 1 capsule (50 mg total) by mouth every morning.    LISDEXAMFETAMINE (VYVANSE) 50 MG CAPSULE    Take 1 capsule (50 mg total) by mouth every morning.   Current Medications    ALBUTEROL (PROVENTIL HFA) 90 MCG/ACTUATION INHALER    Inhale 2 puffs into the lungs every 6 (six) hours as needed for Wheezing. Rescue    VALACYCLOVIR (VALTREX) 500 MG TABLET    TAKE ONE TABLET BY MOUTH EVERY DAY   Changed and/or Refilled Medications    Modified Medication Previous Medication    BUPROPION (WELLBUTRIN XL) 300 MG 24 HR TABLET buPROPion (WELLBUTRIN XL) 300 MG 24 hr tablet       Take 1 tablet (300 mg total) by mouth once daily.    Take 1 tablet (300 mg total) by mouth once daily.    DEXTROAMPHETAMINE-AMPHETAMINE (ADDERALL) 10 MG TAB dextroamphetamine-amphetamine (ADDERALL) 10 mg Tab       Take 1 tablet (10 mg total) by mouth once daily.    Take 1 tablet (10 mg total) by mouth once daily.    SERTRALINE (ZOLOFT) 100 MG TABLET sertraline (ZOLOFT) 100 MG tablet       Take 1 tablet  (100 mg total) by mouth once daily.    Take 1 tablet (100 mg total) by mouth once daily.   Discontinued Medications    LISDEXAMFETAMINE (VYVANSE) 60 MG CAPSULE    Take 1 capsule (60 mg total) by mouth every morning.    LISDEXAMFETAMINE (VYVANSE) 60 MG CAPSULE    Take 1 capsule (60 mg total) by mouth every morning.    LISDEXAMFETAMINE (VYVANSE) 60 MG CAPSULE    Take 1 capsule (60 mg total) by mouth every morning.        Return to Clinic: 3 months    Time spent with pt including note preparation: 31 minutes       Radha Lovett, PhD, MP  Advanced Practice Medical Psychologist  [facility]  [address]  [place] [address]  [phone]   [phone] fax

## 2024-04-10 NOTE — PATIENT INSTRUCTIONS

## 2024-04-15 LAB
ONEOME COMMENT: NORMAL
ONEOME METHOD: NORMAL

## 2024-04-17 ENCOUNTER — OFFICE VISIT (OUTPATIENT)
Dept: GENETICS | Facility: CLINIC | Age: 34
End: 2024-04-17
Payer: COMMERCIAL

## 2024-04-17 DIAGNOSIS — Z80.0 FAMILY HISTORY OF PANCREATIC CANCER: Primary | ICD-10-CM

## 2024-04-17 PROCEDURE — 96040 PR GENETIC COUNSELING, EACH 30 MIN: CPT | Mod: 95,,,

## 2024-04-17 NOTE — PROGRESS NOTES
"  Cancer Genetics  Hereditary/High Risk Clinic  Department of Hematology and Oncology  [facility]        Date of Service:  2024  Provider:  Delano Munoz MS, [facility]    Patient Information  Name:  Billie Nicolas  :  1990  MRN:  [id number]        Referring Provider: Diya Malave MD     Televisit Information  The patient location is: [place], [place].  The chief complaint leading to consultation is: as below.  Visit type: audiovisual.  Face-to-face time with patient: approximately 45 minutes.  Approximately 60 minutes in total were spent on this encounter, which includes face-to-face time and non-face-to-face time preparing to see the patient (e.g., review of tests), obtaining and/or reviewing separately obtained history, documenting clinical information in the electronic or other health record, independently interpreting results (not separately reported) and communicating results to the patient/family/caregiver, or care coordination (not separately reported).  Each patient to whom he or she provides medical services by telemedicine is:  (1) informed of the relationship between the physician and patient and the respective role of any other health care provider with respect to management of the patient; and (2) notified that he or she may decline to receive medical services by telemedicine and may withdraw from such care at any time.      SUBJECTIVE:      Chief Complaint: Genetic Counseling    History of Present Illness (HPI):  Billie Nicolas ("Billie"), 33 y.o., assigned female sex at birth is established with the [facility] Department of Hematology and Oncology but new to me.  She was referred by Medical Oncology for genetic cancer risk assessment given her family history of cancer. She has no personal history of cancer. 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 We met today to discuss the results.    Focused Medical History:   Germline cancer-genetic testing: " Patient informed of positive COVID result and prescription sent to pharmacy.   Discussed medication and isolation guidelines with patient.   Patient verbalized understanding.   All patient questions answered.   Encouraged to call back if any other questions arise.   Letter sent to patient with test results.     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  Cancer:  No  Colonoscopy: No  Mammogram: No  Breast MRI:  No  Other benign tumor:  Yes  Abnormal cervical Pap smear with positive HPV DNA test  Pancreatitis:  No  Pancreatic cyst:  No    Focused Surgical History  Bilateral Laparoscopic salpingectomy (2023)  Uterus, ovaries intact    Ancestry:  Ashkenazi Hol[place] ancestry:  No  Paternal:  Maternal: Cajun Greek    Focused Family History:  Consanguinity in ancestors:  No  Germline cancer-genetic testing in blood relatives:  No  Cancer in family:  Yes; other than those mentioned in the pedigree below  MGGGM - bone cancer, lung cancer, diagnosed at unknown ages,  at unknown age    Family Cancer Pedigree:           Review of Systems:  See HPI.      SUBJECTIVE:   Records Reviewed  Medical History  Problem List  Any pertinent, available Procedures and Pathology reports in both [facility] and [facility]    COUNSELING   Genetic Variant Information  The variant MLH1 [id number] shown on Billie's report was identified as a possible false positive. The report states that ClinVar classifies this result as pathogenic; however, Clinvar does not list this variant in it's database. The report states that it is recommended for Billie to undergo additional clinically validated genetic testing through a different lab.     Causes of cancer  Germline cancer genetic testing is the testing of genes associated with cancer, known as cancer susceptibility genes.  Just as these genes are inherited from parents, mutations in these genes can be inherited, as well.  A mutation in a cancer susceptibility gene adversely affects the gene's ability to prevent cancer; therefore, carriers of cancer susceptibility gene mutations may be at increased risk for certain cancers.     Only 5-10% cancers are caused by a cancer susceptibility gene mutation, meaning the cancer is genetic/hereditary; rather, most  cancers are sporadic.  Causes of sporadic cancers may include environmental risk factors, lifestyle risk factors, and non-modifiable risk factors.  It is important to note that members of a family often share not only their genetics but also risk factors including environmental and lifestyle risk factors, so cancers can be familial.    Mutations in BRCA1 and BRCA2 are associated with an increased risk for breast and ovarian cancer, in addition to male breast cancer, pancreatic, melanoma, and prostate cancer. Mutations in other genes may increase the risk of these cancers, in addition to other cancers.    Mutations in MLH1, MSH2, MSH6, PSM2, and EPCAM are associated with Roche syndrome. Typically, individuals with Roche syndrome have increased risks for colorectal and endometrial cancer, as well as, ovarian, gastric, small bowel, ureter/renal pelvis, pancreatic, hepatobiliary tract, sebaceous neoplasms, brain, and prostate cancer. Discussed that mutations in other genes may increase the risks for these cancers.     Potential results of genetic testing, and their implications  Potential results of genetic testing include positive, negative, and variant of unknown significance (VUS).    A positive result indicates the presence of at least one clinically significant mutation, and the patient's associated cancer risks vary depending upon the cancer susceptibility gene(s) in which there is/are a mutation(s).  With a positive result, in some cases, depending upon the specific result and the patient's clinical history, modified risk management may be recommended, including measures for risk reduction and/or surveillance; however, modified management is not always an option.    A negative result indicates that no clinically significant mutations were identified in the gene(s) tested.    A VUS indicates that there is not presently enough data for the laboratory to make a determination as to whether the variant is clinically  significant.  VUSs are not typically acted upon clinically.       The ability to interpret the meaning of a negative genetic testing result in genes associated with cancer with which the patient has not personally been affected, when done prior to testing the appropriate affected relative(s), is significantly limited.  A negative result in the patient does not indicate that she cannot develop cancer, and, in fact, the patient may even be at increased risk for cancer based on shared risk factors with affected relatives.  The most informative candidates for initial genetic testing in a family are those who have been affected with cancer.     Modified management may also be recommended, even with a result of no or unknown significance, based upon risk assessment that incorporates the family history.      Genetic mutation inheritance  If  Billie tests positive for a mutation, her first-degree relatives would each have a 50% chance of having the same mutation, and other, more distantly related blood-relatives would also be at risk of having the same mutation.       Genetic discrimination  The Genetic Information Nondiscrimination Act (CAROLEE) is U[place]. federal legislation that provides some protections against use of an individual's genetic information by their health insurer and by their employer.  Title I of CAROLEE prohibits most health insurers from utilizing an individual's genetic information to make decisions regarding insurance eligibility or premium charges.  Title II of CAROLEE prohibits covered entities, including employers, from requesting the genetic information of employees and applicants.  CAROLEE does not protect individuals from genetic discrimination toward health insurance obtained through a job with the  or through the Federal Employees Health Benefits Plan; from genetic discrimination by employers with fewer than 15 employees or if employed by the ; or from genetic discrimination by any other type  of policies/entities, including but not limited to life insurance, disability insurance, long-term care insurance,  benefits, and [facility] benefits.     Genetic testing logistics  An outside laboratory would perform the testing after a blood sample is collected at [facility] or a saliva sample is collected by the patient at home.  With genetic testing, there is a potential for the patient to incur out-of-pocket costs.  Results can take several weeks.  Post-test genetic counseling can be conducted once the genetic testing results are available.     Assessment/Plan  Based on the information provided by  Billie, she does not meets current criteria for genetic testing according to current clinical guidelines. Billie's clinical history, including personal history and/or family history, is not suggestive of a hereditary cancer syndrome in the family given the family history of cancer and the degree of relatedness.     Offered germline cancer-genetic testing at this time versus deferring testing at this time or declining testing altogether.  Various test panel options were discussed. Billie decided to proceed with genetic testing through the [employer] BRCA1 and BRCA2 gene sequence and deletion/duplication and 85-gene CustomNext-Cancer Panel.  Billie has provided her informed consent to proceed. A blood draw is scheduled for 04/19/2024.     Questions were encouraged and answered to the patient's satisfaction, and she verbalized understanding of information and agreement with the plan.         Signed,    Delano Munoz MS, [facility]  Certified Genetic Counselor, Hereditary and High-Risk Clinic  Hematology/Oncology, [facility]    04/17/2024

## 2024-04-19 ENCOUNTER — LAB VISIT (OUTPATIENT)
Dept: LAB | Facility: HOSPITAL | Age: 34
End: 2024-04-19
Attending: INTERNAL MEDICINE
Payer: COMMERCIAL

## 2024-04-19 ENCOUNTER — PATIENT MESSAGE (OUTPATIENT)
Dept: GENETICS | Facility: CLINIC | Age: 34
End: 2024-04-19
Payer: COMMERCIAL

## 2024-04-19 DIAGNOSIS — Z80.0 FAMILY HISTORY OF PANCREATIC CANCER: ICD-10-CM

## 2024-04-19 LAB — MISCELLANEOUS GENETIC TEST NAME: NORMAL

## 2024-04-19 PROCEDURE — 36415 COLL VENOUS BLD VENIPUNCTURE: CPT

## 2024-05-20 ENCOUNTER — OFFICE VISIT (OUTPATIENT)
Dept: OPHTHALMOLOGY | Facility: CLINIC | Age: 34
End: 2024-05-20
Payer: COMMERCIAL

## 2024-05-20 ENCOUNTER — PATIENT MESSAGE (OUTPATIENT)
Dept: OPHTHALMOLOGY | Facility: CLINIC | Age: 34
End: 2024-05-20
Payer: COMMERCIAL

## 2024-05-20 DIAGNOSIS — H52.203 MYOPIA OF BOTH EYES WITH ASTIGMATISM: ICD-10-CM

## 2024-05-20 DIAGNOSIS — Z46.0 ENCOUNTER FOR FITTING OR ADJUSTMENT OF SPECTACLES OR CONTACT LENSES: ICD-10-CM

## 2024-05-20 DIAGNOSIS — Z01.00 ENCOUNTER FOR EYE EXAM: Primary | ICD-10-CM

## 2024-05-20 DIAGNOSIS — H52.13 MYOPIA OF BOTH EYES WITH ASTIGMATISM: ICD-10-CM

## 2024-05-20 PROCEDURE — 92014 COMPRE OPH EXAM EST PT 1/>: CPT | Mod: S$GLB,,, | Performed by: OPTOMETRIST

## 2024-05-20 PROCEDURE — 99999 PR PBB SHADOW E&M-EST. PATIENT-LVL III: CPT | Mod: PBBFAC,,, | Performed by: OPTOMETRIST

## 2024-05-20 PROCEDURE — 92015 DETERMINE REFRACTIVE STATE: CPT | Mod: S$GLB,,, | Performed by: OPTOMETRIST

## 2024-05-20 NOTE — PROGRESS NOTES
SUBJECTIVE  Billie Nicolas is 33 y.o. female  Corrected distance visual acuity was 20/20 in the right eye and 20/25 in the left eye.   Chief Complaint   Patient presents with    Annual Exam     Pt states no change in vision since last year. States she had a 23 and me that showed she was a higher risk for Glaucoma and ARMD. Would like your opinion.          HPI     Annual Exam     Additional comments: Pt states no change in vision since last year.   States she had a 23 and me that showed she was a higher risk for Glaucoma   and ARMD. Would like your opinion.           Comments    Soft CTL wearer (changes monthly)  BioTrue solution            Last edited by Ganesh Bundy, OD on 5/20/2024  2:54 PM.         Assessment /Plan :  1. Encounter for eye exam   No evidence of AMD or POAG, no medical history from birth father     2. Encounter for fitting or adjustment of spectacles or contact lenses   No changes CL Rx  Dispense Final Rx for contacts.     3. Myopia of both eyes with astigmatism   Dispense Final Rx for glasses.  RTC 1 year  Discussed above and answered questions.

## 2024-05-24 ENCOUNTER — PATIENT MESSAGE (OUTPATIENT)
Dept: HEMATOLOGY/ONCOLOGY | Facility: CLINIC | Age: 34
End: 2024-05-24
Payer: COMMERCIAL

## 2024-06-12 DIAGNOSIS — F90.8 ATTENTION DEFICIT HYPERACTIVITY DISORDER (ADHD), OTHER TYPE: ICD-10-CM

## 2024-06-12 RX ORDER — DEXTROAMPHETAMINE SACCHARATE, AMPHETAMINE ASPARTATE, DEXTROAMPHETAMINE SULFATE AND AMPHETAMINE SULFATE 2.5; 2.5; 2.5; 2.5 MG/1; MG/1; MG/1; MG/1
10 TABLET ORAL DAILY
Qty: 30 TABLET | Refills: 0 | OUTPATIENT
Start: 2024-06-12 | End: 2024-07-12

## 2024-06-21 ENCOUNTER — PATIENT MESSAGE (OUTPATIENT)
Dept: GENETICS | Facility: CLINIC | Age: 34
End: 2024-06-21
Payer: COMMERCIAL

## 2024-06-21 ENCOUNTER — TELEPHONE (OUTPATIENT)
Dept: PSYCHIATRY | Facility: CLINIC | Age: 34
End: 2024-06-21
Payer: COMMERCIAL

## 2024-06-25 ENCOUNTER — OFFICE VISIT (OUTPATIENT)
Dept: PSYCHIATRY | Facility: CLINIC | Age: 34
End: 2024-06-25
Payer: COMMERCIAL

## 2024-06-25 VITALS
WEIGHT: 198.44 LBS | BODY MASS INDEX: 32.02 KG/M2 | DIASTOLIC BLOOD PRESSURE: 90 MMHG | SYSTOLIC BLOOD PRESSURE: 131 MMHG | HEART RATE: 85 BPM

## 2024-06-25 DIAGNOSIS — F90.8 ATTENTION DEFICIT HYPERACTIVITY DISORDER (ADHD), OTHER TYPE: Primary | ICD-10-CM

## 2024-06-25 DIAGNOSIS — F41.9 ANXIETY: ICD-10-CM

## 2024-06-25 DIAGNOSIS — F33.42 RECURRENT MAJOR DEPRESSIVE DISORDER, IN FULL REMISSION: ICD-10-CM

## 2024-06-25 PROCEDURE — 99214 OFFICE O/P EST MOD 30 MIN: CPT | Mod: S$GLB,,, | Performed by: PSYCHOLOGIST

## 2024-06-25 PROCEDURE — 3075F SYST BP GE 130 - 139MM HG: CPT | Mod: CPTII,S$GLB,, | Performed by: PSYCHOLOGIST

## 2024-06-25 PROCEDURE — 1159F MED LIST DOCD IN RCRD: CPT | Mod: CPTII,S$GLB,, | Performed by: PSYCHOLOGIST

## 2024-06-25 PROCEDURE — 99999 PR PBB SHADOW E&M-EST. PATIENT-LVL II: CPT | Mod: PBBFAC,,, | Performed by: PSYCHOLOGIST

## 2024-06-25 PROCEDURE — 3080F DIAST BP >= 90 MM HG: CPT | Mod: CPTII,S$GLB,, | Performed by: PSYCHOLOGIST

## 2024-06-25 PROCEDURE — 3008F BODY MASS INDEX DOCD: CPT | Mod: CPTII,S$GLB,, | Performed by: PSYCHOLOGIST

## 2024-06-25 RX ORDER — B-COMPLEX WITH VITAMIN C
1 TABLET ORAL DAILY
COMMUNITY

## 2024-06-25 RX ORDER — LISDEXAMFETAMINE DIMESYLATE 50 MG/1
50 CAPSULE ORAL EVERY MORNING
Qty: 30 CAPSULE | Refills: 0 | Status: SHIPPED | OUTPATIENT
Start: 2024-07-25 | End: 2024-08-24

## 2024-06-25 RX ORDER — LEUCOVORIN, FOLIC ACID, LEVOMEFOLATE MAGNESIUM, FERROUS CYSTEINE GLYCINATE, 1,2-DOCOSAHEXANOYL-SN-GLYCERO-3-PHOSPHOSERINE CALCIUM, 1,2-ICOSAPENTOYL-SN-GLYCERO-3-PHOSPHOSERINE CALCIUM, PHOSPHATIDYL SERINE, PYRIDOXAL 5-PHOSPHATE, FLAVIN ADENINE DINUCLEOTIDE, NADH, COBAMAMIDE, COCARBOXYLASE (THIAMINE PYROPHOSPHATE), MAGNESIUM ASCORBATE, ZINC ASCORBATE, MAGNESIUM L-THREONATE AND BETAINE 2.5; 1; 7; 13.6; 6.4; 800; 12; 25; 25; 25; 50; 25; 24; 1; 1; 500; 1.83; 3.67 MG/1; MG/1; MG/1; MG/1; MG/1; UG/1; MG/1; UG/1; UG/1; UG/1; UG/1; UG/1; MG/1; MG/1; MG/1; UG/1; MG/1; MG/1
1 CAPSULE, DELAYED RELEASE PELLETS ORAL DAILY
Qty: 30 EACH | Refills: 11
Start: 2024-06-25 | End: 2025-06-25

## 2024-06-25 RX ORDER — DEXTROAMPHETAMINE SACCHARATE, AMPHETAMINE ASPARTATE, DEXTROAMPHETAMINE SULFATE AND AMPHETAMINE SULFATE 2.5; 2.5; 2.5; 2.5 MG/1; MG/1; MG/1; MG/1
10 TABLET ORAL DAILY
Qty: 30 TABLET | Refills: 0 | Status: SHIPPED | OUTPATIENT
Start: 2024-06-25 | End: 2024-07-25

## 2024-06-25 RX ORDER — LISDEXAMFETAMINE DIMESYLATE 50 MG/1
50 CAPSULE ORAL EVERY MORNING
Qty: 30 CAPSULE | Refills: 0 | Status: SHIPPED | OUTPATIENT
Start: 2024-06-25 | End: 2024-07-25

## 2024-06-25 RX ORDER — LISDEXAMFETAMINE DIMESYLATE 50 MG/1
50 CAPSULE ORAL EVERY MORNING
Qty: 30 CAPSULE | Refills: 0 | Status: SHIPPED | OUTPATIENT
Start: 2024-08-24 | End: 2024-09-23

## 2024-06-25 NOTE — PROGRESS NOTES
"Outpatient Psychiatry Follow-Up Visit    6/25/2024    Timeframe: Corona Virus Outbreak     The patient location is: Patient's work/ Patient reported that his/her location at the time of this visit was in the [facility]     Visit type: Virtual visit with synchronous audio and video     Each patient to whom he or she provides medical services by telehealth is: (1) informed of the relationship between the medical psychologist and patient and the respective role of any other health care provider with respect to management of the patient; and (2) notified that he or she may decline to receive medical services by telehealth and may withdraw from such care at any time.    I also informed patient of the following:   Radha Lovett, PhD, MPAP:  [place] medical license number: [id number]    My contact info:  [facility] / 2nd Floor  [address] [place] [address]   Ph: [phone]    If technology issues, call office phone: Ph: [phone]  If crisis: Dial 911 or go to nearest Emergency Room (ER)  If questions related to privacy practices: contact [facility] Information Department: [phone]    Chief Complaint:  Billie Nicolas is a 33 y.o. female who presents today for follow-up of anxiety and inattention/distractibility .       Preferred Name: Billie  Gender Identity: cis female  Preferred Pronouns:  she/her/they also okay    LAST VISIT: Billie attended her virtual visit. She recently ran her genetics and is homozygous for Roche Syndrome--who she thought was her father is refusing to take a paternity test. Her world has been changed, and her anxiety has increased. She is part of the "pre-vivors" group--because they get their screenings.    She is also getting evaluated for "Autism." She said that despite her improvement in her coping skills, she was still having "occasional meltdowns"--feels just as strong and overwhelming. She associates her meltdowns with " ""overload" and recognizes noise, smells, visual clutter.    **Dr. Deanna Avendano with [facility]--will have assessment  Dr. Avendano does groups for adults with Autism    Billie also sees her therapist regularly.   She is gardening; doing bead collage art.  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 She started in her new position in early February--loves what she does but does needs at least 30 minutes of alone time.    She does want to decrease her Vyvanse back to 50 mg--her appetite has been affected by the 60 mg. We considered adding Abilify for the emotional meltdowns but agreed to hold off until after her evaluation.     Plan--continue Zoloft 100 mg, Wellbutrin  mg, decrease Vyvanse 50 mg  (sent 3 scripts), Adderall 10 mg (sent 1 script); send copy of psych eval    CURRENT PRESENTATION: Billie attended her visit. 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 She reported that she has noticed that as she works on her sensation needs, she has seen improvement in her mood. She has intermittent FMLA for her CPTSD--she gets one full day as needed plus a recovery day. If she needs it, she also has weekly therapy time. 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 We had previously decreased Vyvanse, and she thought it worked much better. On the 50 mg, she still has her appetite.     She had a cancer scare--turns out she does not have it (had confirmation testing) but with getting the genetic testing ( and Me), she found her biological father. She met him for the first time Father's Day weekend--and they talk almost daily. He is in [place], [place].  She teared up when relating that she was able to text him to ask for advice recently.  She said that she has never had that before.  We reviewed parts of her gene results, and we agreed to a trial of in light for her " MTHFR variation.  We are continuing her medications as noted below.    Plan--continue Zoloft 100 mg, Wellbutrin  mg, Vyvanse 50 mg  (sent 3 scripts), Adderall 10 mg (sent 1 script); Elly Conway (partner)     reviewed.  She last filled Vyvanse 2024 and Adderall 2024..      Therapist: Priscilla Malave), trauma therapist    Gene results: SLC6A4=L/L; COMT=abbey/abbey; MTHFR=T/T; EKM2M26=RB; CY=PM  -----------------------------------------------------------------------------------------------------------        2024     1:24 PM 2024    11:25 AM 2024    12:52 PM   GAD7   1. Feeling nervous, anxious, or on edge? 0 1 0   2. Not being able to stop or control worrying? 0 2 0   3. Worrying too much about different things? 0 1 0   4. Trouble relaxing? 0 2 1   5. Being so restless that it is hard to sit still? 1 1 1   6. Becoming easily annoyed or irritable? 1 1 1   7. Feeling afraid as if something awful might happen? 0 1 0   8. If you checked off any problems, how difficult have these problems made it for you to do your work, take care of things at home, or get along with other people? 1     BEATRIZ-7 Score 2 9 3      0-4 = Minimal anxiety  5-9 = Mild anxiety  10-14 = Moderate anxiety  15-21 = Severe anxiety           2022     1:54 PM 2018     9:55 AM 2018     4:21 PM   Depression Patient Health Questionnaire   Over the last two weeks how often have you been bothered by little interest or pleasure in doing things Nearly every day Not at all Not at all   Over the last two weeks how often have you been bothered by feeling down, depressed or hopeless Nearly every day More than half the days Several days   PHQ-2 Total Score 6 2 1   Over the last two weeks how often have you been bothered by trouble falling or staying asleep, or sleeping too much Nearly every day     Over the last two weeks how often have you been bothered by feeling tired or having little energy Nearly every  day     Over the last two weeks how often have you been bothered by a poor appetite or overeating Nearly every day     Over the last two weeks how often have you been bothered by feeling bad about yourself - or that you are a failure or have let yourself or your family down Nearly every day     Over the last two weeks how often have you been bothered by trouble concentrating on things, such as reading the newspaper or watching television Nearly every day     Over the last two weeks how often have you been bothered by moving or speaking so slowly that other people could have noticed. Or the opposite - being so fidgety or restless that you have been moving around a lot more than usual. Nearly every day     Over the last two weeks how often have you been bothered by thoughts that you would be better off dead, or of hurting yourself Not at all     If you checked off any problems, how difficult have these problems made it for you to do your work, take care of things at home or get along with other people? Somewhat difficult     PHQ-9 Score 24     PHQ-9 Interpretation Severe       0-4 = No intervention  5 to 9 = Mild  10 to 14 = Moderate  15 to 19 = Moderately severe  =20 = Severe    Review of Systems   PSYCHIATRIC: Pertinant items are noted in the narrative.    Past Medical, Family and Social History: The patient's past medical, family and social history have been reviewed and updated as appropriate within the electronic medical record - see encounter notes.      Current Outpatient Medications:     albuterol (PROVENTIL HFA) 90 mcg/actuation inhaler, Inhale 2 puffs into the lungs every 6 (six) hours as needed for Wheezing. Rescue, Disp: 18 g, Rfl: 0    buPROPion (WELLBUTRIN XL) 300 MG 24 hr tablet, Take 1 tablet (300 mg total) by mouth once daily., Disp: 90 tablet, Rfl: 1    dextroamphetamine-amphetamine (ADDERALL) 10 mg Tab, Take 1 tablet (10 mg total) by mouth once daily., Disp: 30 tablet, Rfl: 0    lisdexamfetamine  "(VYVANSE) 50 MG capsule, Take 1 capsule (50 mg total) by mouth every morning., Disp: 30 capsule, Rfl: 0    sertraline (ZOLOFT) 100 MG tablet, Take 1 tablet (100 mg total) by mouth once daily., Disp: 90 tablet, Rfl: 1    valACYclovir (VALTREX) 500 MG tablet, TAKE ONE TABLET BY MOUTH EVERY DAY, Disp: 90 tablet, Rfl: 1    Compliance: yes    Side effects: see above    Risk Parameters:  Patient reports no suicidal ideation  Patient reports no homicidal ideation  Patient reports no self-injurious behavior  Patient reports no violent behavior    Exam (detailed: at least 9 elements; comprehensive: all 15 elements)   Constitutional  Vitals:  Most recent vital signs were reviewed.   Last 3 sets of Vitals        4/4/2024     1:59 PM 5/20/2024     2:17 PM 6/25/2024     1:29 PM   Vitals - 1 value per visit   SYSTOLIC 138  131   DIASTOLIC 90  90   Pulse 95  85   Temp 98 °F (36.7 °C)     Resp 20     SPO2 99 %     Weight (lb) 194.01  198.41   Weight (kg) 88  90   Height 5' 6" (1.676 m)     BMI (Calculated) 31.3     Pain Score Zero Zero           General:  age appropriate, casually dressed, neatly groomed     Musculoskeletal  Muscle Strength/Tone:  no tremor, no tic   Gait & Station:  non-ataxic     Psychiatric  Speech:  no latency; no press   Behavior: wnl   Mood & Affect:  euthymic  congruent and appropriate   Thought Process:  normal and logical   Associations:  intact   Thought Content:  normal, no suicidality, no homicidality, delusions, or paranoia   Insight:  has awareness of illness   Judgement: behavior is adequate to circumstances   Orientation:  grossly intact   Memory: intact for content of interview   Language: grossly intact   Attention Span & Concentration:  Grossly intact   Fund of Knowledge:  intact and appropriate to age and level of education     Assessment and Diagnosis   Status/Progress: Based on the examination today, the patient's problem(s) is/are fairly well controlled.  New problems have been presented " today.   Co-morbidities and psychosocial stressors  are complicating management of the primary condition.  There are no active rule-out diagnoses for this patient at this time.     General Impression:     Encounter Diagnoses   Name Primary?    Attention deficit hyperactivity disorder (ADHD), other type Yes    Anxiety     Recurrent major depressive disorder, in full remission        Intervention/Counseling/Treatment Plan   Medication Management: Discussed risks, benefits, and alternatives to treatment plan documented above with patient. I answered all patient questions related to this plan, and patient expressed understanding and agreement.   continue Zoloft 100 mg, Wellbutrin  mg, decrease Vyvanse 50 mg  (sent 3 scripts), Adderall 10 mg (sent 1 script); [place]  Counseling         Medication List with Changes/Refills   Current Medications    ALBUTEROL (PROVENTIL HFA) 90 MCG/ACTUATION INHALER    Inhale 2 puffs into the lungs every 6 (six) hours as needed for Wheezing. Rescue    BUPROPION (WELLBUTRIN XL) 300 MG 24 HR TABLET    Take 1 tablet (300 mg total) by mouth once daily.    DEXTROAMPHETAMINE-AMPHETAMINE (ADDERALL) 10 MG TAB    Take 1 tablet (10 mg total) by mouth once daily.    LISDEXAMFETAMINE (VYVANSE) 50 MG CAPSULE    Take 1 capsule (50 mg total) by mouth every morning.    LISDEXAMFETAMINE (VYVANSE) 50 MG CAPSULE    Take 1 capsule (50 mg total) by mouth every morning.    SERTRALINE (ZOLOFT) 100 MG TABLET    Take 1 tablet (100 mg total) by mouth once daily.    VALACYCLOVIR (VALTREX) 500 MG TABLET    TAKE ONE TABLET BY MOUTH EVERY DAY        Return to Clinic: 3 months    Time spent with pt including note preparation: 44 minutes       Radha Lovett, PhD, MP  Advanced Practice Medical Psychologist  [facility] [facility] [address]  [place] [id number]  [phone]   [phone] fax

## 2024-06-25 NOTE — PATIENT INSTRUCTIONS

## 2024-07-02 ENCOUNTER — PATIENT MESSAGE (OUTPATIENT)
Dept: INTERNAL MEDICINE | Facility: CLINIC | Age: 34
End: 2024-07-02

## 2024-07-02 ENCOUNTER — OFFICE VISIT (OUTPATIENT)
Dept: INTERNAL MEDICINE | Facility: CLINIC | Age: 34
End: 2024-07-02
Payer: COMMERCIAL

## 2024-07-02 DIAGNOSIS — E66.9 OBESITY, UNSPECIFIED CLASSIFICATION, UNSPECIFIED OBESITY TYPE, UNSPECIFIED WHETHER SERIOUS COMORBIDITY PRESENT: Primary | ICD-10-CM

## 2024-07-02 RX ORDER — SEMAGLUTIDE 0.25 MG/.5ML
0.25 INJECTION, SOLUTION SUBCUTANEOUS
Qty: 2 ML | Refills: 0 | Status: ACTIVE | OUTPATIENT
Start: 2024-07-02

## 2024-07-02 RX ORDER — SEMAGLUTIDE 0.5 MG/.5ML
0.5 INJECTION, SOLUTION SUBCUTANEOUS
Qty: 2 ML | Refills: 0 | Status: ACTIVE | OUTPATIENT
Start: 2024-08-02

## 2024-07-02 RX ORDER — SEMAGLUTIDE 1 MG/.5ML
1 INJECTION, SOLUTION SUBCUTANEOUS
Qty: 2 ML | Refills: 0 | Status: ACTIVE | OUTPATIENT
Start: 2024-09-02

## 2024-07-02 NOTE — PROGRESS NOTES
Patient ID: Billie Nicolas is a 33 y.o. White female    Subjective  Chief Complaint: patient presents for medical weight loss management.    Contraindications to GLP-1 receptor agonist therapy:   Denies personal or family history of MTC, personal history of MEN2, history of allergic reaction while taking a GLP-1 receptor agonist, and history of pancreatitis while taking a GLP-1 receptor agonist     Co-morbidities: none  Patient reports having an eating disorder when she was 15 years old but now has a healthy relationship with food.     Weight loss history:  Starting weight:    6/4/2024   Recent Readings    Weight (lbs) 195 lb    BMI 31.47 BMI          Objective  Lab Results   Component Value Date     07/27/2023     01/21/2022     03/05/2021     Lab Results   Component Value Date    K 4.1 07/27/2023    K 4.0 01/21/2022    K 3.6 03/05/2021     Lab Results   Component Value Date     07/27/2023     01/21/2022     03/05/2021     Lab Results   Component Value Date    CO2 20 (L) 07/27/2023    CO2 25 01/21/2022    CO2 26 03/05/2021     Lab Results   Component Value Date    BUN 20 07/27/2023    BUN 13 01/21/2022    BUN 15 03/05/2021     Lab Results   Component Value Date    GLU 77 07/27/2023    GLU 73 01/21/2022    GLU 82 03/05/2021     Lab Results   Component Value Date    CALCIUM 9.1 07/27/2023    CALCIUM 9.2 01/21/2022    CALCIUM 9.2 03/05/2021     Lab Results   Component Value Date    PROT 7.2 01/21/2022    PROT 7.2 03/05/2021    PROT 7.1 01/08/2018     Lab Results   Component Value Date    ALBUMIN 4.4 01/21/2022    ALBUMIN 4.1 03/05/2021    ALBUMIN 3.6 01/08/2018     Lab Results   Component Value Date    BILITOT 0.4 01/21/2022    BILITOT 0.3 03/05/2021    BILITOT 0.2 01/08/2018     Lab Results   Component Value Date    AST 17 01/21/2022    AST 16 03/05/2021    AST 40 01/08/2018     Lab Results   Component Value Date    ALT 14 01/21/2022    ALT 10 03/05/2021    ALT 39  01/08/2018     Lab Results   Component Value Date    ANIONGAP 15 07/27/2023    ANIONGAP 8 01/21/2022    ANIONGAP 9 03/05/2021     Lab Results   Component Value Date    CREATININE 0.9 07/27/2023    CREATININE 0.9 01/21/2022    CREATININE 1.1 03/05/2021     Lab Results   Component Value Date    EGFRNORACEVR >60.0 07/27/2023         Assessment/Plan  -Pt qualifies for GLP-1 RA therapy based on BMI greater than or equal to 30 kg/m2  --Initiate Wegovy 0.25 mg once weekly for 1 month  -Then increase to Wegovy 0.5 mg once weekly for 1 month  -Then increase to Wegovy 1 mg once weekly  -RTC in 3 months       Patient consented to pharmacist management via collaborative practice.

## 2024-07-02 NOTE — PATIENT INSTRUCTIONS
WEGOVY® (wee-SEAN-vee), (semaglutide) injection, for subcutaneous use  Read this Medication Guide and Instructions for Use before you start using WEGOVY and each time you get a refill. There may be new information. This information does not take the place of talking to your healthcare provider about your medical condition or your treatment.  What is the most important information I should know about WEGOVY?   WEGOVY may cause serious side effects, including:   Possible thyroid tumors, including cancer. Tell your healthcare provider if you get a lump or swelling in your neck, hoarseness, trouble swallowing, or shortness of breath. These may be symptoms of thyroid cancer. In studies with rodents, WEGOVY and medicines that work like WEGOVY caused thyroid tumors, including thyroid cancer. It is not known if WEGOVY will cause thyroid tumors or a type of thyroid cancer called medullary thyroid carcinoma (MTC) in people.   Do not use WEGOVY if you or any of your family have ever had a type of thyroid cancer called medullary thyroid carcinoma (MTC), or if you have an endocrine system condition called Multiple Endocrine Neoplasia syndrome type 2 (MEN 2).  What is WEGOVY?   WEGOVY is an injectable prescription medicine used with a reduced calorie diet and increased physical activity:   to reduce the risk of major cardiovascular events such as death, heart attack, or stroke in adults with known heart disease and with either obesity or overweight.   that may help adults and children aged 12 years and older with obesity, or some adults with overweight who also have weight-related medical problems, to help them lose excess body weight and keep the weight off.   WEGOVY contains semaglutide and should not be used with other semaglutide-containing products or other GLP-1 receptor agonist medicines.  It is not known if WEGOVY is safe and effective for use in children under 12 years of age.  Do not use WEGOVY if:   you or any of your  family have ever had a type of thyroid cancer called medullary thyroid carcinoma (MTC) or if you have an endocrine system condition called Multiple Endocrine Neoplasia syndrome type 2 (MEN 2).   you have had a serious allergic reaction to semaglutide or any of the ingredients in WEGOVY. See the end of this Medication Guide for a complete list of ingredients in WEGOVY. Symptoms of a serious allergic reaction include:   swelling of your face, lips, tongue or throat   fainting or feeling dizzy   problems breathing or swallowing   very rapid heartbeat   severe rash or itching  Before using WEGOVY, tell your healthcare provider if you have any other medical conditions, including if you:   have or have had problems with your pancreas or kidneys.   have type 2 diabetes and a history of diabetic retinopathy.   have or have had depression or suicidal thoughts, or mental health issues.   are pregnant or plan to become pregnant. WEGOVY may harm your unborn baby. You should stop using WEGOVY 2 months before you plan to become pregnant.   Pregnancy Exposure Registry: There is a pregnancy exposure registry for women who use WEGOVY during pregnancy. The purpose of this registry is to collect information about the health of you and your baby. Talk to your healthcare provider about how you can take part in this registry or you may contact [employer] at 1-450.784.4402.   are breastfeeding or plan to breastfeed. It is not known if WEGOVY passes into your breast milk. You should talk with your healthcare provider about the best way to feed your baby while using WEGOVY.   Tell your healthcare provider about all the medicines you take, including prescription and over-the-counter medicines, vitamins, and herbal supplements. WEGOVY may affect the way some medicines work and some medicines may affect the way WEGOVY works. Tell your healthcare provider if you are taking other medicines to treat diabetes, including sulfonylureas or insulin.  WEGOVY slows stomach emptying and can affect medicines that need to pass through the stomach quickly.   Know the medicines you take. Keep a list of them to show your healthcare provider and pharmacist when you get a new medicine.  How should I use WEGOVY?   Read the Instructions for Use that comes with WEGOVY.   Use WEGOVY exactly as your healthcare provider tells you to.   Your healthcare provider should show you how to use WEGOVY before you use it for the first time.   WEGOVY is injected under the skin (subcutaneously) of your stomach (abdomen), thigh, or upper arm. Do not inject WEGOVY into a muscle (intramuscularly) or vein (intravenously).   Change (rotate) your injection site with each injection. Do not use the same site for each injection.   Use WEGOVY 1 time each week, on the same day each week, at any time of the day.   Start WEGOVY with 0.25 mg per week in your first month. In your second month, increase your weekly dose to 0.5 mg. In the third month, increase your weekly dose to 1 mg. In the fourth month, increase your weekly dose to 1.7 mg. In the fifth month onwards, your healthcare provider may either maintain your dose at 1.7 mg weekly or increase your weekly dose to 2.4 mg.   If you need to change the day of the week, you may do so as long as your last dose of WEGOVY was given 2 or more days before.   If you miss a dose of WEGOVY and the next scheduled dose is more than 2 days away (48 hours), take the missed dose as soon as possible. If you miss a dose of WEGOVY and the next schedule dose is less than 2 days away (48 hours), do not administer the dose. Take your next dose on the regularly scheduled day.   If you miss doses of WEGOVY for more than 2 weeks, take your next dose on the regularly scheduled day or call your healthcare provider to talk about how to restart your treatment.   You can take WEGOVY with or without food.   If you take too much WEGOVY, you may have severe nausea, severe vomiting  and severe low blood sugar. Call your healthcare provider or go to the nearest hospital emergency room right away if you experience any of these symptoms.  What are the possible side effects of WEGOVY?   WEGOVY may cause serious side effects, including:   See What is the most important information I should know about WEGOVY?   inflammation of your pancreas (pancreatitis). Stop using WEGOVY and call your healthcare provider right away if you have severe pain in your stomach area (abdomen) that will not go away, with or without vomiting. You may feel the pain from your abdomen to your back.   gallbladder problems. WEGOVY may cause gallbladder problems including gallstones. Some gallbladder problems need surgery. Call your healthcare provider if you have any of the following symptoms:   pain in your upper stomach (abdomen)   yellowing of skin or eyes (jaundice)   fever   eleazar-colored stools   increased risk of low blood sugar (hypoglycemia), especially those who also take medicines to treat diabetes mellitus such as insulin or sulfonylureas. Low blood sugar in patients with diabetes who receive WEGOVY can be a serious side effect. Talk to your healthcare provider about how to recognize and treat low blood sugar. You should check your blood sugar before you start taking WEGOVY and while you take WEGOVY. Signs and symptoms of low blood sugar may include:   dizziness or light-headedness   sweating   shakiness   blurred vision   slurred speech   weakness   anxiety   hunger   headache   irritability or mood changes   confusion or drowsiness   fast heartbeat   feeling jittery  kidney problems (kidney failure). In people who have kidney problems, diarrhea, nausea, and vomiting may cause a loss of fluids (dehydration) which may cause kidney problems to get worse. It is important for you to drink fluids to help reduce your chance of dehydration.   serious allergic reactions. Stop using WEGOVY and get medical help right away,  if you have any symptoms of a serious allergic reaction including:   swelling of your face, lips, tongue   severe rash or itching o very rapid heartbeat or throat   problems breathing or swallowing fainting or feeling dizzy   change in vision in people with type 2 diabetes. Tell your healthcare provider if you have changes in vision during treatment with WEGOVY.   increased heart rate. WEGOVY can increase your heart rate while you are at rest. Your healthcare provider should check your heart rate while you take WEGOVY. Tell your healthcare provider if you feel your heart racing or pounding in your chest and it lasts for several minutes.   depression or thoughts of suicide. You should pay attention to any mental changes, especially sudden changes in your mood, behaviors, thoughts, or feelings. Call your healthcare provider right away if you have any mental changes that are new, worse, or worry you.   The most common side effects of WEGOVY in adults or children aged 12 years and older may include:  nausea   stomach (abdomen) pain   dizziness   gas   diarrhea   headache   feeling bloated   stomach flu   vomiting   tiredness (fatigue)   belching   heartburn   constipation   upset stomach   low blood sugar in people   runny nose or sore throat with type 2 diabetes   Talk to your healthcare provider about any side effect that bothers you or does not go away. These are not all the possible side effects of WEGOVY. Call your doctor for medical advice about side effects. You may report side effects to FDA at 8-298-FDA-6561.  General information about the safe and effective use of WEGOVY.  Medicines are sometimes prescribed for purposes other than those listed in a Medication Guide. Do not use WEGOVY for a condition for which it was not prescribed. Do not give WEGOVY to other people, even if they have the same symptoms that you have. It may harm them. You can ask your pharmacist or healthcare provider for information about  WEGOVY that is written for health professionals.  What are the ingredients in WEGOVY?   Active Ingredient: semaglutide   Inactive Ingredients: disodium phosphate dihydrate, 1.42 mg; sodium chloride, 8.25 mg; water for injection; and hydrochloric acid or sodium hydroxide may be added to adjust pH.  For more information, go to [employer] or call 3-057-Xaynhj-.

## 2024-07-09 ENCOUNTER — PATIENT MESSAGE (OUTPATIENT)
Dept: PSYCHIATRY | Facility: CLINIC | Age: 34
End: 2024-07-09
Payer: COMMERCIAL

## 2024-07-12 ENCOUNTER — PATIENT MESSAGE (OUTPATIENT)
Dept: INTERNAL MEDICINE | Facility: CLINIC | Age: 34
End: 2024-07-12
Payer: COMMERCIAL

## 2024-07-16 ENCOUNTER — OFFICE VISIT (OUTPATIENT)
Dept: INTERNAL MEDICINE | Facility: CLINIC | Age: 34
End: 2024-07-16
Payer: COMMERCIAL

## 2024-07-16 ENCOUNTER — LAB VISIT (OUTPATIENT)
Dept: LAB | Facility: HOSPITAL | Age: 34
End: 2024-07-16
Payer: COMMERCIAL

## 2024-07-16 VITALS
BODY MASS INDEX: 31.43 KG/M2 | OXYGEN SATURATION: 98 % | TEMPERATURE: 98 F | HEIGHT: 66 IN | SYSTOLIC BLOOD PRESSURE: 136 MMHG | WEIGHT: 195.56 LBS | HEART RATE: 84 BPM | DIASTOLIC BLOOD PRESSURE: 84 MMHG

## 2024-07-16 DIAGNOSIS — Z00.00 ANNUAL PHYSICAL EXAM: ICD-10-CM

## 2024-07-16 DIAGNOSIS — Z11.3 SCREEN FOR STD (SEXUALLY TRANSMITTED DISEASE): ICD-10-CM

## 2024-07-16 DIAGNOSIS — N76.0 ACUTE VAGINITIS: ICD-10-CM

## 2024-07-16 DIAGNOSIS — Z00.00 ANNUAL PHYSICAL EXAM: Primary | ICD-10-CM

## 2024-07-16 DIAGNOSIS — E66.9 OBESITY, UNSPECIFIED CLASSIFICATION, UNSPECIFIED OBESITY TYPE, UNSPECIFIED WHETHER SERIOUS COMORBIDITY PRESENT: ICD-10-CM

## 2024-07-16 LAB
ALBUMIN SERPL BCP-MCNC: 4.4 G/DL (ref 3.5–5.2)
ALP SERPL-CCNC: 63 U/L (ref 55–135)
ALT SERPL W/O P-5'-P-CCNC: 12 U/L (ref 10–44)
ANION GAP SERPL CALC-SCNC: 8 MMOL/L (ref 8–16)
AST SERPL-CCNC: 16 U/L (ref 10–40)
BACTERIA GENITAL QL WET PREP: ABNORMAL
BASOPHILS # BLD AUTO: 0.02 K/UL (ref 0–0.2)
BASOPHILS NFR BLD: 0.4 % (ref 0–1.9)
BILIRUB SERPL-MCNC: 0.3 MG/DL (ref 0.1–1)
BUN SERPL-MCNC: 13 MG/DL (ref 6–20)
CALCIUM SERPL-MCNC: 9.2 MG/DL (ref 8.7–10.5)
CHLORIDE SERPL-SCNC: 106 MMOL/L (ref 95–110)
CHOLEST SERPL-MCNC: 167 MG/DL (ref 120–199)
CHOLEST/HDLC SERPL: 2.3 {RATIO} (ref 2–5)
CLUE CELLS VAG QL WET PREP: ABNORMAL
CO2 SERPL-SCNC: 24 MMOL/L (ref 23–29)
CREAT SERPL-MCNC: 0.9 MG/DL (ref 0.5–1.4)
DIFFERENTIAL METHOD BLD: ABNORMAL
EOSINOPHIL # BLD AUTO: 0.1 K/UL (ref 0–0.5)
EOSINOPHIL NFR BLD: 1.7 % (ref 0–8)
ERYTHROCYTE [DISTWIDTH] IN BLOOD BY AUTOMATED COUNT: 13.6 % (ref 11.5–14.5)
EST. GFR  (NO RACE VARIABLE): >60 ML/MIN/1.73 M^2
ESTIMATED AVG GLUCOSE: 105 MG/DL (ref 68–131)
FILAMENT FUNGI VAG WET PREP-#/AREA: ABNORMAL
GLUCOSE SERPL-MCNC: 85 MG/DL (ref 70–110)
HBA1C MFR BLD: 5.3 % (ref 4–5.6)
HCT VFR BLD AUTO: 41.7 % (ref 37–48.5)
HDLC SERPL-MCNC: 72 MG/DL (ref 40–75)
HDLC SERPL: 43.1 % (ref 20–50)
HGB BLD-MCNC: 13.3 G/DL (ref 12–16)
IMM GRANULOCYTES # BLD AUTO: 0.01 K/UL (ref 0–0.04)
IMM GRANULOCYTES NFR BLD AUTO: 0.2 % (ref 0–0.5)
LDLC SERPL CALC-MCNC: 85.6 MG/DL (ref 63–159)
LYMPHOCYTES # BLD AUTO: 1.7 K/UL (ref 1–4.8)
LYMPHOCYTES NFR BLD: 32.1 % (ref 18–48)
MCH RBC QN AUTO: 30.1 PG (ref 27–31)
MCHC RBC AUTO-ENTMCNC: 31.9 G/DL (ref 32–36)
MCV RBC AUTO: 94 FL (ref 82–98)
MONOCYTES # BLD AUTO: 0.5 K/UL (ref 0.3–1)
MONOCYTES NFR BLD: 9.1 % (ref 4–15)
NEUTROPHILS # BLD AUTO: 3 K/UL (ref 1.8–7.7)
NEUTROPHILS NFR BLD: 56.5 % (ref 38–73)
NONHDLC SERPL-MCNC: 95 MG/DL
NRBC BLD-RTO: 0 /100 WBC
PLATELET # BLD AUTO: 306 K/UL (ref 150–450)
PMV BLD AUTO: 9.3 FL (ref 9.2–12.9)
POTASSIUM SERPL-SCNC: 4 MMOL/L (ref 3.5–5.1)
PROT SERPL-MCNC: 7.3 G/DL (ref 6–8.4)
RBC # BLD AUTO: 4.42 M/UL (ref 4–5.4)
SODIUM SERPL-SCNC: 138 MMOL/L (ref 136–145)
SPECIMEN SOURCE: ABNORMAL
T VAGINALIS GENITAL QL WET PREP: ABNORMAL
TRIGL SERPL-MCNC: 47 MG/DL (ref 30–150)
WBC # BLD AUTO: 5.29 K/UL (ref 3.9–12.7)
WBC #/AREA VAG WET PREP: ABNORMAL
YEAST GENITAL QL WET PREP: ABNORMAL

## 2024-07-16 PROCEDURE — 99395 PREV VISIT EST AGE 18-39: CPT | Mod: S$GLB,,,

## 2024-07-16 PROCEDURE — 86593 SYPHILIS TEST NON-TREP QUANT: CPT

## 2024-07-16 PROCEDURE — 87210 SMEAR WET MOUNT SALINE/INK: CPT

## 2024-07-16 PROCEDURE — 87491 CHLMYD TRACH DNA AMP PROBE: CPT

## 2024-07-16 PROCEDURE — 3075F SYST BP GE 130 - 139MM HG: CPT | Mod: CPTII,S$GLB,,

## 2024-07-16 PROCEDURE — 36415 COLL VENOUS BLD VENIPUNCTURE: CPT

## 2024-07-16 PROCEDURE — 1160F RVW MEDS BY RX/DR IN RCRD: CPT | Mod: CPTII,S$GLB,,

## 2024-07-16 PROCEDURE — 80074 ACUTE HEPATITIS PANEL: CPT

## 2024-07-16 PROCEDURE — 85025 COMPLETE CBC W/AUTO DIFF WBC: CPT

## 2024-07-16 PROCEDURE — 99999 PR PBB SHADOW E&M-EST. PATIENT-LVL IV: CPT | Mod: PBBFAC,,,

## 2024-07-16 PROCEDURE — 84439 ASSAY OF FREE THYROXINE: CPT

## 2024-07-16 PROCEDURE — 1159F MED LIST DOCD IN RCRD: CPT | Mod: CPTII,S$GLB,,

## 2024-07-16 PROCEDURE — 80053 COMPREHEN METABOLIC PANEL: CPT

## 2024-07-16 PROCEDURE — 84443 ASSAY THYROID STIM HORMONE: CPT

## 2024-07-16 PROCEDURE — 3008F BODY MASS INDEX DOCD: CPT | Mod: CPTII,S$GLB,,

## 2024-07-16 PROCEDURE — 80061 LIPID PANEL: CPT

## 2024-07-16 PROCEDURE — 87389 HIV-1 AG W/HIV-1&-2 AB AG IA: CPT

## 2024-07-16 PROCEDURE — 3079F DIAST BP 80-89 MM HG: CPT | Mod: CPTII,S$GLB,,

## 2024-07-16 PROCEDURE — 83036 HEMOGLOBIN GLYCOSYLATED A1C: CPT

## 2024-07-16 RX ORDER — METRONIDAZOLE 500 MG/1
500 TABLET ORAL EVERY 12 HOURS
Qty: 14 TABLET | Refills: 0 | Status: SHIPPED | OUTPATIENT
Start: 2024-07-16

## 2024-07-16 NOTE — PROGRESS NOTES
Billie Donaldard  07/16/2024  [id number]    Leonie Rodriguez DO  Patient Care Team:  Leonie Rodriguez DO as PCP - General  Precious Cho LPN as Care Coordinator (Internal Medicine)          Visit Type:an urgent visit for a new problem    Chief Complaint:  Chief Complaint   Patient presents with    STD CHECK        History of Present Illness:    Patient presents today for an annual visit  She is interested in getting STD testing  Denies S.S of vaginal infection/UTI     History:  Past Medical History:   Diagnosis Date    Abnormal cervical Pap smear with positive HPV DNA test     ADHD, predominantly inattentive type     Allergy     Anxiety     Asthma     Bulimia     Genetic testing 05/22/2024    [facility] BRCA1/2 Analyses with [employer] [facility] (85 genes) - NEGATIVE, VUS in RB1    HSV-1 infection     Major depression, recurrent     Migraine headache      Past Surgical History:   Procedure Laterality Date    ABSCESS DRAINAGE  12/25/2017    abscess lanced     LAPAROSCOPIC SALPINGECTOMY Bilateral 7/31/2023    Procedure: SALPINGECTOMY, LAPAROSCOPIC;  Surgeon: Elvin Shoemaker MD;  Location: [facility];  Service: OB/GYN;  Laterality: Bilateral;    TONSILLECTOMY AND ADENOIDECTOMY      WISDOM TOOTH EXTRACTION       Family History   Problem Relation Name Age of Onset    Cervical cancer Mother Dana 32        metastastized to uterus, bladder, LIU/BSO    Heart disease Mother Dana     Mitral valve prolapse Mother Dana     Hypertension Mother Dana     Hypertension Maternal Grandmother      Lung cancer Maternal Grandfather          metastasized, +smoking hx    Heart disease Maternal Grandfather          Passed at 54 yoa    Hodgkin's lymphoma Maternal Grandfather      Hypertension Maternal Grandfather      Clotting disorder Maternal Uncle      Liver cancer Other      Pancreatic cancer Other      Colon cancer Other  48    Pancreatic cancer Paternal Uncle      Melanoma Neg Hx       Social History      Socioeconomic History    Marital status: Significant Other    Number of children: 0   Occupational History    Occupation: Pharmacy Intern   Tobacco Use    Smoking status: Never     Passive exposure: Past    Smokeless tobacco: Never   Substance and Sexual Activity    Alcohol use: Yes     Comment: rarely     Drug use: No    Sexual activity: Yes     Partners: Male   Other Topics Concern    Are you pregnant or think you may be? No    Breast-feeding No     Social Determinants of Health     Financial Resource Strain: Low Risk  (12/12/2023)    Overall Financial Resource Strain (CARDIA)     Difficulty of Paying Living Expenses: Not hard at all   Food Insecurity: No Food Insecurity (12/12/2023)    Hunger Vital Sign     Worried About Running Out of Food in the Last Year: Never true     Ran Out of Food in the Last Year: Never true   Transportation Needs: No Transportation Needs (12/12/2023)    PRAPARE - Transportation     Lack of Transportation (Medical): No     Lack of Transportation (Non-Medical): No   Physical Activity: Sufficiently Active (12/12/2023)    Exercise Vital Sign     Days of Exercise per Week: 3 days     Minutes of Exercise per Session: 80 min   Stress: No Stress Concern Present (12/12/2023)    [employer] [place] [employer] - Occupational Stress Questionnaire     Feeling of Stress : Only a little   Housing Stability: Low Risk  (12/12/2023)    Housing Stability Vital Sign     Unable to Pay for Housing in the Last Year: No     Number of Places Lived in the Last Year: 1     Unstable Housing in the Last Year: No     Patient Active Problem List   Diagnosis    Allergy    Anxiety    Asthma    Migraine headache    ADHD    Depression, recurrent    Status post bilateral salpingectomy    Obesity, unspecified     Review of patient's allergies indicates:   Allergen Reactions    Sulfa (sulfonamide antibiotics) Hives    Sulfamethoxazole-trimethoprim        The following were reviewed at this visit: active  problem list, medication list, allergies, family history, social history, and health maintenance.    Medications:  Current Outpatient Medications on File Prior to Visit   Medication Sig Dispense Refill    albuterol (PROVENTIL HFA) 90 mcg/actuation inhaler Inhale 2 puffs into the lungs every 6 (six) hours as needed for Wheezing. Rescue 18 g 0    B-complex with vitamin C (Z-BEC OR EQUIV) tablet Take 1 tablet by mouth once daily.      buPROPion (WELLBUTRIN XL) 300 MG 24 hr tablet Take 1 tablet (300 mg total) by mouth once daily. 90 tablet 1    dextroamphetamine-amphetamine (ADDERALL) 10 mg Tab Take 1 tablet (10 mg total) by mouth once daily 30 tablet 0    iron-FA-dha-epa-FAD-NADH-be-mv (ENLYTE) 1.5 mg iron- 8.73 mg CpID Take 1 capsule by mouth Daily. 30 each 11    lisdexamfetamine (VYVANSE) 50 MG capsule Take 1 capsule (50 mg total) by mouth every morning. 30 capsule 0    [START ON 7/25/2024] lisdexamfetamine (VYVANSE) 50 MG capsule Take 1 capsule (50 mg total) by mouth every morning. 30 capsule 0    [START ON 8/24/2024] lisdexamfetamine (VYVANSE) 50 MG capsule Take 1 capsule (50 mg total) by mouth every morning. 30 capsule 0    sertraline (ZOLOFT) 100 MG tablet Take 1 tablet (100 mg total) by mouth once daily. 90 tablet 1    valACYclovir (VALTREX) 500 MG tablet TAKE ONE TABLET BY MOUTH EVERY DAY 90 tablet 1     No current facility-administered medications on file prior to visit.       Medications have been reviewed and reconciled with patient at this visit.  Barriers to medications reviewed with patient.    Adverse reactions to current medications reviewed with patient..    Over the counter medications reviewed and reconciled with patient.    Exam:  Wt Readings from Last 3 Encounters:   04/04/24 88 kg (194 lb 0.1 oz)   12/12/23 89.4 kg (196 lb 15.7 oz)   09/05/23 87.4 kg (192 lb 10.9 oz)     Temp Readings from Last 3 Encounters:   04/04/24 98 °F (36.7 °C) (Temporal)   12/12/23 98.4 °F (36.9 °C) (Oral)   09/05/23 98.4  °F (36.9 °C) (Tympanic)     BP Readings from Last 3 Encounters:   04/04/24 (!) 138/90   12/12/23 131/85   09/05/23 124/74     Pulse Readings from Last 3 Encounters:   04/04/24 95   12/12/23 95   09/05/23 97     There is no height or weight on file to calculate BMI.      Review of Systems   Respiratory:  Negative for shortness of breath.    Cardiovascular:  Negative for chest pain and palpitations.     Physical Exam  Nursing note reviewed.   Constitutional:       Appearance: She is obese.   HENT:      Head: Normocephalic and atraumatic.      Right Ear: Tympanic membrane normal.      Left Ear: Tympanic membrane normal.      Nose: No congestion or rhinorrhea.      Mouth/Throat:      Mouth: Mucous membranes are moist.   Cardiovascular:      Rate and Rhythm: Normal rate and regular rhythm.      Heart sounds: Normal heart sounds.   Pulmonary:      Effort: Pulmonary effort is normal. No respiratory distress.      Breath sounds: Normal breath sounds.   Neurological:      Mental Status: She is alert and oriented to person, place, and time.   Psychiatric:         Mood and Affect: Mood normal.         Behavior: Behavior normal.         Thought Content: Thought content normal.         Judgment: Judgment normal.         Laboratory Reviewed ({Yes)  Lab Results   Component Value Date    WBC 5.96 04/04/2024    HGB 13.6 04/04/2024    HCT 41.5 04/04/2024     04/04/2024    CHOL 174 07/27/2023    TRIG 42 07/27/2023    HDL 77 (H) 07/27/2023    ALT 14 01/21/2022    AST 17 01/21/2022     07/27/2023    K 4.1 07/27/2023     07/27/2023    CREATININE 0.9 07/27/2023    BUN 20 07/27/2023    CO2 20 (L) 07/27/2023    TSH 2.787 07/27/2023    HGBA1C 5.2 07/27/2023       Billie was seen today for std check.    Diagnoses and all orders for this visit:    Annual physical exam  -     HIV 1/2 Ag/Ab (4th Gen); Future  -     C. trachomatis/N. gonorrhoeae by AMP DNA [facility]; Urine  -     Vaginal Screen  -     Treponema Pallidium Antibodies  IgG, IgM; Future  -     HEPATITIS PANEL, ACUTE; Future  -     CBC Auto Differential; Future  -     Lipid Panel; Future  -     Comprehensive Metabolic Panel; Future  -     TSH; Future  -     HEMOGLOBIN A1C; Future  -     T4, FREE; Future    Screen for STD (sexually transmitted disease)  -     HIV 1/2 Ag/Ab (4th Gen); Future  -     Cancel: HSV 1 & 2, IgG; Future  -     C. trachomatis/N. gonorrhoeae by AMP DNA [facility]; Urine  -     Vaginal Screen  -     Treponema Pallidium Antibodies IgG, IgM; Future  -     HEPATITIS PANEL, ACUTE; Future    Obesity, unspecified classification, unspecified obesity type, unspecified whether serious comorbidity present  -     CBC Auto Differential; Future  -     Lipid Panel; Future  -     Comprehensive Metabolic Panel; Future  -     TSH; Future  -     HEMOGLOBIN A1C; Future  -     T4, FREE; Future  Continue working mela life style changes       Care Plan/Goals: Reviewed    Goals    None         Follow up: No follow-ups on file.    After visit summary was printed and given to patient upon discharge today.  Patient goals and care plan are included in After Visit Summary.

## 2024-07-17 LAB
HAV IGM SERPL QL IA: NORMAL
HBV CORE IGM SERPL QL IA: NORMAL
HBV SURFACE AG SERPL QL IA: NORMAL
HCV AB SERPL QL IA: NORMAL
HIV 1+2 AB+HIV1 P24 AG SERPL QL IA: NORMAL
T4 FREE SERPL-MCNC: 0.92 NG/DL (ref 0.71–1.51)
TREPONEMA PALLIDUM IGG+IGM AB [PRESENCE] IN SERUM OR PLASMA BY IMMUNOASSAY: NONREACTIVE
TSH SERPL DL<=0.005 MIU/L-ACNC: 1.72 UIU/ML (ref 0.4–4)

## 2024-07-18 LAB
C TRACH DNA SPEC QL NAA+PROBE: NOT DETECTED
N GONORRHOEA DNA SPEC QL NAA+PROBE: NOT DETECTED

## 2024-08-16 ENCOUNTER — OFFICE VISIT (OUTPATIENT)
Dept: URGENT CARE | Facility: CLINIC | Age: 34
End: 2024-08-16
Payer: COMMERCIAL

## 2024-08-16 ENCOUNTER — ON-DEMAND VIRTUAL (OUTPATIENT)
Dept: URGENT CARE | Facility: CLINIC | Age: 34
End: 2024-08-16
Payer: COMMERCIAL

## 2024-08-16 VITALS
TEMPERATURE: 98 F | DIASTOLIC BLOOD PRESSURE: 91 MMHG | RESPIRATION RATE: 18 BRPM | HEIGHT: 66 IN | BODY MASS INDEX: 31.34 KG/M2 | OXYGEN SATURATION: 98 % | HEART RATE: 84 BPM | WEIGHT: 195 LBS | SYSTOLIC BLOOD PRESSURE: 131 MMHG

## 2024-08-16 DIAGNOSIS — R09.81 NASAL SINUS CONGESTION: ICD-10-CM

## 2024-08-16 DIAGNOSIS — B34.9 VIRAL ILLNESS: Primary | ICD-10-CM

## 2024-08-16 DIAGNOSIS — R52 BODY ACHES: ICD-10-CM

## 2024-08-16 DIAGNOSIS — F90.8 ATTENTION DEFICIT HYPERACTIVITY DISORDER (ADHD), OTHER TYPE: ICD-10-CM

## 2024-08-16 DIAGNOSIS — R51.9 SINUS HEADACHE: ICD-10-CM

## 2024-08-16 DIAGNOSIS — R05.1 ACUTE COUGH: ICD-10-CM

## 2024-08-16 DIAGNOSIS — R19.7 DIARRHEA, UNSPECIFIED TYPE: ICD-10-CM

## 2024-08-16 DIAGNOSIS — R11.0 NAUSEA: ICD-10-CM

## 2024-08-16 DIAGNOSIS — U07.1 COVID: Primary | ICD-10-CM

## 2024-08-16 LAB
CTP QC/QA: YES
CTP QC/QA: YES
POC MOLECULAR INFLUENZA A AGN: NEGATIVE
POC MOLECULAR INFLUENZA B AGN: NEGATIVE
SARS-COV-2 AG RESP QL IA.RAPID: NEGATIVE

## 2024-08-16 RX ORDER — DEXTROAMPHETAMINE SACCHARATE, AMPHETAMINE ASPARTATE, DEXTROAMPHETAMINE SULFATE AND AMPHETAMINE SULFATE 2.5; 2.5; 2.5; 2.5 MG/1; MG/1; MG/1; MG/1
10 TABLET ORAL DAILY
Qty: 30 TABLET | Refills: 0 | OUTPATIENT
Start: 2024-08-16 | End: 2024-09-15

## 2024-08-16 RX ORDER — BENZONATATE 100 MG/1
100 CAPSULE ORAL 3 TIMES DAILY PRN
Qty: 30 CAPSULE | Refills: 0 | Status: SHIPPED | OUTPATIENT
Start: 2024-08-16 | End: 2024-08-26

## 2024-08-16 NOTE — PROGRESS NOTES
Subjective:      Patient ID: Billie Nicolas is a 33 y.o. female.    Vitals:  vitals were not taken for this visit.     Chief Complaint: URI and GI Problem      Visit Type: TELE AUDIOVISUAL    Present with the patient at the time of consultation: TELEMED PRESENT WITH PATIENT: None, at work    Past Medical History:   Diagnosis Date    Abnormal cervical Pap smear with positive HPV DNA test     ADHD, predominantly inattentive type     Allergy     Anxiety     Asthma     Bulimia     Genetic testing 05/22/2024    [facility] BRCA1/2 Analyses with [employer] (85 genes) - NEGATIVE, VUS in RB1    HSV-1 infection     Major depression, recurrent     Migraine headache      Past Surgical History:   Procedure Laterality Date    ABSCESS DRAINAGE  12/25/2017    abscess lanced     LAPAROSCOPIC SALPINGECTOMY Bilateral 7/31/2023    Procedure: SALPINGECTOMY, LAPAROSCOPIC;  Surgeon: Elvin Shoemaker MD;  Location: [facility];  Service: OB/GYN;  Laterality: Bilateral;    TONSILLECTOMY AND ADENOIDECTOMY      WISDOM TOOTH EXTRACTION       Review of patient's allergies indicates:   Allergen Reactions    Sulfa (sulfonamide antibiotics) Hives    Sulfamethoxazole-trimethoprim      Current Outpatient Medications on File Prior to Visit   Medication Sig Dispense Refill    albuterol (PROVENTIL HFA) 90 mcg/actuation inhaler Inhale 2 puffs into the lungs every 6 (six) hours as needed for Wheezing. Rescue 18 g 0    B-complex with vitamin C (Z-BEC OR EQUIV) tablet Take 1 tablet by mouth once daily.      buPROPion (WELLBUTRIN XL) 300 MG 24 hr tablet Take 1 tablet (300 mg total) by mouth once daily. 90 tablet 1    dextroamphetamine-amphetamine (ADDERALL) 10 mg Tab Take 1 tablet (10 mg total) by mouth once daily 30 tablet 0    iron-FA-dha-epa-FAD-NADH-be-mv (ENLYTE) 1.5 mg iron- 8.73 mg CpID Take 1 capsule by mouth Daily. 30 each 11    lisdexamfetamine (VYVANSE) 50 MG capsule Take 1 capsule (50 mg total) by mouth every morning. 30  capsule 0    lisdexamfetamine (VYVANSE) 50 MG capsule Take 1 capsule (50 mg total) by mouth every morning. 30 capsule 0    [START ON 8/24/2024] lisdexamfetamine (VYVANSE) 50 MG capsule Take 1 capsule (50 mg total) by mouth every morning. 30 capsule 0    sertraline (ZOLOFT) 100 MG tablet Take 1 tablet (100 mg total) by mouth once daily. 90 tablet 1    valACYclovir (VALTREX) 500 MG tablet TAKE ONE TABLET BY MOUTH EVERY DAY 90 tablet 1    [DISCONTINUED] metroNIDAZOLE (FLAGYL) 500 MG tablet Take 1 tablet (500 mg total) by mouth every 12 (twelve) hours. 14 tablet 0     No current facility-administered medications on file prior to visit.     Family History   Problem Relation Name Age of Onset    Cervical cancer Mother Dana 32        metastastized to uterus, bladder, LIU/BSO    Heart disease Mother Dana     Mitral valve prolapse Mother Dana     Hypertension Mother Dana     Hypertension Maternal Grandmother      Lung cancer Maternal Grandfather          metastasized, +smoking hx    Heart disease Maternal Grandfather          Passed at 54 yoa    Hodgkin's lymphoma Maternal Grandfather      Hypertension Maternal Grandfather      Clotting disorder Maternal Uncle      Liver cancer Other      Pancreatic cancer Other      Colon cancer Other  48    Pancreatic cancer Paternal Uncle      Melanoma Neg Hx             Ohs Peq Odvv Intake    8/16/2024  8:18 AM CDT - Filed by Patient   What is your current physical address in the event of a medical emergency? [id number] [place] [address]   Are you able to take your vital signs? Yes   Systolic Blood Pressure: 131   Diastolic Blood Pressure: 91   Weight: 196   Height: 67   Pulse: 101   Temperature: 99   Respiration rate: 18   Pulse Oxygen:    Please attach any relevant images or files          Symptom on set this AM. No known sick contacts. No testing done. Not taking anything currently.    URI   Associated symptoms include congestion, diarrhea, nausea and a sore  throat. Pertinent negatives include no vomiting.   GI Problem  The primary symptoms include fever (subjective), fatigue, nausea, diarrhea and myalgias. Primary symptoms do not include vomiting.       Constitution: Positive for fatigue and fever (subjective).   HENT:  Positive for congestion, postnasal drip, sinus pressure and sore throat.    Gastrointestinal:  Positive for nausea and diarrhea. Negative for vomiting.   Musculoskeletal:  Positive for muscle ache.        Objective:   The physical exam was conducted virtually.  Physical Exam   Constitutional: She is oriented to person, place, and time. She does not appear ill. No distress.   HENT:   Head: Normocephalic and atraumatic.   Nose: Nose normal.   Eyes: Extraocular movement intact   Pulmonary/Chest: Effort normal.   Abdominal: Normal appearance.   Musculoskeletal: Normal range of motion.         General: Normal range of motion.   Neurological: no focal deficit. She is alert and oriented to person, place, and time.   Psychiatric: Her behavior is normal. Mood normal.   Vitals reviewed.      Assessment:     1. Viral illness        Plan:   Further testing recommended.    Patient encouraged to monitor symptoms closely and instructed to follow-up for new or worsening symptoms. Further, in-person, evaluation may be necessary for continued treatment. Please follow up with your primary care doctor or specialist as needed. Verbally discussed plan. Patient confirms understanding and is in agreement with treatment and plan.     You must understand that you've received a Virtual Care evaluation only and that you may be released before all your medical problems are known or treated. You, the patient, will arrange for follow up care as instructed.      Viral illness             Patient Instructions   OVER THE COUNTER RECOMMENDATIONS/SUGGESTIONS (IF NO CONTRAINDICATIONS).     ·         Make sure to stay well hydrated.     ·         Use Nasal Saline to mechanically move any  post nasal drip from your eustachian tube or from the back of your throat.     ·         Use warm saltwater gargles to ease your throat pain. Warm saltwater gargles as needed for sore throat-  1/2 tsp salt to 1 cup warm water, gargle as desired. Warm fluids tend to relieve a sore throat.     .         Throat lozenges, Chloraseptic spray or other over the counter treatments are ok to use as well. Use as directed.     ·         Use an antihistamine such as Claritin, Zyrtec or Allegra to dry you out.     ·         Use pseudoephedrine (behind the counter) to decongest. Pseudoephedrine  30 mg up to 240 mg /day. It can raise your blood pressure and give you palpitations.     ·         Use Mucinex (guaifenesin) to break up mucous up to 2400mg/day to loosen any mucous.     ·         The Mucinex DM pill has a cough suppressant that can be sedating. It can be used at night to stop the tickle at the back of your throat.     ·         You can use Mucinex D (it has guaifenesin and a high dose of pseudoephedrine) in the mornings to help decongest.     ·         Use Afrin (oxymetazoline) in each nare for no longer than 3 days, as it is addictive. It can also dry out your mucous membranes and cause elevated blood pressure. This is especially useful if you are flying.     ·         Use Flonase 1-2 sprays/nostril per day. It is a local acting steroid nasal spray, if you develop a bloody nose, stop using the medication immediately.     ·         Sometimes Nyquil at night is beneficial to help you get some rest, however it is sedating, and it does have an antihistamine, and Tylenol.     ·         Honey is a natural cough suppressant that can be used.     ·         Tylenol up to 4,000 mg a day is safe for short periods and can be used for body aches, pain, and fever. However, in high doses and prolonged use it can cause liver irritation.     ·         Ibuprofen is a non-steroidal anti-inflammatory that can be used for body aches, pain,  and fever. However, it can also cause stomach irritation if overused.     Recommendations for stomach symptoms:   . Try to stay hydrated (Water, Diluted fruit juices, Gatorade, Pedialyte, Popsicles) as best as you can.      . Stick to a bland diet. Avoid spicy, greasy, fatty foods or dairy products until symptoms improve then advance diet as tolerated.     . Pepto-Bismol, Imodium, or Metamucil over the counter as directed for diarrhea relief.     . Over the counter Probiotics, as directed, may be beneficial.

## 2024-08-16 NOTE — PATIENT INSTRUCTIONS
COVID-19 Discharge Instructions       What care is needed at home?   Ask your doctor what you need to do when you go home. Make sure you ask questions if you do not understand what the doctor says.  Drink lots of water, juice, or broth to replace fluids lost from a fever.  You may use cool mist humidifiers to help ease congestion and coughing.  Use 2 to 3 pillows to prop yourself up when you lie down to make it easier to breathe and sleep.  Do not smoke and do not drink beer, wine, and mixed drinks (alcohol).  To lower the chance of passing the infection to others, get a COVID-19 vaccine after your infection has resolved.  If you have not been fully vaccinated:  Wear a mask over your mouth and nose if you are around others who are not sick. Cloth masks work best if they have more than one layer of fabric.  Wash your hands often.  Stay home in a separate room, if possible, away from others. Only go out to get medical care.  Use a separate bathroom if possible.  Do not make food for others.

## 2024-08-16 NOTE — PATIENT INSTRUCTIONS
OVER THE COUNTER RECOMMENDATIONS/SUGGESTIONS (IF NO CONTRAINDICATIONS).     ·         Make sure to stay well hydrated.     ·         Use Nasal Saline to mechanically move any post nasal drip from your eustachian tube or from the back of your throat.     ·         Use warm saltwater gargles to ease your throat pain. Warm saltwater gargles as needed for sore throat-  1/2 tsp salt to 1 cup warm water, gargle as desired. Warm fluids tend to relieve a sore throat.     .         Throat lozenges, Chloraseptic spray or other over the counter treatments are ok to use as well. Use as directed.     ·         Use an antihistamine such as Claritin, Zyrtec or Allegra to dry you out.     ·         Use pseudoephedrine (behind the counter) to decongest. Pseudoephedrine  30 mg up to 240 mg /day. It can raise your blood pressure and give you palpitations.     ·         Use Mucinex (guaifenesin) to break up mucous up to 2400mg/day to loosen any mucous.     ·         The Mucinex DM pill has a cough suppressant that can be sedating. It can be used at night to stop the tickle at the back of your throat.     ·         You can use Mucinex D (it has guaifenesin and a high dose of pseudoephedrine) in the mornings to help decongest.     ·         Use Afrin (oxymetazoline) in each nare for no longer than 3 days, as it is addictive. It can also dry out your mucous membranes and cause elevated blood pressure. This is especially useful if you are flying.     ·         Use Flonase 1-2 sprays/nostril per day. It is a local acting steroid nasal spray, if you develop a bloody nose, stop using the medication immediately.     ·         Sometimes Nyquil at night is beneficial to help you get some rest, however it is sedating, and it does have an antihistamine, and Tylenol.     ·         Honey is a natural cough suppressant that can be used.     ·         Tylenol up to 4,000 mg a day is safe for short periods and can be used for body aches, pain, and  fever. However, in high doses and prolonged use it can cause liver irritation.     ·         Ibuprofen is a non-steroidal anti-inflammatory that can be used for body aches, pain, and fever. However, it can also cause stomach irritation if overused.     Recommendations for stomach symptoms:   . Try to stay hydrated (Water, Diluted fruit juices, Gatorade, Pedialyte, Popsicles) as best as you can.      . Stick to a bland diet. Avoid spicy, greasy, fatty foods or dairy products until symptoms improve then advance diet as tolerated.     . Pepto-Bismol, Imodium, or Metamucil over the counter as directed for diarrhea relief.     . Over the counter Probiotics, as directed, may be beneficial.

## 2024-08-16 NOTE — PROGRESS NOTES
"Subjective:      Patient ID: Billie Nicolas is a 33 y.o. female.    Vitals:  height is 5' 6" (1.676 m) and weight is 88.5 kg (195 lb). Her oral temperature is 98.4 °F (36.9 °C). Her blood pressure is 131/91 (abnormal) and her pulse is 84. Her respiration is 18 and oxygen saturation is 98%.     Chief Complaint: Sore Throat    32 y/o female with diarrhea (yesterday), congestion, sore throat, fatigue, body aches, headaches and slight fever since this morning. Nausea but no vomiting. Throat pain is 4/10. Body aches is 6/10. She hasn't taken any medications for the symptoms. 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 She wants to see if she has COVID or Flu because she is unsure of what this is.     Sore Throat   This is a new problem. The current episode started in the past 7 days. The problem has been gradually worsening. Neither side of throat is experiencing more pain than the other. The pain is at a severity of 4/10. Associated symptoms include congestion, diarrhea and headaches. She has had no exposure to strep or mono. She has tried nothing for the symptoms.       HENT:  Positive for congestion and sore throat.    Gastrointestinal:  Positive for diarrhea.   Neurological:  Positive for headaches.      Objective:     Physical Exam   Constitutional: She is oriented to person, place, and time. She appears well-developed. She is cooperative.  Non-toxic appearance. She does not appear ill. No distress.   HENT:   Head: Normocephalic and atraumatic.   Ears:   Right Ear: Hearing, external ear and ear canal normal. A middle ear effusion is present.   Left Ear: Hearing, external ear and ear canal normal. A middle ear effusion is present.   Nose: Mucosal edema, rhinorrhea and purulent discharge present. No nasal deformity. No epistaxis. Right sinus exhibits frontal sinus tenderness. Right sinus exhibits no maxillary sinus tenderness. Left sinus exhibits frontal sinus tenderness. Left sinus exhibits no " maxillary sinus tenderness.   Mouth/Throat: Uvula is midline and mucous membranes are normal. No trismus in the jaw. Normal dentition. No uvula swelling. Posterior oropharyngeal edema and posterior oropharyngeal erythema present. No oropharyngeal exudate.   Eyes: Conjunctivae and lids are normal. No scleral icterus.   Neck: Trachea normal and phonation normal. Neck supple. No edema present. No erythema present. No neck rigidity present.   Cardiovascular: Normal rate, regular rhythm, normal heart sounds and normal pulses.   Pulmonary/Chest: Effort normal and breath sounds normal. No respiratory distress. She has no decreased breath sounds. She has no rhonchi.   Abdominal: Normal appearance.   Musculoskeletal: Normal range of motion.         General: No deformity. Normal range of motion.   Neurological: She is alert and oriented to person, place, and time. She exhibits normal muscle tone. Coordination normal.   Skin: Skin is warm, dry, intact, not diaphoretic and not pale.   Psychiatric: Her speech is normal and behavior is normal. Judgment and thought content normal.   Nursing note and vitals reviewed.      Assessment:     1. COVID    2. Body aches    3. Nausea    4. Diarrhea, unspecified type    5. Nasal sinus congestion    6. Sinus headache    7. Acute cough        Plan:     Results for orders placed or performed in visit on 08/16/24   SARS Coronavirus 2 Antigen, POCT Manual Read   Result Value Ref Range    SARS Coronavirus 2 Antigen Negative Negative     Acceptable Yes    POCT Influenza A/B MOLECULAR   Result Value Ref Range    POC Molecular Influenza A Ag Negative Negative    POC Molecular Influenza B Ag Negative Negative     Acceptable Yes          COVID    Body aches  -     SARS Coronavirus 2 Antigen, POCT Manual Read  -     POCT Influenza A/B MOLECULAR    Nausea  -     POCT Influenza A/B MOLECULAR    Diarrhea, unspecified type  -     POCT Influenza A/B MOLECULAR    Nasal sinus  congestion  -     POCT Influenza A/B MOLECULAR    Sinus headache  -     POCT Influenza A/B MOLECULAR    Acute cough  -     benzonatate (TESSALON) 100 MG capsule; Take 1 capsule (100 mg total) by mouth 3 (three) times daily as needed for Cough (Do not take more than 6 capsules in a 24 hour period.).  Dispense: 30 capsule; Refill: 0    What care is needed at home?   Ask your doctor what you need to do when you go home. Make sure you ask questions if you do not understand what the doctor says.  Drink lots of water, juice, or broth to replace fluids lost from a fever.  You may use cool mist humidifiers to help ease congestion and coughing.  Use 2 to 3 pillows to prop yourself up when you lie down to make it easier to breathe and sleep.  Do not smoke and do not drink beer, wine, and mixed drinks (alcohol).  To lower the chance of passing the infection to others, get a COVID-19 vaccine after your infection has resolved.  If you have not been fully vaccinated:  Wear a mask over your mouth and nose if you are around others who are not sick. Cloth masks work best if they have more than one layer of fabric.  Wash your hands often.  Stay home in a separate room, if possible, away from others. Only go out to get medical care.  Use a separate bathroom if possible.  Do not make food for others.

## 2024-08-26 ENCOUNTER — PATIENT MESSAGE (OUTPATIENT)
Dept: INTERNAL MEDICINE | Facility: CLINIC | Age: 34
End: 2024-08-26
Payer: COMMERCIAL

## 2024-09-05 ENCOUNTER — OFFICE VISIT (OUTPATIENT)
Dept: INTERNAL MEDICINE | Facility: CLINIC | Age: 34
End: 2024-09-05
Payer: COMMERCIAL

## 2024-09-05 VITALS
WEIGHT: 195.75 LBS | HEART RATE: 99 BPM | RESPIRATION RATE: 18 BRPM | OXYGEN SATURATION: 99 % | DIASTOLIC BLOOD PRESSURE: 88 MMHG | TEMPERATURE: 99 F | HEIGHT: 66 IN | SYSTOLIC BLOOD PRESSURE: 118 MMHG | BODY MASS INDEX: 31.46 KG/M2

## 2024-09-05 DIAGNOSIS — Z00.00 ANNUAL PHYSICAL EXAM: Primary | ICD-10-CM

## 2024-09-05 DIAGNOSIS — J45.990 EXERCISE-INDUCED ASTHMA: ICD-10-CM

## 2024-09-05 DIAGNOSIS — B00.9 HSV-1 INFECTION: ICD-10-CM

## 2024-09-05 DIAGNOSIS — E66.9 OBESITY (BMI 30.0-34.9): ICD-10-CM

## 2024-09-05 PROCEDURE — 99395 PREV VISIT EST AGE 18-39: CPT | Mod: S$GLB,,, | Performed by: INTERNAL MEDICINE

## 2024-09-05 PROCEDURE — 1159F MED LIST DOCD IN RCRD: CPT | Mod: CPTII,S$GLB,, | Performed by: INTERNAL MEDICINE

## 2024-09-05 PROCEDURE — 3074F SYST BP LT 130 MM HG: CPT | Mod: CPTII,S$GLB,, | Performed by: INTERNAL MEDICINE

## 2024-09-05 PROCEDURE — 3079F DIAST BP 80-89 MM HG: CPT | Mod: CPTII,S$GLB,, | Performed by: INTERNAL MEDICINE

## 2024-09-05 PROCEDURE — 3008F BODY MASS INDEX DOCD: CPT | Mod: CPTII,S$GLB,, | Performed by: INTERNAL MEDICINE

## 2024-09-05 PROCEDURE — 3044F HG A1C LEVEL LT 7.0%: CPT | Mod: CPTII,S$GLB,, | Performed by: INTERNAL MEDICINE

## 2024-09-05 PROCEDURE — 99999 PR PBB SHADOW E&M-EST. PATIENT-LVL IV: CPT | Mod: PBBFAC,,, | Performed by: INTERNAL MEDICINE

## 2024-09-05 PROCEDURE — 1160F RVW MEDS BY RX/DR IN RCRD: CPT | Mod: CPTII,S$GLB,, | Performed by: INTERNAL MEDICINE

## 2024-09-05 RX ORDER — VALACYCLOVIR HYDROCHLORIDE 500 MG/1
500 TABLET, FILM COATED ORAL DAILY
Qty: 90 TABLET | Refills: 1 | Status: SHIPPED | OUTPATIENT
Start: 2024-09-05

## 2024-09-05 NOTE — PROGRESS NOTES
"Billie Nicolas  33 y.o. White female  [id number]    Chief Complaint:  Chief Complaint   Patient presents with    Annual Exam       History of Present Illness:  History of Present Illness    CHIEF COMPLAINT:  Ms. Nicolas presents today for annual follow-up.    FAMILY HISTORY:  She recently discovered her biological father (56 years old) through genetic testing. Her father was unaware of her existence until recently. His wife, an orthopedic surgeon, has been supportive. She denies having any other siblings from her biological father. She plans to visit them next month and attend one of her father's drag racing events in [place]. She expresses enthusiasm about connecting with her paternal family, noting similarities in appearance and personality.    MEDICAL HISTORY:  She has a history of asthma, now primarily exercise-induced. She keeps an inhaler as an "emotional support" in her gym bag but reports no recent use. She has a history of nebulized albuterol treatments and allergy shots in childhood. She recently had a cancer scare, but genetic testing was negative for BRCA1 and Roche syndrome. She underwent a bilateral salpingectomy last year.    MENTAL HEALTH:  She is currently being treated for ADD, anxiety, and depression by Dr. Radha Lovett, whom she sees every three months. She reports Wellbutrin, Zoloft, Adderall, and Vyvanse have made a significant positive impact on her condition. She expresses concerns about PMDD and is considering treatment options.    GYNECOLOGICAL HEALTH:  She reports recurrent bacterial vaginosis since high school, recently treated with Flagyl. She has been making lifestyle changes to manage this condition, including avoiding baths and compounding her own boric acid capsules.    HERPES MANAGEMENT:  She has a history of herpes labialis (HSV-1) since childhood, with outbreaks occurring 3-4 times per year while on suppressive therapy. She requests a refill of Valtrex for emergency use " during outbreaks, typically taking 2 g for one day when an outbreak occurs. She notes that sleep disturbances can trigger outbreaks.    CURRENT SYMPTOMS:  She reports experiencing nausea with a severity of 5-6/10, occurring from mid-morning to mid-afternoon, denying any associated GERD symptoms or vomiting. She also reports sleep disturbances, specifically waking up at 3 AM with anxiety 3-4 times per week, which she attributes to increased work responsibilities. She has been working on improving her sleep hygiene, including setting a bedtime and limiting caffeine intake after 1 PM due to being a slow metabolizer.    OCCUPATION:  She is an ambulatory clinical pharmacist in rheumatology, having transferred to this position in December and been on-site since early February. She reports being given significant trust and responsibility, particularly after one of the doctors was dismissed two weeks ago. She has been tasked with ensuring that the dismissed doctor's patients do not fall through the cracks.    LIFESTYLE AND SOCIAL HISTORY:  She is in a polyamorous relationship. Her partner, Man, is described as very encouraging of her exercise habits. She engages in regular weightlifting, though admits to some inconsistency in maintaining   a routine. Her diet has improved significantly, incorporating more vegetables through the use of [employer] meal service. She acknowledges occasional indulgences and reports a reduction in alcohol consumption.    VISION AND HEARING:  She reports vision correction of -1.75 in each eye and states her hearing is good.    VACCINATIONS:  She is due for her flu vaccine and planning to get the COVID vaccine. She has completed the HPV vaccine series. Her Tdap vaccination is due next year.         Review of Systems   Constitutional:  Negative for fever.   HENT:  Negative for hearing loss.    Eyes:  Negative for blurred vision.   Respiratory:  Negative for shortness of breath and wheezing.     Cardiovascular:  Negative for chest pain and leg swelling.   Gastrointestinal:  Positive for nausea. Negative for abdominal pain, blood in stool, constipation, diarrhea, heartburn and vomiting.   Genitourinary:  Negative for dysuria.   Neurological:  Negative for dizziness and headaches.   Psychiatric/Behavioral:  The patient is nervous/anxious and has insomnia.        Laboratory  Lab Results   Component Value Date    WBC 5.29 07/16/2024    HGB 13.3 07/16/2024    HCT 41.7 07/16/2024     07/16/2024    CHOL 167 07/16/2024    TRIG 47 07/16/2024    HDL 72 07/16/2024    ALT 12 07/16/2024    AST 16 07/16/2024     07/16/2024    K 4.0 07/16/2024     07/16/2024    CREATININE 0.9 07/16/2024    BUN 13 07/16/2024    CO2 24 07/16/2024    TSH 1.724 07/16/2024    HGBA1C 5.3 07/16/2024     Lab Results   Component Value Date    LDLCALC 85.6 07/16/2024       The following were reviewed: Active problem list, medication list, allergies, family history, social history, and Health Maintenance.     History:  Past Medical History:   Diagnosis Date    Abnormal cervical Pap smear with positive HPV DNA test     ADHD, predominantly inattentive type     Allergy     Anxiety     Asthma     Bulimia     Genetic testing 05/22/2024    [facility] BRCA1/2 Analyses with [employer] [facility] (85 genes) - NEGATIVE, VUS in RB1    HSV-1 infection     Major depression, recurrent     Migraine headache      Past Surgical History:   Procedure Laterality Date    ABSCESS DRAINAGE  12/25/2017    abscess lanced     ADENOIDECTOMY  1996?    LAPAROSCOPIC SALPINGECTOMY Bilateral 07/31/2023    Procedure: SALPINGECTOMY, LAPAROSCOPIC;  Surgeon: Elvin Shoemaker MD;  Location: [facility];  Service: OB/GYN;  Laterality: Bilateral;    TONSILLECTOMY  1996    TONSILLECTOMY AND ADENOIDECTOMY      TUBAL LIGATION  7/31/23    Bilateral salpingectomy- laproscopic    WISDOM TOOTH EXTRACTION       Family History   Problem Relation Name Age of Onset    Cervical  cancer Mother Dana Esteban        metastastized to uterus, bladder, LIU/BSO    Heart disease Mother Dana         mitral valve prolapse, stage 1 CHF, stent placed at 49 years old    Mitral valve prolapse Mother Dana     Hypertension Mother Dana     Alcohol abuse Mother Dana     Cancer Mother Dana         cervical cancer    Miscarriages / Stillbirths Mother Dana         1 known miscarriage    Hypertension Maternal Grandmother Billie quinn     Cancer Maternal Grandmother Billie quinn         uterine cancer    Lung cancer Maternal Grandfather Chet Nicolas         metastasized, +smoking hx    Heart disease Maternal Grandfather Chet Nicolas         Mitral valve prolapse, STEMI at 55    Hodgkin's lymphoma Maternal Grandfather Chet Nicolas     Hypertension Maternal Grandfather Chet Nicolas     Cancer Maternal Grandfather Chet Nicolas         non-Hodgkins Lymphoma    Alcohol abuse Father Corwin     Clotting disorder Maternal Uncle      Liver cancer Other      Pancreatic cancer Other      Colon cancer Other  48    Pancreatic cancer Paternal Uncle      Miscarriages / Stillbirths Sister Fela Rai         3 miscarriages/chemical pregnancies    Melanoma Neg Hx       Social History     Socioeconomic History    Marital status: Significant Other    Number of children: 0   Occupational History    Occupation: Pharmacy Intern   Tobacco Use    Smoking status: Never     Passive exposure: Past    Smokeless tobacco: Never   Substance and Sexual Activity    Alcohol use: Yes     Alcohol/week: 1.0 standard drink of alcohol     Types: 1 Glasses of wine per week     Comment: rarely     Drug use: No    Sexual activity: Yes     Partners: Female, Male     Birth control/protection: Partner-Vasectomy, See Surgical Hx, Other-see comments     Comment: Dental dam with female partners   Other Topics Concern    Are you pregnant or think you may be? No    Breast-feeding No     Social Determinants of Health     Financial Resource Strain:  Low Risk  (9/5/2024)    Overall Financial Resource Strain (CARDIA)     Difficulty of Paying Living Expenses: Not hard at all   Food Insecurity: No Food Insecurity (9/5/2024)    Hunger Vital Sign     Worried About Running Out of Food in the Last Year: Never true     Ran Out of Food in the Last Year: Never true   Transportation Needs: No Transportation Needs (12/12/2023)    PRAPARE - Transportation     Lack of Transportation (Medical): No     Lack of Transportation (Non-Medical): No   Physical Activity: Sufficiently Active (9/5/2024)    Exercise Vital Sign     Days of Exercise per Week: 3 days     Minutes of Exercise per Session: 50 min   Stress: Stress Concern Present (9/5/2024)    [employer] [place] [employer] - Occupational Stress Questionnaire     Feeling of Stress : To some extent   Housing Stability: Low Risk  (12/12/2023)    Housing Stability Vital Sign     Unable to Pay for Housing in the Last Year: No     Number of Places Lived in the Last Year: 1     Unstable Housing in the Last Year: No     Patient Active Problem List   Diagnosis    Allergy    Anxiety    Exercise-induced asthma    Migraine headache    ADHD    Depression, recurrent    Status post bilateral salpingectomy    Obesity, unspecified     Review of patient's allergies indicates:   Allergen Reactions    Sulfa (sulfonamide antibiotics) Hives    Sulfamethoxazole-trimethoprim        Health Maintenance  Health Maintenance Topics with due status: Not Due       Topic Last Completion Date    TETANUS VACCINE 12/10/2015    Cervical Cancer Screening 01/18/2023     Health Maintenance Due   Topic Date Due    Influenza Vaccine (1) 09/01/2024       Medications:  Current Outpatient Medications on File Prior to Visit   Medication Sig Dispense Refill    B-complex with vitamin C (Z-BEC OR EQUIV) tablet Take 1 tablet by mouth once daily.      buPROPion (WELLBUTRIN XL) 300 MG 24 hr tablet Take 1 tablet (300 mg total) by mouth once daily. 90 tablet 1     dextroamphetamine-amphetamine (ADDERALL) 10 mg Tab Take 1 tablet (10 mg total) by mouth once daily 30 tablet 0    iron-FA-dha-epa-FAD-NADH-be-mv (ENLYTE) 1.5 mg iron- 8.73 mg CpID Take 1 capsule by mouth Daily. 30 each 11    lisdexamfetamine (VYVANSE) 50 MG capsule Take 1 capsule (50 mg total) by mouth every morning. 30 capsule 0    lisdexamfetamine (VYVANSE) 50 MG capsule Take 1 capsule (50 mg total) by mouth every morning. 30 capsule 0    sertraline (ZOLOFT) 100 MG tablet Take 1 tablet (100 mg total) by mouth once daily. 90 tablet 1    valACYclovir (VALTREX) 500 MG tablet TAKE ONE TABLET BY MOUTH EVERY DAY 90 tablet 1    albuterol (PROVENTIL HFA) 90 mcg/actuation inhaler Inhale 2 puffs into the lungs every 6 (six) hours as needed for Wheezing. Rescue 18 g 0     No current facility-administered medications on file prior to visit.       Medications have been reviewed and reconciled with patient at visit today.    Exam:  Vitals:    09/05/24 0803   BP: 118/88   Pulse: 99   Resp: 18   Temp: 98.5 °F (36.9 °C)     Weight: 88.8 kg (195 lb 12.3 oz)   Body mass index is 31.6 kg/m².      Physical Exam    Vitals: Reviewed.  Constitutional: No acute distress. Well-developed. Not ill-appearing.  Eyes: No scleral icterus.  Neck: No cervical lymphadenopathy.  Cardiovascular: Normal rate and regular rhythm. Normal heart sounds.  Pulmonary: Pulmonary effort is normal. No respiratory distress. Normal breath sounds.  Abdomen: Soft. Nontender. Nondistended. Normoactive bowel sounds.  Extremities: No edema.  Skin: Warm. Dry.  Neurological: Alert and oriented to person, place, and time.  Psychiatric: Behavior normal.         Assessment:  The primary encounter diagnosis was Annual physical exam. Diagnoses of Exercise-induced asthma, HSV-1 infection, and Obesity (BMI 30.0-34.9) were also pertinent to this visit.    Assessment & Plan    ANNUAL PHYSICAL EXAM:  - Counseled regarding age appropriate screenings and immunizations  - Counseled  regarding lifestyle modifications  - Ms. Nicolas to inquire about updated COVID vaccine and flu vaccine availability at [facility].SLEEP DISTURBANCES:  - Considered recent sleep disturbances likely related to new job stressors.  - Ms. Nicolas to continue current sleep hygiene practices, including set bedtime and avoiding caffeine after 1:00 PM.  - Discussed PMDD concerns, recommended initial consultation with psychiatrist before considering GYN interventions.  - Follow up with Dr. Lovett (psychiatrist) to discuss PMDD concerns before considering further interventions.    ASTHMA:  - Assessed patient's asthma, noting it is now primarily exercise-induced and well-controlled.    HERPES LABIALIS:  - Valtrex refilled    MENTAL HEALTH:  - Reviewed patient's mental health management, currently under care of Dr. Radha Lovett.    FOLLOW UP:  - Follow up in 1 year for annual appointment.

## 2024-10-13 ENCOUNTER — HOSPITAL ENCOUNTER (EMERGENCY)
Facility: HOSPITAL | Age: 34
Discharge: HOME | End: 2024-10-13
Attending: EMERGENCY MEDICINE
Payer: COMMERCIAL

## 2024-10-13 VITALS
BODY MASS INDEX: 31.46 KG/M2 | TEMPERATURE: 99 F | SYSTOLIC BLOOD PRESSURE: 121 MMHG | WEIGHT: 195.75 LBS | OXYGEN SATURATION: 98 % | RESPIRATION RATE: 17 BRPM | DIASTOLIC BLOOD PRESSURE: 70 MMHG | HEART RATE: 60 BPM | HEIGHT: 66 IN

## 2024-10-13 DIAGNOSIS — R10.9 ABDOMINAL PAIN: ICD-10-CM

## 2024-10-13 DIAGNOSIS — K29.00 OTHER ACUTE GASTRITIS WITHOUT HEMORRHAGE: Primary | ICD-10-CM

## 2024-10-13 LAB
ALBUMIN SERPL BCP-MCNC: 4 G/DL (ref 3.5–5.2)
ALP SERPL-CCNC: 67 U/L (ref 55–135)
ALT SERPL W/O P-5'-P-CCNC: 14 U/L (ref 10–44)
ANION GAP SERPL CALC-SCNC: 8 MMOL/L (ref 8–16)
AST SERPL-CCNC: 19 U/L (ref 10–40)
B-HCG UR QL: NEGATIVE
BASOPHILS # BLD AUTO: 0.03 K/UL (ref 0–0.2)
BASOPHILS NFR BLD: 0.5 % (ref 0–1.9)
BILIRUB SERPL-MCNC: 0.3 MG/DL (ref 0.1–1)
BILIRUB UR QL STRIP: NEGATIVE
BNP SERPL-MCNC: 14 PG/ML (ref 0–99)
BUN SERPL-MCNC: 14 MG/DL (ref 6–20)
CALCIUM SERPL-MCNC: 9.8 MG/DL (ref 8.7–10.5)
CHLORIDE SERPL-SCNC: 107 MMOL/L (ref 95–110)
CLARITY UR: CLEAR
CO2 SERPL-SCNC: 25 MMOL/L (ref 23–29)
COLOR UR: YELLOW
CREAT SERPL-MCNC: 0.9 MG/DL (ref 0.5–1.4)
DIFFERENTIAL METHOD BLD: ABNORMAL
EOSINOPHIL # BLD AUTO: 0.2 K/UL (ref 0–0.5)
EOSINOPHIL NFR BLD: 2.8 % (ref 0–8)
ERYTHROCYTE [DISTWIDTH] IN BLOOD BY AUTOMATED COUNT: 12.8 % (ref 11.5–14.5)
EST. GFR  (NO RACE VARIABLE): >60 ML/MIN/1.73 M^2
GLUCOSE SERPL-MCNC: 123 MG/DL (ref 70–110)
GLUCOSE UR QL STRIP: NEGATIVE
HCT VFR BLD AUTO: 37.2 % (ref 37–48.5)
HGB BLD-MCNC: 12.5 G/DL (ref 12–16)
HGB UR QL STRIP: NEGATIVE
IMM GRANULOCYTES # BLD AUTO: 0.01 K/UL (ref 0–0.04)
IMM GRANULOCYTES NFR BLD AUTO: 0.2 % (ref 0–0.5)
KETONES UR QL STRIP: NEGATIVE
LEUKOCYTE ESTERASE UR QL STRIP: NEGATIVE
LIPASE SERPL-CCNC: 21 U/L (ref 4–60)
LYMPHOCYTES # BLD AUTO: 1.9 K/UL (ref 1–4.8)
LYMPHOCYTES NFR BLD: 30.8 % (ref 18–48)
MCH RBC QN AUTO: 30.1 PG (ref 27–31)
MCHC RBC AUTO-ENTMCNC: 33.6 G/DL (ref 32–36)
MCV RBC AUTO: 90 FL (ref 82–98)
MONOCYTES # BLD AUTO: 0.6 K/UL (ref 0.3–1)
MONOCYTES NFR BLD: 9.1 % (ref 4–15)
NEUTROPHILS # BLD AUTO: 3.5 K/UL (ref 1.8–7.7)
NEUTROPHILS NFR BLD: 56.6 % (ref 38–73)
NITRITE UR QL STRIP: NEGATIVE
NRBC BLD-RTO: 0 /100 WBC
PH UR STRIP: 6 [PH] (ref 5–8)
PLATELET # BLD AUTO: 266 K/UL (ref 150–450)
PMV BLD AUTO: 8.8 FL (ref 9.2–12.9)
POTASSIUM SERPL-SCNC: 3.6 MMOL/L (ref 3.5–5.1)
PROT SERPL-MCNC: 6.5 G/DL (ref 6–8.4)
PROT UR QL STRIP: NEGATIVE
RBC # BLD AUTO: 4.15 M/UL (ref 4–5.4)
SODIUM SERPL-SCNC: 140 MMOL/L (ref 136–145)
SP GR UR STRIP: 1.02 (ref 1–1.03)
TROPONIN I SERPL DL<=0.01 NG/ML-MCNC: <0.006 NG/ML (ref 0–0.03)
URN SPEC COLLECT METH UR: NORMAL
UROBILINOGEN UR STRIP-ACNC: NEGATIVE EU/DL
WBC # BLD AUTO: 6.17 K/UL (ref 3.9–12.7)

## 2024-10-13 PROCEDURE — 99285 EMERGENCY DEPT VISIT HI MDM: CPT | Mod: 25

## 2024-10-13 PROCEDURE — 83880 ASSAY OF NATRIURETIC PEPTIDE: CPT | Performed by: NURSE PRACTITIONER

## 2024-10-13 PROCEDURE — 83690 ASSAY OF LIPASE: CPT | Performed by: NURSE PRACTITIONER

## 2024-10-13 PROCEDURE — 63600175 PHARM REV CODE 636 W HCPCS: Performed by: EMERGENCY MEDICINE

## 2024-10-13 PROCEDURE — 80053 COMPREHEN METABOLIC PANEL: CPT | Performed by: NURSE PRACTITIONER

## 2024-10-13 PROCEDURE — 85025 COMPLETE CBC W/AUTO DIFF WBC: CPT | Performed by: NURSE PRACTITIONER

## 2024-10-13 PROCEDURE — 93010 ELECTROCARDIOGRAM REPORT: CPT | Mod: ,,, | Performed by: STUDENT IN AN ORGANIZED HEALTH CARE EDUCATION/TRAINING PROGRAM

## 2024-10-13 PROCEDURE — 63600175 PHARM REV CODE 636 W HCPCS: Performed by: NURSE PRACTITIONER

## 2024-10-13 PROCEDURE — 81025 URINE PREGNANCY TEST: CPT | Performed by: NURSE PRACTITIONER

## 2024-10-13 PROCEDURE — 25000003 PHARM REV CODE 250: Performed by: EMERGENCY MEDICINE

## 2024-10-13 PROCEDURE — 96375 TX/PRO/DX INJ NEW DRUG ADDON: CPT

## 2024-10-13 PROCEDURE — 25500020 PHARM REV CODE 255: Performed by: EMERGENCY MEDICINE

## 2024-10-13 PROCEDURE — 81003 URINALYSIS AUTO W/O SCOPE: CPT | Performed by: NURSE PRACTITIONER

## 2024-10-13 PROCEDURE — 84484 ASSAY OF TROPONIN QUANT: CPT | Performed by: NURSE PRACTITIONER

## 2024-10-13 PROCEDURE — 96374 THER/PROPH/DIAG INJ IV PUSH: CPT

## 2024-10-13 PROCEDURE — 93005 ELECTROCARDIOGRAM TRACING: CPT

## 2024-10-13 RX ORDER — ONDANSETRON HYDROCHLORIDE 2 MG/ML
4 INJECTION, SOLUTION INTRAVENOUS
Status: COMPLETED | OUTPATIENT
Start: 2024-10-13 | End: 2024-10-13

## 2024-10-13 RX ORDER — ALUMINUM HYDROXIDE, MAGNESIUM HYDROXIDE, AND SIMETHICONE 1200; 120; 1200 MG/30ML; MG/30ML; MG/30ML
30 SUSPENSION ORAL ONCE
Status: COMPLETED | OUTPATIENT
Start: 2024-10-13 | End: 2024-10-13

## 2024-10-13 RX ORDER — KETOROLAC TROMETHAMINE 30 MG/ML
15 INJECTION, SOLUTION INTRAMUSCULAR; INTRAVENOUS
Status: COMPLETED | OUTPATIENT
Start: 2024-10-13 | End: 2024-10-13

## 2024-10-13 RX ORDER — LIDOCAINE HYDROCHLORIDE 20 MG/ML
15 SOLUTION OROPHARYNGEAL ONCE
Status: COMPLETED | OUTPATIENT
Start: 2024-10-13 | End: 2024-10-13

## 2024-10-13 RX ORDER — FAMOTIDINE 20 MG/1
20 TABLET, FILM COATED ORAL NIGHTLY PRN
Qty: 30 TABLET | Refills: 0 | Status: SHIPPED | OUTPATIENT
Start: 2024-10-13

## 2024-10-13 RX ADMIN — LIDOCAINE HYDROCHLORIDE 15 ML: 20 SOLUTION ORAL at 01:10

## 2024-10-13 RX ADMIN — IOHEXOL 100 ML: 350 INJECTION, SOLUTION INTRAVENOUS at 02:10

## 2024-10-13 RX ADMIN — ONDANSETRON 4 MG: 2 INJECTION INTRAMUSCULAR; INTRAVENOUS at 01:10

## 2024-10-13 RX ADMIN — KETOROLAC TROMETHAMINE 15 MG: 30 INJECTION, SOLUTION INTRAMUSCULAR at 03:10

## 2024-10-13 RX ADMIN — ALUMINUM HYDROXIDE, MAGNESIUM HYDROXIDE, AND DIMETHICONE 30 ML: 200; 20; 200 SUSPENSION ORAL at 01:10

## 2024-10-13 NOTE — FIRST PROVIDER EVALUATION
"Medical screening examination initiated.  I have conducted a focused provider triage encounter, findings are as follows:    Brief history of present illness:  Patient reports worsening epigastric pain.  Also reports shortness of breath and nausea    Vitals:    10/13/24 0031   BP: (!) 174/102   Pulse: 78   Resp: 20   Temp: 98.2 °F (36.8 °C)   TempSrc: Oral   SpO2: 99%   Weight: 88.8 kg (195 lb 12.3 oz)   Height: 5' 6" (1.676 m)       Pertinent physical exam:  No acute distress    Brief workup plan:  Labs, EKG, imaging, further eval    Preliminary workup initiated; this workup will be continued and followed by the physician or advanced practice provider that is assigned to the patient when roomed.  "

## 2024-10-13 NOTE — ED PROVIDER NOTES
SCRIBE #1 NOTE: I, Cynthia Bach, am scribing for, and in the presence of, Sis Brennan DO. I have scribed the entire note.       History     Chief Complaint   Patient presents with    Abdominal Pain     Pt reports burning epigastric pain that began around 1800 this evening.  Pt states she has never had this pain before. Pt reports SOB and nausea secondary to pain.  No vomiting or diarrhea.      Review of patient's allergies indicates:   Allergen Reactions    Sulfa (sulfonamide antibiotics) Hives    Sulfamethoxazole-trimethoprim          History of Present Illness     HPI    10/13/2024, 1:24 AM  History obtained from the patient      History of Present Illness: Billie Nicolas is a 34 y.o. female patient with a PMHx of HSV-1 infection and abnormal cervical pap smear with positive HPV DNA test who presents to the Emergency Department for evaluation of epigastric abdominal pain that radiates to back bilaterally which onset suddenly at 6-7 pm. Pt reports the pain started intermittently but now symptoms are constant and moderate in severity. Pt reports never experiencing the pain before. Pt is unable to sleep. Laying flat worsens pain. Pt still has gallbladder and appendix. No mitigating or exacerbating factors reported. Associated sxs include SOB, chest pain, and nausea secondary to pain. Patient denies any vaginal bleeding or discharge, dysuria, vomiting, and all other sxs at this time. Pt is not on birth control. Pt reports recently started naproxen for the past week. Pt has no history of blood clots and no recent hospitalization. Pt is an occasional drinker.  She has been taking Naprosyn recently which she does not normally use.  No further complaints or concerns at this time.       Arrival mode: Personal vehicle     PCP: Leonie Rodriguez DO        Past Medical History:  Past Medical History:   Diagnosis Date    Abnormal cervical Pap smear with positive HPV DNA test     ADHD, predominantly inattentive  type     Allergy     Anxiety     Asthma     Bulimia     Genetic testing 05/22/2024    [facility] BRCA1/2 Analyses with [employer] (85 genes) - NEGATIVE, VUS in RB1    HSV-1 infection     Major depression, recurrent     Migraine headache        Past Surgical History:  Past Surgical History:   Procedure Laterality Date    ABSCESS DRAINAGE  12/25/2017    abscess lanced     ADENOIDECTOMY  1996?    LAPAROSCOPIC SALPINGECTOMY Bilateral 07/31/2023    Procedure: SALPINGECTOMY, LAPAROSCOPIC;  Surgeon: Elvin Shoemaker MD;  Location: [facility];  Service: OB/GYN;  Laterality: Bilateral;    TONSILLECTOMY  1996    TONSILLECTOMY AND ADENOIDECTOMY      TUBAL LIGATION  7/31/23    Bilateral salpingectomy- laproscopic    WISDOM TOOTH EXTRACTION           Family History:  Family History   Problem Relation Name Age of Onset    Cervical cancer Mother Dana 32        metastastized to uterus, bladder, LIU/BSO    Heart disease Mother Dana         mitral valve prolapse, stage 1 CHF, stent placed at 49 years old    Mitral valve prolapse Mother Dana     Hypertension Mother Dana     Alcohol abuse Mother Dana     Cancer Mother Dana         cervical cancer    Miscarriages / Stillbirths Mother Dana         1 known miscarriage    Hypertension Maternal Grandmother Billie quinn     Cancer Maternal Grandmother Billie quinn         uterine cancer    Lung cancer Maternal Grandfather Chet Nicolas         metastasized, +smoking hx    Heart disease Maternal Grandfather Chet Nicolas         Mitral valve prolapse, STEMI at 55    Hodgkin's lymphoma Maternal Grandfather Chet Nicolas     Hypertension Maternal Grandfather Chet Nicolas     Cancer Maternal Grandfather Chet Nicolas         non-Hodgkins Lymphoma    Alcohol abuse Father Corwin     Clotting disorder Maternal Uncle      Liver cancer Other      Pancreatic cancer Other      Colon cancer Other  48    Pancreatic cancer Paternal Uncle      Miscarriages / Stillbirths Sister  Fela Rai         3 miscarriages/chemical pregnancies    Melanoma Neg Hx         Social History:  Social History     Tobacco Use    Smoking status: Never     Passive exposure: Past    Smokeless tobacco: Never   Substance and Sexual Activity    Alcohol use: Yes     Alcohol/week: 1.0 standard drink of alcohol     Types: 1 Glasses of wine per week     Comment: rarely     Drug use: No    Sexual activity: Yes     Partners: Female, Male     Birth control/protection: Partner-Vasectomy, See Surgical Hx, Other-see comments     Comment: Dental dam with female partners        Review of Systems     Review of Systems   Constitutional:  Negative for fever.   Respiratory:  Positive for shortness of breath.    Cardiovascular:  Positive for chest pain.   Gastrointestinal:  Positive for abdominal pain (Epigastric) and nausea (Secondary to pain). Negative for vomiting.   Genitourinary:  Negative for dysuria, vaginal bleeding and vaginal discharge.   Musculoskeletal:  Positive for back pain.   All other systems reviewed and are negative.     Physical Exam     Initial Vitals [10/13/24 0031]   BP Pulse Resp Temp SpO2   (!) 174/102 78 20 98.2 °F (36.8 °C) 99 %      MAP       --          Physical Exam  Nursing Notes and Vital Signs Reviewed.  Constitutional: Patient is in no acute distress. Well-developed and well-nourished.  Head: Atraumatic. Normocephalic.  Eyes: PERRL. EOM intact. Conjunctivae are not pale. No scleral icterus.  ENT: Mucous membranes are moist. Oropharynx is clear and symmetric.    Neck: Supple. Full ROM. No lymphadenopathy.  Cardiovascular: Regular rate. Regular rhythm. No murmurs, rubs, or gallops. Distal pulses are 2+ and symmetric.  Pulmonary/Chest: No respiratory distress. Clear to auscultation bilaterally. No wheezing or rales.  Abdominal: Soft and non-distended.  Epigastric abdominal tenderness.  No rebound, guarding, or rigidity. Good bowel sounds.  Genitourinary: No CVA tenderness  Musculoskeletal: Moves all  "extremities. No obvious deformities. No edema. No calf tenderness.  Skin: Warm and dry.  Neurological:  Alert, awake, and appropriate.  Normal speech.  No acute focal neurological deficits are appreciated.  Psychiatric: Normal affect. Good eye contact. Appropriate in content.     ED Course   Procedures  ED Vital Signs:  Vitals:    10/13/24 0031 10/13/24 0105 10/13/24 0202 10/13/24 0233   BP: (!) 174/102  (!) 137/97 127/86   Pulse: 78 71 67 65   Resp: 20      Temp: 98.2 °F (36.8 °C)      TempSrc: Oral      SpO2: 99%  96% 97%   Weight: 88.8 kg (195 lb 12.3 oz)      Height: 5' 6" (1.676 m)       10/13/24 0351 10/13/24 0436   BP: 129/84 121/70   Pulse: 68 60   Resp: 20 17   Temp: 98.2 °F (36.8 °C) 98.6 °F (37 °C)   TempSrc:  Oral   SpO2:  98%   Weight:     Height:         Abnormal Lab Results:  Labs Reviewed   CBC W/ AUTO DIFFERENTIAL - Abnormal       Result Value    WBC 6.17      RBC 4.15      Hemoglobin 12.5      Hematocrit 37.2      MCV 90      MCH 30.1      MCHC 33.6      RDW 12.8      Platelets 266      MPV 8.8 (*)     Immature Granulocytes 0.2      Gran # (ANC) 3.5      Immature Grans (Abs) 0.01      Lymph # 1.9      Mono # 0.6      Eos # 0.2      Baso # 0.03      nRBC 0      Gran % 56.6      Lymph % 30.8      Mono % 9.1      Eosinophil % 2.8      Basophil % 0.5      Differential Method Automated     COMPREHENSIVE METABOLIC PANEL - Abnormal    Sodium 140      Potassium 3.6      Chloride 107      CO2 25      Glucose 123 (*)     BUN 14      Creatinine 0.9      Calcium 9.8      Total Protein 6.5      Albumin 4.0      Total Bilirubin 0.3      Alkaline Phosphatase 67      AST 19      ALT 14      eGFR >60      Anion Gap 8     TROPONIN I    Troponin I <0.006     B-TYPE NATRIURETIC PEPTIDE    BNP 14     LIPASE    Lipase 21     URINALYSIS, REFLEX TO URINE CULTURE    Specimen UA Urine, Clean Catch      Color, UA Yellow      Appearance, UA Clear      pH, UA 6.0      Specific Gravity, UA 1.020      Protein, UA Negative      " Glucose, UA Negative      Ketones, UA Negative      Bilirubin (UA) Negative      Occult Blood UA Negative      Nitrite, UA Negative      Urobilinogen, UA Negative      Leukocytes, UA Negative      Narrative:     Specimen Source->Urine   PREGNANCY TEST, URINE RAPID    Preg Test, Ur Negative      Narrative:     Specimen Source->Urine        All Lab Results:  Results for orders placed or performed during the hospital encounter of 10/13/24   EKG 12-lead    Collection Time: 10/13/24 12:34 AM   Result Value Ref Range    QRS Duration 72 ms    [facility] QTC Calculation 445 ms   CBC Auto Differential    Collection Time: 10/13/24  1:14 AM   Result Value Ref Range    WBC 6.17 3.90 - 12.70 K/uL    RBC 4.15 4.00 - 5.40 M/uL    Hemoglobin 12.5 12.0 - 16.0 g/dL    Hematocrit 37.2 37.0 - 48.5 %    MCV 90 82 - 98 fL    MCH 30.1 27.0 - 31.0 pg    MCHC 33.6 32.0 - 36.0 g/dL    RDW 12.8 11.5 - 14.5 %    Platelets 266 150 - 450 K/uL    MPV 8.8 (L) 9.2 - 12.9 fL    Immature Granulocytes 0.2 0.0 - 0.5 %    Gran # (ANC) 3.5 1.8 - 7.7 K/uL    Immature Grans (Abs) 0.01 0.00 - 0.04 K/uL    Lymph # 1.9 1.0 - 4.8 K/uL    Mono # 0.6 0.3 - 1.0 K/uL    Eos # 0.2 0.0 - 0.5 K/uL    Baso # 0.03 0.00 - 0.20 K/uL    nRBC 0 0 /100 WBC    Gran % 56.6 38.0 - 73.0 %    Lymph % 30.8 18.0 - 48.0 %    Mono % 9.1 4.0 - 15.0 %    Eosinophil % 2.8 0.0 - 8.0 %    Basophil % 0.5 0.0 - 1.9 %    Differential Method Automated    Comprehensive Metabolic Panel    Collection Time: 10/13/24  1:14 AM   Result Value Ref Range    Sodium 140 136 - 145 mmol/L    Potassium 3.6 3.5 - 5.1 mmol/L    Chloride 107 95 - 110 mmol/L    CO2 25 23 - 29 mmol/L    Glucose 123 (H) 70 - 110 mg/dL    BUN 14 6 - 20 mg/dL    Creatinine 0.9 0.5 - 1.4 mg/dL    Calcium 9.8 8.7 - 10.5 mg/dL    Total Protein 6.5 6.0 - 8.4 g/dL    Albumin 4.0 3.5 - 5.2 g/dL    Total Bilirubin 0.3 0.1 - 1.0 mg/dL    Alkaline Phosphatase 67 55 - 135 U/L    AST 19 10 - 40 U/L    ALT 14 10 - 44 U/L    eGFR >60 >60 mL/min/1.73  m^2    Anion Gap 8 8 - 16 mmol/L   Troponin I    Collection Time: 10/13/24  1:14 AM   Result Value Ref Range    Troponin I <0.006 0.000 - 0.026 ng/mL   Brain natriuretic peptide    Collection Time: 10/13/24  1:14 AM   Result Value Ref Range    BNP 14 0 - 99 pg/mL   Lipase    Collection Time: 10/13/24  1:14 AM   Result Value Ref Range    Lipase 21 4 - 60 U/L   Urinalysis, Reflex to Urine Culture Urine, Clean Catch    Collection Time: 10/13/24  1:23 AM    Specimen: Urine   Result Value Ref Range    Specimen UA Urine, Clean Catch     Color, UA Yellow Yellow, Straw, Karina    Appearance, UA Clear Clear    pH, UA 6.0 5.0 - 8.0    Specific Gravity, UA 1.020 1.005 - 1.030    Protein, UA Negative Negative    Glucose, UA Negative Negative    Ketones, UA Negative Negative    Bilirubin (UA) Negative Negative    Occult Blood UA Negative Negative    Nitrite, UA Negative Negative    Urobilinogen, UA Negative <2.0 EU/dL    Leukocytes, UA Negative Negative   Pregnancy, urine rapid (UPT)    Collection Time: 10/13/24  1:23 AM   Result Value Ref Range    Preg Test, Ur Negative      *Note: Due to a large number of results and/or encounters for the requested time period, some results have not been displayed. A complete set of results can be found in Results Review.       Imaging Results:  Imaging Results              CT Abdomen Pelvis With IV Contrast NO Oral Contrast (Final result)  Result time 10/13/24 09:42:15      Final result by AYESHA Newberry Sr., MD (10/13/24 09:42:15)                   Impression:      The body and tail of the pancreas are prominent in size. There are no adjacent inflammatory changes.    All CT scans at this facility use dose modulation, iterative reconstruction, and/or weight base dosing when appropriate to reduce radiation dose when appropriate to reduce radiation dose to as low as reasonably achievable.      Electronically signed by: Hamilton Newberry MD  Date:    10/13/2024  Time:    09:42                Narrative:    EXAMINATION:  CT ABDOMEN PELVIS WITH IV CONTRAST    CLINICAL HISTORY:  Epigastric pain;    TECHNIQUE:  Standard abdomen and pelvis CT protocol with IV contrast was performed    COMPARISON:  None    FINDINGS:  Finding: The size of the heart is within normal limits. The lungs are clear. There is no pneumothorax or pleural effusion.    The body and tail of the pancreas are prominent in size.  There are no adjacent inflammatory changes.  The liver, gallbladder, spleen, adrenals, and kidneys are normal in appearance. The ureters and the urinary bladder are normal in appearance. The appendix and the rest of the gastrointestinal system are normal in appearance. There is no free fluid within the abdomen or pelvis. There is no pneumoperitoneum.                        Wet Read by Sis Brennan DO (10/13/24 04:05:49, [facility] Emergency Medicine)    Stat rad Radha Sanchez MD    Fluid and gas-filled small bowel loops may represent enteritis in the appropriate clinical setting.  Large amount of stool in the colon.  Small fat containing umbilical hernia.  Small amount of fluid in the posterior pelvis.                                     X-Ray Chest 1 View (Final result)  Result time 10/13/24 07:29:55      Final result by AYESHA Newberry Sr., MD (10/13/24 07:29:55)                   Impression:      Normal study.      Electronically signed by: Hamilton Newberry MD  Date:    10/13/2024  Time:    07:29               Narrative:    EXAMINATION:  XR CHEST 1 VIEW    CLINICAL HISTORY:  shortness of breath;    COMPARISON:  01/21/2022    FINDINGS:  The size of the heart is normal. The lungs are clear. There is no pneumothorax.  The costophrenic angles are sharp.                        Wet Read by Sis Brennan DO (10/13/24 04:06:16, [facility])    No acute findings                                   Type of Interpretation: Outside Written Report.  Radiology Procedure  Done: Abdomen/Pelvis CT with Contrast.  Interpretation: Fluid and gas-filled small bowel loops may represent enteritis in the appropriate clinical setting.  Radiologist: Radha Sanchez MD          The EKG was ordered, reviewed, and independently interpreted by the ED provider.  Interpretation time: 00:34  Rate: 80 BPM  Rhythm: normal sinus rhythm  Interpretation: No acute ST changes. No STEMI.           The Emergency Provider reviewed the vital signs and test results, which are outlined above.     ED Discussion     4:25 AM: Reassessed pt at this time. Discussed with pt all pertinent ED information and results. Discussed pt dx and plan of tx. Gave pt all f/u and return to the ED instructions. All questions and concerns were addressed at this time. Pt expresses understanding of information and instructions, and is comfortable with plan to discharge. Pt is stable for discharge.    I discussed with patient and/or family/caretaker that evaluation in the ED does not suggest any emergent or life threatening medical conditions requiring immediate intervention beyond what was provided in the ED, and I believe patient is safe for discharge.  Regardless, an unremarkable evaluation in the ED does not preclude the development or presence of a serious of life threatening condition. As such, patient was instructed to return immediately for any worsening or change in current symptoms.    ED Course as of 10/13/24 2343   Sun Oct 13, 2024   0346 CT abdomen and pelvis with IV contrast Fluid and gas-filled small bowel loops may represent enteritis in the appropriate clinical setting [NF]   2340 34-year-old female presents with epigastric abdominal pain that radiates to her chest.  Likely gastritis secondary to use of NSAIDs.  Lipase is negative for pancreatitis.  Cardiac workup including EKG, troponin, BNP, and chest x-ray are all normal.  She has a PERC score of 0 effectively ruling out PE and a heart score of 0.  Her H&H is normal,  nothing to indicate bleeding.  No fever or leukocytosis, nothing to indicate infection.  Normal renal function, LFTs, and electrolytes.  Urine is unremarkable for blood to indicate kidney stone, or infection to indicate pyelonephritis.  CT abdomen and pelvis with IV contrast shows no findings to suggest appendicitis or cholecystitis.  No small-bowel obstruction or hernia.  No colitis.  I suspect the cause of her symptoms is gastritis.  Instructed to avoid NSAID use and other exacerbating foods/drinks that cause gastritis and take over-the-counter Pepcid as needed. [NF]      ED Course User Index  [NF] Sis Brennan, DO     Medical Decision Making  34-year-old female presents with epigastric abdominal pain that radiates to her chest.  Likely gastritis secondary to use of NSAIDs.  Lipase is negative for pancreatitis.  Cardiac workup including EKG, troponin, BNP, and chest x-ray are all normal.  She has a PERC score of 0 effectively ruling out PE and a heart score of 0.  Her H&H is normal, nothing to indicate bleeding.  No fever or leukocytosis, nothing to indicate infection.  Normal renal function, LFTs, and electrolytes.  Urine is unremarkable for blood to indicate kidney stone, or infection to indicate pyelonephritis.  CT abdomen and pelvis with IV contrast shows no findings to suggest appendicitis or cholecystitis.  No small-bowel obstruction or hernia.  No colitis.  I suspect the cause of her symptoms is gastritis.  Pain resolved here with Toradol and GI cocktail.  The reason Toradol was chosen for treatment was due to patient request.  In hindsight this may have exacerbated her gastritis.  Instructed to avoid NSAID use and other exacerbating foods/drinks that cause gastritis and take over-the-counter Pepcid as needed.      Amount and/or Complexity of Data Reviewed  Labs: ordered. Decision-making details documented in ED Course.  Radiology: ordered and independent interpretation performed. Decision-making  details documented in ED Course.  ECG/medicine tests: ordered and independent interpretation performed. Decision-making details documented in ED Course.    Risk  OTC drugs.  Prescription drug management.       Additional MDM:   Differential Diagnosis:   Gastritis, GERD, peptic ulcer disease, esophagitis, pancreatitis, cholecystitis, appendicitis, pyelonephritis, kidney stone, ACS, PE.      PERC Rule:   Age is greater than or equal to 50 = 0.0  Heart Rate is greater than or equal to 100 = 0.0  SaO2 on room air < 95% = 0.0  Unilateral leg swelling = 0.0  Hemoptysis = 0.0  Recent surgery or trauma = 0.0  Prior PE or DVT =  0.0  Hormone use = 0.00  PERC Score = 0      Heart Score:    History:          Slightly suspicious.  ECG:             Normal  Age:               Less than 45 years  Risk factors: no risk factors known  Troponin:       Less than or equal to normal limit  Heart Score = 0                ED Medication(s):  Medications   ondansetron injection 4 mg (4 mg Intravenous Given 10/13/24 0123)   aluminum-magnesium hydroxide-simethicone 200-200-20 mg/5 mL suspension 30 mL (30 mLs Oral Given 10/13/24 0147)     And   LIDOcaine viscous HCl 2% oral solution 15 mL (15 mLs Oral Given 10/13/24 0147)   iohexoL (OMNIPAQUE 350) injection 100 mL (100 mLs Intravenous Given 10/13/24 0248)   ketorolac injection 15 mg (15 mg Intravenous Given 10/13/24 0319)       Discharge Medication List as of 10/13/2024  4:25 AM        START taking these medications    Details   famotidine (PEPCID) 20 MG tablet Take 1 tablet (20 mg total) by mouth nightly as needed for Heartburn., Starting Sun 10/13/2024, Print              Follow-up Information       Leonie Rodriguez DO On 10/18/2024.    Specialty: Internal Medicine  Contact information:  [address] [place] [address] [phone]               [facility] - Emergency Dept..    Specialty: Emergency Medicine  Why: As needed, If symptoms worsen  Contact information:  [id number] [address]  [facility] [id number] [phone]                               Scribe Attestation:   Scribe #1: I performed the above scribed service and the documentation accurately describes the services I performed. I attest to the accuracy of the note.     Attending:   Physician Attestation Statement for Scribe #1: I, Sis Brennan DO, personally performed the services described in this documentation, as scribed by Cynthia Bach, in my presence, and it is both accurate and complete.           Clinical Impression       ICD-10-CM ICD-9-CM   1. Other acute gastritis without hemorrhage  K29.00 535.00   2. Abdominal pain  R10.9 789.00       Disposition:   Disposition: Discharged  Condition: Stable       Sis Brennan DO  10/13/24 6537

## 2024-10-14 LAB
OHS QRS DURATION: 72 MS
OHS QTC CALCULATION: 445 MS

## 2024-10-16 ENCOUNTER — PATIENT MESSAGE (OUTPATIENT)
Dept: INTERNAL MEDICINE | Facility: CLINIC | Age: 34
End: 2024-10-16
Payer: COMMERCIAL

## 2024-10-21 ENCOUNTER — OFFICE VISIT (OUTPATIENT)
Dept: PSYCHIATRY | Facility: CLINIC | Age: 34
End: 2024-10-21
Payer: COMMERCIAL

## 2024-10-21 DIAGNOSIS — F33.42 MAJOR DEPRESSIVE DISORDER, RECURRENT EPISODE, IN FULL REMISSION: ICD-10-CM

## 2024-10-21 DIAGNOSIS — F90.8 OTHER SPECIFIED ATTENTION DEFICIT HYPERACTIVITY DISORDER (ADHD): Primary | ICD-10-CM

## 2024-10-21 DIAGNOSIS — Z15.89 MTHFR GENE MUTATION: ICD-10-CM

## 2024-10-21 DIAGNOSIS — F41.9 ANXIETY: ICD-10-CM

## 2024-10-21 DIAGNOSIS — F32.81 PMDD (PREMENSTRUAL DYSPHORIC DISORDER): ICD-10-CM

## 2024-10-21 PROCEDURE — 99215 OFFICE O/P EST HI 40 MIN: CPT | Mod: 95,,, | Performed by: PSYCHOLOGIST

## 2024-10-21 PROCEDURE — 3044F HG A1C LEVEL LT 7.0%: CPT | Mod: CPTII,95,, | Performed by: PSYCHOLOGIST

## 2024-10-21 PROCEDURE — 1159F MED LIST DOCD IN RCRD: CPT | Mod: CPTII,95,, | Performed by: PSYCHOLOGIST

## 2024-10-21 RX ORDER — LISDEXAMFETAMINE DIMESYLATE 60 MG/1
60 CAPSULE ORAL EVERY MORNING
Qty: 30 CAPSULE | Refills: 0 | Status: SHIPPED | OUTPATIENT
Start: 2024-11-20 | End: 2024-12-20

## 2024-10-21 RX ORDER — LISDEXAMFETAMINE DIMESYLATE 60 MG/1
60 CAPSULE ORAL EVERY MORNING
Qty: 30 CAPSULE | Refills: 0 | Status: SHIPPED | OUTPATIENT
Start: 2024-12-20 | End: 2025-01-19

## 2024-10-21 RX ORDER — LISDEXAMFETAMINE DIMESYLATE 60 MG/1
60 CAPSULE ORAL EVERY MORNING
Qty: 30 CAPSULE | Refills: 0 | Status: SHIPPED | OUTPATIENT
Start: 2024-10-21 | End: 2024-11-23

## 2024-10-21 RX ORDER — BUPROPION HYDROCHLORIDE 300 MG/1
300 TABLET ORAL DAILY
Qty: 90 TABLET | Refills: 1 | Status: SHIPPED | OUTPATIENT
Start: 2024-10-21 | End: 2025-04-19

## 2024-10-21 RX ORDER — DEXTROAMPHETAMINE SACCHARATE, AMPHETAMINE ASPARTATE, DEXTROAMPHETAMINE SULFATE AND AMPHETAMINE SULFATE 2.5; 2.5; 2.5; 2.5 MG/1; MG/1; MG/1; MG/1
10 TABLET ORAL DAILY
Qty: 30 TABLET | Refills: 0 | Status: SHIPPED | OUTPATIENT
Start: 2024-10-21 | End: 2024-11-23

## 2024-10-21 RX ORDER — SERTRALINE HYDROCHLORIDE 100 MG/1
150 TABLET, FILM COATED ORAL DAILY
Qty: 135 TABLET | Refills: 1 | Status: SHIPPED | OUTPATIENT
Start: 2024-10-21 | End: 2025-04-19

## 2024-10-21 NOTE — PATIENT INSTRUCTIONS

## 2024-10-21 NOTE — PROGRESS NOTES
Outpatient Psychiatry Follow-Up Visit    10/21/2024    Timeframe: Corona Virus Outbreak     The patient location is: Patient's work/ Patient reported that his/her location at the time of this visit was in the [facility]     Visit type: Virtual visit with synchronous audio and video     Each patient to whom he or she provides medical services by telehealth is: (1) informed of the relationship between the medical psychologist and patient and the respective role of any other health care provider with respect to management of the patient; and (2) notified that he or she may decline to receive medical services by telehealth and may withdraw from such care at any time.    I also informed patient of the following:   Radha Lovett, PhD, MPAP:  [place] medical license number: [id number]    My contact info:  [facility] / 2nd Floor  [address] [place] [address]   Ph: [phone]    If technology issues, call office phone: Ph: [phone]  If crisis: Dial 911 or go to nearest Emergency Room (ER)  If questions related to privacy practices: contact [facility] Information Department: [phone]    Chief Complaint:  Billie Nicolas is a 34 y.o. female who presents today for follow-up of anxiety and inattention/distractibility .       Preferred Name: Billie  Gender Identity: cis female  Preferred Pronouns:  she/her/they also okay    LAST VISIT: Billie attended her visit. 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 She reported that she has noticed that as she works on her sensation needs, she has seen improvement in her mood. She has intermittent FMLA for her CPTSD--she gets one full day as needed plus a recovery day. If she needs it, she also has weekly therapy time. 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 We had previously decreased Vyvanse, and she  thought it worked much better. On the 50 mg, she still has her appetite.     She had a cancer scare--turns out she does not have it (had confirmation testing) but with getting the genetic testing (23 and Me), she found her biological father. She met him for the first time Father's Day weekend--and they talk almost daily. He is in [place], [place].  She teared up when relating that she was able to text him to ask for advice recently.  She said that she has never had that before.  We reviewed parts of her gene results, and we agreed to a trial of in light for her MTHFR variation.  We are continuing her medications as noted below.    Plan--continue Zoloft 100 mg, Wellbutrin  mg, Vyvanse 50 mg  (sent 3 scripts), Adderall 10 mg (sent 1 script); Enlyte    CURRENT PRESENTATION: Billie attended her virtual visit. She had not started Enlyte--expensive. She has been taking a b vitamin. We talked about her mood and MTHFR variant, encouraging her to get l-methylfolate supplementation OTC. She reported that her PMDD has been more problematic. It has not caused functional limitations because she is aware of the effect on her relationships and work and takes active measures to overcome it--but it has been much more noticeable. We considered medicine changes (adding/changing Prozac, adding Ativan) but ultimately agreed to increase Zoloft.    She reported that she does a lot of vocal stimulation with her ADHD/Spectrum--on Concerta more outside of her control. She is usually able to control that at work but less able to do that on Concerta. She usually makes different noises at home--sometimes cat noises, hums, other high pitched sounds, etc. She reported that the Vyvanse 50 mg dose has not been as effective as the 60 mg--she gets hyperfocused at times but that continued with 50 mg, too, and she thinks that is more of an issue that she has to work on or address. See plan below.    Plan--continue Wellbutrin  mg, Vyvanse 50 mg   (sent 3 scripts), Adderall 10 mg (sent 1 script); increase Zoloft 150 mg; OTC l-methylfolate    Man (partner)  Pharmacist     since 2024.        Therapist: Priscilla (Faiza Malave), trauma therapist    Prior medicines: Viibryd (good for depression but not anxiety), Lexapro (sedating and numbing), trazodone, Strattera (made her angry); Concerta (verbal tics); Adderall XR (teeth-grinding; comedown); Vyvanse     Gene results: SLC6A4=L/L; COMT=abbey/abbey; MTHFR=T/T; [id number]=DK; CY=PM  -----------------------------------------------------------------------------------------------------------        10/17/2024    11:35 AM 2024     1:24 PM 2024    11:25 AM   GAD7   1. Feeling nervous, anxious, or on edge? 1  0  1    2. Not being able to stop or control worrying? 1  0  2    3. Worrying too much about different things? 1  0  1    4. Trouble relaxing? 2  0  2    5. Being so restless that it is hard to sit still? 1  1  1    6. Becoming easily annoyed or irritable? 1  1  1    7. Feeling afraid as if something awful might happen? 1  0  1    8. If you checked off any problems, how difficult have these problems made it for you to do your work, take care of things at home, or get along with other people? 1  1     BEATRIZ-7 Score 8  2 9       Patient-reported      0-4 = Minimal anxiety  5-9 = Mild anxiety  10-14 = Moderate anxiety  15-21 = Severe anxiety           2024     1:34 PM 2022     1:54 PM 2018     9:55 AM 2018     4:21 PM   Depression Patient Health Questionnaire   Over the last two weeks how often have you been bothered by little interest or pleasure in doing things Not at all Nearly every day Not at all Not at all   Over the last two weeks how often have you been bothered by feeling down, depressed or hopeless Not at all Nearly every day More than half the days Several days   PHQ-2 Total Score 0 6 2 1   Over the last two weeks how often have you been bothered by trouble falling or  staying asleep, or sleeping too much  Nearly every day     Over the last two weeks how often have you been bothered by feeling tired or having little energy  Nearly every day     Over the last two weeks how often have you been bothered by a poor appetite or overeating  Nearly every day     Over the last two weeks how often have you been bothered by feeling bad about yourself - or that you are a failure or have let yourself or your family down  Nearly every day     Over the last two weeks how often have you been bothered by trouble concentrating on things, such as reading the newspaper or watching television  Nearly every day     Over the last two weeks how often have you been bothered by moving or speaking so slowly that other people could have noticed. Or the opposite - being so fidgety or restless that you have been moving around a lot more than usual.  Nearly every day     Over the last two weeks how often have you been bothered by thoughts that you would be better off dead, or of hurting yourself  Not at all     If you checked off any problems, how difficult have these problems made it for you to do your work, take care of things at home or get along with other people?  Somewhat difficult     PHQ-9 Score  24     PHQ-9 Interpretation  Severe       0-4 = No intervention  5 to 9 = Mild  10 to 14 = Moderate  15 to 19 = Moderately severe  =20 = Severe    Review of Systems   PSYCHIATRIC: Pertinant items are noted in the narrative.    Past Medical, Family and Social History: The patient's past medical, family and social history have been reviewed and updated as appropriate within the electronic medical record - see encounter notes.      Current Outpatient Medications:     albuterol (PROVENTIL HFA) 90 mcg/actuation inhaler, Inhale 2 puffs into the lungs every 6 (six) hours as needed for Wheezing. Rescue, Disp: 18 g, Rfl: 0    B-complex with vitamin C (Z-BEC OR EQUIV) tablet, Take 1 tablet by mouth once daily., Disp: ,  "Rfl:     buPROPion (WELLBUTRIN XL) 300 MG 24 hr tablet, Take 1 tablet (300 mg total) by mouth once daily., Disp: 90 tablet, Rfl: 1    COVID-19 (COMIRNATY 2024-25, 12Y UP,,PF,) 30 mcg/0.3 mL IM vaccine (>/= 11 yo), Inject into the muscle., Disp: 0.3 mL, Rfl: 0    dextroamphetamine-amphetamine (ADDERALL) 10 mg Tab, Take 1 tablet (10 mg total) by mouth once daily, Disp: 30 tablet, Rfl: 0    famotidine (PEPCID) 20 MG tablet, Take 1 tablet (20 mg total) by mouth nightly as needed for Heartburn., Disp: 30 tablet, Rfl: 0    lisdexamfetamine (VYVANSE) 60 MG capsule, Take 1 capsule (60 mg total) by mouth every morning., Disp: 30 capsule, Rfl: 0    [START ON 11/20/2024] lisdexamfetamine (VYVANSE) 60 MG capsule, Take 1 capsule (60 mg total) by mouth every morning., Disp: 30 capsule, Rfl: 0    [START ON 12/20/2024] lisdexamfetamine (VYVANSE) 60 MG capsule, Take 1 capsule (60 mg total) by mouth every morning., Disp: 30 capsule, Rfl: 0    sertraline (ZOLOFT) 100 MG tablet, Take 1.5 tablets (150 mg total) by mouth once daily., Disp: 135 tablet, Rfl: 1    valACYclovir (VALTREX) 500 MG tablet, TAKE ONE TABLET BY MOUTH EVERY DAY, Disp: 90 tablet, Rfl: 1    Compliance: yes    Side effects: see above    Risk Parameters:  Patient reports no suicidal ideation  Patient reports no homicidal ideation  Patient reports no self-injurious behavior  Patient reports no violent behavior    Exam (detailed: at least 9 elements; comprehensive: all 15 elements)   Constitutional  Vitals:  Most recent vital signs were reviewed.   Last 3 sets of Vitals        8/16/2024    11:43 AM 9/5/2024     8:03 AM 10/13/2024    12:31 AM   Vitals - 1 value per visit   SYSTOLIC 131 118 174   DIASTOLIC 91 88 102   Pulse 84 99 78   Temp 98.4 °F (36.9 °C) 98.5 °F (36.9 °C) 98.2 °F (36.8 °C)   Resp 18 18 20   SPO2 98 % 99 % 99 %   Weight (lb) 195 195.77 195.77   Weight (kg) 88.451 88.8 88.8   Height 5' 6" (1.676 m) 5' 6" (1.676 m) 5' 6" (1.676 m)   BMI (Calculated) 31.5 " 31.6 31.6   Pain Score  Zero           General:  age appropriate, casually dressed, neatly groomed     Musculoskeletal  Muscle Strength/Tone:  no tremor, no tic   Gait & Station:  video visit     Psychiatric  Speech:  no latency; no press   Behavior: wnl   Mood & Affect:  More irritable at times of month;  congruent and appropriate   Thought Process:  normal and logical   Associations:  intact   Thought Content:  normal, no suicidality, no homicidality, delusions, or paranoia   Insight:  has awareness of illness   Judgement: behavior is adequate to circumstances   Orientation:  grossly intact   Memory: intact for content of interview   Language: grossly intact   Attention Span & Concentration:  Grossly intact   Fund of Knowledge:  intact and appropriate to age and level of education     Assessment and Diagnosis   Status/Progress: Based on the examination today, the patient's problem(s) is/are adequately but not ideally controlled.  New problems have been presented today.   Co-morbidities and psychosocial stressors  are complicating management of the primary condition.  There are no active rule-out diagnoses for this patient at this time.     General Impression:     Encounter Diagnoses   Name Primary?    Other specified attention deficit hyperactivity disorder (ADHD) Yes    PMDD (premenstrual dysphoric disorder)     Anxiety     MTHFR gene mutation     Major depressive disorder, recurrent episode, in full remission        Intervention/Counseling/Treatment Plan   Medication Management: Discussed risks, benefits, and alternatives to treatment plan documented above with patient. I answered all patient questions related to this plan, and patient expressed understanding and agreement.   continue Wellbutrin  mg, Vyvanse 50 mg  (sent 3 scripts), Adderall 10 mg (sent 1 script); increase Zoloft 150 mg; OTC l-methylfolate  Counseling as needed       Medication List with Changes/Refills   New Medications     LISDEXAMFETAMINE (VYVANSE) 60 MG CAPSULE    Take 1 capsule (60 mg total) by mouth every morning.    LISDEXAMFETAMINE (VYVANSE) 60 MG CAPSULE    Take 1 capsule (60 mg total) by mouth every morning.    LISDEXAMFETAMINE (VYVANSE) 60 MG CAPSULE    Take 1 capsule (60 mg total) by mouth every morning.   Current Medications    ALBUTEROL (PROVENTIL HFA) 90 MCG/ACTUATION INHALER    Inhale 2 puffs into the lungs every 6 (six) hours as needed for Wheezing. Rescue    B-COMPLEX WITH VITAMIN C (Z-BEC OR EQUIV) TABLET    Take 1 tablet by mouth once daily.    COVID-19 (COMIRNATY 2024-25, 12Y UP,,PF,) 30 MCG/0.3 ML IM VACCINE (>/= 11 YO)    Inject into the muscle.    FAMOTIDINE (PEPCID) 20 MG TABLET    Take 1 tablet (20 mg total) by mouth nightly as needed for Heartburn.    VALACYCLOVIR (VALTREX) 500 MG TABLET    TAKE ONE TABLET BY MOUTH EVERY DAY   Changed and/or Refilled Medications    Modified Medication Previous Medication    BUPROPION (WELLBUTRIN XL) 300 MG 24 HR TABLET buPROPion (WELLBUTRIN XL) 300 MG 24 hr tablet       Take 1 tablet (300 mg total) by mouth once daily.    Take 1 tablet (300 mg total) by mouth once daily.    DEXTROAMPHETAMINE-AMPHETAMINE (ADDERALL) 10 MG TAB dextroamphetamine-amphetamine (ADDERALL) 10 mg Tab       Take 1 tablet (10 mg total) by mouth once daily    Take 1 tablet (10 mg total) by mouth once daily    SERTRALINE (ZOLOFT) 100 MG TABLET sertraline (ZOLOFT) 100 MG tablet       Take 1.5 tablets (150 mg total) by mouth once daily.    Take 1 tablet (100 mg total) by mouth once daily.   Discontinued Medications    IRON-FA-DHA-EPA-FAD-NADH-BE-MV (ENLYTE) 1.5 MG IRON- 8.73 MG CPID    Take 1 capsule by mouth Daily.    LISDEXAMFETAMINE (VYVANSE) 50 MG CAPSULE    Take 1 capsule (50 mg total) by mouth every morning.    LISDEXAMFETAMINE (VYVANSE) 50 MG CAPSULE    Take 1 capsule (50 mg total) by mouth every morning.    LISDEXAMFETAMINE (VYVANSE) 50 MG CAPSULE    Take 1 capsule (50 mg total) by mouth every  morning.        Return to Clinic: 3 months    Time spent with pt including note preparation: 36 minutes       Radha Lovett, PhD, MP  Advanced Practice Medical Psychologist  [facility]  [address] [facility].  [place] [id number] [phone]   [phone] fax

## 2024-12-02 ENCOUNTER — HOSPITAL ENCOUNTER (OUTPATIENT)
Dept: RADIOLOGY | Facility: HOSPITAL | Age: 34
Discharge: HOME | End: 2024-12-02
Attending: PHYSICIAN ASSISTANT
Payer: COMMERCIAL

## 2024-12-02 ENCOUNTER — OFFICE VISIT (OUTPATIENT)
Dept: INTERNAL MEDICINE | Facility: CLINIC | Age: 34
End: 2024-12-02
Payer: COMMERCIAL

## 2024-12-02 VITALS
OXYGEN SATURATION: 100 % | WEIGHT: 196.19 LBS | DIASTOLIC BLOOD PRESSURE: 70 MMHG | SYSTOLIC BLOOD PRESSURE: 120 MMHG | HEART RATE: 100 BPM | BODY MASS INDEX: 31.67 KG/M2 | RESPIRATION RATE: 20 BRPM | TEMPERATURE: 98 F

## 2024-12-02 DIAGNOSIS — M25.532 LEFT WRIST PAIN: Primary | ICD-10-CM

## 2024-12-02 DIAGNOSIS — M25.532 LEFT WRIST PAIN: ICD-10-CM

## 2024-12-02 PROCEDURE — 73110 X-RAY EXAM OF WRIST: CPT | Mod: TC,LT

## 2024-12-02 PROCEDURE — 3074F SYST BP LT 130 MM HG: CPT | Mod: CPTII,S$GLB,, | Performed by: PHYSICIAN ASSISTANT

## 2024-12-02 PROCEDURE — 3078F DIAST BP <80 MM HG: CPT | Mod: CPTII,S$GLB,, | Performed by: PHYSICIAN ASSISTANT

## 2024-12-02 PROCEDURE — 3044F HG A1C LEVEL LT 7.0%: CPT | Mod: CPTII,S$GLB,, | Performed by: PHYSICIAN ASSISTANT

## 2024-12-02 PROCEDURE — 96372 THER/PROPH/DIAG INJ SC/IM: CPT | Mod: S$GLB,,, | Performed by: PHYSICIAN ASSISTANT

## 2024-12-02 PROCEDURE — 1160F RVW MEDS BY RX/DR IN RCRD: CPT | Mod: CPTII,S$GLB,, | Performed by: PHYSICIAN ASSISTANT

## 2024-12-02 PROCEDURE — 99999 PR PBB SHADOW E&M-EST. PATIENT-LVL V: CPT | Mod: PBBFAC,,, | Performed by: PHYSICIAN ASSISTANT

## 2024-12-02 PROCEDURE — 73110 X-RAY EXAM OF WRIST: CPT | Mod: 26,LT,, | Performed by: RADIOLOGY

## 2024-12-02 PROCEDURE — 99214 OFFICE O/P EST MOD 30 MIN: CPT | Mod: 25,S$GLB,, | Performed by: PHYSICIAN ASSISTANT

## 2024-12-02 PROCEDURE — 1159F MED LIST DOCD IN RCRD: CPT | Mod: CPTII,S$GLB,, | Performed by: PHYSICIAN ASSISTANT

## 2024-12-02 PROCEDURE — 3008F BODY MASS INDEX DOCD: CPT | Mod: CPTII,S$GLB,, | Performed by: PHYSICIAN ASSISTANT

## 2024-12-02 RX ORDER — DICLOFENAC SODIUM 10 MG/G
2 GEL TOPICAL 4 TIMES DAILY
COMMUNITY

## 2024-12-02 RX ORDER — IBUPROFEN 600 MG/1
600 TABLET ORAL NIGHTLY PRN
Qty: 12 TABLET | Refills: 0 | Status: SHIPPED | OUTPATIENT
Start: 2024-12-02

## 2024-12-02 RX ORDER — KETOROLAC TROMETHAMINE 30 MG/ML
30 INJECTION, SOLUTION INTRAMUSCULAR; INTRAVENOUS
Status: COMPLETED | OUTPATIENT
Start: 2024-12-02 | End: 2024-12-02

## 2024-12-02 RX ADMIN — KETOROLAC TROMETHAMINE 30 MG: 30 INJECTION, SOLUTION INTRAMUSCULAR; INTRAVENOUS at 07:12

## 2024-12-02 NOTE — PROGRESS NOTES
Subjective:      Patient ID: Billie Nicolas is a 34 y.o. female.    Chief Complaint: Wrist Pain (She is here due to left wrist pain, has been going on for approximately 3 weeks. Occurred during workout session. Pronation is worse than supination but any rotation makes pain worse. )    Patient is known to me, being seen today for L wrist pain x3wks, minimal change.  Weight lifting at gym and feels may have done too much rowing/bicep curls   Treatment includes diclofenac gel which provides temporary relief, also wearing brace   Denies hand pain  Denies swelling   Occasionally tingling in 4th and 5th digit   R hand dominant     H/o gastritis a few months ago, occurred after naproxen, seems to tolerate ibuprofen ok     Last visit Sept 2024 w PCP.       Review of Systems   Constitutional:  Negative for activity change and unexpected weight change.   HENT:  Negative for hearing loss, rhinorrhea and trouble swallowing.    Eyes:  Negative for discharge and visual disturbance.   Respiratory:  Negative for chest tightness and wheezing.    Cardiovascular:  Negative for chest pain and palpitations.   Gastrointestinal:  Negative for blood in stool, constipation, diarrhea and vomiting.   Endocrine: Negative for polydipsia and polyuria.   Genitourinary:  Negative for difficulty urinating, dysuria, hematuria and menstrual problem.   Musculoskeletal:  Positive for arthralgias. Negative for joint swelling and neck pain.   Neurological:  Negative for weakness and headaches.   Psychiatric/Behavioral:  Negative for confusion and dysphoric mood.        Objective:   /70 (BP Location: Left arm, Patient Position: Sitting)   Pulse 100   Temp 98.2 °F (36.8 °C) (Tympanic)   Resp 20   Wt 89 kg (196 lb 3.4 oz)   LMP 11/09/2024   SpO2 100%   BMI 31.67 kg/m²   Physical Exam  Constitutional:       General: She is not in acute distress.     Appearance: She is well-developed. She is not ill-appearing or diaphoretic.   HENT:       Head: Normocephalic and atraumatic.      Right Ear: External ear normal.      Left Ear: External ear normal.   Eyes:      General: Lids are normal.         Right eye: No discharge.         Left eye: No discharge.      Conjunctiva/sclera: Conjunctivae normal.      Right eye: Right conjunctiva is not injected.      Left eye: Left conjunctiva is not injected.   Cardiovascular:      Pulses:           Radial pulses are 2+ on the left side.   Pulmonary:      Effort: Pulmonary effort is normal. No respiratory distress.   Musculoskeletal:      Left forearm: Normal.      Left wrist: Bony tenderness present. No swelling. Normal range of motion.      Left hand: Normal.   Skin:     General: Skin is warm and dry.      Findings: No rash.   Neurological:      Mental Status: She is alert and oriented to person, place, and time.   Psychiatric:         Speech: Speech normal.         Behavior: Behavior normal.         Thought Content: Thought content normal.         Judgment: Judgment normal.       Assessment:      1. Left wrist pain       Plan:   Left wrist pain  -     ibuprofen (ADVIL,MOTRIN) 600 MG tablet; Take 1 tablet (600 mg total) by mouth nightly as needed for Pain.  Dispense: 12 tablet; Refill: 0  -     X-Ray Wrist Complete 3 views Left; Future; Expected date: 12/02/2024  -     Ambulatory referral/consult to Orthopedics; Future; Expected date: 12/09/2024  -     ketorolac injection 30 mg      Discussed RICE, topical and oral NSAIDs (only 1x daily short term 3-5 days, start pepcid, take w meal)    Discussed worsening signs/symptoms and when to return to clinic or go to ED.   Patient expresses understanding and agrees with treatment plan.

## 2024-12-05 ENCOUNTER — OFFICE VISIT (OUTPATIENT)
Dept: ORTHOPEDICS | Facility: CLINIC | Age: 34
End: 2024-12-05
Payer: COMMERCIAL

## 2024-12-05 VITALS — HEIGHT: 66 IN | WEIGHT: 196.19 LBS | BODY MASS INDEX: 31.53 KG/M2

## 2024-12-05 DIAGNOSIS — M77.8 EXTENSOR CARPI ULNARIS TENDINITIS: ICD-10-CM

## 2024-12-05 PROCEDURE — 99999 PR PBB SHADOW E&M-EST. PATIENT-LVL III: CPT | Mod: PBBFAC,,, | Performed by: STUDENT IN AN ORGANIZED HEALTH CARE EDUCATION/TRAINING PROGRAM

## 2024-12-05 RX ADMIN — TRIAMCINOLONE ACETONIDE 40 MG: 40 INJECTION, SUSPENSION INTRA-ARTICULAR; INTRAMUSCULAR at 08:12

## 2024-12-05 NOTE — PROGRESS NOTES
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  Pain is ulnar-sided and occurs with activity.  Pain level is a 2/10.  She 1st noticed the pain after a workout.  No specific history of injury.  No history of diabetes.  Patient does not take blood thinners.  She describes the pain as shooting and aching in nature during activity.  Of note she does have some intermittent ring and small finger tingling as well.  She does not have pain which wakes her up from sleep at night.    Review of Systems  Review of systems negative for chest pain, shortness of breath, fevers, chills, nausea/vomiting.    Past Medical History  Past Medical History:   Diagnosis Date    Abnormal cervical Pap smear with positive HPV DNA test     ADHD, predominantly inattentive type     Allergy     Anxiety     Asthma     Bulimia     Genetic testing 05/22/2024    [facility] BRCA1/2 Analyses with [employer] [facility] (85 genes) - NEGATIVE, VUS in RB1    HSV-1 infection     Major depression, recurrent     Migraine headache        Past Surgical History  Past Surgical History:   Procedure Laterality Date    ABSCESS DRAINAGE  12/25/2017    abscess lanced     ADENOIDECTOMY  1996?    LAPAROSCOPIC SALPINGECTOMY Bilateral 07/31/2023    Procedure: SALPINGECTOMY, LAPAROSCOPIC;  Surgeon: Elvin Shoemaker MD;  Location: [facility];  Service: OB/GYN;  Laterality: Bilateral;    TONSILLECTOMY  1996    TONSILLECTOMY AND ADENOIDECTOMY      TUBAL LIGATION  7/31/23    Bilateral salpingectomy- laproscopic    WISDOM TOOTH EXTRACTION         Allergies  Review of patient's allergies indicates:   Allergen Reactions    Sulfa (sulfonamide antibiotics) Hives    Sulfamethoxazole-trimethoprim        Family History  Family  History   Problem Relation Name Age of Onset    Cervical cancer Mother Dana Esteban        metastastized to uterus, bladder, LUI/BSO    Heart disease Mother Dana         mitral valve prolapse, stage 1 CHF, stent placed at 49 years old    Mitral valve prolapse Mother Dana     Hypertension Mother Dana     Alcohol abuse Mother Dana     Cancer Mother Dana         cervical cancer    Miscarriages / Stillbirths Mother Dana         1 known miscarriage    Hypertension Maternal Grandmother Billie quinn     Cancer Maternal Grandmother Billie quinn         uterine cancer    Lung cancer Maternal Grandfather Chet Nicolas         metastasized, +smoking hx    Heart disease Maternal Grandfather Chet Nicolas         Mitral valve prolapse, STEMI at 55    Hodgkin's lymphoma Maternal Grandfather Chet Nicolas     Hypertension Maternal Grandfather Chet Nicolas     Cancer Maternal Grandfather Chet Nicolas         non-Hodgkins Lymphoma    Alcohol abuse Father Corwin     Clotting disorder Maternal Uncle      Liver cancer Other      Pancreatic cancer Other      Colon cancer Other  48    Pancreatic cancer Paternal Uncle      Miscarriages / Stillbirths Sister Fela Rai         3 miscarriages/chemical pregnancies    Melanoma Neg Hx         Social History  Social History     Socioeconomic History    Marital status: Significant Other    Number of children: 0   Occupational History    Occupation: Pharmacy Intern   Tobacco Use    Smoking status: Never     Passive exposure: Past    Smokeless tobacco: Never   Substance and Sexual Activity    Alcohol use: Yes     Alcohol/week: 1.0 standard drink of alcohol     Types: 1 Glasses of wine per week     Comment: rarely     Drug use: No    Sexual activity: Yes     Partners: Female, Male     Birth control/protection: Partner-Vasectomy, See Surgical Hx, Other-see comments     Comment: Dental dam with female partners   Other Topics Concern    Are you pregnant or think you may be? No     Breast-feeding No     Social Drivers of Health     Financial Resource Strain: Low Risk  (9/5/2024)    Overall Financial Resource Strain (CARDIA)     Difficulty of Paying Living Expenses: Not hard at all   Food Insecurity: No Food Insecurity (9/5/2024)    Hunger Vital Sign     Worried About Running Out of Food in the Last Year: Never true     Ran Out of Food in the Last Year: Never true   Transportation Needs: No Transportation Needs (12/12/2023)    PRAPARE - Transportation     Lack of Transportation (Medical): No     Lack of Transportation (Non-Medical): No   Physical Activity: Sufficiently Active (9/5/2024)    Exercise Vital Sign     Days of Exercise per Week: 3 days     Minutes of Exercise per Session: 50 min   Stress: Stress Concern Present (9/5/2024)    [employer] [place] - Occupational Stress Questionnaire     Feeling of Stress : To some extent   Housing Stability: Low Risk  (12/12/2023)    Housing Stability Vital Sign     Unable to Pay for Housing in the Last Year: No     Number of Places Lived in the Last Year: 1     Unstable Housing in the Last Year: No       Vital Signs  There were no vitals filed for this visit.    Physical Exam  Constitutional: Appears well-developed and well-nourished. No distress.   HENT:   Head: Normocephalic.   Eyes: EOM are normal.   Pulmonary/Chest: Effort normal.   Neurological: Oriented to person, place, and time.   Psychiatric: Normal mood and affect.     Left Upper Extremity:   No abrasions, lacerations, wounds.  No swelling.  No erythema.  No drainage.  Patient is able to make a fist and extend all her fingers.  Full active wrist flexion and extension.  Full pronation and supination.  Patient has mild tenderness over the ECU tendon sheath.  Positive ECU synergy test.  No tenderness over the TFCC.  No pain with ulnar Shuck.  No tenderness over the dorsal radiocarpal articulation.  No tenderness over the pisotriquetral joint.   Two-point discrimination is 5  mm in all 5 fingers.  Negative Tinel's in the median  and ulnar nerve distributions at the wrist.  Negative Tinel's in the ulnar nerve distribution at the elbow.  Negative elbow flexion test.  Negative Phalen's.  Palpable radial pulse.    Imaging    Left wrist x-rays three views were obtained on 12/02/2024 and independently reviewed by myself.  Patient is noted to be ulnar neutral.  No arthritic change noted throughout the carpus.  No fracture.  No dislocation or subluxation.  No foreign body.    Assessment and Plan    34-year-old right-hand dominant female presents with left ECU tendonitis symptomatic for 3-4 weeks.  I discussed the natural history of this pathology with the patient.  I discussed potential treatment options.  I recommended over-the-counter anti-inflammatories as needed for pain control.  I fitted patient for a squeeze ulnar compression wrap to be worn during activities. At least 10 minutes were spent sizing, fitting, and educating for durable medical equipment application today.  This service was performed under the direction of Delores Lanier MD.  CPT 26646.   Lastly, I have recommended a steroid injection to see if this will improve or resolve her symptoms.  Patient agreed to proceed.  She tolerated procedure well.  Discussed that she should give the injection 2 weeks to work.      Follow up in clinic as needed if symptoms recur or do not resolve.    Delores Lanier MD  Orthopaedic Hand Surgery

## 2024-12-06 RX ORDER — TRIAMCINOLONE ACETONIDE 40 MG/ML
40 INJECTION, SUSPENSION INTRA-ARTICULAR; INTRAMUSCULAR
Status: DISCONTINUED | OUTPATIENT
Start: 2024-12-05 | End: 2024-12-06 | Stop reason: HOSPADM

## 2025-01-10 ENCOUNTER — OFFICE VISIT (OUTPATIENT)
Dept: PSYCHIATRY | Facility: CLINIC | Age: 35
End: 2025-01-10
Payer: COMMERCIAL

## 2025-01-10 DIAGNOSIS — F33.42 MAJOR DEPRESSIVE DISORDER, RECURRENT EPISODE, IN FULL REMISSION: ICD-10-CM

## 2025-01-10 DIAGNOSIS — F90.8 OTHER SPECIFIED ATTENTION DEFICIT HYPERACTIVITY DISORDER (ADHD): Primary | ICD-10-CM

## 2025-01-10 DIAGNOSIS — F41.9 ANXIETY: ICD-10-CM

## 2025-01-10 DIAGNOSIS — F32.81 PMDD (PREMENSTRUAL DYSPHORIC DISORDER): ICD-10-CM

## 2025-01-10 RX ORDER — BUPROPION HYDROCHLORIDE 300 MG/1
300 TABLET ORAL DAILY
Qty: 90 TABLET | Refills: 1 | Status: SHIPPED | OUTPATIENT
Start: 2025-01-10 | End: 2025-07-09

## 2025-01-10 RX ORDER — DEXTROAMPHETAMINE SACCHARATE, AMPHETAMINE ASPARTATE, DEXTROAMPHETAMINE SULFATE AND AMPHETAMINE SULFATE 2.5; 2.5; 2.5; 2.5 MG/1; MG/1; MG/1; MG/1
10 TABLET ORAL DAILY
Qty: 30 TABLET | Refills: 0 | Status: SHIPPED | OUTPATIENT
Start: 2025-03-11 | End: 2025-04-10

## 2025-01-10 RX ORDER — DEXTROAMPHETAMINE SACCHARATE, AMPHETAMINE ASPARTATE, DEXTROAMPHETAMINE SULFATE AND AMPHETAMINE SULFATE 2.5; 2.5; 2.5; 2.5 MG/1; MG/1; MG/1; MG/1
10 TABLET ORAL DAILY
Qty: 30 TABLET | Refills: 0 | Status: SHIPPED | OUTPATIENT
Start: 2025-02-09 | End: 2025-03-11

## 2025-01-10 RX ORDER — LISDEXAMFETAMINE DIMESYLATE 60 MG/1
60 CAPSULE ORAL EVERY MORNING
Qty: 30 CAPSULE | Refills: 0 | Status: SHIPPED | OUTPATIENT
Start: 2025-02-09 | End: 2025-03-11

## 2025-01-10 RX ORDER — SERTRALINE HYDROCHLORIDE 100 MG/1
150 TABLET, FILM COATED ORAL DAILY
Qty: 135 TABLET | Refills: 1 | Status: SHIPPED | OUTPATIENT
Start: 2025-01-10 | End: 2025-07-09

## 2025-01-10 RX ORDER — DEXTROAMPHETAMINE SACCHARATE, AMPHETAMINE ASPARTATE, DEXTROAMPHETAMINE SULFATE AND AMPHETAMINE SULFATE 2.5; 2.5; 2.5; 2.5 MG/1; MG/1; MG/1; MG/1
10 TABLET ORAL DAILY
Qty: 30 TABLET | Refills: 0 | Status: SHIPPED | OUTPATIENT
Start: 2025-01-10 | End: 2025-02-09

## 2025-01-10 RX ORDER — LISDEXAMFETAMINE DIMESYLATE 60 MG/1
60 CAPSULE ORAL EVERY MORNING
Qty: 30 CAPSULE | Refills: 0 | Status: SHIPPED | OUTPATIENT
Start: 2025-03-11 | End: 2025-04-10

## 2025-01-10 RX ORDER — LISDEXAMFETAMINE DIMESYLATE 60 MG/1
60 CAPSULE ORAL EVERY MORNING
Qty: 30 CAPSULE | Refills: 0 | Status: SHIPPED | OUTPATIENT
Start: 2025-01-10 | End: 2025-02-09

## 2025-01-10 NOTE — PATIENT INSTRUCTIONS

## 2025-01-10 NOTE — PROGRESS NOTES
Outpatient Psychiatry Follow-Up Visit    1/10/2025    Virtual Visit    The patient location is: Patient's home/ Patient reported that his/her location at the time of this visit was in the [facility]     Visit type: Virtual visit with synchronous audio and video     Each patient to whom he or she provides medical services by telehealth is: (1) informed of the relationship between the medical psychologist and patient and the respective role of any other health care provider with respect to management of the patient; and (2) notified that he or she may decline to receive medical services by telehealth and may withdraw from such care at any time.    I also informed patient of the following:   Radha Lovett, PhD, MPAP:  [place] medical license number: [id number]    My contact info:  [facility] / 2nd Floor  [address]  [place] [address]   Ph: [phone]    If technology issues, call office phone: Ph: [phone] or [phone]  If crisis: Dial 911 or go to nearest Emergency Room (ER)  If questions related to privacy practices: contact [facility]"[employer] [facility] Information Department: [phone]    Preferred Name: Billie  Gender Identity: cis female  Preferred Pronouns: she/her    LAST VISIT: Billie attended her virtual visit. She had not started Enlyte--expensive. She has been taking a b vitamin. We talked about her mood and MTHFR variant, encouraging her to get l-methylfolate supplementation OTC. She reported that her PMDD has been more problematic. It has not caused functional limitations because she is aware of the effect on her relationships and work and takes active measures to overcome it--but it has been much more noticeable. We considered medicine changes (adding/changing Prozac, adding Ativan) but ultimately agreed to increase Zoloft.     She reported that she does a lot of vocal stimulation with her ADHD/Spectrum--on Concerta more outside of her control. She is  usually able to control that at work but less able to do that on Concerta. She usually makes different noises at home--sometimes cat noises, hums, other high pitched sounds, etc. She reported that the Vyvanse 50 mg dose has not been as effective as the 60 mg--she gets hyperfocused at times but that continued with 50 mg, too, and she thinks that is more of an issue that she has to work on or address. See plan below.     Plan--continue Wellbutrin  mg, Vyvanse 0 mg  (sent 3 scripts), Adderall 10 mg (sent 1 script); increase Zoloft 150 mg; OTC l-methylfolate      History of Present Illness    CHIEF COMPLAINT:  Billie presents for a follow-up visit to review medication efficacy and discuss recent life events, last seen in October.    HPI:  Billie reports improved work-life balance, setting better boundaries at work, and consistently taking lunch breaks. She has been tasked with streamlining her responsibilities, which has been beneficial. Billie expresses pride in her progress, noting that lunch sometimes occurs at 1 PM or 1:30 PM.    Billie confirms the effectiveness of her current medication regimen, particularly noting the benefits of increasing Vyvanse to 60mg. She reports improved energy levels, allowing her to work out at the end of the day. Billie also reports improved premenstrual symptoms with the increased Zoloft dosage of 150mg.    Billie discusses the recent loss of her 15-year-old dog, describing the experience as emotionally challenging but well-managed. Her boyfriend provided significant support during the acute grief period, which included sleep disturbances and crying. Billie has since adopted a new dog, Daniele, which has helped in the grieving process.    Billie mentions returning to regular exercise at the gym and feeling the benefits. She also discusses starting a new supplement regimen with L-methylfolate, reporting noticeable improvements.    Billie mentions upcoming plans including completing a memorial tattoo  for her  dog and visiting her father for his birthday next month.    MEDICATIONS:  Billie is on Zoloft (sertraline) 150 mg daily for depression, increased from 100 mg. She is also taking Wellbutrin XL (bupropion) 300 mg daily for depression. For ADHD, she takes Vyvanse (lisdexamfetamine) 60 mg in the morning, increased from 50 mg, which helps maintain her energy throughout the day. She also takes Adderall (amphetamine/dextroamphetamine) 10 mg as needed in the afternoon for ADHD. Patient is on L-methylfolate 15 mg daily as a supplement for MTHFR mutation, which includes B9 and B12.    LIFESTYLE:  Billie reports setting better boundaries at work, including taking time to eat lunch. 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 She mentions sometimes staying late at work. Billie has a boyfriend and lives with him. They recently adopted a new dog named Daniele. She is getting back into working out at the gym and is planning to get a memorial tattoo for her  dog. Billie spent holidays with her dad, dad's family, and boyfriend's family. She has a supportive boyfriend who helped her through the grief of losing her dog. She plans to visit her dad in [place] for his birthday next month. Recently, the patient went through the loss of her 15-year-old dog, holding a celebra[employer] life photo shoot before the dog's peaceful passing at home. She adopted a new dog two weeks later.      ROS:  Constitutional: -fatigue  Psychiatric: +sleep difficulty       PSYCHIATRIC: Pertinant items are noted in the narrative.    Past Medical, Family and Social History: The patient's past medical, family and social history have been reviewed and updated as appropriate within the electronic medical record - see encounter notes.     [place] in [place].            1/3/2025     9:25 AM 10/17/2024    11:35 AM 2024     1:24 PM   GAD7   1. Feeling nervous, anxious, or on edge? 1  1  0     2. Not being able to stop or control worrying? 0  1  0    3. Worrying too much about different things? 0  1  0    4. Trouble relaxing? 1  2  0    5. Being so restless that it is hard to sit still? 1  1  1    6. Becoming easily annoyed or irritable? 1  1  1    7. Feeling afraid as if something awful might happen? 1  1  0    8. If you checked off any problems, how difficult have these problems made it for you to do your work, take care of things at home, or get along with other people? 0  1  1    BEATRIZ-7 Score 5  8  2       Patient-reported      0-4 = Minimal anxiety  5-9 = Mild anxiety  10-14 = Moderate anxiety  15-21 = Severe anxiety         7/16/2024     1:34 PM 9/30/2022     1:54 PM 12/28/2018     9:55 AM 7/6/2018     4:21 PM   Depression Patient Health Questionnaire   Over the last two weeks how often have you been bothered by little interest or pleasure in doing things Not at all Nearly every day Not at all Not at all   Over the last two weeks how often have you been bothered by feeling down, depressed or hopeless Not at all Nearly every day More than half the days Several days   PHQ-2 Total Score 0 6 2 1   Over the last two weeks how often have you been bothered by trouble falling or staying asleep, or sleeping too much  Nearly every day     Over the last two weeks how often have you been bothered by feeling tired or having little energy  Nearly every day     Over the last two weeks how often have you been bothered by a poor appetite or overeating  Nearly every day     Over the last two weeks how often have you been bothered by feeling bad about yourself - or that you are a failure or have let yourself or your family down  Nearly every day     Over the last two weeks how often have you been bothered by trouble concentrating on things, such as reading the newspaper or watching television  Nearly every day     Over the last two weeks how often have you been bothered by moving or speaking so slowly that other  "people could have noticed. Or the opposite - being so fidgety or restless that you have been moving around a lot more than usual.  Nearly every day     Over the last two weeks how often have you been bothered by thoughts that you would be better off dead, or of hurting yourself  Not at all     If you checked off any problems, how difficult have these problems made it for you to do your work, take care of things at home or get along with other people?  Somewhat difficult     PHQ-9 Score  24     PHQ-9 Interpretation  Severe       0-4 = No intervention  5 to 9 = Mild  10 to 14 = Moderate  15 to 19 = Moderately severe  =20 = Severe    Risk Parameters:  Patient reports no suicidal ideation  Patient reports no homicidal ideation  Patient reports no self-injurious behavior  Patient reports no violent behavior    Exam (detailed: at least 9 elements; comprehensive: all 15 elements)   Constitutional  Vitals:  Most recent vital signs were reviewed.   Last 3 sets of Vitals        10/13/2024    12:31 AM 12/2/2024     7:09 AM 12/5/2024     8:50 AM   Vitals - 1 value per visit   SYSTOLIC 174 120    DIASTOLIC 102 70    Pulse 78 100    Temp 98.2 °F (36.8 °C) 98.2 °F (36.8 °C)    Resp 20 20    SPO2 99 % 100 %    Weight (lb) 195.77 196.21 196.21   Weight (kg) 88.8 89 89   Height 5' 6" (1.676 m)  5' 6" (1.676 m)   BMI (Calculated) 31.6  31.7   Pain Score  Three Two          General:  age appropriate, casually dressed, neatly groomed     Musculoskeletal  Muscle Strength/Tone:  no tremor, no tic   Gait & Station:  video visit     Psychiatric  Speech:  no latency; no press   Behavior: wnl   Mood & Affect:  euthymic  congruent and appropriate   Thought Process:  normal and logical   Associations:  intact   Thought Content:  normal, no suicidality, no homicidality, delusions, or paranoia   Insight:  has awareness of illness   Judgement: behavior is adequate to circumstances   Orientation:  grossly intact   Memory: intact for content of " interview   Language: grossly intact   Attention Span & Concentration:  Grossly intact   Fund of Knowledge:  intact and appropriate to age and level of education     General Impression:     Encounter Diagnoses   Name Primary?    Other specified attention deficit hyperactivity disorder (ADHD) Yes    PMDD (premenstrual dysphoric disorder)     Anxiety     Major depressive disorder, recurrent episode, in full remission          Assessment & Plan    - Continued Zoloft 150 mg, Wellbutrin  mg, Vyvanse 60 mg, and as-needed Adderall 10 mg, noting positive response to recent Vyvanse dose increase  - Recommend continuation of OTC L-methylfolate supplement, acknowledging effectiveness for patient with MTHFR mutation  - Assessed stability on current medication regimen, considering transition to 6-month follow-up schedule given patient's pharmacist background and medication understanding    MAJOR DEPRESSIVE DISORDER:  - Continued Zoloft 150 mg daily.  - Continued Wellbutrin  mg daily.  - Continued OTC L-methylfolate supplement (15 mg active form plus B9 and B12).    ATTENTION-DEFICIT HYPERACTIVITY DISORDER:  - Continued Vyvanse 60 mg in the mornings.  - Continued Adderall 10 mg as needed later in the day.  - Refilled Vyvanse and Adderall 10 mg.    FOLLOW-UP AND GENERAL CARE:  - Follow up in 6 months for in-person visit.  - Billie to message in 3 months for additional 3-month medication refills.  - Contact the office if refill request is questioned.  - Follow up earlier if needed.           I spent a total of 25 minutes on the day of the visit. This includes face to face time and non-face to face time preparing to see the patient (eg, review of tests), obtaining and/or reviewing separately obtained history, documenting clinical information in the electronic or other health record, independently interpreting results and communicating results to the patient/family/caregiver, or care coordinator.        Radha Lovett, PhD,  MAYRA  Advanced Practice Medical Psychologist  [facility]  [address] [place] [address]  [phone]   [phone] fax    This note was generated with the assistance of ambient listening technology. Verbal consent was obtained by the patient and accompanying visitor(s) for the recording of patient appointment to facilitate this note. I attest to having reviewed and edited the generated note for accuracy, though some syntax or spelling errors may persist. Please contact the author of this note for any clarification.

## 2025-02-04 ENCOUNTER — OFFICE VISIT (OUTPATIENT)
Dept: INTERNAL MEDICINE | Facility: CLINIC | Age: 35
End: 2025-02-04
Payer: COMMERCIAL

## 2025-02-04 DIAGNOSIS — L03.115 CELLULITIS OF RIGHT THIGH: Primary | ICD-10-CM

## 2025-02-04 PROCEDURE — 99999 PR PBB SHADOW E&M-EST. PATIENT-LVL IV: CPT | Mod: PBBFAC,,,

## 2025-02-04 PROCEDURE — 3074F SYST BP LT 130 MM HG: CPT | Mod: CPTII,S$GLB,,

## 2025-02-04 PROCEDURE — G2211 COMPLEX E/M VISIT ADD ON: HCPCS | Mod: S$GLB,,,

## 2025-02-04 PROCEDURE — 1159F MED LIST DOCD IN RCRD: CPT | Mod: CPTII,S$GLB,,

## 2025-02-04 PROCEDURE — 3008F BODY MASS INDEX DOCD: CPT | Mod: CPTII,S$GLB,,

## 2025-02-04 PROCEDURE — 3079F DIAST BP 80-89 MM HG: CPT | Mod: CPTII,S$GLB,,

## 2025-02-04 PROCEDURE — 1160F RVW MEDS BY RX/DR IN RCRD: CPT | Mod: CPTII,S$GLB,,

## 2025-02-04 PROCEDURE — 99213 OFFICE O/P EST LOW 20 MIN: CPT | Mod: S$GLB,,,

## 2025-02-04 RX ORDER — DOXYCYCLINE 100 MG/1
100 CAPSULE ORAL 2 TIMES DAILY
Qty: 20 CAPSULE | Refills: 0 | Status: SHIPPED | OUTPATIENT
Start: 2025-02-04 | End: 2025-02-16

## 2025-02-04 NOTE — PROGRESS NOTES
Billie Nicolas  34 y.o. White female  [id number]    Leonie Rodriguez DO  Patient Care Team:  Leonie Rodriguez DO as PCP - General  Precious Cho LPN as Care Coordinator (Internal Medicine)    Chief Complaint:   Chief Complaint   Patient presents with    Rash     Pt states she has gotten a tattoo this past Saturday ,Sunday evening aching and in a lot of pain , right knee pain, redness spreading since Sunday , pt states ice helps with the pain,        History of Present Illness:  Pt is here for rash and is new to me.    Patient presents today with skin cellulitis around a new tattoo    CURRENT SYMPTOMS:  She reports experiencing the most severe redness she has ever had with tattoos. The redness initially spread but then halted, with slight improvement on Sunday, followed by worsening throughout the day after being better in the morning. She experiences aching pain that worsens with weight-bearing, radiating up the leg. She denies itching at the tattoo site. She denies fever, chills, or other symptoms.    TATTOO HISTORY AND CURRENT CARE:  She has six tattoos, all concealable at work, with a history of reaction to purple ink in a prior tattoo. The current tattoo was completed in a four-hour session. She follows a daily cleaning routine with shower and birdbath at opposite times, applying antibiotic ointment followed by Aquaphor or sensitive skin lotion after cleaning and drying. She is compliant with aftercare instructions by avoiding gym activities and wearing loose clothing.    MEDICAL HISTORY:  She has known MRSA colonization.    ALLERGIES:  She reports sulfa allergy with severe reaction to Bactrim.    CURRENT MEDICATIONS:  She is taking Zyrtec daily as a preventive measure and ibuprofen 800mg for symptom relief, which helped overnight.    The following were reviewed: active problem list, medication list, allergies, family history, social history, and Health Maintenance.     History:  Past Medical  History:   Diagnosis Date    Abnormal cervical Pap smear with positive HPV DNA test     ADHD, predominantly inattentive type     Allergy     Anxiety     Asthma     Bulimia     Genetic testing 05/22/2024    [facility] BRCA1/2 Analyses with [employer] [facility] (85 genes) - NEGATIVE, VUS in RB1    HSV-1 infection     Major depression, recurrent     Migraine headache      Past Surgical History:   Procedure Laterality Date    ABSCESS DRAINAGE  12/25/2017    abscess lanced     ADENOIDECTOMY  1996?    LAPAROSCOPIC SALPINGECTOMY Bilateral 07/31/2023    Procedure: SALPINGECTOMY, LAPAROSCOPIC;  Surgeon: Elvin Shoemaker MD;  Location: [facility];  Service: OB/GYN;  Laterality: Bilateral;    TONSILLECTOMY  1996    TONSILLECTOMY AND ADENOIDECTOMY      TUBAL LIGATION  7/31/23    Bilateral salpingectomy- laproscopic    WISDOM TOOTH EXTRACTION       Family History   Problem Relation Name Age of Onset    Cervical cancer Mother Dana Esteban        metastastized to uterus, bladder, LIU/BSO    Heart disease Mother Dana         mitral valve prolapse, stage 1 CHF, stent placed at 49 years old    Mitral valve prolapse Mother Dana     Hypertension Mother Dana     Alcohol abuse Mother Dana     Cancer Mother Dana         cervical cancer    Miscarriages / Stillbirths Mother Dana         1 known miscarriage    Hypertension Maternal Grandmother Billie quinn     Cancer Maternal Grandmother Billie quinn         uterine cancer    Lung cancer Maternal Grandfather Chet Nicolas         metastasized, +smoking hx    Heart disease Maternal Grandfather Chet Nicolas         Mitral valve prolapse, STEMI at 55    Hodgkin's lymphoma Maternal Grandfather Chet Nicolas     Hypertension Maternal Grandfather Chet Nicolas     Cancer Maternal Grandfather Chet Nicolas         non-Hodgkins Lymphoma    Alcohol abuse Father Corwin     Clotting disorder Maternal Uncle      Liver cancer Other      Pancreatic cancer Other      Colon cancer Other  48     Pancreatic cancer Paternal Uncle      Miscarriages / Stillbirths Sister Fela Rai         3 miscarriages/chemical pregnancies    Melanoma Neg Hx       Social History     Socioeconomic History    Marital status: Significant Other    Number of children: 0   Occupational History    Occupation: Pharmacy Intern   Tobacco Use    Smoking status: Never     Passive exposure: Past    Smokeless tobacco: Never   Substance and Sexual Activity    Alcohol use: Yes     Alcohol/week: 1.0 standard drink of alcohol     Types: 1 Glasses of wine per week     Comment: rarely     Drug use: No    Sexual activity: Yes     Partners: Female, Male     Birth control/protection: Partner-Vasectomy, See Surgical Hx, Other-see comments     Comment: Dental dam with female partners   Other Topics Concern    Are you pregnant or think you may be? No    Breast-feeding No     Social Drivers of Health     Financial Resource Strain: Low Risk  (9/5/2024)    Overall Financial Resource Strain (CARDIA)     Difficulty of Paying Living Expenses: Not hard at all   Food Insecurity: No Food Insecurity (9/5/2024)    Hunger Vital Sign     Worried About Running Out of Food in the Last Year: Never true     Ran Out of Food in the Last Year: Never true   Transportation Needs: No Transportation Needs (12/12/2023)    PRAPARE - Transportation     Lack of Transportation (Medical): No     Lack of Transportation (Non-Medical): No   Physical Activity: Sufficiently Active (9/5/2024)    Exercise Vital Sign     Days of Exercise per Week: 3 days     Minutes of Exercise per Session: 50 min   Stress: Stress Concern Present (9/5/2024)    [employer] - Occupational Stress Questionnaire     Feeling of Stress : To some extent   Housing Stability: Low Risk  (12/12/2023)    Housing Stability Vital Sign     Unable to Pay for Housing in the Last Year: No     Number of Places Lived in the Last Year: 1     Unstable Housing in the Last Year: No     Patient Active  Problem List   Diagnosis    Allergy    Anxiety    Exercise-induced asthma    Migraine headache    ADHD    Depression, recurrent    Status post bilateral salpingectomy    Obesity, unspecified     Review of patient's allergies indicates:   Allergen Reactions    Sulfa (sulfonamide antibiotics) Hives    Sulfamethoxazole-trimethoprim        Health Maintenance  There are no preventive care reminders to display for this patient.    Medications:  Current Outpatient Medications on File Prior to Visit   Medication Sig Dispense Refill    B-complex with vitamin C (Z-BEC OR EQUIV) tablet Take 1 tablet by mouth once daily.      buPROPion (WELLBUTRIN XL) 300 MG 24 hr tablet Take 1 tablet (300 mg total) by mouth once daily. 90 tablet 1    dextroamphetamine-amphetamine 10 mg Tab Take 1 tablet (10 mg total) by mouth once daily. 30 tablet 0    dextroamphetamine-amphetamine 10 mg Tab Take 1 tablet (10 mg total) by mouth once daily. 30 tablet 0    [START ON 3/11/2025] dextroamphetamine-amphetamine 10 mg Tab Take 1 tablet (10 mg total) by mouth once daily. 30 tablet 0    diclofenac sodium (VOLTAREN) 1 % Gel Apply 2 g topically 4 (four) times daily.      lisdexamfetamine (VYVANSE) 60 MG capsule Take 1 capsule (60 mg total) by mouth every morning. 30 capsule 0    lisdexamfetamine (VYVANSE) 60 MG capsule Take 1 capsule (60 mg total) by mouth every morning. 30 capsule 0    [START ON 3/11/2025] lisdexamfetamine (VYVANSE) 60 MG capsule Take 1 capsule (60 mg total) by mouth every morning. 30 capsule 0    sertraline (ZOLOFT) 100 MG tablet Take 1½ tablets (150 mg total) by mouth once daily. 135 tablet 1    valACYclovir (VALTREX) 500 MG tablet TAKE ONE TABLET BY MOUTH EVERY DAY 90 tablet 1    albuterol (PROVENTIL HFA) 90 mcg/actuation inhaler Inhale 2 puffs into the lungs every 6 (six) hours as needed for Wheezing. Rescue 18 g 0    [DISCONTINUED] COVID-19 (COMIRNATY 2024-25, 12Y UP,,PF,) 30 mcg/0.3 mL IM vaccine (>/= 11 yo) Inject into the muscle.  (Patient not taking: Reported on 2/4/2025) 0.3 mL 0    [DISCONTINUED] famotidine (PEPCID) 20 MG tablet Take 1 tablet (20 mg total) by mouth nightly as needed for Heartburn. (Patient not taking: Reported on 2/4/2025) 30 tablet 0    [DISCONTINUED] ibuprofen (ADVIL,MOTRIN) 600 MG tablet Take 1 tablet (600 mg total) by mouth nightly as needed for Pain. (Patient not taking: Reported on 2/4/2025) 12 tablet 0     No current facility-administered medications on file prior to visit.       Medications have been reviewed and reconciled with patient at visit today.    Laboratory Reviewed: (Yes)  Lab Results   Component Value Date    WBC 6.17 10/13/2024    HGB 12.5 10/13/2024    HCT 37.2 10/13/2024     10/13/2024    CHOL 167 07/16/2024    TRIG 47 07/16/2024    HDL 72 07/16/2024    ALT 14 10/13/2024    AST 19 10/13/2024     10/13/2024    K 3.6 10/13/2024     10/13/2024    CREATININE 0.9 10/13/2024    BUN 14 10/13/2024    CO2 25 10/13/2024    TSH 1.724 07/16/2024    HGBA1C 5.3 07/16/2024       ROS:  Review of Systems   Constitutional:  Negative for chills and fever.   HENT:  Negative for congestion and sore throat.    Respiratory:  Negative for cough and shortness of breath.    Cardiovascular:  Negative for chest pain and leg swelling.   Gastrointestinal:  Negative for abdominal pain, constipation, diarrhea and vomiting.   Skin:  Positive for rash.   Neurological:  Negative for dizziness, weakness and headaches.       Exam:  Vitals:    02/04/25 1510   BP: 126/82   Pulse: 96   Resp: 16   Temp: 97.7 °F (36.5 °C)     Weight: 88.3 kg (194 lb 10.7 oz)   Body mass index is 31.42 kg/m².    Physical Exam  Vitals reviewed.   Constitutional:       General: She is awake. She is not in acute distress.     Appearance: She is not ill-appearing.   Eyes:      Conjunctiva/sclera: Conjunctivae normal.   Cardiovascular:      Rate and Rhythm: Normal rate and regular rhythm.      Heart sounds: Normal heart sounds. No murmur  heard.  Pulmonary:      Effort: Pulmonary effort is normal. No respiratory distress.      Breath sounds: Normal breath sounds.   Musculoskeletal:         General: No swelling or deformity.   Skin:     General: Skin is warm and dry.             Comments: Erythema noted surrounding new tattoo to Right thigh. No swelling noted to area or to knee. Redness does not extend past knee. No calf swelling or tenderness.   Neurological:      General: No focal deficit present.      Mental Status: She is alert.   Psychiatric:         Mood and Affect: Mood normal.         Behavior: Behavior normal.           Assessment:  The encounter diagnosis was Cellulitis of right thigh.    Plan:  IMPRESSION:  - Assessed tattoo-related skin condition as possible cellulitis vs. immunogenic reaction  - Considered MRSA colonization history in treatment decision  - Opted for doxycycline as first-line antibiotic treatment, considering local resistance patterns and patient's sulfa allergy  - Evaluated for signs of systemic infection; patient currently afebrile with stable vitals  - Noted possibility of ink sensitivity, particularly to purple pigment    CELLULITIS:  - Assessed the patient's condition as likely cellulitis, considering the risk of infection from the tattoo procedure despite proper hygiene measures.  - Observed extensive redness around the tattoo area, which is unusual.  - The redness has stopped spreading but is still present.  - Noted that the patient reports redness, aching, and difficulty walking due to cellulitis on left lower limb following a tattoo.  - Confirmed that the patient denies fever, chills, or feeling generally unwell.  - Vitals are normal.  - Prescribed doxycycline 100 mg twice daily for 10 days.  - Recommend continuing cetirizine daily and ibuprofen 800 mg as needed for inflammation and pain management.  - Emphasized the importance of monitoring for signs of worsening infection, including fever, chills, or increased  redness and swelling.  - Instructed the patient to contact the office if symptoms worsen.  - Scheduled a follow up in 2-3 days if the condition is not improving.  - Acknowledged the patient's MRSA colonization and considered it in treatment planning.  - Adjusted antibiotic choice to doxycycline, considering MRSA colonization and local resistance patterns.    INK SENSITIVITY:  - Discussed the possibility of ink sensitivity as an alternative cause for the reaction.    TATTOO AFTERCARE:  - Patient to continue current tattoo aftercare routine, including daily cleaning and moisturizing.  - Recommend avoiding gym activities while healing.  - Patient to wear loose clothing over the affected area.        1. Cellulitis of right thigh  -     doxycycline (VIBRAMYCIN) 100 MG Cap; Take 1 capsule (100 mg total) by mouth 2 (two) times daily. for 10 days  Dispense: 20 capsule; Refill: 0        Care plan/goals: reviewed    Follow up: Follow up if symptoms worsen or fail to improve.    This note was generated with the assistance of ambient listening technology. Verbal consent was obtained by the patient and accompanying visitor(s) for the recording of patient appointment to facilitate this note. I attest to having reviewed and edited the generated note for accuracy, though some syntax or spelling errors may persist. Please contact the author of this note for any clarification.

## 2025-02-11 VITALS
TEMPERATURE: 98 F | WEIGHT: 194.69 LBS | HEIGHT: 66 IN | RESPIRATION RATE: 16 BRPM | DIASTOLIC BLOOD PRESSURE: 82 MMHG | BODY MASS INDEX: 31.29 KG/M2 | OXYGEN SATURATION: 97 % | HEART RATE: 96 BPM | SYSTOLIC BLOOD PRESSURE: 126 MMHG

## 2025-03-05 ENCOUNTER — PATIENT MESSAGE (OUTPATIENT)
Dept: INTERNAL MEDICINE | Facility: CLINIC | Age: 35
End: 2025-03-05
Payer: COMMERCIAL

## 2025-03-05 DIAGNOSIS — B00.9 HSV-1 INFECTION: ICD-10-CM

## 2025-03-05 DIAGNOSIS — Z92.29 HISTORY OF MMR VACCINATION: ICD-10-CM

## 2025-03-05 DIAGNOSIS — Z79.899 MEDICATION MANAGEMENT: Primary | ICD-10-CM

## 2025-03-05 RX ORDER — VALACYCLOVIR HYDROCHLORIDE 500 MG/1
500 TABLET, FILM COATED ORAL DAILY
Qty: 90 TABLET | Refills: 1 | Status: SHIPPED | OUTPATIENT
Start: 2025-03-05

## 2025-03-05 NOTE — TELEPHONE ENCOUNTER
No care due was identified.  Health [facility] Embedded Care Due Messages. Reference number: [id number].   3/05/2025 6:42:59 AM CST

## 2025-03-06 ENCOUNTER — LAB VISIT (OUTPATIENT)
Dept: LAB | Facility: HOSPITAL | Age: 35
End: 2025-03-06
Attending: PHYSICIAN ASSISTANT
Payer: COMMERCIAL

## 2025-03-06 DIAGNOSIS — Z92.29 HISTORY OF MMR VACCINATION: ICD-10-CM

## 2025-03-06 PROCEDURE — 86765 RUBEOLA ANTIBODY: CPT | Performed by: PHYSICIAN ASSISTANT

## 2025-03-06 PROCEDURE — 86735 MUMPS ANTIBODY: CPT | Performed by: PHYSICIAN ASSISTANT

## 2025-03-06 PROCEDURE — 86762 RUBELLA ANTIBODY: CPT | Performed by: PHYSICIAN ASSISTANT

## 2025-03-06 PROCEDURE — 36415 COLL VENOUS BLD VENIPUNCTURE: CPT | Performed by: PHYSICIAN ASSISTANT

## 2025-03-06 NOTE — TELEPHONE ENCOUNTER
Refill Decision Note   Billie Nicolas  is requesting a refill authorization.  Brief Assessment and Rationale for Refill:  Approve     Medication Therapy Plan:         Comments:     Note composed:6:06 PM 03/05/2025

## 2025-03-07 LAB
MUMPS IGG INTERPRETATION: POSITIVE
MUMPS IGG SCREEN: 39.7 AU/ML
RUBEOLA IGG ANTIBODY: >300 AU/ML
RUBEOLA INTERPRETATION: POSITIVE
RUBV IGG SER-ACNC: 329 IU/ML
RUBV IGG SER-IMP: REACTIVE

## 2025-03-10 ENCOUNTER — RESULTS FOLLOW-UP (OUTPATIENT)
Dept: INTERNAL MEDICINE | Facility: CLINIC | Age: 35
End: 2025-03-10

## 2025-03-17 ENCOUNTER — PATIENT MESSAGE (OUTPATIENT)
Dept: INTERNAL MEDICINE | Facility: CLINIC | Age: 35
End: 2025-03-17
Payer: COMMERCIAL

## 2025-03-21 ENCOUNTER — PATIENT MESSAGE (OUTPATIENT)
Dept: OBSTETRICS AND GYNECOLOGY | Facility: CLINIC | Age: 35
End: 2025-03-21
Payer: COMMERCIAL

## 2025-03-21 ENCOUNTER — OFFICE VISIT (OUTPATIENT)
Dept: INTERNAL MEDICINE | Facility: CLINIC | Age: 35
End: 2025-03-21
Payer: COMMERCIAL

## 2025-03-21 ENCOUNTER — OFFICE VISIT (OUTPATIENT)
Dept: DERMATOLOGY | Facility: CLINIC | Age: 35
End: 2025-03-21
Payer: COMMERCIAL

## 2025-03-21 DIAGNOSIS — Z12.83 SKIN CANCER SCREENING: Primary | ICD-10-CM

## 2025-03-21 DIAGNOSIS — R68.89 HEAT INTOLERANCE: ICD-10-CM

## 2025-03-21 DIAGNOSIS — L65.9 HAIR LOSS: ICD-10-CM

## 2025-03-21 DIAGNOSIS — E01.0 THYROMEGALY: ICD-10-CM

## 2025-03-21 DIAGNOSIS — D22.9 MULTIPLE BENIGN NEVI: ICD-10-CM

## 2025-03-21 DIAGNOSIS — R53.83 FATIGUE, UNSPECIFIED TYPE: Primary | ICD-10-CM

## 2025-03-21 DIAGNOSIS — L81.4 SOLAR LENTIGO: ICD-10-CM

## 2025-03-21 DIAGNOSIS — Z11.3 SCREEN FOR STD (SEXUALLY TRANSMITTED DISEASE): ICD-10-CM

## 2025-03-21 DIAGNOSIS — D18.00 HEMANGIOMA, UNSPECIFIED SITE: ICD-10-CM

## 2025-03-21 PROCEDURE — 99999 PR PBB SHADOW E&M-EST. PATIENT-LVL III: CPT | Mod: PBBFAC,,, | Performed by: STUDENT IN AN ORGANIZED HEALTH CARE EDUCATION/TRAINING PROGRAM

## 2025-03-21 NOTE — PROGRESS NOTES
"Subjective:      Patient ID: Billie Nicolas is a 34 y.o. female.    Chief Complaint: No chief complaint on file.    The patient location is: [place]   The chief complaint leading to consultation is: lab request    Visit type: audiovisual    Face to Face time with patient: 8:10-8:28  18 minutes of total time spent on the encounter, which includes face to face time and non-face to face time preparing to see the patient (eg, review of tests), Obtaining and/or reviewing separately obtained history, Documenting clinical information in the electronic or other health record, Independently interpreting results (not separately reported) and communicating results to the patient/family/caregiver, or Care coordination (not separately reported).     Each patient to whom he or she provides medical services by telemedicine is:  (1) informed of the relationship between the physician and patient and the respective role of any other health care provider with respect to management of the patient; and (2) notified that he or she may decline to receive medical services by telemedicine and may withdraw from such care at any time.    Notes: Patient is known to me, being seen today for health concerns.    Waking up with full body sweating, hot flashes, trouble concentrating at the end of the day   Irregular cycles, mood shifts surrounding cycle worsened   Trouble w waking in the middle of the night, not related to life stressors, sleep routine hasn't changed   Symptoms ongoing x6mths   Zoloft increased in Oct but symptoms pre-datesadjustment     Diet is fairly health, lifts weights 3-4x a week, no change in weight   Brittle nails and hair loss, more gray hair coming in   Fatigue    Vit D normal last year     Sister hypothyroid, mom may be sub-clinical hypothyroid   2017/2018 u/s thyroid "Mild thyromegaly with diffusely heterogeneous thyroid parenchyma. Negative for dominant mass."    Requests STD screen    Last visit Feb 2205 w " Willow Clement NP.      Review of Systems   Constitutional:  Positive for diaphoresis. Negative for activity change, chills, fever and unexpected weight change.   HENT:  Negative for congestion, hearing loss, rhinorrhea, sore throat and trouble swallowing.    Eyes:  Negative for discharge and visual disturbance.   Respiratory:  Negative for cough, chest tightness, shortness of breath and wheezing.    Cardiovascular:  Negative for chest pain, palpitations and leg swelling.   Gastrointestinal:  Positive for nausea. Negative for abdominal pain, blood in stool, constipation, diarrhea and vomiting.   Endocrine: Positive for heat intolerance. Negative for polydipsia and polyuria.   Genitourinary:  Positive for menstrual problem. Negative for difficulty urinating, dysuria and hematuria.   Musculoskeletal:  Negative for arthralgias, joint swelling and neck pain.   Skin:  Negative for rash.   Neurological:  Positive for headaches. Negative for dizziness, weakness and light-headedness.   Psychiatric/Behavioral:  Positive for decreased concentration and sleep disturbance. Negative for confusion and dysphoric mood. The patient is not nervous/anxious.        Objective:   There were no vitals taken for this visit.  Physical Exam  Constitutional:       General: She is not in acute distress.     Appearance: She is well-developed. She is not ill-appearing or diaphoretic.   HENT:      Head: Normocephalic and atraumatic.      Right Ear: External ear normal.      Left Ear: External ear normal.   Eyes:      General: Lids are normal.         Right eye: No discharge.         Left eye: No discharge.      Conjunctiva/sclera: Conjunctivae normal.      Right eye: Right conjunctiva is not injected.      Left eye: Left conjunctiva is not injected.   Pulmonary:      Effort: Pulmonary effort is normal. No respiratory distress.   Skin:     General: Skin is warm and dry.      Findings: No rash.   Neurological:      Mental Status: She is alert and  oriented to person, place, and time.   Psychiatric:         Speech: Speech normal.         Behavior: Behavior normal.         Thought Content: Thought content normal.         Judgment: Judgment normal.       Assessment:      1. Fatigue, unspecified type    2. Thyromegaly    3. Hair loss    4. Heat intolerance    5. Screen for STD (sexually transmitted disease)       Plan:   Fatigue, unspecified type  -     TSH; Future; Expected date: 03/21/2025  -     T4, Free; Future; Expected date: 03/21/2025  -     CBC Auto Differential; Future; Expected date: 03/21/2025  -     Comprehensive Metabolic Panel; Future; Expected date: 03/21/2025    Thyromegaly  -     US Thyroid; Future; Expected date: 03/21/2025    Hair loss  -     TSH; Future; Expected date: 03/21/2025  -     T4, Free; Future; Expected date: 03/21/2025  -     Comprehensive Metabolic Panel; Future; Expected date: 03/21/2025    Heat intolerance  -     TSH; Future; Expected date: 03/21/2025  -     T4, Free; Future; Expected date: 03/21/2025    Screen for STD (sexually transmitted disease)  -     HIV 1/2 Ag/Ab (4th Gen); Future; Expected date: 03/21/2025  -     HEPATITIS PANEL, ACUTE; Future; Expected date: 03/21/2025  -     HERPES SIMPLEX 1&2 IGG; Future; Expected date: 03/21/2025  -     RPR; Future; Expected date: 03/21/2025  -     C. trachomatis/N. gonorrhoeae by AMP DNA; Future; Expected date: 03/21/2025  -     Trichomonas vaginalis, RNA, Qual, Urine; Future; Expected date: 03/21/2025      Recommend f/u GYN for irregular cycles and current symptoms, suspect may want to add additional labs to r/o perimenopause    Additional recs pending above results

## 2025-03-21 NOTE — PROGRESS NOTES
Patient Information  Name: Billie Nicolas  : 1990  MRN: [id number]     Referring Physician:  Dr. Garcia   Primary Care Physician:  Leonie Castano DO   Date of Visit: 2025      Subjective:       Billie Nicolas is a 34 y.o. female who presents for   Chief Complaint   Patient presents with    Mole     TBSE, father had hx of SCC or BCC     HPI  Patient here for skin check.     Does patient have a personal hx of skin cancers? no  Does patient have family hx of melanoma?  no  Does patient have hx of strong sun exposure or tanning bed use in the past? yes    Patient was last seen:Visit date not found     Prior notes by myself reviewed.   Clinical documentation obtained by nursing staff reviewed.    Review of Systems   Skin:  Negative for itching and rash.        Objective:    Physical Exam   Constitutional: She appears well-developed and well-nourished. No distress.   Neurological: She is alert and oriented to person, place, and time. She is not disoriented.   Psychiatric: She has a normal mood and affect.   Skin:   Areas Examined (abnormalities noted in diagram):   Scalp / Hair Palpated and Inspected  Head / Face Inspection Performed  Neck Inspection Performed  Chest / Axilla Inspection Performed  Abdomen Inspection Performed  Genitals / Buttocks / Groin Inspection Performed  Back Inspection Performed  RUE Inspected  LUE Inspection Performed  RLE Inspected  LLE Inspection Performed  Nails and Digits Inspection Performed                   Diagram Legend     Erythematous scaling macule/papule c/w actinic keratosis       Vascular papule c/w angioma      Pigmented verrucoid papule/plaque c/w seborrheic keratosis      Yellow umbilicated papule c/w sebaceous hyperplasia      Irregularly shaped tan macule c/w lentigo     1-2 mm smooth white papules consistent with Milia      Movable subcutaneous cyst with punctum c/w epidermal inclusion cyst      Subcutaneous movable cyst c/w pilar cyst       Firm pink to brown papule c/w dermatofibroma      Pedunculated fleshy papule(s) c/w skin tag(s)      Evenly pigmented macule c/w junctional nevus     Mildly variegated pigmented, slightly irregular-bordered macule c/w mildly atypical nevus      Flesh colored to evenly pigmented papule c/w intradermal nevus       Pink pearly papule/plaque c/w basal cell carcinoma      Erythematous hyperkeratotic cursted plaque c/w SCC      Surgical scar with no sign of skin cancer recurrence      Open and closed comedones      Inflammatory papules and pustules      Verrucoid papule consistent consistent with wart     Erythematous eczematous patches and plaques     Dystrophic onycholytic nail with subungual debris c/w onychomycosis     Umbilicated papule    Erythematous-base heme-crusted tan verrucoid plaque consistent with inflamed seborrheic keratosis     Erythematous Silvery Scaling Plaque c/w Psoriasis     See annotation      No images are attached to the encounter or orders placed in the encounter.    [] Data reviewed  [] Independent review of test  [] Management discussed with another provider    Assessment / Plan:        Skin cancer screening  Total body skin examination performed today including at least 12 points as noted in physical examination. No lesions suspicious for malignancy noted.    Recommend daily sun protection/avoidance, use of at least SPF 30, broad spectrum sunscreen (OTC drug), skin self examinations, and routine physician surveillance to optimize early detection    Multiple benign nevi  Discussed ABCDE's of nevi.  Monitor for new mole or moles that are becoming bigger, darker, irritated, or developing irregular borders. Brochure provided. Instructed patient to observe lesion(s) for changes and follow up in clinic if changes are noted. Patient to monitor skin at home for new or changing lesions.     Hemangioma, unspecified site  This is a benign vascular lesion. Reassurance given. No treatment required.      Solar lentigo  This is a benign hyperpigmented sun induced lesion. Recommend daily sun protection/avoidance and use of at least SPF 30, broad spectrum sunscreen (OTC drug) will reduce the number of new lesions. Treatment of these benign lesions are considered cosmetic.             LOS NUMBER AND COMPLEXITY OF PROBLEMS    COMPLEXITY OF DATA RISK TOTAL TIME (m)   35061  15885 [] 1 self-limited or minor problem [x] Minimal to none [] No treatment recommended or patient to monitor 15-29  10-19   90652  92856 Low  [] 2 or > self limited or minor problems  [] 1 stable chronic illness  [] 1 acute, uncomplicated illness or injury Limited (2)  [] Prior external notes from each unique source  [] Review result of each unique test  [] Order each unique test [x]  Low  OTC medications, minor skin biopsy 30-44  20-29   86286  91303 Moderate  []  1 or > chronic illness with progression, exacerbation or SE of treatment  [x]  2 or more stable chronic illnesses  []  1 acute illness with systemic symptoms  []  1 acute complicated injury  []  1 undiagnosed new problem with uncertain prognosis Moderate (1/3 below)  []  3 or more data items        *Now includes assessment requiring independent historian  []  Independent interpretation of a test  []  Discuss management/test with another provider Moderate  []  Prescription drug mgmt  []  Minor surgery with risk discussed  []  Mgmt limited by social determinates 45-59  30-39   48875  54445 High  []  1 or more chronic illness with severe exacerbation, progression or SE of treatment  []  1 acute or chronic illness/injury that poses a threat to life or bodily function Extensive (2/3 below)  []  3 or more data items        *Now includes assessment requiring independent historian.  []  Independent interpretation of a test  []  Discuss management/test with another provider High  []  Major surgery with risk discussed  []  Drug therapy requiring intensive monitoring for toxicity  []   Hospitalization  []  Decision for DNR 60-74  40-54      No follow-ups on file.    Mayelin Burleson MD, FAAD  [facility]

## 2025-03-24 ENCOUNTER — OFFICE VISIT (OUTPATIENT)
Dept: OBSTETRICS AND GYNECOLOGY | Facility: CLINIC | Age: 35
End: 2025-03-24
Payer: COMMERCIAL

## 2025-03-24 ENCOUNTER — LAB VISIT (OUTPATIENT)
Dept: LAB | Facility: HOSPITAL | Age: 35
End: 2025-03-24
Attending: PHYSICIAN ASSISTANT
Payer: COMMERCIAL

## 2025-03-24 DIAGNOSIS — N95.1 PERIMENOPAUSAL SYMPTOMS: Primary | ICD-10-CM

## 2025-03-24 DIAGNOSIS — Z11.3 ENCOUNTER FOR SCREENING FOR INFECTIONS WITH PREDOMINANTLY SEXUAL MODE OF TRANSMISSION: ICD-10-CM

## 2025-03-24 DIAGNOSIS — R53.83 FATIGUE, UNSPECIFIED TYPE: ICD-10-CM

## 2025-03-24 DIAGNOSIS — Z72.89 OTHER PROBLEMS RELATED TO LIFESTYLE: ICD-10-CM

## 2025-03-24 DIAGNOSIS — Z11.3 SCREEN FOR STD (SEXUALLY TRANSMITTED DISEASE): ICD-10-CM

## 2025-03-24 DIAGNOSIS — R68.89 HEAT INTOLERANCE: ICD-10-CM

## 2025-03-24 DIAGNOSIS — L65.9 HAIR LOSS: ICD-10-CM

## 2025-03-24 DIAGNOSIS — N95.1 PERIMENOPAUSAL SYMPTOMS: ICD-10-CM

## 2025-03-24 LAB
ABSOLUTE EOSINOPHIL (OHS): 0.1 K/UL
ABSOLUTE MONOCYTE (OHS): 0.45 K/UL (ref 0.3–1)
ABSOLUTE NEUTROPHIL COUNT (OHS): 2.35 K/UL (ref 1.8–7.7)
ALBUMIN SERPL BCP-MCNC: 4.2 G/DL (ref 3.5–5.2)
ALP SERPL-CCNC: 65 UNIT/L (ref 40–150)
ALT SERPL W/O P-5'-P-CCNC: 13 UNIT/L (ref 10–44)
ANION GAP (OHS): 7 MMOL/L (ref 8–16)
AST SERPL-CCNC: 17 UNIT/L (ref 11–45)
BASOPHILS # BLD AUTO: 0.02 K/UL
BASOPHILS NFR BLD AUTO: 0.4 %
BILIRUB SERPL-MCNC: 0.3 MG/DL (ref 0.1–1)
BUN SERPL-MCNC: 20 MG/DL (ref 6–20)
CALCIUM SERPL-MCNC: 8.9 MG/DL (ref 8.7–10.5)
CHLORIDE SERPL-SCNC: 106 MMOL/L (ref 95–110)
CO2 SERPL-SCNC: 25 MMOL/L (ref 23–29)
CREAT SERPL-MCNC: 1.1 MG/DL (ref 0.5–1.4)
ERYTHROCYTE [DISTWIDTH] IN BLOOD BY AUTOMATED COUNT: 13.6 % (ref 11.5–14.5)
ESTRADIOL SERPL HS-MCNC: 33 PG/ML
FSH SERPL-ACNC: 8.27 MIU/ML
GFR SERPLBLD CREATININE-BSD FMLA CKD-EPI: >60 ML/MIN/1.73/M2
GLUCOSE SERPL-MCNC: 94 MG/DL (ref 70–110)
HCT VFR BLD AUTO: 37.5 % (ref 37–48.5)
HGB BLD-MCNC: 12.1 GM/DL (ref 12–16)
IMM GRANULOCYTES # BLD AUTO: 0.01 K/UL (ref 0–0.04)
IMM GRANULOCYTES NFR BLD AUTO: 0.2 % (ref 0–0.5)
LH SERPL-ACNC: 6.5 MIU/ML
LYMPHOCYTES # BLD AUTO: 1.68 K/UL (ref 1–4.8)
MCH RBC QN AUTO: 29.5 PG (ref 27–50)
MCHC RBC AUTO-ENTMCNC: 32.3 G/DL (ref 32–36)
MCV RBC AUTO: 92 FL (ref 82–98)
NUCLEATED RBC (/100WBC) (OHS): 0 /100 WBC
PLATELET # BLD AUTO: 304 K/UL (ref 150–450)
PMV BLD AUTO: 8.4 FL (ref 9.2–12.9)
POTASSIUM SERPL-SCNC: 3.9 MMOL/L (ref 3.5–5.1)
PROGEST SERPL-MCNC: 0.3 NG/ML
PROT SERPL-MCNC: 6.8 GM/DL (ref 6–8.4)
RBC # BLD AUTO: 4.1 M/UL (ref 4–5.4)
RELATIVE EOSINOPHIL (OHS): 2.2 %
RELATIVE LYMPHOCYTE (OHS): 36.4 % (ref 18–48)
RELATIVE MONOCYTE (OHS): 9.8 % (ref 4–15)
RELATIVE NEUTROPHIL (OHS): 51 % (ref 38–73)
SODIUM SERPL-SCNC: 138 MMOL/L (ref 136–145)
T4 FREE SERPL-MCNC: 0.93 NG/DL (ref 0.71–1.51)
TSH SERPL-ACNC: 2.17 UIU/ML (ref 0.4–4)
WBC # BLD AUTO: 4.61 K/UL (ref 3.9–12.7)

## 2025-03-24 PROCEDURE — 85025 COMPLETE CBC W/AUTO DIFF WBC: CPT

## 2025-03-24 PROCEDURE — 86695 HERPES SIMPLEX TYPE 1 TEST: CPT

## 2025-03-24 PROCEDURE — 36415 COLL VENOUS BLD VENIPUNCTURE: CPT

## 2025-03-24 PROCEDURE — 80074 ACUTE HEPATITIS PANEL: CPT

## 2025-03-24 PROCEDURE — 98005 SYNCH AUDIO-VIDEO EST LOW 20: CPT | Mod: 95,,, | Performed by: NURSE PRACTITIONER

## 2025-03-24 PROCEDURE — 83002 ASSAY OF GONADOTROPIN (LH): CPT

## 2025-03-24 PROCEDURE — 84443 ASSAY THYROID STIM HORMONE: CPT

## 2025-03-24 PROCEDURE — 84144 ASSAY OF PROGESTERONE: CPT

## 2025-03-24 PROCEDURE — 1159F MED LIST DOCD IN RCRD: CPT | Mod: CPTII,95,, | Performed by: NURSE PRACTITIONER

## 2025-03-24 PROCEDURE — 84439 ASSAY OF FREE THYROXINE: CPT

## 2025-03-24 PROCEDURE — 87389 HIV-1 AG W/HIV-1&-2 AB AG IA: CPT

## 2025-03-24 PROCEDURE — 80053 COMPREHEN METABOLIC PANEL: CPT

## 2025-03-24 PROCEDURE — 1160F RVW MEDS BY RX/DR IN RCRD: CPT | Mod: CPTII,95,, | Performed by: NURSE PRACTITIONER

## 2025-03-24 PROCEDURE — 86593 SYPHILIS TEST NON-TREP QUANT: CPT

## 2025-03-24 PROCEDURE — 82670 ASSAY OF TOTAL ESTRADIOL: CPT

## 2025-03-24 PROCEDURE — 83001 ASSAY OF GONADOTROPIN (FSH): CPT

## 2025-03-24 NOTE — PROGRESS NOTES
Subjective:       Patient ID: Billie Nicolas is a 34 y.o. female.    Chief Complaint:  No chief complaint on file.      History of Present Illness  HPI  The patient location is: [place], [place]  The chief complaint leading to consultation is: perimenopause    Visit type: audiovisual    Face to Face time with patient: 20 minutes of total time spent on the encounter, which includes face to face time and non-face to face time preparing to see the patient (eg, review of tests), Obtaining and/or reviewing separately obtained history, Documenting clinical information in the electronic or other health record, Independently interpreting results (not separately reported) and communicating results to the patient/family/caregiver, or Care coordination (not separately reported).         Each patient to whom he or she provides medical services by telemedicine is:  (1) informed of the relationship between the physician and patient and the respective role of any other health care provider with respect to management of the patient; and (2) notified that he or she may decline to receive medical services by telemedicine and may withdraw from such care at any time.    Notes:    present for perimenopausal symptoms  Cycles used to be predictable, but not lately  Bilateral salpingectomy   26d cycles + or - 2  Used to be 28d cycles very regularly  Flow is less and lighter; 4d within the last year  Eating better and working out more, but weight has not changed  Less red meat, more veggies and fiber  Hot flashes twice daily, night sweats, trouble sleeping 2-4d/wk  Wakes up drenched; Will be up for 2-4h  Air is down with two fans  No caffeine after 12 noon; sensitive to it  120-180mg caffeine  Family history of thyroid disease in her sister and mother        GYN & OB History  No LMP recorded.   Date of Last Pap: 2023    OB History    Para Term  AB Living   2 0   2 0   SAB IAB Ectopic Multiple Live  Births   2    0      # Outcome Date GA Lbr Jaime/2nd Weight Sex Type Anes PTL Lv   2 SAB            1 SAB                Review of Systems  Review of Systems   Constitutional:         +night sweats   Genitourinary:  Positive for hot flashes and menstrual problem. Negative for dysmenorrhea and menorrhagia.   Psychiatric/Behavioral:  Positive for sleep disturbance.            Objective:      Physical Exam:   Constitutional: She is oriented to person, place, and time. She appears well-developed and well-nourished. No distress.    HENT:   Head: Normocephalic and atraumatic.    Eyes: Pupils are equal, round, and reactive to light. Conjunctivae and EOM are normal.      Pulmonary/Chest: Effort normal.                  Musculoskeletal: Normal range of motion.       Neurological: She is alert and oriented to person, place, and time.    Skin: She is not diaphoretic.    Psychiatric: She has a normal mood and affect. Her behavior is normal. Judgment and thought content normal.           Assessment:        1. Perimenopausal symptoms    2. Other problems related to lifestyle    3. Encounter for screening for infections with predominantly sexual mode of transmission               Plan:   Labs ordered  F/u in 3 months for f/u and annual  RPR changed to treponema and pt informed; denies hx.    Reviewed options for hot flushes, including lifestyle measures such as avoiding caffeine, dressing in layers, avoiding hot foods/drinks, soy milk before bed, sleeping with a fan--reviewed use otc--amberen, estroven, christoph zaida, black cohosh, red clover, non estrogen--brisdelle, clonidine vs estrogen sequential or CHC.  Will think about it     Continue annual well woman exam.    Perimenopausal symptoms  -     Estradiol; Future; Expected date: 03/24/2025  -     Progesterone; Future; Expected date: 03/25/2025  -     Follicle Stimulating Hormone; Future; Expected date: 03/31/2025  -     Luteinizing Hormone; Future; Expected date:  03/31/2025    Other problems related to lifestyle  -     Treponema Pallidium Antibodies IgG, IgM; Future; Expected date: 03/24/2025    Encounter for screening for infections with predominantly sexual mode of transmission  -     Treponema Pallidium Antibodies IgG, IgM; Future; Expected date: 03/24/2025

## 2025-03-25 ENCOUNTER — RESULTS FOLLOW-UP (OUTPATIENT)
Dept: INTERNAL MEDICINE | Facility: CLINIC | Age: 35
End: 2025-03-25

## 2025-03-25 ENCOUNTER — RESULTS FOLLOW-UP (OUTPATIENT)
Dept: OBSTETRICS AND GYNECOLOGY | Facility: CLINIC | Age: 35
End: 2025-03-25

## 2025-03-25 LAB
HAV IGM SERPL QL IA: NORMAL
HBV CORE IGM SERPL QL IA: NORMAL
HBV SURFACE AG SERPL QL IA: NORMAL
HCV AB SERPL QL IA: NORMAL
HIV 1+2 AB+HIV1 P24 AG SERPL QL IA: NORMAL
HSV1 IGG SERPL QL IA: POSITIVE
HSV2 IGG SERPL QL IA: NEGATIVE
T PALLIDUM IGG+IGM SER QL: NEGATIVE

## 2025-04-23 ENCOUNTER — PATIENT MESSAGE (OUTPATIENT)
Dept: PSYCHIATRY | Facility: CLINIC | Age: 35
End: 2025-04-23
Payer: COMMERCIAL

## 2025-04-23 DIAGNOSIS — F90.8 OTHER SPECIFIED ATTENTION DEFICIT HYPERACTIVITY DISORDER (ADHD): ICD-10-CM

## 2025-04-23 RX ORDER — LISDEXAMFETAMINE DIMESYLATE 60 MG/1
60 CAPSULE ORAL EVERY MORNING
Qty: 30 CAPSULE | Refills: 0 | Status: SHIPPED | OUTPATIENT
Start: 2025-06-22 | End: 2025-07-22

## 2025-04-23 RX ORDER — LISDEXAMFETAMINE DIMESYLATE 60 MG/1
60 CAPSULE ORAL EVERY MORNING
Qty: 30 CAPSULE | Refills: 0 | Status: SHIPPED | OUTPATIENT
Start: 2025-05-23 | End: 2025-06-22

## 2025-04-23 RX ORDER — LISDEXAMFETAMINE DIMESYLATE 60 MG/1
60 CAPSULE ORAL EVERY MORNING
Qty: 30 CAPSULE | Refills: 0 | Status: SHIPPED | OUTPATIENT
Start: 2025-04-23 | End: 2025-05-25

## 2025-04-23 RX ORDER — DEXTROAMPHETAMINE SACCHARATE, AMPHETAMINE ASPARTATE, DEXTROAMPHETAMINE SULFATE AND AMPHETAMINE SULFATE 2.5; 2.5; 2.5; 2.5 MG/1; MG/1; MG/1; MG/1
10 TABLET ORAL DAILY
Qty: 30 TABLET | Refills: 0 | Status: SHIPPED | OUTPATIENT
Start: 2025-04-23 | End: 2025-05-25

## 2025-04-23 RX ORDER — DEXTROAMPHETAMINE SACCHARATE, AMPHETAMINE ASPARTATE, DEXTROAMPHETAMINE SULFATE AND AMPHETAMINE SULFATE 2.5; 2.5; 2.5; 2.5 MG/1; MG/1; MG/1; MG/1
10 TABLET ORAL DAILY
Qty: 30 TABLET | Refills: 0 | Status: SHIPPED | OUTPATIENT
Start: 2025-06-22 | End: 2025-07-22

## 2025-04-23 RX ORDER — DEXTROAMPHETAMINE SACCHARATE, AMPHETAMINE ASPARTATE, DEXTROAMPHETAMINE SULFATE AND AMPHETAMINE SULFATE 2.5; 2.5; 2.5; 2.5 MG/1; MG/1; MG/1; MG/1
10 TABLET ORAL DAILY
Qty: 30 TABLET | Refills: 0 | Status: SHIPPED | OUTPATIENT
Start: 2025-05-23 | End: 2025-06-22

## 2025-06-09 ENCOUNTER — ON-DEMAND VIRTUAL (OUTPATIENT)
Dept: URGENT CARE | Facility: CLINIC | Age: 35
End: 2025-06-09
Payer: COMMERCIAL

## 2025-06-09 DIAGNOSIS — J06.9 URI WITH COUGH AND CONGESTION: ICD-10-CM

## 2025-06-09 DIAGNOSIS — R09.81 SINUS CONGESTION: Primary | ICD-10-CM

## 2025-06-09 DIAGNOSIS — R05.9 COUGH, UNSPECIFIED TYPE: ICD-10-CM

## 2025-06-09 RX ORDER — PREDNISONE 20 MG/1
20 TABLET ORAL DAILY
Qty: 3 TABLET | Refills: 0 | Status: SHIPPED | OUTPATIENT
Start: 2025-06-09 | End: 2025-06-14

## 2025-06-09 RX ORDER — AZELASTINE 1 MG/ML
1 SPRAY, METERED NASAL 2 TIMES DAILY
Qty: 30 ML | Refills: 0 | Status: SHIPPED | OUTPATIENT
Start: 2025-06-09

## 2025-06-09 RX ORDER — ALBUTEROL SULFATE 90 UG/1
2 INHALANT RESPIRATORY (INHALATION) EVERY 6 HOURS PRN
Qty: 18 G | Refills: 0 | Status: SHIPPED | OUTPATIENT
Start: 2025-06-09 | End: 2026-06-09

## 2025-06-09 RX ORDER — PROMETHAZINE HYDROCHLORIDE AND DEXTROMETHORPHAN HYDROBROMIDE 6.25; 15 MG/5ML; MG/5ML
5 SYRUP ORAL EVERY 6 HOURS PRN
Qty: 118 ML | Refills: 0 | Status: SHIPPED | OUTPATIENT
Start: 2025-06-09 | End: 2025-06-19

## 2025-06-09 NOTE — PROGRESS NOTES
Subjective:      Patient ID: Billie Nicolas is a 34 y.o. female.    Vitals:  vitals were not taken for this visit.     Chief Complaint: Headache, Cough, and Sinus Problem      Visit Type: TELE AUDIOVISUAL - This visit was conducted virtually based on  subjective information and limited objective exam    Present with the patient at the time of consultation: TELEMED PRESENT WITH PATIENT: None  LOCATION OF PATIENT [place], [place]  Two patient identifiers used to verify patient- saying out date of birth and full name.       Past Medical History:   Diagnosis Date    Abnormal cervical Pap smear with positive HPV DNA test     ADHD, predominantly inattentive type     Allergy     Anxiety     Asthma     Bulimia     Genetic testing 05/22/2024    Ambry BRCA1/2 Analyses with [employer] (85 genes) - NEGATIVE, VUS in RB1    HSV-1 infection     Major depression, recurrent     Migraine headache      Past Surgical History:   Procedure Laterality Date    ABSCESS DRAINAGE  12/25/2017    abscess lanced     LAPAROSCOPIC SALPINGECTOMY Bilateral 07/31/2023    Procedure: SALPINGECTOMY, LAPAROSCOPIC;  Surgeon: Elvin Shoemaker MD;  Location: [facility];  Service: OB/GYN;  Laterality: Bilateral;    TONSILLECTOMY AND ADENOIDECTOMY  1996    WISDOM TOOTH EXTRACTION       Review of patient's allergies indicates:   Allergen Reactions    Sulfa (sulfonamide antibiotics) Hives    Sulfamethoxazole-trimethoprim      Medications Ordered Prior to Encounter[1]  Family History   Problem Relation Name Age of Onset    Cervical cancer Mother Dana 32        metastastized to uterus, bladder, LIU/BSO    Heart disease Mother Dana         mitral valve prolapse, stage 1 CHF, stent placed at 49 years old    Mitral valve prolapse Mother Dana     Hypertension Mother Dana     Alcohol abuse Mother Dana     Cancer Mother Dana         cervical cancer    Miscarriages / Stillbirths Mother Dana         1 known miscarriage     Hypertension Maternal Grandmother Billie quinn     Cancer Maternal Grandmother Billie quinn         uterine cancer    Lung cancer Maternal Grandfather Chet Nicolas         metastasized, +smoking hx    Heart disease Maternal Grandfather Chet Nicolas         Mitral valve prolapse, STEMI at 55    Hodgkin's lymphoma Maternal Grandfather Chet Nicolas     Hypertension Maternal Grandfather Chet Nicolas     Cancer Maternal Grandfather Chet Nicolas         non-Hodgkins Lymphoma    Alcohol abuse Father Corwin     Clotting disorder Maternal Uncle      Liver cancer Other      Pancreatic cancer Other      Colon cancer Other  48    Pancreatic cancer Paternal Uncle      Miscarriages / Stillbirths Sister Fela Rai         3 miscarriages/chemical pregnancies    Melanoma Neg Hx         Medications Ordered                [facility] [address] [place], [facility] [address]    Telephone: [phone]   Fax: [phone]   Hours: Mon-Fri, 8a-5:30p                         Internal Pharmacy (4 of 4)              albuterol (VENTOLIN HFA) 90 mcg/actuation inhaler    Sig: Inhale 2 puffs into the lungs every 6 (six) hours as needed for Wheezing. Rescue       Start: 25     Quantity: 18 g Refills: 0                         azelastine (ASTELIN) 137 mcg (0.1 %) nasal spray    Si spray (137 mcg total) by Nasal route 2 (two) times daily.       Start: 25     Quantity: 30 mL Refills: 0                         predniSONE (DELTASONE) 20 MG tablet    Sig: Take 1 tablet (20 mg total) by mouth once daily. for 3 days       Start: 25     Quantity: 3 tablet Refills: 0                         promethazine-dextromethorphan (PROMETHAZINE-DM) 6.25-15 mg/5 mL Syrp    Sig: Take 5 mLs by mouth every 6 (six) hours as needed (cough).       Start: 25     Quantity: 118 mL Refills: 0                           No questionnaires on file.    33 yo female with c/o sore throat, sinus congestion, cough with colored  secretions. She states headache and fatigue. She states low grade fever. She states she has tested negative for covid. She states slight sob - gets winded more easily. She denies wheezing. She states she has tried ibuprofen , zyrtec and dm and vapor steam showers.         Constitution: Positive for fatigue and fever.   HENT:  Positive for congestion and sinus pressure.    Cardiovascular: Negative.    Respiratory:  Positive for cough.    Gastrointestinal: Negative.    Endocrine: negative.   Genitourinary: Negative.  Negative for frequency and urgency.   Musculoskeletal: Negative.    Skin: Negative.    Allergic/Immunologic: Negative.    Neurological: Negative.    Hematologic/Lymphatic: Negative.    Psychiatric/Behavioral: Negative.          Objective:   The physical exam was conducted virtually.    AAO x 3 ; no acute distress noted; appearance normal; mood and behavior normal; thought process normal  Head- normocephalic  Nose- appears normal, no discharge or erythema  Eyes- pupils appear normal in size, no drainage, no erythema  Ears- normal appearing; no discharge, no erythema  Mouth- appears normal  Oropharynx- no erythema, lesions  Lungs- breathing at a normal rate, no acute distress noted  Heart- no reports of tachycardia, palpitations, chest pain  Abdomen- non distended, non tender reported by patient  Skin- warm and dry, no erythema or edema noted by patient or visualized  Psych- as above; no si/hi      Assessment:     1. Sinus congestion    2. Cough, unspecified type    3. URI with cough and congestion        Plan:     PLEASE READ YOUR DISCHARGE INSTRUCTIONS ENTIRELY AS IT CONTAINS IMPORTANT INFORMATION.      Please drink plenty of fluids.    Please get plenty of rest.    Please return here or go to the Emergency Department for any concerns or worsening of condition.    Please take an over the counter antihistamine medication (allegra/Claritin/Zyrtec) of your choice as directed.    Try an over the counter  decongestant like Mucinex D or Sudafed. You buy this behind the pharmacy counter    If you do have Hypertension or palpitations, it is safe to take Coricidin HBP for relief of sinus symptoms.    If not allergic, please take over the counter Tylenol (Acetaminophen) and/or Motrin (Ibuprofen) as directed for control of pain and/or fever.  Please follow up with your primary care doctor or specialist as needed.    Sore throat recommendations: Warm fluids, warm salt water gargles, throat lozenges, tea, honey, soup, rest, hydration.    Use over the counter flonase: one spray each nostril twice daily OR two sprays each nostril once daily.     Sinus rinses DO NOT USE TAP WATER, if you must, water must be a rolling boil for 1 minute, let it cool, then use.  May use distilled water, or over the counter nasal saline rinses.  Vics vapor rub in shower to help open nasal passages.  May use nasal gel to keep passages moisturized.  May use Nasal saline sprays during the day for added relief of congestion.   For those who go to the gym, please do not use the sauna or steam room now to clear sinuses.    If you  smoke, please stop smoking.      Please return or see your primary care doctor if you develop new or worsening symptoms.     Please arrange follow up with your primary medical clinic as soon as possible. You must understand that you've received Virtual treatment only and that you may be released before all of your medical problems are known or treated. You, the patient, will arrange for follow up as instructed. If your symptoms worsen or fail to improve you should go to the Emergency Room.      Thank you for choosing [facility]!    Our goal in the [facility] is to always provide outstanding medical care. You may receive a survey by mail or e-mail in the next week regarding your experience today. We would greatly appreciate you completing and returning the survey. Your feedback provides us  with a way to recognize our staff who provide very good care, and it helps us learn how to improve when your experience was below our aspiration of excellence.         We appreciate you trusting us with your medical care. We hope you feel better soon. We will be happy to take care of you for all of your future medical needs.    You must understand that you've received an Urgent Care treatment only and that you may be released before all your medical problems are known or treated. You, the patient, will arrange for follow up care as instructed.    Follow up with your PCP or specialty clinic as directed in the next 1-2 weeks if not improved or as needed.  You can call [phone] to schedule an appointment with the appropriate provider.    If your condition worsens we recommend that you receive another evaluation in person, with your primary care provider, urgent care or at the emergency room immediately or contact your primary medical clinics after hours call service to discuss your concerns.         Sinus congestion  -     predniSONE (DELTASONE) 20 MG tablet; Take 1 tablet (20 mg total) by mouth once daily. for 3 days  Dispense: 3 tablet; Refill: 0  -     albuterol (VENTOLIN HFA) 90 mcg/actuation inhaler; Inhale 2 puffs into the lungs every 6 (six) hours as needed for Wheezing. Rescue  Dispense: 18 g; Refill: 0  -     promethazine-dextromethorphan (PROMETHAZINE-DM) 6.25-15 mg/5 mL Syrp; Take 5 mLs by mouth every 6 (six) hours as needed (cough).  Dispense: 118 mL; Refill: 0  -     azelastine (ASTELIN) 137 mcg (0.1 %) nasal spray; 1 spray (137 mcg total) by Nasal route 2 (two) times daily.  Dispense: 30 mL; Refill: 0    Cough, unspecified type  -     predniSONE (DELTASONE) 20 MG tablet; Take 1 tablet (20 mg total) by mouth once daily. for 3 days  Dispense: 3 tablet; Refill: 0  -     albuterol (VENTOLIN HFA) 90 mcg/actuation inhaler; Inhale 2 puffs into the lungs every 6 (six) hours as needed for Wheezing. Rescue   Dispense: 18 g; Refill: 0  -     promethazine-dextromethorphan (PROMETHAZINE-DM) 6.25-15 mg/5 mL Syrp; Take 5 mLs by mouth every 6 (six) hours as needed (cough).  Dispense: 118 mL; Refill: 0  -     azelastine (ASTELIN) 137 mcg (0.1 %) nasal spray; 1 spray (137 mcg total) by Nasal route 2 (two) times daily.  Dispense: 30 mL; Refill: 0    URI with cough and congestion  -     predniSONE (DELTASONE) 20 MG tablet; Take 1 tablet (20 mg total) by mouth once daily. for 3 days  Dispense: 3 tablet; Refill: 0  -     albuterol (VENTOLIN HFA) 90 mcg/actuation inhaler; Inhale 2 puffs into the lungs every 6 (six) hours as needed for Wheezing. Rescue  Dispense: 18 g; Refill: 0  -     promethazine-dextromethorphan (PROMETHAZINE-DM) 6.25-15 mg/5 mL Syrp; Take 5 mLs by mouth every 6 (six) hours as needed (cough).  Dispense: 118 mL; Refill: 0  -     azelastine (ASTELIN) 137 mcg (0.1 %) nasal spray; 1 spray (137 mcg total) by Nasal route 2 (two) times daily.  Dispense: 30 mL; Refill: 0                         [1]   Current Outpatient Medications on File Prior to Visit   Medication Sig Dispense Refill    B-complex with vitamin C (Z-BEC OR EQUIV) tablet Take 1 tablet by mouth once daily.      buPROPion (WELLBUTRIN XL) 300 MG 24 hr tablet Take 1 tablet (300 mg total) by mouth once daily. 90 tablet 1    [START ON 6/22/2025] dextroamphetamine-amphetamine 10 mg Tab Take 1 tablet (10 mg total) by mouth once daily. 30 tablet 0    dextroamphetamine-amphetamine 10 mg Tab Take 1 tablet (10 mg total) by mouth once daily. 30 tablet 0    diclofenac sodium (VOLTAREN) 1 % Gel Apply 2 g topically 4 (four) times daily. (Patient not taking: Reported on 3/24/2025)      [START ON 6/22/2025] lisdexamfetamine (VYVANSE) 60 MG capsule Take 1 capsule (60 mg total) by mouth every morning. 30 capsule 0    lisdexamfetamine (VYVANSE) 60 MG capsule Take 1 capsule (60 mg total) by mouth every morning. 30 capsule 0    sertraline (ZOLOFT) 100 MG tablet Take 1½ tablets  (150 mg total) by mouth once daily. 135 tablet 1    valACYclovir (VALTREX) 500 MG tablet Take 1 tablet (500 mg total) by mouth once daily. 90 tablet 1     No current facility-administered medications on file prior to visit.

## 2025-06-16 ENCOUNTER — OFFICE VISIT (OUTPATIENT)
Dept: OPHTHALMOLOGY | Facility: CLINIC | Age: 35
End: 2025-06-16
Payer: COMMERCIAL

## 2025-06-16 DIAGNOSIS — Z01.00 ENCOUNTER FOR EYE EXAM: Primary | ICD-10-CM

## 2025-06-16 DIAGNOSIS — Z46.0 ENCOUNTER FOR FITTING OR ADJUSTMENT OF SPECTACLES OR CONTACT LENSES: ICD-10-CM

## 2025-06-16 DIAGNOSIS — H52.7 REFRACTIVE ERRORS: ICD-10-CM

## 2025-06-16 PROCEDURE — 92014 COMPRE OPH EXAM EST PT 1/>: CPT | Mod: ,,, | Performed by: OPTOMETRIST

## 2025-06-16 PROCEDURE — 99999 PR PBB SHADOW E&M-EST. PATIENT-LVL III: CPT | Mod: PBBFAC,,, | Performed by: OPTOMETRIST

## 2025-06-16 PROCEDURE — 92015 DETERMINE REFRACTIVE STATE: CPT | Mod: ,,, | Performed by: OPTOMETRIST

## 2025-06-16 NOTE — PROGRESS NOTES
SUBJECTIVE  Bilile Nicolas is 34 y.o. female  Corrected distance visual acuity was 20/20 in the right eye and 20/20 slow in the left eye. Corrected near visual acuity was J1 in the right eye and J1 in the left eye.   Chief Complaint   Patient presents with    Eye Exam    Contact Lens Follow Up          HPI    No visual complaints.  Last eye exam 05/20/2024 TRF.  [employer]data glasses and contact lenses RX.  Last dilation 05/20/2024.  Last edited by Kristina Darby MA on 6/16/2025  2:16 PM.         Assessment /Plan :  1. Encounter for eye exam  No pathology.     2. Encounter for fitting or adjustment of spectacles or contact lenses  No changes CL Rx  Dispense Final Rx for contacts.      3. Refractive errors  Dispense Final Rx for glasses.  RTC 1 year  Discussed above and answered questions.

## 2025-06-18 ENCOUNTER — PATIENT MESSAGE (OUTPATIENT)
Dept: OPTOMETRY | Facility: CLINIC | Age: 35
End: 2025-06-18
Payer: COMMERCIAL

## 2025-07-01 ENCOUNTER — OFFICE VISIT (OUTPATIENT)
Dept: PSYCHIATRY | Facility: CLINIC | Age: 35
End: 2025-07-01
Payer: COMMERCIAL

## 2025-07-01 VITALS
DIASTOLIC BLOOD PRESSURE: 86 MMHG | SYSTOLIC BLOOD PRESSURE: 134 MMHG | BODY MASS INDEX: 32.13 KG/M2 | HEART RATE: 92 BPM | WEIGHT: 199.06 LBS

## 2025-07-01 DIAGNOSIS — F33.42 MAJOR DEPRESSIVE DISORDER, RECURRENT EPISODE, IN FULL REMISSION: ICD-10-CM

## 2025-07-01 DIAGNOSIS — F41.9 ANXIETY: ICD-10-CM

## 2025-07-01 DIAGNOSIS — F32.81 PMDD (PREMENSTRUAL DYSPHORIC DISORDER): ICD-10-CM

## 2025-07-01 DIAGNOSIS — F90.8 OTHER SPECIFIED ATTENTION DEFICIT HYPERACTIVITY DISORDER (ADHD): Primary | ICD-10-CM

## 2025-07-01 DIAGNOSIS — Z63.79 STRESS DUE TO ILLNESS OF FAMILY MEMBER: ICD-10-CM

## 2025-07-01 PROCEDURE — 99214 OFFICE O/P EST MOD 30 MIN: CPT | Mod: S$GLB,,, | Performed by: PSYCHOLOGIST

## 2025-07-01 PROCEDURE — 3075F SYST BP GE 130 - 139MM HG: CPT | Mod: CPTII,S$GLB,, | Performed by: PSYCHOLOGIST

## 2025-07-01 PROCEDURE — 3008F BODY MASS INDEX DOCD: CPT | Mod: CPTII,S$GLB,, | Performed by: PSYCHOLOGIST

## 2025-07-01 PROCEDURE — 1159F MED LIST DOCD IN RCRD: CPT | Mod: CPTII,S$GLB,, | Performed by: PSYCHOLOGIST

## 2025-07-01 PROCEDURE — G2211 COMPLEX E/M VISIT ADD ON: HCPCS | Mod: S$GLB,,, | Performed by: PSYCHOLOGIST

## 2025-07-01 PROCEDURE — 3079F DIAST BP 80-89 MM HG: CPT | Mod: CPTII,S$GLB,, | Performed by: PSYCHOLOGIST

## 2025-07-01 PROCEDURE — 90833 PSYTX W PT W E/M 30 MIN: CPT | Mod: S$GLB,,, | Performed by: PSYCHOLOGIST

## 2025-07-01 PROCEDURE — 99999 PR PBB SHADOW E&M-EST. PATIENT-LVL II: CPT | Mod: PBBFAC,,, | Performed by: PSYCHOLOGIST

## 2025-07-01 RX ORDER — DEXTROAMPHETAMINE SACCHARATE, AMPHETAMINE ASPARTATE, DEXTROAMPHETAMINE SULFATE AND AMPHETAMINE SULFATE 2.5; 2.5; 2.5; 2.5 MG/1; MG/1; MG/1; MG/1
1 TABLET ORAL
Qty: 90 TABLET | Refills: 0 | Status: SHIPPED | OUTPATIENT
Start: 2025-07-01 | End: 2025-09-29

## 2025-07-01 RX ORDER — LISDEXAMFETAMINE DIMESYLATE 60 MG/1
60 CAPSULE ORAL EVERY MORNING
Qty: 30 CAPSULE | Refills: 0 | Status: SHIPPED | OUTPATIENT
Start: 2025-07-01 | End: 2025-07-31

## 2025-07-01 RX ORDER — LISDEXAMFETAMINE DIMESYLATE 60 MG/1
60 CAPSULE ORAL EVERY MORNING
Qty: 30 CAPSULE | Refills: 0 | Status: SHIPPED | OUTPATIENT
Start: 2025-07-31 | End: 2025-08-30

## 2025-07-01 RX ORDER — BUPROPION HYDROCHLORIDE 300 MG/1
300 TABLET ORAL DAILY
Qty: 90 TABLET | Refills: 1 | Status: SHIPPED | OUTPATIENT
Start: 2025-07-01 | End: 2025-12-28

## 2025-07-01 RX ORDER — SERTRALINE HYDROCHLORIDE 100 MG/1
150 TABLET, FILM COATED ORAL DAILY
Qty: 135 TABLET | Refills: 1 | Status: SHIPPED | OUTPATIENT
Start: 2025-07-01 | End: 2025-12-28

## 2025-07-01 RX ORDER — LISDEXAMFETAMINE DIMESYLATE 60 MG/1
60 CAPSULE ORAL EVERY MORNING
Qty: 30 CAPSULE | Refills: 0 | Status: SHIPPED | OUTPATIENT
Start: 2025-08-30 | End: 2025-09-29

## 2025-07-01 NOTE — PATIENT INSTRUCTIONS

## 2025-07-01 NOTE — PROGRESS NOTES
Outpatient Psychiatry Follow-Up Visit     7/1/2025      Clinical Status of Patient:  Outpatient (Ambulatory)    Preferred Name: Billie  Gender Identity: cis female  Preferred Pronouns: she/her      History of Present Illness    CHIEF COMPLAINT:  Billie presents for follow-up for ADHD, PMDD, and anxiety management, last seen in January.    HPI:  Billie reports that her depression remains in full remission since the last visit in January. She notes significant improvement in her PMDD symptoms following an increase in Zoloft dosage. While some physical symptoms persist, she reports a marked reduction in negative impacts on work and personal relationships.    Billie experienced an increase in anxiety and a decrease in executive function when she temporarily stopped taking her methylated folate supplement. She also reported worsened sleep during this period. Upon resuming the supplement, these symptoms improved. Billie mentions some recent sleep disruption, which she attributes to current life circumstances, including family health concerns.    Billie's mother-in-law was recently diagnosed with early-stage Alzheimer's at age 66. This diagnosis has impacted the patient's future plans, as she is likely to become a primary caregiver. Billie expresses emotional difficulty coping with this situation.    Billie reports she is gradually tapering down her caffeine intake, currently consuming about 10.5 ounces of diet soda per day, only in the morning. She mentions her upcoming participation in a weight loss study at [place], which will involve controlled food intake and sleeping in a hypoxic tent.    Billie expresses distress about current political and social issues, which are causing her anxiety. She reports channeling these feelings into community service and mutual aid activities.    MEDICATIONS:  Billie is on Zoloft 150 mg, taking 1.5 tablets orally for PMDD, which has improved her symptoms. She is also on Wellbutrin  mg orally,  "Vyvanse 60 mg orally for ADHD, and Adderall 10 mg orally after lunch for ADHD. She takes methylated folate 15 mg orally, which has improved her anxiety, executive function, and sleep.     FAMILY HISTORY:  Family history is significant for mother-in-law being diagnosed with early-onset Alzheimer's a few months ago at age 66.    ALLERGIES:  Billie is allergic to Sulfur (Bactrim), which causes an all-over rash, 102-degree fever, and profound fatigue.    SUBSTANCE USE:  Billie is currently tapering down her caffeine intake, consuming about 10.5 ounces per day in the morning, primarily from diet Coke.    LIFESTYLE:  Billie works from home, with her workplace being only 5 minutes away. She lives with her boyfriend in a larger, less cramped living situation compared to her sister-in-law; thus, she and her significant other will most likely take on the primary caregiver role for her mother-in-law down the road. She is active in buy nothing groups on [employer] and participates in protests, serving as a medic. Billie is involved in mutual aid and community-minded activities. 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 She plans to be involved in a research study with [employer].      ROS:  Constitutional: +sleep disturbances  Female Genitourinary: +menstrual pain or symptoms  Integumentary: +rash  Psychiatric: -depression, +anxiety  Allergic: +allergic reactions       PSYCHIATRIC: Pertinant items are noted in the narrative.    Past Medical, Family and Social History: The patient's past medical, family and social history have been reviewed and updated as appropriate within the electronic medical record - see encounter notes.        6/30/2025     2:36 PM 1/3/2025     9:25 AM 10/17/2024    11:35 AM   GAD7   1. Feeling nervous, anxious, or on edge? 1 1 1   2. Not being able to stop or control worrying? 1 0 1   3. Worrying too much about different things? 0 0 1   4. Trouble " relaxing? 2 1 2   5. Being so restless that it is hard to sit still? 2 1 1   6. Becoming easily annoyed or irritable? 1 1 1   7. Feeling afraid as if something awful might happen? 0 1 1   8. If you checked off any problems, how difficult have these problems made it for you to do your work, take care of things at home, or get along with other people? 1 0 1   BEATRIZ-7 Score 7  5  8        Patient-reported      0-4 = Minimal anxiety  5-9 = Mild anxiety  10-14 = Moderate anxiety  15-21 = Severe anxiety         2/4/2025     3:13 PM 7/16/2024     1:34 PM   Depression Patient Health Questionnaire   Over the last two weeks how often have you been bothered by little interest or pleasure in doing things Not at all Not at all   Over the last two weeks how often have you been bothered by feeling down, depressed or hopeless Not at all Not at all   PHQ-2 Total Score 0 0   Over the last two weeks how often have you been bothered by trouble falling or staying asleep, or sleeping too much     Over the last two weeks how often have you been bothered by feeling tired or having little energy     Over the last two weeks how often have you been bothered by a poor appetite or overeating     Over the last two weeks how often have you been bothered by feeling bad about yourself - or that you are a failure or have let yourself or your family down     Over the last two weeks how often have you been bothered by trouble concentrating on things, such as reading the newspaper or watching television     Over the last two weeks how often have you been bothered by moving or speaking so slowly that other people could have noticed. Or the opposite - being so fidgety or restless that you have been moving around a lot more than usual.     Over the last two weeks how often have you been bothered by thoughts that you would be better off dead, or of hurting yourself     If you checked off any problems, how difficult have these problems made it for you to do  "your work, take care of things at home or get along with other people?     PHQ-9 Score     PHQ-9 Interpretation         Data saved with a previous flowsheet row definition     0-4 = No intervention  5 to 9 = Mild  10 to 14 = Moderate  15 to 19 = Moderately severe  =20 = Severe    Risk Parameters:  Patient reports no suicidal ideation  Patient reports no homicidal ideation  Patient reports no self-injurious behavior  Patient reports no violent behavior    Exam (detailed: at least 9 elements; comprehensive: all 15 elements)   Constitutional  Vitals:  Most recent vital signs were reviewed.   Last 3 sets of Vitals        2/4/2025     3:10 PM 3/21/2025     1:04 PM 7/1/2025     2:23 PM   Vitals - 1 value per visit   SYSTOLIC 126  134   DIASTOLIC 82  86   Pulse 96  92   Temp 97.7 °F (36.5 °C)     Resp 16     SPO2 97 %     Weight (lb) 194.67  199.08   Weight (kg) 88.3  90.3   Height 5' 6" (1.676 m)     BMI (Calculated) 31.4     Pain Score Six Zero           General:  age appropriate, well dressed, neatly groomed     Musculoskeletal  Muscle Strength/Tone:  no tremor, no tic   Gait & Station:  non-ataxic     Psychiatric  Speech:  no latency; no press   Behavior: wnl   Mood & Affect:  Stressed; sad about MIL  congruent and appropriate   Thought Process:  normal and logical   Associations:  intact   Thought Content:  normal, no suicidality, no homicidality, delusions, or paranoia   Insight:  has awareness of illness   Judgement: behavior is adequate to circumstances   Orientation:  grossly intact   Memory: intact for content of interview   Language: grossly intact   Attention Span & Concentration:  Grossly intact   Fund of Knowledge:  intact and appropriate to age and level of education     General Impression:       ICD-10-CM ICD-9-CM   1. Other specified attention deficit hyperactivity disorder (ADHD)  F90.8 314.01   2. Stress due to illness of family member  Z63.79 V61.49   3. Anxiety  F41.9 300.00   4. PMDD (premenstrual " dysphoric disorder)  F32.81 625.4   5. Major depressive disorder, recurrent episode, in full remission  F33.42 296.36        Assessment & Plan    Continued current medication regimen for ADHD, PMDD, and anxiety management, noting improvement in PMDD symptoms with increased Zoloft dosage.  Assessed response to methylated folate supplementation, acknowledging reported benefits in anxiety reduction, executive function, and sleep quality.  Considered adjusting Adderall prescription to 90-day supply for patient convenience, pending pharmacy approval.  Reviewed overall mental health status in context of current societal stressors and personal challenges.    PLAN SUMMARY:   Continue Adderall 10 mg regular release after lunch   Continue Vyvanse 60 mg daily   Continue Wellbutrin  mg daily   Continue Zoloft 150 mg (1.5 tablets daily)   Billie to continue tapering down caffeine intake   Contact office if only 30-day supply of Adderall is received   Follow up in 6 months   Contact office if needed during provider's 2-week absence starting on the 12th    ATTENTION-DEFICIT HYPERACTIVITY DISORDER:   Continued Vyvanse 60 mg daily.   Continued Adderall 10 mg regular release after lunch.   Message the office if only a 30-day supply of Adderall is received, so additional prescriptions can be sent.    DEPRESSION/ANXIETY:   Continued Zoloft 150 mg (1.5 tablets daily).   Continued Wellbutrin  mg daily.    FOLLOW-UP ENCOUNTER:   Follow up in 6 months.   Contact the office if anything is needed while I am out of office for 2 weeks starting on the 12th.         I spent an additional 14 minutes performing E/M services with >50% spent on counseling, guidance, coordinating care (not Psychotherapy related) in addition to the 16 minutes performing Psychotherapy.    I spent a total of 30 minutes on the day of the visit. This includes face to face time and non-face to face time preparing to see the patient (eg, review of tests),  obtaining and/or reviewing separately obtained history, documenting clinical information in the electronic or other health record, independently interpreting results and communicating results to the patient/family/caregiver, or care coordinator.       Radha Lovett, PhD, MP  Advanced Practice Medical Psychologist  [facility]--[address]  [place] [address]  [phone]   [phone] fax    This note was generated with the assistance of ambient listening technology. Verbal consent was obtained by the patient and accompanying visitor(s) for the recording of patient appointment to facilitate this note. I attest to having reviewed and edited the generated note for accuracy, though some syntax or spelling errors may persist. Please contact the author of this note for any clarification.

## 2025-08-27 ENCOUNTER — OFFICE VISIT (OUTPATIENT)
Dept: INTERNAL MEDICINE | Facility: CLINIC | Age: 35
End: 2025-08-27
Payer: COMMERCIAL

## 2025-08-27 VITALS
BODY MASS INDEX: 31.11 KG/M2 | HEART RATE: 98 BPM | OXYGEN SATURATION: 99 % | RESPIRATION RATE: 16 BRPM | WEIGHT: 193.56 LBS | HEIGHT: 66 IN | SYSTOLIC BLOOD PRESSURE: 124 MMHG | TEMPERATURE: 98 F | DIASTOLIC BLOOD PRESSURE: 82 MMHG

## 2025-08-27 DIAGNOSIS — B34.9 VIRAL ILLNESS: Primary | ICD-10-CM

## 2025-08-27 DIAGNOSIS — J02.9 SORE THROAT: ICD-10-CM

## 2025-08-27 DIAGNOSIS — H92.02 LEFT EAR PAIN: ICD-10-CM

## 2025-08-27 DIAGNOSIS — R09.81 NASAL CONGESTION: ICD-10-CM

## 2025-08-27 LAB
FLUAV AG UPPER RESP QL IA.RAPID: NEGATIVE
FLUBV AG UPPER RESP QL IA.RAPID: NEGATIVE
GROUP A STREP MOLECULAR (OHS): NEGATIVE
SARS-COV-2 RDRP RESP QL NAA+PROBE: NEGATIVE

## 2025-08-27 PROCEDURE — 87651 STREP A DNA AMP PROBE: CPT

## 2025-08-27 PROCEDURE — 99999 PR PBB SHADOW E&M-EST. PATIENT-LVL IV: CPT | Mod: PBBFAC,,,

## 2025-08-27 PROCEDURE — U0002 COVID-19 LAB TEST NON-CDC: HCPCS

## 2025-08-27 PROCEDURE — 87502 INFLUENZA DNA AMP PROBE: CPT

## 2025-09-05 ENCOUNTER — OFFICE VISIT (OUTPATIENT)
Dept: INTERNAL MEDICINE | Facility: CLINIC | Age: 35
End: 2025-09-05
Payer: COMMERCIAL

## 2025-09-05 VITALS
OXYGEN SATURATION: 99 % | TEMPERATURE: 97 F | DIASTOLIC BLOOD PRESSURE: 70 MMHG | HEART RATE: 96 BPM | HEIGHT: 66 IN | BODY MASS INDEX: 30.82 KG/M2 | WEIGHT: 191.81 LBS | SYSTOLIC BLOOD PRESSURE: 110 MMHG

## 2025-09-05 DIAGNOSIS — F32.A ANXIETY AND DEPRESSION: ICD-10-CM

## 2025-09-05 DIAGNOSIS — Z00.00 ANNUAL PHYSICAL EXAM: Primary | ICD-10-CM

## 2025-09-05 DIAGNOSIS — F90.9 ATTENTION DEFICIT HYPERACTIVITY DISORDER (ADHD), UNSPECIFIED ADHD TYPE: ICD-10-CM

## 2025-09-05 DIAGNOSIS — F41.9 ANXIETY AND DEPRESSION: ICD-10-CM

## 2025-09-05 DIAGNOSIS — B00.9 HSV-1 INFECTION: ICD-10-CM

## 2025-09-05 PROCEDURE — 99999 PR PBB SHADOW E&M-EST. PATIENT-LVL IV: CPT | Mod: PBBFAC,,, | Performed by: INTERNAL MEDICINE

## 2025-09-05 RX ORDER — VALACYCLOVIR HYDROCHLORIDE 500 MG/1
500 TABLET, FILM COATED ORAL DAILY
Qty: 90 TABLET | Refills: 1 | Status: SHIPPED | OUTPATIENT
Start: 2025-09-05

## (undated) DEVICE — SHEARS HARMONIC 5CM 36CM

## (undated) DEVICE — ELECTRODE REM PLYHSV RETURN 9

## (undated) DEVICE — COVER PROXIMA MAYO STAND

## (undated) DEVICE — GLOVE SURGICAL LATEX SZ 6

## (undated) DEVICE — UNDERGLOVES BIOGEL PI SIZE 8

## (undated) DEVICE — COVER LIGHT HANDLE 80/CA

## (undated) DEVICE — KIT ANTIFOG

## (undated) DEVICE — GLOVE SURG BIOGEL LATEX SZ 7.5

## (undated) DEVICE — APPLICATOR CHLORAPREP ORN 26ML

## (undated) DEVICE — CANNULA ENDOPATH XCEL 5X100MM

## (undated) DEVICE — MANIPULATOR UTERINE

## (undated) DEVICE — DRAPE UINDERBUT GRAD PCH

## (undated) DEVICE — SUT MONOCRYL 4-0 PS-1 UND

## (undated) DEVICE — SET PNEUMOCLEAR HEAT HUM SE HF

## (undated) DEVICE — ADHESIVE DERMABOND ADVANCED

## (undated) DEVICE — TRAY CATH URETHRAL FOLEY 16FR

## (undated) DEVICE — MANIFOLD 4 PORT

## (undated) DEVICE — TRAY SKIN SCRUB WET PREMIUM

## (undated) DEVICE — NDL PNEUMO INSUFFLATI 120MM

## (undated) DEVICE — DRAPE LAVH SURG 109X109X100IN

## (undated) DEVICE — COVER CAMERA OPERATING ROOM

## (undated) DEVICE — SYR 10CC LUER LOCK

## (undated) DEVICE — TROCAR ENDOPATH XCEL 5X100MM

## (undated) DEVICE — GOWN NONREINF SET-IN SLV 2XL

## (undated) DEVICE — TROCAR ENDOPATH XCEL 8MM 10CM

## (undated) DEVICE — GOWN POLY REINF BRTH SLV XL

## (undated) DEVICE — SYR 3CC LUER LOC

## (undated) DEVICE — SOL NS 1000CC

## (undated) DEVICE — DRESSING TELFA STRL 4X3 LF

## (undated) DEVICE — PACK BASIC SETUP SC BR